# Patient Record
Sex: FEMALE | Race: WHITE | NOT HISPANIC OR LATINO | Employment: FULL TIME | URBAN - METROPOLITAN AREA
[De-identification: names, ages, dates, MRNs, and addresses within clinical notes are randomized per-mention and may not be internally consistent; named-entity substitution may affect disease eponyms.]

---

## 2017-01-01 ENCOUNTER — TELEPHONE (OUTPATIENT)
Dept: MEDICAL GROUP | Facility: MEDICAL CENTER | Age: 62
End: 2017-01-01

## 2017-01-01 ENCOUNTER — APPOINTMENT (OUTPATIENT)
Dept: VASCULAR LAB | Facility: MEDICAL CENTER | Age: 62
End: 2017-01-01
Attending: INTERNAL MEDICINE
Payer: COMMERCIAL

## 2017-01-01 ENCOUNTER — TELEPHONE (OUTPATIENT)
Dept: VASCULAR LAB | Facility: MEDICAL CENTER | Age: 62
End: 2017-01-01

## 2017-01-01 ENCOUNTER — HOSPITAL ENCOUNTER (OUTPATIENT)
Dept: RADIOLOGY | Facility: MEDICAL CENTER | Age: 62
End: 2017-12-03
Attending: INTERNAL MEDICINE
Payer: COMMERCIAL

## 2017-01-01 ENCOUNTER — ANTICOAGULATION MONITORING (OUTPATIENT)
Dept: VASCULAR LAB | Facility: MEDICAL CENTER | Age: 62
End: 2017-01-01

## 2017-01-01 ENCOUNTER — OFFICE VISIT (OUTPATIENT)
Dept: MEDICAL GROUP | Facility: MEDICAL CENTER | Age: 62
End: 2017-01-01
Payer: COMMERCIAL

## 2017-01-01 VITALS — HEART RATE: 83 BPM | DIASTOLIC BLOOD PRESSURE: 55 MMHG | SYSTOLIC BLOOD PRESSURE: 143 MMHG

## 2017-01-01 VITALS
BODY MASS INDEX: 43.94 KG/M2 | DIASTOLIC BLOOD PRESSURE: 70 MMHG | TEMPERATURE: 97.2 F | RESPIRATION RATE: 14 BRPM | HEART RATE: 86 BPM | WEIGHT: 238.8 LBS | HEIGHT: 62 IN | OXYGEN SATURATION: 98 % | SYSTOLIC BLOOD PRESSURE: 120 MMHG

## 2017-01-01 DIAGNOSIS — I82.403 ACUTE DEEP VEIN THROMBOSIS (DVT) OF BOTH LOWER EXTREMITIES, UNSPECIFIED VEIN (HCC): ICD-10-CM

## 2017-01-01 DIAGNOSIS — I82.403 DEEP VEIN THROMBOSIS (DVT) OF BOTH LOWER EXTREMITIES, UNSPECIFIED CHRONICITY, UNSPECIFIED VEIN (HCC): ICD-10-CM

## 2017-01-01 DIAGNOSIS — Z00.00 PREVENTATIVE HEALTH CARE: ICD-10-CM

## 2017-01-01 DIAGNOSIS — C78.7 ADENOCARCINOMA OF COLON METASTATIC TO LIVER (HCC): ICD-10-CM

## 2017-01-01 DIAGNOSIS — Z12.31 ENCOUNTER FOR SCREENING MAMMOGRAM FOR MALIGNANT NEOPLASM OF BREAST: ICD-10-CM

## 2017-01-01 DIAGNOSIS — Z93.3 COLOSTOMY IN PLACE (HCC): ICD-10-CM

## 2017-01-01 DIAGNOSIS — Z79.01 CHRONIC ANTICOAGULATION: ICD-10-CM

## 2017-01-01 DIAGNOSIS — C18.9 ADENOCARCINOMA OF COLON METASTATIC TO LIVER (HCC): ICD-10-CM

## 2017-01-01 DIAGNOSIS — C18.7 MALIGNANT NEOPLASM OF SIGMOID COLON (HCC): ICD-10-CM

## 2017-01-01 DIAGNOSIS — Z00.00 HEALTH CARE MAINTENANCE: ICD-10-CM

## 2017-01-01 LAB
INR BLD: 1.9 (ref 0.9–1.2)
INR BLD: 2.2 (ref 0.9–1.2)
INR BLD: 2.3 (ref 0.9–1.2)
INR BLD: 2.9 (ref 0.9–1.2)
INR BLD: 3.1 (ref 0.9–1.2)
INR BLD: 3.9 (ref 0.9–1.2)
INR PPP: 1.9 (ref 2–3.5)
INR PPP: 2.2 (ref 2–3.5)
INR PPP: 2.3 (ref 2–3.5)
INR PPP: 2.9 (ref 2–3.5)
INR PPP: 3.1 (ref 2–3.5)
INR PPP: 3.9 (ref 2–3.5)

## 2017-01-01 PROCEDURE — 99214 OFFICE O/P EST MOD 30 MIN: CPT | Performed by: INTERNAL MEDICINE

## 2017-01-01 PROCEDURE — 85610 PROTHROMBIN TIME: CPT

## 2017-01-01 PROCEDURE — 99212 OFFICE O/P EST SF 10 MIN: CPT | Performed by: NURSE PRACTITIONER

## 2017-01-01 PROCEDURE — 700117 HCHG RX CONTRAST REV CODE 255: Performed by: INTERNAL MEDICINE

## 2017-01-01 PROCEDURE — 99211 OFF/OP EST MAY X REQ PHY/QHP: CPT | Performed by: NURSE PRACTITIONER

## 2017-01-01 PROCEDURE — 71260 CT THORAX DX C+: CPT

## 2017-01-01 RX ADMIN — IOHEXOL 100 ML: 350 INJECTION, SOLUTION INTRAVENOUS at 09:46

## 2017-01-01 RX ADMIN — IOHEXOL 50 ML: 240 INJECTION, SOLUTION INTRATHECAL; INTRAVASCULAR; INTRAVENOUS; ORAL at 08:40

## 2017-01-04 ENCOUNTER — ANTICOAGULATION MONITORING (OUTPATIENT)
Dept: VASCULAR LAB | Facility: MEDICAL CENTER | Age: 62
End: 2017-01-04
Attending: NURSE PRACTITIONER
Payer: COMMERCIAL

## 2017-01-04 DIAGNOSIS — I82.403 DEEP VEIN THROMBOSIS (DVT) OF BOTH LOWER EXTREMITIES, UNSPECIFIED CHRONICITY, UNSPECIFIED VEIN (HCC): ICD-10-CM

## 2017-01-04 LAB
INR BLD: 2.3 (ref 0.9–1.2)
INR PPP: 2.3 (ref 2–3.5)

## 2017-01-04 PROCEDURE — 85610 PROTHROMBIN TIME: CPT

## 2017-01-04 PROCEDURE — 99211 OFF/OP EST MAY X REQ PHY/QHP: CPT | Performed by: NURSE PRACTITIONER

## 2017-01-04 NOTE — MR AVS SNAPSHOT
Tamara Hollinsnavin Flynn   2017 11:45 AM   Anticoagulation Monitoring   MRN: [id number]    Department:  Vascular Medicine   Dept Phone:  [phone]    Description:  Female : 1955   Provider:  [facility] EXAM 4           Allergies as of 2017     No Known Allergies      Vital Signs     Smoking Status                   Never Smoker            Basic Information     Date Of Birth Sex Race Ethnicity Preferred Language    1955 Female White Non- English      Your appointments     2017 11:45 AM   Established Patient with [facility] EXAM 4   [facility]  (--)    [address]  [place] [address]   [phone]            2017 11:00 AM   Established Patient with Maritza Esparza M.D.   [facility]    [address]  [address]  [place] [address] [phone]           You will be receiving a confirmation call a few days before your appointment from our automated call confirmation system.              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 K63.2 High  2016 - Present    Deep vein thrombosis (DVT) of both lower extremities (HCC) I82.403   2016 - Present    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   2016 - Present    Encounter for screening mammogram for malignant neoplasm of breast Z12.31   2016 - Present      Health Maintenance        Date Due Completion  Dates    IMM DTaP/Tdap/Td Vaccine (1 - Tdap) 10/23/1973 ---    PAP SMEAR 10/23/1975 ---    COLONOSCOPY 10/23/2004 ---    IMM ZOSTER VACCINE 10/23/2014 ---    MAMMOGRAM 11/1/2017 11/1/2016            Results     POCT Protime      Component    INR    2.3                        Current Immunizations     Influenza Vaccine Quad Inj (Pf) 11/12/2015    Influenza Vaccine Quad Inj (Preserved) 10/10/2016      Below and/or attached are the medications your provider expects you to take. Review all of your home medications and newly ordered medications with your provider and/or pharmacist. Follow medication instructions as directed by your provider and/or pharmacist. Please keep your medication list with you and share with your provider. Update the information when medications are discontinued, doses are changed, or new medications (including over-the-counter products) are added; and carry medication information at all times in the event of emergency situations     Allergies:  No Known Allergies          Medications  Valid as of: January 04, 2017 - 11:58 AM    Generic Name Brand Name Tablet Size Instructions for use    Aspirin-Caffeine (Tab) Aspirin-Caffeine 500-32.5 MG Take 2 Tabs by mouth 1 time daily as needed.        Cholecalciferol (Cap) Vitamin D 2000 UNITS Take 2 Caps by mouth every day.        Furosemide (Tab) LASIX 20 MG Take 20 mg by mouth 1 time daily as needed.        Multiple Vitamins-Calcium   Take 1 Tab by mouth every day.        Oxybutynin Chloride (Tab) DITROPAN 5 MG Take 5 mg by mouth 3 times a day as needed.        Oxycodone-Acetaminophen (Tab) PERCOCET 5-325 MG Take 1-2 Tabs by mouth every four hours as needed.        Warfarin Sodium (Tab) COUMADIN 6 MG Take 1 tab by mouth daily or as directed by coumadin melo        .                 Medicines prescribed today were sent to:     [facility]  [place] (), [place] - [address]    [address] [place] [address] [place] () [place] [address]    Phone: [phone]  Fax: [phone]    Open 24 Hours?: No      Medication refill instructions:       If your prescription bottle indicates you have medication refills left, it is not necessary to call your provider’s office. Please contact your pharmacy and they will refill your medication.    If your prescription bottle indicates you do not have any refills left, you may request refills at any time through one of the following ways: The online [employer] system (except Urgent Care), by calling your provider’s office, or by asking your pharmacy to contact your provider’s office with a refill request. Medication refills are processed only during regular business hours and may not be available until the next business day. Your provider may request additional information or to have a follow-up visit with you prior to refilling your medication.   *Please Note: Medication refills are assigned a new Rx number when refilled electronically. Your pharmacy may indicate that no refills were authorized even though a new prescription for the same medication is available at the pharmacy. Please request the medicine by name with the pharmacy before contacting your provider for a refill.        Warfarin Dosing Calendar   January 2017 Details    Sun Mon Tue Wed Thu Fri Sat     1               2               3               4   2.3   3 mg   See details      5      6 mg         6      3 mg         7      6 mg           8      6 mg         9      3 mg         10      6 mg         11      3 mg         12      6 mg         13      3 mg         14      6 mg           15      6 mg         16      3 mg         17      6 mg         18      3 mg         19      6 mg         20      3 mg         21      6 mg           22      6 mg         23      3 mg         24      6 mg         25      3 mg         26      6 mg         27      3 mg         28      6 mg           29      6 mg         30      3 mg         31      6 mg              Date Details   01/04 This INR  check   INR: 2.3               How to take your warfarin dose     To take:  3 mg Take 0.5 of a 6 mg tablet.    To take:  6 mg Take 1 of the 6 mg tablets.           Warfarin Dosing Calendar   February 2017 Details    Sun Mon Tue Wed Thu Fri Sat        1      3 mg         2               3               4                 5               6               7               8               9               10               11                 12               13               14               15               16               17               18                 19               20               21               22               23               24               25                 26               27               28                    Date Details   No additional details    Date of next INR:  2/1/2017         How to take your warfarin dose     To take:  3 mg Take 0.5 of a 6 mg tablet.              [employer] Access Code: Activation code not generated  Current [employer] Status: Active

## 2017-01-04 NOTE — PROGRESS NOTES
Anticoagulation Summary as of 1/4/2017     INR goal 2.0-3.0   Selected INR 2.3 (1/4/2017)   Maintenance plan 3 mg (6 mg x 0.5) on Mon, Wed, Fri; 6 mg (6 mg x 1) all other days   Weekly total 33 mg   No change documented Re Briones A.P.N.   Plan last modified ASTRID ShinP.ZACH (11/7/2016)   Next INR check 2/1/2017   Target end date Indefinite    Indications   Deep vein thrombosis (DVT) of both lower extremities (HCC) [I82.403]         Anticoagulation Episode Summary     INR check location     Preferred lab     Send INR reminders to     [facility]      Anticoagulation Care Providers     Provider Role Specialty Phone number    Yimi Martinez M.D. Referring Surgery [phone]    DIMITRI SchofieldD Responsible      [facility] Responsible  [phone]        Patient is therapeutic today. Denies any medication or diet changes. No current symptoms of bleeding or thrombosis reported. Continue current regimen. Follow up in 4 weeks.    Next Appointment: Wednesday, February 1, 2017 at 11:45 am.     Re HUITRON

## 2017-01-18 ENCOUNTER — ANTICOAGULATION MONITORING (OUTPATIENT)
Dept: VASCULAR LAB | Facility: MEDICAL CENTER | Age: 62
End: 2017-01-18

## 2017-01-18 DIAGNOSIS — I82.403 DEEP VEIN THROMBOSIS (DVT) OF BOTH LOWER EXTREMITIES, UNSPECIFIED CHRONICITY, UNSPECIFIED VEIN (HCC): ICD-10-CM

## 2017-01-19 ENCOUNTER — ANTICOAGULATION VISIT (OUTPATIENT)
Dept: VASCULAR LAB | Facility: MEDICAL CENTER | Age: 62
End: 2017-01-19
Attending: NURSE PRACTITIONER
Payer: COMMERCIAL

## 2017-01-19 DIAGNOSIS — I82.403 DEEP VEIN THROMBOSIS (DVT) OF BOTH LOWER EXTREMITIES, UNSPECIFIED CHRONICITY, UNSPECIFIED VEIN (HCC): ICD-10-CM

## 2017-01-19 LAB — INR PPP: 2.2 (ref 2–3.5)

## 2017-01-19 PROCEDURE — 85610 PROTHROMBIN TIME: CPT

## 2017-01-19 PROCEDURE — 99213 OFFICE O/P EST LOW 20 MIN: CPT

## 2017-01-19 NOTE — PATIENT INSTRUCTIONS
Pt is scheduled for breast needle biopsy 1-   Stated weight 214 lbs  ClCr >60ml/min    January 20 - last dose of warfarin   January 21 - NO WARFARIN, Lovenox 100mg twice daily sub q  January 22 - NO WARFARIN, Lovenox 100mg twice daily sub q  January 23 - NO WARFARIN, Lovenox 100mg twice daily sub q  January 24 - No warfarin, Lovenox in the morning ONLY  January 25- Procedure day - may start warfarin after procedure if ok with MD  January 26 - Warfarin 6mg  AND Lovenox 100mg twice daily   January 27-  Warfarin 6mg  AND Lovenox 100mg twice daily   January 28- Warfarin 6mg  AND Lovenox 100mg twice daily   January 29- Warfarin 6mg  AND Lovenox 100mg twice daily   January 30-test INR Lovenox 100mg in the am

## 2017-01-19 NOTE — PROGRESS NOTES
Anticoagulation Summary as of 1/19/2017     INR goal 2.0-3.0   Selected INR 2.2 (1/19/2017)   Maintenance plan 3 mg (6 mg x 0.5) on Mon, Wed, Fri; 6 mg (6 mg x 1) all other days   Weekly total 33 mg   Plan last modified DEBBI Shin (11/7/2016)   Next INR check 1/30/2017   Target end date Indefinite    Indications   Deep vein thrombosis (DVT) of both lower extremities (CMS-HCC) [I82.403]         Anticoagulation Episode Summary     INR check location     Preferred lab     Send INR reminders to     [facility]      Anticoagulation Care Providers     Provider Role Specialty Phone number    Yimi Martinez M.D. Referring Surgery [phone]    Jael Garcia PHARMD Responsible      [facility] Responsible  [phone]        Pt is therapeutic today. Pt is to continue with current warfarin dosing regimen.  Pt denies any unusual s/s of bleeding, bruising, clotting or any changes to diet or medications.  Follow up in 2 weeks.    Jael Garcia PHARMD      Pt is scheduled for breast needle biopsy 1-   Stated weight 214 lbs  ClCr >60ml/min    January 20 - last dose of warfarin   January 21 - NO WARFARIN, Lovenox 100mg twice daily sub q  January 22 - NO WARFARIN, Lovenox 100mg twice daily sub q  January 23 - NO WARFARIN, Lovenox 100mg twice daily sub q  January 24 - No warfarin, Lovenox in the morning ONLY  January 25- Procedure day - may start warfarin after procedure if ok with MD  January 26 - Warfarin 6mg  AND Lovenox 100mg twice daily   January 27-  Warfarin 6mg  AND Lovenox 100mg twice daily   January 28- Warfarin 6mg  AND Lovenox 100mg twice daily   January 29- Warfarin 6mg  AND Lovenox 100mg twice daily   January 30-test INR Lovenox 100mg in the am

## 2017-01-19 NOTE — MR AVS SNAPSHOT
Tamara Caitie lFynn   2017 11:30 AM   Anticoagulation Visit   MRN: [id number]    Department:  Vascular Medicine   Dept Phone:  [phone]    Description:  Female : 1955   Provider:  [facility] EXAM 1           Allergies as of 2017     No Known Allergies      You were diagnosed with     Deep vein thrombosis (DVT) of both lower extremities, unspecified chronicity, unspecified vein (CMS-HCC)   [4128543]         Vital Signs     Smoking Status                   Never Smoker            Basic Information     Date Of Birth Sex Race Ethnicity Preferred Language    1955 Female White Non- English      Your appointments     2017  8:00 AM   CT [id number] with [facility] CT 1, NURSE, IR IMAGING(CT/ULTRASOUND), RADIOLOGIST, CT/US INTERVENTIONAL [facility] IMAGING - CT - [facility]    [address] [address]   [phone]            2017 10:30 AM   Established Patient with [facility] EXAM 5   [facility]  (--)    [address] [address]   [phone]            2017 11:45 AM   Established Patient with [facility] EXAM 4   [facility]  (--)    [address] [address]   [phone]            2017 11:00 AM   Established Patient with Maritza Esparza M.D.   [facility]    [address]  [address]  [facility] [id number] [phone]           You will be receiving a confirmation call a few days before your appointment from our automated call confirmation system.              Problem List              ICD-10-CM Priority Class Noted - Resolved    Morbid obesity with BMI of 40.0-44.9, adult (CMS-HCC) E66.01, Z68.41   2015 - Present    Health care maintenance Z00.00   2015 - Present    Prediabetes R73.03   2015 - Present    Adenocarcinoma of sigmoid  colon (CMS-HCC) C18.7   3/22/2016 - Present    Family history of primary TB Z83.6 Low  4/20/2016 - Present    Adenocarcinoma of colon metastatic to liver (CMS-HCC) C18.9, C78.7 High  4/20/2016 - Present    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 K63.2 High  4/20/2016 - Present    Deep vein thrombosis (DVT) of both lower extremities (CMS-[facility]) I82.403   5/23/2016 - Present    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 (CMS-[facility]) C78.5   8/9/2016 - Present    Encounter for screening mammogram for malignant neoplasm of breast Z12.31   9/20/2016 - Present      Health Maintenance        Date Due Completion Dates    IMM DTaP/Tdap/Td Vaccine (1 - Tdap) 10/23/1973 ---    PAP SMEAR 10/23/1975 ---    COLONOSCOPY 10/23/2004 ---    IMM ZOSTER VACCINE 10/23/2014 ---    MAMMOGRAM 11/1/2017 11/1/2016            Results     POCT Protime      Component    INR    2.2                        Current Immunizations     Influenza Vaccine Quad Inj (Pf) 11/12/2015    Influenza Vaccine Quad Inj (Preserved) 10/10/2016      Below and/or attached are the medications your provider expects you to take. Review all of your home medications and newly ordered medications with your provider and/or pharmacist. Follow medication instructions as directed by your provider and/or pharmacist. Please keep your medication list with you and share with your provider. Update the information when medications are discontinued, doses are changed, or new medications (including over-the-counter products) are added; and carry medication information at all times in the event of emergency situations     Allergies:  No Known Allergies          Medications  Valid as of: January 19, 2017 - 11:56 AM    Generic Name Brand Name Tablet Size Instructions for use    Aspirin-Caffeine (Tab) Aspirin-Caffeine 500-32.5 MG Take 2 Tabs by mouth 1 time daily as needed.        Cholecalciferol (Cap) Vitamin D 2000 UNITS Take 2 Caps by mouth every day.        Furosemide (Tab)  LASIX 20 MG Take 20 mg by mouth 1 time daily as needed.        Multiple Vitamins-Calcium   Take 1 Tab by mouth every day.        Oxybutynin Chloride (Tab) DITROPAN 5 MG Take 5 mg by mouth 3 times a day as needed.        Oxycodone-Acetaminophen (Tab) PERCOCET 5-325 MG Take 1-2 Tabs by mouth every four hours as needed.        Warfarin Sodium (Tab) COUMADIN 6 MG Take 1 tab by mouth daily or as directed by coumadin melo        .                 Medicines prescribed today were sent to:     [facility] [address] [place] [address] (), [place] - [address]    [address] () [place] [address]    Phone: [phone] Fax: [phone]    Open 24 Hours?: No      Medication refill instructions:       If your prescription bottle indicates you have medication refills left, it is not necessary to call your provider’s office. Please contact your pharmacy and they will refill your medication.    If your prescription bottle indicates you do not have any refills left, you may request refills at any time through one of the following ways: The online [employer] system (except Urgent Care), by calling your provider’s office, or by asking your pharmacy to contact your provider’s office with a refill request. Medication refills are processed only during regular business hours and may not be available until the next business day. Your provider may request additional information or to have a follow-up visit with you prior to refilling your medication.   *Please Note: Medication refills are assigned a new Rx number when refilled electronically. Your pharmacy may indicate that no refills were authorized even though a new prescription for the same medication is available at the pharmacy. Please request the medicine by name with the pharmacy before contacting your provider for a refill.        Instructions    Pt is scheduled for breast needle biopsy 1-   Stated weight 214 lbs  ClCr >60ml/min    January 20 - last dose of warfarin      January 21 - NO WARFARIN, Lovenox 100mg twice daily sub q  January 22 - NO WARFARIN, Lovenox 100mg twice daily sub q  January 23 - NO WARFARIN, Lovenox 100mg twice daily sub q  January 24 - No warfarin, Lovenox in the morning ONLY  January 25- Procedure day - may start warfarin after procedure if ok with MD  January 26 - Warfarin 6mg  AND Lovenox 100mg twice daily   January 27-  Warfarin 6mg  AND Lovenox 100mg twice daily   January 28- Warfarin 6mg  AND Lovenox 100mg twice daily   January 29- Warfarin 6mg  AND Lovenox 100mg twice daily   January 30-test INR Lovenox 100mg in the am         Warfarin Dosing Calendar   January 2017 Details    Sun Mon Tue Wed Thu Fri Sat     1               2               3               4               5               6               7                 8               9               10               11               12               13               14                 15               16               17               18               19   2.2   6 mg   See details      20      3 mg         21      Hold           22      Hold         23      Hold         24      Hold         25      6 mg         26      6 mg         27      6 mg         28      6 mg           29      6 mg         30      3 mg         31                    Date Details   01/19 This INR check   INR: 2.2       Date of next INR:  1/30/2017         How to take your warfarin dose     To take:  3 mg Take 0.5 of a 6 mg tablet.    To take:  6 mg Take 1 of the 6 mg tablets.    Hold Do not take your warfarin dose. See the Details table to the right for additional instructions.                   [employer] Access Code: Activation code not generated  Current [employer] Status: Active

## 2017-01-20 LAB — INR BLD: 2.2 (ref 0.9–1.2)

## 2017-01-24 RX ORDER — ACETAMINOPHEN 500 MG
500-1000 TABLET ORAL PRN
Status: ON HOLD | COMMUNITY
End: 2018-01-01

## 2017-01-25 ENCOUNTER — HOSPITAL ENCOUNTER (OUTPATIENT)
Facility: MEDICAL CENTER | Age: 62
End: 2017-01-25
Attending: INTERNAL MEDICINE | Admitting: INTERNAL MEDICINE
Payer: COMMERCIAL

## 2017-01-25 ENCOUNTER — APPOINTMENT (OUTPATIENT)
Dept: RADIOLOGY | Facility: MEDICAL CENTER | Age: 62
End: 2017-01-25
Attending: INTERNAL MEDICINE
Payer: COMMERCIAL

## 2017-01-25 VITALS
HEIGHT: 61 IN | DIASTOLIC BLOOD PRESSURE: 69 MMHG | TEMPERATURE: 98 F | HEART RATE: 77 BPM | RESPIRATION RATE: 16 BRPM | BODY MASS INDEX: 40.4 KG/M2 | SYSTOLIC BLOOD PRESSURE: 146 MMHG | OXYGEN SATURATION: 99 % | WEIGHT: 214 LBS

## 2017-01-25 DIAGNOSIS — C18.7 MALIGNANT NEOPLASM OF SIGMOID COLON (HCC): ICD-10-CM

## 2017-01-25 LAB
INR PPP: 0.93 (ref 0.87–1.13)
PROTHROMBIN TIME: 12.7 SEC (ref 12–14.6)

## 2017-01-25 PROCEDURE — 700102 HCHG RX REV CODE 250 W/ 637 OVERRIDE(OP): Performed by: RADIOLOGY

## 2017-01-25 PROCEDURE — 4410213 CT-NEEDLE BX-ABD-RETROPERITONL

## 2017-01-25 PROCEDURE — 700111 HCHG RX REV CODE 636 W/ 250 OVERRIDE (IP): Performed by: RADIOLOGY

## 2017-01-25 PROCEDURE — A9270 NON-COVERED ITEM OR SERVICE: HCPCS | Performed by: RADIOLOGY

## 2017-01-25 PROCEDURE — 85610 PROTHROMBIN TIME: CPT

## 2017-01-25 PROCEDURE — 700111 HCHG RX REV CODE 636 W/ 250 OVERRIDE (IP)

## 2017-01-25 PROCEDURE — 700101 HCHG RX REV CODE 250

## 2017-01-25 PROCEDURE — 88305 TISSUE EXAM BY PATHOLOGIST: CPT

## 2017-01-25 RX ORDER — ONDANSETRON 2 MG/ML
4 INJECTION INTRAMUSCULAR; INTRAVENOUS EVERY 8 HOURS PRN
Status: DISCONTINUED | OUTPATIENT
Start: 2017-01-25 | End: 2017-01-25 | Stop reason: HOSPADM

## 2017-01-25 RX ORDER — NALOXONE HYDROCHLORIDE 0.4 MG/ML
INJECTION, SOLUTION INTRAMUSCULAR; INTRAVENOUS; SUBCUTANEOUS
Status: COMPLETED
Start: 2017-01-25 | End: 2017-01-25

## 2017-01-25 RX ORDER — ONDANSETRON 2 MG/ML
4 INJECTION INTRAMUSCULAR; INTRAVENOUS PRN
Status: DISCONTINUED | OUTPATIENT
Start: 2017-01-25 | End: 2017-01-25 | Stop reason: HOSPADM

## 2017-01-25 RX ORDER — SODIUM CHLORIDE 9 MG/ML
500 INJECTION, SOLUTION INTRAVENOUS PRN
Status: DISCONTINUED | OUTPATIENT
Start: 2017-01-25 | End: 2017-01-25 | Stop reason: HOSPADM

## 2017-01-25 RX ORDER — MIDAZOLAM HYDROCHLORIDE 1 MG/ML
.5-2 INJECTION INTRAMUSCULAR; INTRAVENOUS PRN
Status: DISCONTINUED | OUTPATIENT
Start: 2017-01-25 | End: 2017-01-25 | Stop reason: HOSPADM

## 2017-01-25 RX ORDER — MIDAZOLAM HYDROCHLORIDE 1 MG/ML
INJECTION INTRAMUSCULAR; INTRAVENOUS
Status: COMPLETED
Start: 2017-01-25 | End: 2017-01-25

## 2017-01-25 RX ORDER — FLUMAZENIL 0.1 MG/ML
INJECTION INTRAVENOUS
Status: COMPLETED
Start: 2017-01-25 | End: 2017-01-25

## 2017-01-25 RX ORDER — ACETAMINOPHEN 500 MG
1000 TABLET ORAL EVERY 6 HOURS
Status: DISCONTINUED | OUTPATIENT
Start: 2017-01-25 | End: 2017-01-25 | Stop reason: HOSPADM

## 2017-01-25 RX ADMIN — MIDAZOLAM HYDROCHLORIDE 2 MG: 1 INJECTION, SOLUTION INTRAMUSCULAR; INTRAVENOUS at 09:20

## 2017-01-25 RX ADMIN — FENTANYL CITRATE 50 MCG: 50 INJECTION, SOLUTION INTRAMUSCULAR; INTRAVENOUS at 09:30

## 2017-01-25 RX ADMIN — FENTANYL CITRATE 25 MCG: 50 INJECTION, SOLUTION INTRAMUSCULAR; INTRAVENOUS at 09:10

## 2017-01-25 RX ADMIN — MIDAZOLAM HYDROCHLORIDE 1 MG: 1 INJECTION, SOLUTION INTRAMUSCULAR; INTRAVENOUS at 09:21

## 2017-01-25 RX ADMIN — FENTANYL CITRATE 25 MCG: 50 INJECTION, SOLUTION INTRAMUSCULAR; INTRAVENOUS at 09:31

## 2017-01-25 RX ADMIN — MIDAZOLAM HYDROCHLORIDE 1 MG: 1 INJECTION, SOLUTION INTRAMUSCULAR; INTRAVENOUS at 09:10

## 2017-01-25 RX ADMIN — MIDAZOLAM 1 MG: 1 INJECTION INTRAMUSCULAR; INTRAVENOUS at 09:10

## 2017-01-25 RX ADMIN — FENTANYL CITRATE 25 MCG: 50 INJECTION, SOLUTION INTRAMUSCULAR; INTRAVENOUS at 09:26

## 2017-01-25 RX ADMIN — FENTANYL CITRATE 50 MCG: 50 INJECTION, SOLUTION INTRAMUSCULAR; INTRAVENOUS at 09:20

## 2017-01-25 RX ADMIN — FENTANYL CITRATE 25 MCG: 50 INJECTION, SOLUTION INTRAMUSCULAR; INTRAVENOUS at 09:21

## 2017-01-25 RX ADMIN — MIDAZOLAM HYDROCHLORIDE 2 MG: 1 INJECTION, SOLUTION INTRAMUSCULAR; INTRAVENOUS at 09:26

## 2017-01-25 ASSESSMENT — PAIN SCALES - GENERAL
PAINLEVEL_OUTOF10: 0

## 2017-01-25 NOTE — PROGRESS NOTES
IR RN note:    Biopsy of Peritoneal Implant by MD Mcadams assisted by CT tech Rosangela,Right mid abdomen access site;  X 4 cores in formalin, x 1 core in RPMI sent to lab  Patient tolerated procedure, hemodynamically stable; report given to PPU COLLETTE Florian;   patient transported back to PPU via RN

## 2017-01-25 NOTE — OR NURSING
0949: Pt transferred to PPU 1 at this time. Denies any pain or discomfort. Dressing to R mid upper abd, CDI.  called and notified of d/c time.     1015: R mid upper abd dressing CDI.

## 2017-01-25 NOTE — IP AVS SNAPSHOT
" After Visit Summary                                                                                                                Name:Tamara Flynn  Medical Record Number:[id number]  CSN: [id number]    YOB: 1955   Age: 61 y.o.  Sex: female  HT:1.549 m (5' 0.98\") WT: 97.07 kg (214 lb)          Admit Date: 1/25/2017     Discharge Date:   Today's Date: 1/25/2017  Attending Doctor:  Margo Carvajal M.D.                  Allergies:  Review of patient's allergies indicates no known allergies.              Follow-up Information     1. Follow up with Margo Carvajal M.D. In 1 week.    Specialty:  Oncology    Contact information    [address]   [address]  [facility] [address] [phone]          Discharge Instructions         ACTIVITY: Rest and take it easy for the first 24 hours.  A responsible adult is recommended to remain with you during that time.  It is normal to feel sleepy.  We encourage you to not do anything that requires balance, judgment or coordination.    MILD FLU-LIKE SYMPTOMS ARE NORMAL. YOU MAY EXPERIENCE GENERALIZED MUSCLE ACHES, THROAT IRRITATION, HEADACHE AND/OR SOME NAUSEA.    FOR 24 HOURS DO NOT:  Drive, operate machinery or run household appliances.  Drink beer or alcoholic beverages.   Make important decisions or sign legal documents.    SPECIAL INSTRUCTIONS:     Biopsy  A biopsy is a procedure in which small samples of tissue are removed from the body. The tissue is examined under a microscope. A biopsy may be done to determine the cause (diagnosis) of a condition or mass (tumor). A biopsy may also be done to determine the best treatment for you. In some instances, a biopsy may be performed on normal tissue to determine if cancer has spread or if a transplanted organ is being rejected. There are 2 ways to obtain samples:  · Fine needle biopsy. Samples are removed using a thin needle inserted through the skin.   · Open biopsy. Samples are removed after a cut (incision) is made " through the skin.   LET YOUR CAREGIVER KNOW ABOUT:  · Allergies to food or medicine.   · Medicines taken, including vitamins, herbs, eyedrops, over-the-counter medicines, and creams.   · Use of steroids (by mouth or creams).   · Previous problems with anesthetics or numbing medicines.   · History of bleeding problems or blood clots.   · Previous surgery.   · Other health problems, including diabetes and kidney problems.   · Possibility of pregnancy, if this applies.   RISKS AND COMPLICATIONS  · Bleeding from the biopsy site. The risk of bleeding is higher if you have a bleeding disorder or are taking any blood thinning medicines (anticoagulants).   · Infection.   · Injury to organs or structures near the biopsy site.   · Chronic pain at the biopsy site. This is defined as pain that lasts for more than 3 months.   · Very rarely, a second biopsy may be required if not enough tissue was collected during the first biopsy.   BEFORE THE PROCEDURE  Ask your caregiver what time you need to arrive for your procedure. Ask your caregiver whether you need to stop eating or drinking (fast) before your procedure. Ask your caregiver about changing or stopping your regular medicines. A blood sample may be done to determine your blood clotting time. Medicine may be given to help you relax (sedative).  PROCEDURE  During a fine needle biopsy, you will be awake during the procedure. You will be positioned to allow the best possible access to the biopsy site. Let your caregiver know if the position is not comfortable. The biopsy site will be cleaned. A needle is inserted through your skin. You may feel mild discomfort during this procedure. The needle is withdrawn once tissue samples have been removed. Pressure may be applied to the biopsy site to reduce swelling and to ensure that bleeding has stopped. The samples will be sent to be examined.  During an open biopsy, you may be given medicine that numbs the area (local anesthetic) or  medicine that makes you sleep (general anesthetic). An incision is made through the skin. A tissue sample or the entire mass is removed. The sample or mass will be sent to be examined. Sometimes, the sample or mass may be examined during the procedure. If the sample or mass contains cancer cells, further tissue or structures may be removed. The incision is then closed with stitches (sutures) or skin glue (adhesive).  AFTER THE PROCEDURE  Your recovery will be assessed and monitored. If there are no problems, you should be able to go home shortly after the procedure (outpatient). You will need to arrange for someone to drive you home if you received a sedative or pain relieving medicine during the procedure. Ask when your test results will be ready. Make sure you get your test results.  Document Released: 12/15/2001 Document Revised: 03/11/2013 Document Reviewed: 06/14/2012  [employer]® Patient Information ©2013 [employer].    DIET: To avoid nausea, slowly advance diet as tolerated, avoiding spicy or greasy foods for the first day.  Add more substantial food to your diet according to your physician's instructions.  Babies can be fed formula or breast milk as soon as they are hungry.  INCREASE FLUIDS AND FIBER TO AVOID CONSTIPATION.    SURGICAL DRESSING/BATHING: May remove bandaid in 24 hours. May shower in 24 hours. Do not submerge in water (bath/swimming) for one week.     FOLLOW-UP APPOINTMENT:  A follow-up appointment should be arranged with your doctor; call to schedule.    You should CALL YOUR PHYSICIAN if you develop:  Fever greater than 101 degrees F.  Pain not relieved by medication, or persistent nausea or vomiting.  Excessive bleeding (blood soaking through dressing) or unexpected drainage from the wound.  Extreme redness or swelling around the incision site, drainage of pus or foul smelling drainage.  Inability to urinate or empty your bladder within 8 hours.  Problems with breathing or chest pain.    You  should call 911 if you develop problems with breathing or chest pain.  If you are unable to contact your doctor or surgical center, you should go to the nearest emergency room or urgent care center.  Physician's telephone #: Dr. Carvajal [phone]      If any questions arise, call your doctor.  If your doctor is not available, please feel free to call the Surgical Center at [phone].  The Center is open Monday through Friday from 7AM to 7PM.  You can also call the HEALTH HOTLINE open 24 hours/day, 7 days/week and speak to a nurse at [phone], or toll free at [phone].    A registered nurse may call you a few days after your surgery to see how you are doing after your procedure.    MEDICATIONS: Resume taking daily medication.  Take prescribed pain medication with food.  If no medication is prescribed, you may take non-aspirin pain medication if needed.  PAIN MEDICATION CAN BE VERY CONSTIPATING.  Take a stool softener or laxative such as senokot, pericolace, or milk of magnesia if needed.        If your physician has prescribed pain medication that includes Acetaminophen (Tylenol), do not take additional Acetaminophen (Tylenol) while taking the prescribed medication.    Depression / Suicide Risk    As you are discharged from this [facility] facility, it is important to learn how to keep safe from harming yourself.    Recognize the warning signs:  · Abrupt changes in personality, positive or negative- including increase in energy   · Giving away possessions  · Change in eating patterns- significant weight changes-  positive or negative  · Change in sleeping patterns- unable to sleep or sleeping all the time   · Unwillingness or inability to communicate  · Depression  · Unusual sadness, discouragement and loneliness  · Talk of wanting to die  · Neglect of personal appearance   · Rebelliousness- reckless behavior  · Withdrawal from people/activities they love  · Confusion- inability to  concentrate     If you or a loved one observes any of these behaviors or has concerns about self-harm, here's what you can do:  · Talk about it- your feelings and reasons for harming yourself  · Remove any means that you might use to hurt yourself (examples: pills, rope, extension cords, firearm)  · Get professional help from the community (Mental Health, Substance Abuse, psychological counseling)  · Do not be alone:Call your Safe Contact- someone whom you trust who will be there for you.  · Call your local CRISIS HOTLINE 702-5448 or 980-328-4156  · Call your local Children's Mobile Crisis Response Team [place] (483) 608-8492 or www.Oxonica  · Call the toll free National Suicide Prevention Hotlines   · National Suicide Prevention Lifeline 234-229-UWLQ (9152)  · [place] Hope Line Network 800-SUICIDE (586-5010)       Medication List      CONTINUE taking these medications        Instructions    acetaminophen 500 MG Tabs   Commonly known as:  TYLENOL    Take 500-1,000 mg by mouth as needed.   Dose:  500-1000 mg       vitamin D 1000 UNIT Tabs   Commonly known as:  cholecalciferol    Take 1,000 Units by mouth every day.   Dose:  1000 Units       warfarin 6 MG Tabs   Commonly known as:  COUMADIN    Take 1 tab by mouth daily or as directed by coumadin melo               Medication Information     Above and/or attached are the medications your physician expects you to take upon discharge. Review all of your home medications and newly ordered medications with your doctor and/or pharmacist. Follow medication instructions as directed by your doctor and/or pharmacist. Please keep your medication list with you and share with your physician. Update the information when medications are discontinued, doses are changed, or new medications (including over-the-counter products) are added; and carry medication information at all times in the event of emergency situations.        Resources     Quit Smoking / Tobacco  Use:    I understand the use of any tobacco products increases my chance of suffering from future heart disease or stroke and could cause other illnesses which may shorten my life. Quitting the use of tobacco products is the single most important thing I can do to improve my health. For further information on smoking / tobacco cessation call a Toll Free Quit Line at 1-710.317.1337 (*National [employer] [place]) or 1-451.468.9985 (American Lung Association) or you can access the web based program at www.lungusa.org.    [place] Tobacco Users Help Line:  (877) 417-8882       Toll Free: 1-630.850.4401  Quit Tobacco Program [facility] (133)931-0034    Crisis Hotline:    [place] Crisis Hotline:  3-289-SHTIQGW or 1-625.647.1305    [place] Crisis Hotline:    1-255.620.8099 or 332-489-5571    Discharge Survey:   Thank you for choosing [facility]. We hope we did everything we could to make your hospital stay a pleasant one. You may be receiving a survey and we would appreciate your time and participation in answering the questions. Your input is very valuable to us in our efforts to improve our service to our patients and their families.            Signatures     My signature on this form indicates that:    1. I acknowledge receipt and understanding of these Home Care Instruction.  2. My questions regarding this information have been answered to my satisfaction.  3. I have formulated a plan with my discharge nurse to obtain my prescribed medications for home.    __________________________________      __________________________________                   Patient Signature                                 Guardian/Responsible Adult Signature      __________________________________                 __________       ________                       Nurse Signature                                               Date                 Time

## 2017-01-25 NOTE — IP AVS SNAPSHOT
1/25/2017          Tamara Flynn  [address] [place] [address]    Dear Tamara:    Critical access hospital wants to ensure your discharge home is safe and you or your loved ones have had all your questions answered regarding your care after you leave the hospital.    You may receive a telephone call within two days of your discharge.  This call is to make certain you understand your discharge instructions as well as ensure we provided you with the best care possible during your stay with us.     The call will only last approximately 3-5 minutes and will be done by a nurse.    Once again, we want to ensure your discharge home is safe and that you have a clear understanding of any next steps in your care.  If you have any questions or concerns, please do not hesitate to contact us, we are here for you.  Thank you for choosing [facility] for your healthcare needs.    Sincerely,    Reg Brower    [facility]

## 2017-01-25 NOTE — DISCHARGE INSTRUCTIONS
ACTIVITY: Rest and take it easy for the first 24 hours.  A responsible adult is recommended to remain with you during that time.  It is normal to feel sleepy.  We encourage you to not do anything that requires balance, judgment or coordination.    MILD FLU-LIKE SYMPTOMS ARE NORMAL. YOU MAY EXPERIENCE GENERALIZED MUSCLE ACHES, THROAT IRRITATION, HEADACHE AND/OR SOME NAUSEA.    FOR 24 HOURS DO NOT:  Drive, operate machinery or run household appliances.  Drink beer or alcoholic beverages.   Make important decisions or sign legal documents.    SPECIAL INSTRUCTIONS:     Biopsy  A biopsy is a procedure in which small samples of tissue are removed from the body. The tissue is examined under a microscope. A biopsy may be done to determine the cause (diagnosis) of a condition or mass (tumor). A biopsy may also be done to determine the best treatment for you. In some instances, a biopsy may be performed on normal tissue to determine if cancer has spread or if a transplanted organ is being rejected. There are 2 ways to obtain samples:  · Fine needle biopsy. Samples are removed using a thin needle inserted through the skin.   · Open biopsy. Samples are removed after a cut (incision) is made through the skin.   LET YOUR CAREGIVER KNOW ABOUT:  · Allergies to food or medicine.   · Medicines taken, including vitamins, herbs, eyedrops, over-the-counter medicines, and creams.   · Use of steroids (by mouth or creams).   · Previous problems with anesthetics or numbing medicines.   · History of bleeding problems or blood clots.   · Previous surgery.   · Other health problems, including diabetes and kidney problems.   · Possibility of pregnancy, if this applies.   RISKS AND COMPLICATIONS  · Bleeding from the biopsy site. The risk of bleeding is higher if you have a bleeding disorder or are taking any blood thinning medicines (anticoagulants).   · Infection.   · Injury to organs or structures near the biopsy site.   · Chronic pain at  the biopsy site. This is defined as pain that lasts for more than 3 months.   · Very rarely, a second biopsy may be required if not enough tissue was collected during the first biopsy.   BEFORE THE PROCEDURE  Ask your caregiver what time you need to arrive for your procedure. Ask your caregiver whether you need to stop eating or drinking (fast) before your procedure. Ask your caregiver about changing or stopping your regular medicines. A blood sample may be done to determine your blood clotting time. Medicine may be given to help you relax (sedative).  PROCEDURE  During a fine needle biopsy, you will be awake during the procedure. You will be positioned to allow the best possible access to the biopsy site. Let your caregiver know if the position is not comfortable. The biopsy site will be cleaned. A needle is inserted through your skin. You may feel mild discomfort during this procedure. The needle is withdrawn once tissue samples have been removed. Pressure may be applied to the biopsy site to reduce swelling and to ensure that bleeding has stopped. The samples will be sent to be examined.  During an open biopsy, you may be given medicine that numbs the area (local anesthetic) or medicine that makes you sleep (general anesthetic). An incision is made through the skin. A tissue sample or the entire mass is removed. The sample or mass will be sent to be examined. Sometimes, the sample or mass may be examined during the procedure. If the sample or mass contains cancer cells, further tissue or structures may be removed. The incision is then closed with stitches (sutures) or skin glue (adhesive).  AFTER THE PROCEDURE  Your recovery will be assessed and monitored. If there are no problems, you should be able to go home shortly after the procedure (outpatient). You will need to arrange for someone to drive you home if you received a sedative or pain relieving medicine during the procedure. Ask when your test results will  be ready. Make sure you get your test results.  Document Released: 12/15/2001 Document Revised: 03/11/2013 Document Reviewed: 06/14/2012  [employer]Care® Patient Information ©2013 [employer].    DIET: To avoid nausea, slowly advance diet as tolerated, avoiding spicy or greasy foods for the first day.  Add more substantial food to your diet according to your physician's instructions.  Babies can be fed formula or breast milk as soon as they are hungry.  INCREASE FLUIDS AND FIBER TO AVOID CONSTIPATION.    SURGICAL DRESSING/BATHING: May remove bandaid in 24 hours. May shower in 24 hours. Do not submerge in water (bath/swimming) for one week.     FOLLOW-UP APPOINTMENT:  A follow-up appointment should be arranged with your doctor; call to schedule.    You should CALL YOUR PHYSICIAN if you develop:  Fever greater than 101 degrees F.  Pain not relieved by medication, or persistent nausea or vomiting.  Excessive bleeding (blood soaking through dressing) or unexpected drainage from the wound.  Extreme redness or swelling around the incision site, drainage of pus or foul smelling drainage.  Inability to urinate or empty your bladder within 8 hours.  Problems with breathing or chest pain.    You should call 911 if you develop problems with breathing or chest pain.  If you are unable to contact your doctor or surgical center, you should go to the nearest emergency room or urgent care center.  Physician's telephone #: Dr. Carvajal [phone]      If any questions arise, call your doctor.  If your doctor is not available, please feel free to call the Surgical Center at [phone].  The Center is open Monday through Friday from 7AM to 7PM.  You can also call the [employer] HOTLINE open 24 hours/day, 7 days/week and speak to a nurse at [phone], or toll free at [phone].    A registered nurse may call you a few days after your surgery to see how you are doing after your procedure.    MEDICATIONS: Resume taking daily  medication.  Take prescribed pain medication with food.  If no medication is prescribed, you may take non-aspirin pain medication if needed.  PAIN MEDICATION CAN BE VERY CONSTIPATING.  Take a stool softener or laxative such as senokot, pericolace, or milk of magnesia if needed.        If your physician has prescribed pain medication that includes Acetaminophen (Tylenol), do not take additional Acetaminophen (Tylenol) while taking the prescribed medication.    Depression / Suicide Risk    As you are discharged from this [facility] access hospital facility, it is important to learn how to keep safe from harming yourself.    Recognize the warning signs:  · Abrupt changes in personality, positive or negative- including increase in energy   · Giving away possessions  · Change in eating patterns- significant weight changes-  positive or negative  · Change in sleeping patterns- unable to sleep or sleeping all the time   · Unwillingness or inability to communicate  · Depression  · Unusual sadness, discouragement and loneliness  · Talk of wanting to die  · Neglect of personal appearance   · Rebelliousness- reckless behavior  · Withdrawal from people/activities they love  · Confusion- inability to concentrate     If you or a loved one observes any of these behaviors or has concerns about self-harm, here's what you can do:  · Talk about it- your feelings and reasons for harming yourself  · Remove any means that you might use to hurt yourself (examples: pills, rope, extension cords, firearm)  · Get professional help from the community (Mental Health, Substance Abuse, psychological counseling)  · Do not be alone:Call your Safe Contact- someone whom you trust who will be there for you.  · Call your local CRISIS HOTLINE 117-4468 or 124-591-6545  · Call your local Children's Mobile Crisis Response Team [place] (146) 534-4233 or www.ClientShow  · Call the toll free National Suicide Prevention Hotlines   · National Suicide Prevention  Lifeline 468-711-CUNF (6736)  · National [place] Line Network 800-SUICIDE (570-0679)

## 2017-01-25 NOTE — OR SURGEON
Immediate Post- Operative Note    PostOp Diagnosis: ANTERIOR ABDOMINAL MASS/NODE. HX COLON CA.      Procedure(s): CT GUIDED PERITONEAL MASS BIOPSY    18G CORES X5. 4 IN FORMALIN. ONE IN RPMI.      Estimated Blood Loss: <1CC        Complications: NONE            1/25/2017     9:33 AM     Allen Mcadams

## 2017-01-30 ENCOUNTER — ANTICOAGULATION VISIT (OUTPATIENT)
Dept: VASCULAR LAB | Facility: MEDICAL CENTER | Age: 62
End: 2017-01-30
Attending: NURSE PRACTITIONER
Payer: COMMERCIAL

## 2017-01-30 DIAGNOSIS — I82.403 DEEP VEIN THROMBOSIS (DVT) OF BOTH LOWER EXTREMITIES, UNSPECIFIED CHRONICITY, UNSPECIFIED VEIN (HCC): ICD-10-CM

## 2017-01-30 LAB — INR PPP: 1.3 (ref 2–3.5)

## 2017-01-30 PROCEDURE — 99212 OFFICE O/P EST SF 10 MIN: CPT | Performed by: NURSE PRACTITIONER

## 2017-01-30 PROCEDURE — 85610 PROTHROMBIN TIME: CPT

## 2017-01-30 NOTE — MR AVS SNAPSHOT
Tamara Caitie Flynn   2017 10:30 AM   Anticoagulation Visit   MRN: [id number]    Department:  Vascular Medicine   Dept Phone:  [phone]    Description:  Female : 1955   Provider:  [facility] EXAM 5           Allergies as of 2017     No Known Allergies      You were diagnosed with     Deep vein thrombosis (DVT) of both lower extremities, unspecified chronicity, unspecified vein (CMS-HCC)   [1577568]         Vital Signs     Smoking Status                   Never Smoker            Basic Information     Date Of Birth Sex Race Ethnicity Preferred Language    1955 Female White Non- English      Your appointments     2017 11:45 AM   Established Patient with [facility] EXAM 4   [facility]  (--)    [address]  [place] [address]   [phone]            2017 11:00 AM   Established Patient with Maritza Esparza M.D.   [facility]    [address]  [address]  [place] [address] [phone]           You will be receiving a confirmation call a few days before your appointment from our automated call confirmation system.              Problem List              ICD-10-CM Priority Class Noted - Resolved    Morbid obesity with BMI of 40.0-44.9, adult (CMS-[facility]) E66.01, Z68.41   2015 - Present    Health care maintenance Z00.00   2015 - Present    Prediabetes R73.03   2015 - Present    Adenocarcinoma of sigmoid colon (CMS-HCC) C18.7   3/22/2016 - Present    Family history of primary TB Z83.6 Low  2016 - Present    Adenocarcinoma of colon metastatic to liver (CMS-HCC) C18.9, C78.7 High  2016 - Present    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 K63.2 High  2016 - Present    Deep vein thrombosis (DVT) of both lower extremities (CMS-HCC) I82.403   2016 - Present    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    8/9/2016 - Present    Encounter for screening mammogram for malignant neoplasm of breast Z12.31   9/20/2016 - Present      Health Maintenance        Date Due Completion Dates    IMM DTaP/Tdap/Td Vaccine (1 - Tdap) 10/23/1973 ---    PAP SMEAR 10/23/1975 ---    IMM ZOSTER VACCINE 10/23/2014 ---    MAMMOGRAM 11/1/2017 11/1/2016    COLONOSCOPY 3/22/2026 3/22/2016            Results     POCT Protime      Component    INR    1.3                        Current Immunizations     Influenza Vaccine Quad Inj (Pf) 11/12/2015    Influenza Vaccine Quad Inj (Preserved) 10/10/2016      Below and/or attached are the medications your provider expects you to take. Review all of your home medications and newly ordered medications with your provider and/or pharmacist. Follow medication instructions as directed by your provider and/or pharmacist. Please keep your medication list with you and share with your provider. Update the information when medications are discontinued, doses are changed, or new medications (including over-the-counter products) are added; and carry medication information at all times in the event of emergency situations     Allergies:  No Known Allergies          Medications  Valid as of: January 30, 2017 - 10:40 AM    Generic Name Brand Name Tablet Size Instructions for use    Acetaminophen (Tab) TYLENOL 500 MG Take 500-1,000 mg by mouth as needed.        Cholecalciferol (Tab) cholecalciferol 1000 UNIT Take 1,000 Units by mouth every day.        Enoxaparin Sodium (Solution) LOVENOX 100 MG/ML Inject 100 mg as instructed every 12 hours.        Warfarin Sodium (Tab) COUMADIN 6 MG Take 1 tab by mouth daily or as directed by coumadin melo        .                 Medicines prescribed today were sent to:     [facility] [address] [place] [address] (), [place] - [address]    [address] [place] [address] [place] () [place] [address]    Phone: [phone] Fax: [phone]    Open 24 Hours?: No      Medication refill instructions:       If  your prescription bottle indicates you have medication refills left, it is not necessary to call your provider’s office. Please contact your pharmacy and they will refill your medication.    If your prescription bottle indicates you do not have any refills left, you may request refills at any time through one of the following ways: The online [employer] system (except Urgent Care), by calling your provider’s office, or by asking your pharmacy to contact your provider’s office with a refill request. Medication refills are processed only during regular business hours and may not be available until the next business day. Your provider may request additional information or to have a follow-up visit with you prior to refilling your medication.   *Please Note: Medication refills are assigned a new Rx number when refilled electronically. Your pharmacy may indicate that no refills were authorized even though a new prescription for the same medication is available at the pharmacy. Please request the medicine by name with the pharmacy before contacting your provider for a refill.        Warfarin Dosing Calendar January 2017 Details    Sun Mon Tue Wed Thu Fri Sat     1               2               3               4               5               6               7                 8               9               10               11               12               13               14                 15               16               17               18               19               20               21                 22               23               24               25               26               27               28                 29               30   1.3   6 mg   See details      31      6 mg   See details           Date Details   01/30 This INR check   Take 6 mg (6 mg tablets x 1)   Lovenox 100 mg twice daily   INR: 1.3      01/31 Lovenox 100 mg twice daily               How to take your warfarin dose     To take:  6 mg  Take 1 of the 6 mg tablets.           Warfarin Dosing Calendar   February 2017 Details    Sun Mon Tue Wed Thu Fri Sat        1      6 mg   See details      2      6 mg   See details      3      3 mg         4                 5               6               7               8               9               10               11                 12               13               14               15               16               17               18                 19               20               21               22               23               24               25                 26               27               28                    Date Details   02/01 Take 6 mg (6 mg tablets x 1)   Lovenox 100 mg twice daily      02/02 Lovenox 100 mg twice daily       Date of next INR:  2/3/2017         How to take your warfarin dose     To take:  3 mg Take 0.5 of a 6 mg tablet.    To take:  6 mg Take 1 of the 6 mg tablets.              [employer] Access Code: Activation code not generated  Current [employer] Status: Active

## 2017-01-30 NOTE — PROGRESS NOTES
Anticoagulation Summary as of 1/30/2017     INR goal 2.0-3.0   Selected INR 1.3! (1/30/2017)   Maintenance plan 3 mg (6 mg x 0.5) on Mon, Wed, Fri; 6 mg (6 mg x 1) all other days   Weekly total 33 mg   Plan last modified DEBBI Shin (11/7/2016)   Next INR check 2/3/2017   Target end date Indefinite    Indications   Deep vein thrombosis (DVT) of both lower extremities (CMS-HCC) [I82.403]         Anticoagulation Episode Summary     INR check location     Preferred lab     Send INR reminders to     Comments [facility]      Anticoagulation Care Providers     Provider Role Specialty Phone number    Yimi Martinez M.D. Referring Surgery [phone]    DIMITRI SchofieldD Responsible      [facility] Responsible  [phone]        Patient issubtherapeutic today. She was off her warfarin for a breast needle biopsy. Bridging with Lovenox 100 mg twice daily. Denies any medication or diet changes. No current symptoms of bleeding or thrombosis reported. Continue current regimen. Follow up on Friday.    1/30/17 - Lovenox 100 mg twice daily AND warfarin 6 mg  1/31/17 - Lovenox 100 mg twice daily AND warfarin 6 mg  2/01/17 - Lovenox 100 mg twice daily AND warfarin 6 mg  2/02/17 - Lovenox 100 mg twice daily AND warfarin 6 mg  2/03/17 - INR check at 11:45 am.    Next Appointment: Friday , Feb 3, 2017 at 11:45 am     Re HUITRON

## 2017-01-31 LAB — INR BLD: 1.3 (ref 0.9–1.2)

## 2017-02-03 ENCOUNTER — HOSPITAL ENCOUNTER (OUTPATIENT)
Dept: RADIOLOGY | Facility: MEDICAL CENTER | Age: 62
End: 2017-02-03
Attending: SURGERY
Payer: COMMERCIAL

## 2017-02-03 ENCOUNTER — ANTICOAGULATION VISIT (OUTPATIENT)
Dept: VASCULAR LAB | Facility: MEDICAL CENTER | Age: 62
End: 2017-02-03
Attending: NURSE PRACTITIONER
Payer: COMMERCIAL

## 2017-02-03 DIAGNOSIS — C18.9 MALIGNANT NEOPLASM OF COLON, UNSPECIFIED SITE: ICD-10-CM

## 2017-02-03 DIAGNOSIS — I82.403 DEEP VEIN THROMBOSIS (DVT) OF BOTH LOWER EXTREMITIES, UNSPECIFIED CHRONICITY, UNSPECIFIED VEIN (HCC): ICD-10-CM

## 2017-02-03 DIAGNOSIS — C78.7 SECONDARY MALIGNANT NEOPLASM OF LIVER (HCC): ICD-10-CM

## 2017-02-03 LAB
INR BLD: 1.7 (ref 0.9–1.2)
INR PPP: 1.7 (ref 2–3.5)

## 2017-02-03 PROCEDURE — 85610 PROTHROMBIN TIME: CPT

## 2017-02-03 PROCEDURE — A9552 F18 FDG: HCPCS

## 2017-02-03 PROCEDURE — 99212 OFFICE O/P EST SF 10 MIN: CPT | Performed by: NURSE PRACTITIONER

## 2017-02-03 NOTE — MR AVS SNAPSHOT
Tamara Flynn   2/3/2017 11:45 AM   Anticoagulation Visit   MRN: [id number]    Department:  Vascular Medicine   Dept Phone:  [phone]    Description:  Female : 1955   Provider:  [facility] EXAM 4           Allergies as of 2/3/2017     No Known Allergies      You were diagnosed with     Deep vein thrombosis (DVT) of both lower extremities, unspecified chronicity, unspecified vein (CMS-HCC)   [[id number]]         Vital Signs     Smoking Status                   Never Smoker            Basic Information     Date Of Birth Sex Race Ethnicity Preferred Language    1955 Female White Non- English      Your appointments     2017 12:30 PM   CT PET60 with [address]   IMAGING [address] ([address])    [address]  [place] [id number] [phone]            2017  3:45 PM   Established Patient with [facility] EXAM 4   [facility]  (--)    [address] [facility]  [place] [address]   [phone]            2017 11:00 AM   Established Patient with Maritza Esparza M.D.   [facility]    [address]  [address]  [place] [id number] [phone]           You will be receiving a confirmation call a few days before your appointment from our automated call confirmation system.              Problem List              ICD-10-CM Priority Class Noted - Resolved    Morbid obesity with BMI of 40.0-44.9, adult (CMS-[facility]) E66.01, Z68.41   2015 - Present    Health care maintenance Z00.00   2015 - Present    Prediabetes R73.03   2015 - Present    Adenocarcinoma of sigmoid colon (CMS-HCC) C18.7   3/22/2016 - Present    Family history of primary TB Z83.6 Low  2016 - Present    Adenocarcinoma of colon metastatic to liver (CMS-HCC) C18.9, C78.7 High  2016 - Present    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 K63.2 High  2016 - Present    Deep vein thrombosis (DVT)  of both lower extremities (CMS-HCC) I82.403   5/23/2016 - Present    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   8/9/2016 - Present    Encounter for screening mammogram for malignant neoplasm of breast Z12.31   9/20/2016 - Present      Health Maintenance        Date Due Completion Dates    IMM DTaP/Tdap/Td Vaccine (1 - Tdap) 10/23/1973 ---    PAP SMEAR 10/23/1975 ---    IMM ZOSTER VACCINE 10/23/2014 ---    MAMMOGRAM 11/1/2017 11/1/2016    COLONOSCOPY 3/22/2026 3/22/2016            Results     POCT Protime      Component    INR    1.7                        Current Immunizations     Influenza Vaccine Quad Inj (Pf) 11/12/2015    Influenza Vaccine Quad Inj (Preserved) 10/10/2016      Below and/or attached are the medications your provider expects you to take. Review all of your home medications and newly ordered medications with your provider and/or pharmacist. Follow medication instructions as directed by your provider and/or pharmacist. Please keep your medication list with you and share with your provider. Update the information when medications are discontinued, doses are changed, or new medications (including over-the-counter products) are added; and carry medication information at all times in the event of emergency situations     Allergies:  No Known Allergies          Medications  Valid as of: February 03, 2017 - 11:49 AM    Generic Name Brand Name Tablet Size Instructions for use    Acetaminophen (Tab) TYLENOL 500 MG Take 500-1,000 mg by mouth as needed.        Cholecalciferol (Tab) cholecalciferol 1000 UNIT Take 1,000 Units by mouth every day.        Enoxaparin Sodium (Solution) LOVENOX 100 MG/ML Inject 100 mg as instructed every 12 hours.        Warfarin Sodium (Tab) COUMADIN 6 MG Take 1 tab by mouth daily or as directed by coumadin cldenzel        .                 Medicines prescribed today were sent to:     [facility] [address] - [place] - [address]     [address] [place] () [place] [address]    Phone: [phone] Fax: [phone]    Open 24 Hours?: No      Medication refill instructions:       If your prescription bottle indicates you have medication refills left, it is not necessary to call your provider’s office. Please contact your pharmacy and they will refill your medication.    If your prescription bottle indicates you do not have any refills left, you may request refills at any time through one of the following ways: The online [employer] system (except Urgent Care), by calling your provider’s office, or by asking your pharmacy to contact your provider’s office with a refill request. Medication refills are processed only during regular business hours and may not be available until the next business day. Your provider may request additional information or to have a follow-up visit with you prior to refilling your medication.   *Please Note: Medication refills are assigned a new Rx number when refilled electronically. Your pharmacy may indicate that no refills were authorized even though a new prescription for the same medication is available at the pharmacy. Please request the medicine by name with the pharmacy before contacting your provider for a refill.        Warfarin Dosing Calendar   February 2017 Details    Sun Mon Tue Wed Thu Fri Sat        1               2               3   1.7   9 mg   See details      4      9 mg   See details        5      6 mg   See details      6      3 mg         7      6 mg         8      3 mg         9      6 mg         10      3 mg         11      6 mg           12      6 mg         13      3 mg         14      6 mg         15      3 mg         16      6 mg         17      3 mg         18      6 mg           19      6 mg         20      3 mg         21      6 mg         22      3 mg         23      6 mg         24      3 mg         25      6 mg           26      6 mg         27      3 mg         28      6 mg               Date Details   02/03 This INR check   Take 9 mg (6 mg tablets x 1.5)   Lovenox 100 mg twice daily   INR: 1.7      02/04 Take 9 mg (6 mg tablets x 1.5)   Lovenox 100 mg twice daily      02/05 Lovenox 100 mg twice daily      Date of next INR: No date specified         How to take your warfarin dose     To take:  3 mg Take 0.5 of a 6 mg tablet.    To take:  6 mg Take 1 of the 6 mg tablets.    To take:  9 mg Take 1.5 of the 6 mg tablets.              [employer] Access Code: Activation code not generated  Current [employer] Status: Active

## 2017-02-03 NOTE — PROGRESS NOTES
Anticoagulation Summary as of 2/3/2017     INR goal 2.0-3.0   Selected INR 1.7! (2/3/2017)   Maintenance plan 3 mg (6 mg x 0.5) on Mon, Wed, Fri; 6 mg (6 mg x 1) all other days   Weekly total 33 mg   Plan last modified DEBBI Shin (11/7/2016)   Next INR check 2/6/2017   Target end date Indefinite    Indications   Deep vein thrombosis (DVT) of both lower extremities (CMS-HCC) [I82.403]         Anticoagulation Episode Summary     INR check location     Preferred lab     Send INR reminders to     Comments [facility]      Anticoagulation Care Providers     Provider Role Specialty Phone number    Yimi Martinez M.D. Referring Surgery [phone]    DIMITRI SchofieldD Responsible      [facility] Responsible  [phone]        Patient is subtherapeutic today. She is bridging with Lovenox. Denies any medication or diet changes. No current symptoms of bleeding or thrombosis reported. Continue Lovenox and increase warfarin. Follow up in 72 hours.    Next Appointment: Monday, Feb 6, 2017 at 10:30 am.     Re HUITRON

## 2017-02-06 ENCOUNTER — ANTICOAGULATION VISIT (OUTPATIENT)
Dept: VASCULAR LAB | Facility: MEDICAL CENTER | Age: 62
End: 2017-02-06
Attending: NURSE PRACTITIONER
Payer: COMMERCIAL

## 2017-02-06 DIAGNOSIS — I82.403 DEEP VEIN THROMBOSIS (DVT) OF BOTH LOWER EXTREMITIES, UNSPECIFIED CHRONICITY, UNSPECIFIED VEIN (HCC): ICD-10-CM

## 2017-02-06 LAB
INR BLD: 2.2 (ref 0.9–1.2)
INR PPP: 2.2 (ref 2–3.5)

## 2017-02-06 PROCEDURE — 85610 PROTHROMBIN TIME: CPT

## 2017-02-06 PROCEDURE — 99212 OFFICE O/P EST SF 10 MIN: CPT | Performed by: NURSE PRACTITIONER

## 2017-02-06 NOTE — MR AVS SNAPSHOT
Tamara Caitie Flynn   2017 3:45 PM   Anticoagulation Visit   MRN: [id number]    Department:  Vascular Medicine   Dept Phone:  [phone]    Description:  Female : 1955   Provider:  V EXAM 4           Allergies as of 2017     No Known Allergies      You were diagnosed with     Deep vein thrombosis (DVT) of both lower extremities, unspecified chronicity, unspecified vein (CMS-[facility])   [[id number]]         Vital Signs     Smoking Status                   Never Smoker            Basic Information     Date Of Birth Sex Race Ethnicity Preferred Language    1955 Female White Non- English      Your appointments     2017  3:45 PM   Established Patient with [facility] EXAM 4   [facility]  (--)    [address] [facility] [place] [address]   [phone]            2017 10:45 AM   Established Patient with [facility] EXAM 5   [facility]  (--)    [address] [facility] [place] [address]   [phone]            2017 11:00 AM   Established Patient with Maritza Esparza M.D.   [facility]    [address]  [address]  [place] [id number] [phone]           You will be receiving a confirmation call a few days before your appointment from our automated call confirmation system.              Problem List              ICD-10-CM Priority Class Noted - Resolved    Morbid obesity with BMI of 40.0-44.9, adult (CMS-[facility]) E66.01, Z68.41   2015 - Present    Health care maintenance Z00.00   2015 - Present    Prediabetes R73.03   2015 - Present    Adenocarcinoma of sigmoid colon (CMS-HCC) C18.7   3/22/2016 - Present    Family history of primary TB Z83.6 Low  2016 - Present    Adenocarcinoma of colon metastatic to liver (CMS-HCC) C18.9, C78.7 High  2016 - Present    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 K63.2 High   4/20/2016 - Present    Deep vein thrombosis (DVT) of both lower extremities (CMS-HCC) I82.403   5/23/2016 - Present    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   8/9/2016 - Present    Encounter for screening mammogram for malignant neoplasm of breast Z12.31   9/20/2016 - Present      Health Maintenance        Date Due Completion Dates    IMM DTaP/Tdap/Td Vaccine (1 - Tdap) 10/23/1973 ---    PAP SMEAR 10/23/1975 ---    IMM ZOSTER VACCINE 10/23/2014 ---    MAMMOGRAM 11/1/2017 11/1/2016    COLONOSCOPY 3/22/2026 3/22/2016            Results     POCT Protime      Component    INR    2.2                        Current Immunizations     Influenza Vaccine Quad Inj (Pf) 11/12/2015    Influenza Vaccine Quad Inj (Preserved) 10/10/2016      Below and/or attached are the medications your provider expects you to take. Review all of your home medications and newly ordered medications with your provider and/or pharmacist. Follow medication instructions as directed by your provider and/or pharmacist. Please keep your medication list with you and share with your provider. Update the information when medications are discontinued, doses are changed, or new medications (including over-the-counter products) are added; and carry medication information at all times in the event of emergency situations     Allergies:  No Known Allergies          Medications  Valid as of: February 06, 2017 - 10:42 AM    Generic Name Brand Name Tablet Size Instructions for use    Acetaminophen (Tab) TYLENOL 500 MG Take 500-1,000 mg by mouth as needed.        Cholecalciferol (Tab) cholecalciferol 1000 UNIT Take 1,000 Units by mouth every day.        Enoxaparin Sodium (Solution) LOVENOX 100 MG/ML Inject 100 mg as instructed every 12 hours.        Warfarin Sodium (Tab) COUMADIN 6 MG Take 1 tab by mouth daily or as directed by coumadin clniic        .                 Medicines prescribed today were sent to:     [facility]  [address] - [place] (), [place] - [address]    [address] [place] () [place] [address]    Phone: [phone] Fax: [phone]    Open 24 Hours?: No      Medication refill instructions:       If your prescription bottle indicates you have medication refills left, it is not necessary to call your provider’s office. Please contact your pharmacy and they will refill your medication.    If your prescription bottle indicates you do not have any refills left, you may request refills at any time through one of the following ways: The online [employer] system (except Urgent Care), by calling your provider’s office, or by asking your pharmacy to contact your provider’s office with a refill request. Medication refills are processed only during regular business hours and may not be available until the next business day. Your provider may request additional information or to have a follow-up visit with you prior to refilling your medication.   *Please Note: Medication refills are assigned a new Rx number when refilled electronically. Your pharmacy may indicate that no refills were authorized even though a new prescription for the same medication is available at the pharmacy. Please request the medicine by name with the pharmacy before contacting your provider for a refill.        Warfarin Dosing Calendar   February 2017 Details    Sun Mon Tue Wed Thu Fri Sat        1               2               3               4                 5               6   2.2   3 mg   See details      7      6 mg         8      3 mg         9      6 mg         10      3 mg         11      6 mg           12      6 mg         13      3 mg         14               15               16               17               18                 19               20               21               22               23               24               25                 26               27               28                    Date Details   02/06 This INR check   INR: 2.2        Date of next INR:  2/13/2017         How to take your warfarin dose     To take:  3 mg Take 0.5 of a 6 mg tablet.    To take:  6 mg Take 1 of the 6 mg tablets.              Incoming [employer] Access Code: Activation code not generated  Current Incoming Media Status: Active

## 2017-02-07 LAB — HBA1C MFR BLD: 6.1 % (ref ?–5.8)

## 2017-02-13 ENCOUNTER — ANTICOAGULATION VISIT (OUTPATIENT)
Dept: VASCULAR LAB | Facility: MEDICAL CENTER | Age: 62
End: 2017-02-13
Attending: NURSE PRACTITIONER
Payer: COMMERCIAL

## 2017-02-13 DIAGNOSIS — I82.403 DEEP VEIN THROMBOSIS (DVT) OF BOTH LOWER EXTREMITIES, UNSPECIFIED CHRONICITY, UNSPECIFIED VEIN (HCC): ICD-10-CM

## 2017-02-13 LAB
INR BLD: 1.2 (ref 0.9–1.2)
INR PPP: 1.2 (ref 2–3.5)

## 2017-02-13 PROCEDURE — 99212 OFFICE O/P EST SF 10 MIN: CPT | Performed by: NURSE PRACTITIONER

## 2017-02-13 PROCEDURE — 85610 PROTHROMBIN TIME: CPT

## 2017-02-13 NOTE — PROGRESS NOTES
Anticoagulation Summary as of 2/13/2017     INR goal 2.0-3.0   Selected INR 1.2! (2/13/2017)   Maintenance plan 3 mg (6 mg x 0.5) on Mon, Wed, Fri; 6 mg (6 mg x 1) all other days   Weekly total 33 mg   Plan last modified DEBBI Shin (11/7/2016)   Next INR check 2/22/2017   Target end date Indefinite    Indications   Deep vein thrombosis (DVT) of both lower extremities (CMS-HCC) [I82.403]         Anticoagulation Episode Summary     INR check location     Preferred lab     Send INR reminders to     Comments [facility]      Anticoagulation Care Providers     Provider Role Specialty Phone number    Yimi Martinez M.D. Referring Surgery [phone]    DIMITRI SchofieldD Responsible      [facility] Responsible  [phone]        Patient is subtherapeutic today. Denies any medication or diet changes and no missed doses No current symptoms of bleeding or thrombosis reported. DVT in April 2016. Will increase regimen. Follow up in 1 week.    Next Appointment: Monday, February 22, 2017 at 1:15 pm.    Re HUITRON

## 2017-02-13 NOTE — MR AVS SNAPSHOT
Tamara Caitie Flynn   2017 10:45 AM   Anticoagulation Visit   MRN: [id number]    Department:  Vascular Medicine   Dept Phone:  [phone]    Description:  Female : 1955   Provider:  [facility] EXAM 5           Allergies as of 2017     No Known Allergies      You were diagnosed with     Deep vein thrombosis (DVT) of both lower extremities, unspecified chronicity, unspecified vein (CMS-HCC)   [5693619]         Vital Signs     Smoking Status                   Never Smoker            Basic Information     Date Of Birth Sex Race Ethnicity Preferred Language    1955 Female White Non- English      Your appointments     2017 11:00 AM   Established Patient with Maritza Esparza M.D.   [facility]    [address]  [address]  [facility] [id number] [phone]           You will be receiving a confirmation call a few days before your appointment from our automated call confirmation system.            2017  1:15 PM   Established Patient with [facility] EXAM 5   [facility]  (--)    [address] [facility] [address]   [phone]              Problem List              ICD-10-CM Priority Class Noted - Resolved    Morbid obesity with BMI of 40.0-44.9, adult (CMS-HCC) E66.01, Z68.41   2015 - Present    Health care maintenance Z00.00   2015 - Present    Prediabetes R73.03   2015 - Present    Adenocarcinoma of sigmoid colon (CMS-HCC) C18.7   3/22/2016 - Present    Family history of primary TB Z83.6 Low  2016 - Present    Adenocarcinoma of colon metastatic to liver (CMS-HCC) C18.9, C78.7 High  2016 - Present    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 K63.2 High  2016 - Present    Deep vein thrombosis (DVT) of both lower extremities (CMS-HCC) I82.403   2016 - Present    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    8/9/2016 - Present    Encounter for screening mammogram for malignant neoplasm of breast Z12.31   9/20/2016 - Present      Health Maintenance        Date Due Completion Dates    IMM DTaP/Tdap/Td Vaccine (1 - Tdap) 10/23/1973 ---    PAP SMEAR 10/23/1975 ---    IMM ZOSTER VACCINE 10/23/2014 ---    MAMMOGRAM 11/1/2017 11/1/2016    COLONOSCOPY 3/22/2026 3/22/2016            Results     POCT Protime      Component    INR    1.2                        Current Immunizations     Influenza Vaccine Quad Inj (Pf) 11/12/2015    Influenza Vaccine Quad Inj (Preserved) 10/10/2016      Below and/or attached are the medications your provider expects you to take. Review all of your home medications and newly ordered medications with your provider and/or pharmacist. Follow medication instructions as directed by your provider and/or pharmacist. Please keep your medication list with you and share with your provider. Update the information when medications are discontinued, doses are changed, or new medications (including over-the-counter products) are added; and carry medication information at all times in the event of emergency situations     Allergies:  No Known Allergies          Medications  Valid as of: February 13, 2017 - 11:05 AM    Generic Name Brand Name Tablet Size Instructions for use    Acetaminophen (Tab) TYLENOL 500 MG Take 500-1,000 mg by mouth as needed.        Cholecalciferol (Tab) cholecalciferol 1000 UNIT Take 1,000 Units by mouth every day.        Enoxaparin Sodium (Solution) LOVENOX 100 MG/ML Inject 100 mg as instructed every 12 hours.        Warfarin Sodium (Tab) COUMADIN 6 MG Take 1 tab by mouth daily or as directed by coumadin melo        .                 Medicines prescribed today were sent to:     [facility] [address] [place] [address] (), [place] - [address]    [address] [place] [address] [place] () [place] [address]    Phone: [phone] Fax: [phone]    Open 24 Hours?: No      Medication refill instructions:       If  your prescription bottle indicates you have medication refills left, it is not necessary to call your provider’s office. Please contact your pharmacy and they will refill your medication.    If your prescription bottle indicates you do not have any refills left, you may request refills at any time through one of the following ways: The online [employer] system (except Urgent Care), by calling your provider’s office, or by asking your pharmacy to contact your provider’s office with a refill request. Medication refills are processed only during regular business hours and may not be available until the next business day. Your provider may request additional information or to have a follow-up visit with you prior to refilling your medication.   *Please Note: Medication refills are assigned a new Rx number when refilled electronically. Your pharmacy may indicate that no refills were authorized even though a new prescription for the same medication is available at the pharmacy. Please request the medicine by name with the pharmacy before contacting your provider for a refill.        Warfarin Dosing Calendar   February 2017 Details    Sun Mon Tue Wed Thu Fri Sat        1               2               3               4                 5               6               7               8               9               10               11                 12               13   1.2   9 mg   See details      14      6 mg         15      6 mg         16      6 mg         17      3 mg         18      6 mg           19      6 mg         20      3 mg         21      6 mg         22      3 mg         23               24               25                 26               27               28                    Date Details   02/13 This INR check   INR: 1.2       Date of next INR:  2/22/2017         How to take your warfarin dose     To take:  3 mg Take 0.5 of a 6 mg tablet.    To take:  6 mg Take 1 of the 6 mg tablets.    To take:  9 mg Take  1.5 of the 6 mg tablets.              [employer] Access Code: Activation code not generated  Current [employer] Status: Active

## 2017-02-17 ENCOUNTER — OFFICE VISIT (OUTPATIENT)
Dept: MEDICAL GROUP | Facility: MEDICAL CENTER | Age: 62
End: 2017-02-17
Payer: COMMERCIAL

## 2017-02-17 VITALS
RESPIRATION RATE: 14 BRPM | HEART RATE: 92 BPM | SYSTOLIC BLOOD PRESSURE: 132 MMHG | BODY MASS INDEX: 41.31 KG/M2 | WEIGHT: 218.8 LBS | DIASTOLIC BLOOD PRESSURE: 84 MMHG | HEIGHT: 61 IN | TEMPERATURE: 97.9 F | OXYGEN SATURATION: 94 %

## 2017-02-17 DIAGNOSIS — R73.01 IFG (IMPAIRED FASTING GLUCOSE): ICD-10-CM

## 2017-02-17 DIAGNOSIS — Z23 NEED FOR TDAP VACCINATION: ICD-10-CM

## 2017-02-17 DIAGNOSIS — C18.9 ADENOCARCINOMA OF COLON METASTATIC TO LIVER (HCC): ICD-10-CM

## 2017-02-17 DIAGNOSIS — Z79.01 CHRONIC ANTICOAGULATION: ICD-10-CM

## 2017-02-17 DIAGNOSIS — C78.7 ADENOCARCINOMA OF COLON METASTATIC TO LIVER (HCC): ICD-10-CM

## 2017-02-17 DIAGNOSIS — I82.401 DEEP VEIN THROMBOSIS (DVT) OF RIGHT LOWER EXTREMITY, UNSPECIFIED CHRONICITY, UNSPECIFIED VEIN (HCC): ICD-10-CM

## 2017-02-17 DIAGNOSIS — Z93.3 COLOSTOMY IN PLACE (HCC): ICD-10-CM

## 2017-02-17 DIAGNOSIS — Z00.00 HEALTH CARE MAINTENANCE: ICD-10-CM

## 2017-02-17 PROCEDURE — 90715 TDAP VACCINE 7 YRS/> IM: CPT | Performed by: INTERNAL MEDICINE

## 2017-02-17 PROCEDURE — 90471 IMMUNIZATION ADMIN: CPT | Performed by: INTERNAL MEDICINE

## 2017-02-17 PROCEDURE — 99214 OFFICE O/P EST MOD 30 MIN: CPT | Mod: 25 | Performed by: INTERNAL MEDICINE

## 2017-02-17 ASSESSMENT — PAIN SCALES - GENERAL: PAINLEVEL: NO PAIN

## 2017-02-17 NOTE — MR AVS SNAPSHOT
"Jesseaudi Hollinsnavin Flynn   2017 11:00 AM   Office Visit   MRN: [id number]    Department:  [place] [address]   Dept Phone:  [phone]    Description:  Female : 1955   Provider:  Maritza Esparza M.D.           Reason for Visit     Follow-Up           Allergies as of 2017     No Known Allergies      You were diagnosed with     Adenocarcinoma of colon metastatic to liver (CMS-HCC)   [269022]       Deep vein thrombosis (DVT) of right lower extremity, unspecified chronicity, unspecified vein (CMS-[facility])   [5069297]       Chronic anticoagulation   [977130]       IFG (impaired fasting glucose)   [486287]       BMI 40.0-44.9, adult (CMS-HCC)   [338099]       Need for Tdap vaccination   [321210]       Health care maintenance   [160000]       Colostomy in place (CMS-HCC)   [576474]         Vital Signs     Blood Pressure Pulse Temperature Respirations Height Weight    132/84 mmHg 92 36.6 °C (97.9 °F) 14 1.549 m (5' 0.98\") 99.247 kg (218 lb 12.8 oz)    Body Mass Index Oxygen Saturation Smoking Status             41.36 kg/m2 94% Never Smoker          Basic Information     Date Of Birth Sex Race Ethnicity Preferred Language    1955 Female White Non- English      Your appointments     2017  1:15 PM   Established Patient with [facility] EXAM 5   [facility] [place] [facility]  (--)    [address] [facility] [place] [address]   [phone]            May 19, 2017 10:40 AM   Established Patient with Maritza Esparza M.D.   [facility] ([place])    [address]  [address]  [place] [address] [phone]           You will be receiving a confirmation call a few days before your appointment from our automated call confirmation system.              Problem List              ICD-10-CM Priority Class Noted - Resolved    Morbid obesity with BMI of 40.0-44.9, adult (CMS-HCC) E66.01, Z68.41   2015 - Present    Health " care maintenance Z00.00   11/12/2015 - Present    Prediabetes R73.03   12/7/2015 - Present    Adenocarcinoma of sigmoid colon (CMS-HCC) C18.7   3/22/2016 - Present    Family history of primary TB Z83.6 Low  4/20/2016 - Present    Adenocarcinoma of colon metastatic to liver (CMS-HCC) C18.9, C78.7 High  4/20/2016 - Present    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 K63.2 High  4/20/2016 - Present    Deep vein thrombosis (DVT) of both lower extremities (CMS-HCC) I82.403   5/23/2016 - Present    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   8/9/2016 - Present    Encounter for screening mammogram for malignant neoplasm of breast Z12.31   9/20/2016 - Present    Colostomy in place (CMS-HCC) Z93.3   2/17/2017 - Present      Health Maintenance        Date Due Completion Dates    IMM DTaP/Tdap/Td Vaccine (1 - Tdap) 10/23/1973 ---    PAP SMEAR 10/23/1975 ---    IMM ZOSTER VACCINE 10/23/2014 ---    MAMMOGRAM 11/1/2017 11/1/2016    COLONOSCOPY 3/22/2026 3/22/2016            Current Immunizations     Influenza Vaccine Quad Inj (Pf) 11/12/2015    Influenza Vaccine Quad Inj (Preserved) 10/10/2016    Tdap Vaccine  Incomplete      Below and/or attached are the medications your provider expects you to take. Review all of your home medications and newly ordered medications with your provider and/or pharmacist. Follow medication instructions as directed by your provider and/or pharmacist. Please keep your medication list with you and share with your provider. Update the information when medications are discontinued, doses are changed, or new medications (including over-the-counter products) are added; and carry medication information at all times in the event of emergency situations     Allergies:  No Known Allergies          Medications  Valid as of: February 17, 2017 - 11:19 AM    Generic Name Brand Name Tablet Size Instructions for use    Acetaminophen (Tab) TYLENOL 500 MG Take 500-1,000 mg by mouth as  needed.        Cholecalciferol (Tab) cholecalciferol 1000 UNIT Take 1,000 Units by mouth every day.        Warfarin Sodium (Tab) COUMADIN 6 MG Take 1 tab by mouth daily or as directed by coumadin clniic        .                 Medicines prescribed today were sent to:     [facility] [address] [place] [address] (), [place] - [address]    [address] () [place] [address]    Phone: [phone] Fax: [phone]    Open 24 Hours?: No      Medication refill instructions:       If your prescription bottle indicates you have medication refills left, it is not necessary to call your provider’s office. Please contact your pharmacy and they will refill your medication.    If your prescription bottle indicates you do not have any refills left, you may request refills at any time through one of the following ways: The online [employer] system (except Urgent Care), by calling your provider’s office, or by asking your pharmacy to contact your provider’s office with a refill request. Medication refills are processed only during regular business hours and may not be available until the next business day. Your provider may request additional information or to have a follow-up visit with you prior to refilling your medication.   *Please Note: Medication refills are assigned a new Rx number when refilled electronically. Your pharmacy may indicate that no refills were authorized even though a new prescription for the same medication is available at the pharmacy. Please request the medicine by name with the pharmacy before contacting your provider for a refill.        Your To Do List     Future Labs/Procedures Complete By Expires    CBC WITH DIFFERENTIAL  As directed 2/18/2018    COMP METABOLIC PANEL  As directed 2/18/2018    HEMOGLOBIN A1C  As directed 2/18/2018    VITAMIN D,25 HYDROXY  As directed 2/18/2018         [employer] Access Code: Activation code not generated  Current [employer] Status: Active

## 2017-02-17 NOTE — PROGRESS NOTES
CHIEF COMPLAINT  Chief Complaint   Patient presents with   • Follow-Up   Colon cancer    HPI  Patient is a 61 y.o. female patient who presents today for the following     Adenocarcinoma of sigmoid colon with liver meta / colostomy , chronic anticoagulation/   Dg:  in 5/5016, with bladder fistula,  subsequent surgery, transient blood loss anemia (received iron).      Imaging follow-up showed (as below):   · Persistent liver metastases;  ·  Metastatic carcinoma in fibroadipose tissue anteriorly to stomach  · Left hypogastric and external iliac adenopathy  · Mild prevascular/aortopulmonary window lymphadenopathy could be reactive or malignant.    She has been f/u by surgery / oncology, had chemotherapy tolerated well.  She developed a DVT related to cancer,  on Coumadin, follow-up by Coumadin clinic.      Denies:  - Fatigue, fever, chills  - Abdominal pain  - Anorexia, weight loss.    IFG  The patient had elevated fasting blood sugar, as below.    Diet: decreased bettye  Exercise: Some  BMI: 40  No polydipsia, polyphagia, polyuria.  No abdominal pain, weight loss, fatigue.    Hypovitaminosis D  The patient had low vitamin D in the past.     She has been on 2000 units of vitamin D daily.     Obesity, BMI 41  Increased weight since the last office visit.    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     • acetaminophen (TYLENOL) 500 MG Tab Take 500-1,000 mg by mouth as needed.     • warfarin (COUMADIN) 6 MG Tab Take 1 tab by mouth daily or as directed by coumadin clniic 30 Tab 3     No current facility-administered medications for this visit.     ALLERGIES  Allergies: Review of patient's allergies indicates no known allergies.  PAST MEDICAL HISTORY  Past Medical History   Diagnosis Date   • Obesity    • Essential hypertension    • Cancer (CMS-HCC) 2016     liver, bladder, colon   • Urinary bladder disorder      1/2 bladder due to cancer surgery   • Blood clotting disorder (CMS-HCC) 4-     left leg   • Bowel habit changes      has colostomy   • Blood loss anemia 2016     SURGICAL HISTORY  She  has past surgical history that includes excision of tonsil tags; cystoscopy (3/17/2016); trans urethral resection bladder (3/17/2016); colonoscopy - endo (N/A, 3/22/2016); recovery (3/21/2016); exploratory laparotomy (4/17/2016); exploratory laparotomy (4/18/2016); cystectomy (4/18/2016); colostomy (Left, 4/19/2016); and cath placement (Left, 8/9/2016).  SOCIAL HISTORY  Social History   Substance Use Topics   • Smoking status: Never Smoker    • Smokeless tobacco: Never Used   • Alcohol Use: No     Social History     Social History Narrative    Lives with .     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     FAMILY HISTORY  Family History   Problem Relation Age of Onset   • Cancer Neg Hx    • Diabetes Mother      prediabetes   • Heart Disease Mother    • Hypertension Mother    • Hyperlipidemia Neg Hx    • Psychiatry Neg Hx    • Thyroid Neg Hx    •  "Stroke Maternal Grandmother      No family status information on file.       ROS   Constitutional: Negative for fever, chills.  HENT: Negative for congestion, sore throat.  Eyes: Negative for blurred vision.   Respiratory: Negative for cough, shortness of breath.  Cardiovascular: Negative for chest pain, palpitations.   Gastrointestinal: as above.   Genitourinary: Negative for dysuria.  Musculoskeletal: Negative for significant myalgias, back pain and joint pain.   Skin: Negative for rash and itching.   Neuro: Negative for dizziness, weakness and headaches.   Endo/Heme/Allergies: Does not bruise/bleed easily.   Psychiatric/Behavioral: Negative for depression, anxiety    PHYSICAL EXAM   Blood pressure 132/84, pulse 92, temperature 36.6 °C (97.9 °F), resp. rate 14, height 1.549 m (5' 0.98\"), weight 99.247 kg (218 lb 12.8 oz), SpO2 94 %. Body mass index is 41.36 kg/(m^2).  General:  NAD, well appearing  HEENT:   NC/AT, PERRLA, EOMI, TMs are clear. Oropharyngeal mucosa is pink,  without lesions;  no cervical / supraclavicular  lymphadenopathy, no thyromegaly.    Cardiovascular: RRR.   No m/r/g. No carotid bruits .      Lungs:   CTAB, no w/r/r, no respiratory distress.  Abdomen: Soft, NT/ND + BS; no suprapubic tenderness; could not palpate the liver or spleen due to obesity.  Colostomy in place.  No surrounding redness.  Extremities:  2+ DP and radial pulses bilaterally.  No c/c/e.   Skin:  Warm, dry.  No erythema. No rash.   Neurologic: Alert & oriented x 3.  No focal deficits.  Psychiatric:  Affect normal, mood normal, judgment normal.    LABS     Labs from 2/7/17 are reviewed and discussed with a patient, scanned in the media, significant for:    1. CMP: Alkaline phosphatase 147.  2. A1c 6.1  3. CBC: RBC 3.79, otherwise normal    IMAGING     Reviewed the following imaging, since the last office visit:    PET scan, 2/3/17  1.  Liver masses in segments 5 and 6 of the right lobe of the liver are identified measuring up " "to 4 cm in diameter with activity measuring up to 9.4 SUV. Findings are consistent with liver metastases. There is an adjacent calcified mass in the liver  with no elevated activity.  2.  Left hypogastric and external iliac adenopathy is unchanged compared to the prior CT and does demonstrate elevated activity which is likely indicative of tumor involvement of these nodes.  3.  Oval-shaped soft tissue mass anterior to the antrum of stomach is identified. Small area of elevated activity is noted posteriorly within the lesion which could indicate presence of tumor at this location.  4.  Mildly enlarged lymph nodes in the mediastinum are noted but no elevated activity is identified at this time.    CT needle bx abdomen, 1/25/17  1.  CT GUIDED BIOPSY OF AN ANTERIOR ABDOMINAL LEFT PARAMEDIAN UPPER ABDOMINAL (EPIGASTRIC) OVOID MASS SUSPECTED TO REPRESENT A PERITONEAL \"IMPLANT\".    CT abdomen, 12/20/16  1.  Interval enlargement of peritoneal implant anterior to the gastric antrum, consistent with worsening metastatic disease  2.  Enlarging left external iliac and pelvic sidewall metastatic lymphadenopathy  3.  No other worsening is seen. Perhaps improvement in hepatic metastases, stable shaun hepatis and aortic bifurcation level lymphadenopathy  4.  Mild prevascular/aortopulmonary window lymphadenopathy could be reactive or malignant. Attention in follow-up is advised  5.  Left colostomy with unchanged parastomal hernia containing small bowel    ASSESMENT AND PLAN        1. Adenocarcinoma of colon metastatic to liver (CMS-HCC)  Continue oncology/gastroenterology/surgery follow-up.   Pending new chemotherapy.    - CBC WITH DIFFERENTIAL; Future  - COMP METABOLIC PANEL; Future    2. Colostomy in place (CMS-HCC)  No complications    3. Deep vein thrombosis (DVT) of right lower extremity, unspecified chronicity, unspecified vein (CMS-HCC)  4. Chronic anticoagulation  No DVT, recurrence, continue Coumadin and Coumadin clinic " follow-up    5. IFG (impaired fasting glucose)  Advised to decrease carbohydrates, increase exercise  - HEMOGLOBIN A1C; Future    6. BMI 40.0-44.9, adult (CMS-[facility])  - OBESITY COUNSELING (No Charge): Patient identified as having weight management issue.  Appropriate orders and counseling given.    7. Need for Tdap vaccination  - Tdap =>6yo IM  Information was provided to the patient regarding the vaccine, including side effects. Vaccine was given by my medical assistant under my supervision.    8. Health care maintenance  - VITAMIN D,25 HYDROXY; Future    Counseling:   - Smoking:  Nonsmoker    Followup: in 3 months    All questions are answered.    Please note that this dictation was created using voice recognition software, and that there might be errors of butch and possibly content.

## 2017-02-22 ENCOUNTER — ANTICOAGULATION VISIT (OUTPATIENT)
Dept: VASCULAR LAB | Facility: MEDICAL CENTER | Age: 62
End: 2017-02-22
Attending: INTERNAL MEDICINE
Payer: COMMERCIAL

## 2017-02-22 DIAGNOSIS — I82.403 DEEP VEIN THROMBOSIS (DVT) OF BOTH LOWER EXTREMITIES, UNSPECIFIED CHRONICITY, UNSPECIFIED VEIN (HCC): ICD-10-CM

## 2017-02-22 LAB
INR BLD: 1.2 (ref 0.9–1.2)
INR PPP: 1.2 (ref 2–3.5)

## 2017-02-22 PROCEDURE — 99212 OFFICE O/P EST SF 10 MIN: CPT | Performed by: NURSE PRACTITIONER

## 2017-02-22 PROCEDURE — 85610 PROTHROMBIN TIME: CPT

## 2017-02-22 NOTE — MR AVS SNAPSHOT
Tamara Caitie Flynn   2017 1:15 PM   Anticoagulation Visit   MRN: [id number]    Department:  Vascular Medicine   Dept Phone:  [phone]    Description:  Female : 1955   Provider:  [facility] EXAM 5           Allergies as of 2017     No Known Allergies      You were diagnosed with     Deep vein thrombosis (DVT) of both lower extremities, unspecified chronicity, unspecified vein (CMS-HCC)   [0495520]         Vital Signs     Smoking Status                   Never Smoker            Basic Information     Date Of Birth Sex Race Ethnicity Preferred Language    1955 Female White Non- English      Your appointments     2017 10:45 AM   Established Patient with [facility] EXAM 4   [facility]  (--)    [address]  [place] [address]   [phone]            May 19, 2017 10:40 AM   Established Patient with Maritza Esparza M.D.   [facility] ([place])    [address]  [address]  [place] [address] [phone]           You will be receiving a confirmation call a few days before your appointment from our automated call confirmation system.              Problem List              ICD-10-CM Priority Class Noted - Resolved    Morbid obesity with BMI of 40.0-44.9, adult (CMS-HCC) E66.01, Z68.41   2015 - Present    Health care maintenance Z00.00   2015 - Present    Prediabetes R73.03   2015 - Present    Adenocarcinoma of sigmoid colon (CMS-HCC) C18.7   3/22/2016 - Present    Family history of primary TB Z83.6 Low  2016 - Present    Adenocarcinoma of colon metastatic to liver (CMS-HCC) C18.9, C78.7 High  2016 - Present    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 K63.2 High  2016 - Present    Deep vein thrombosis (DVT) of both lower extremities (CMS-HCC) I82.403   2016 - Present    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    8/9/2016 - Present    Encounter for screening mammogram for malignant neoplasm of breast Z12.31   9/20/2016 - Present    Colostomy in place (CMS-HCC) Z93.3   2/17/2017 - Present      Health Maintenance        Date Due Completion Dates    PAP SMEAR 10/23/1975 ---    IMM ZOSTER VACCINE 10/23/2014 ---    MAMMOGRAM 11/1/2017 11/1/2016    COLONOSCOPY 3/22/2026 3/22/2016    IMM DTaP/Tdap/Td Vaccine (2 - Td) 2/17/2027 2/17/2017            Results     POCT Protime      Component    INR    1.2                        Current Immunizations     Influenza Vaccine Quad Inj (Pf) 11/12/2015    Influenza Vaccine Quad Inj (Preserved) 10/10/2016    Tdap Vaccine 2/17/2017      Below and/or attached are the medications your provider expects you to take. Review all of your home medications and newly ordered medications with your provider and/or pharmacist. Follow medication instructions as directed by your provider and/or pharmacist. Please keep your medication list with you and share with your provider. Update the information when medications are discontinued, doses are changed, or new medications (including over-the-counter products) are added; and carry medication information at all times in the event of emergency situations     Allergies:  No Known Allergies          Medications  Valid as of: February 22, 2017 -  1:17 PM    Generic Name Brand Name Tablet Size Instructions for use    Acetaminophen (Tab) TYLENOL 500 MG Take 500-1,000 mg by mouth as needed.        Cholecalciferol (Tab) cholecalciferol 1000 UNIT Take 1,000 Units by mouth every day.        Warfarin Sodium (Tab) COUMADIN 6 MG Take 1 tab by mouth daily or as directed by coumadin melo        .                 Medicines prescribed today were sent to:     [facility] [address] [place] [address] (), [place] [address]    [address] [place] [address] () [place] [address]    Phone: [phone] Fax: [phone]    Open 24 Hours?: No      Medication refill instructions:       If your  prescription bottle indicates you have medication refills left, it is not necessary to call your provider’s office. Please contact your pharmacy and they will refill your medication.    If your prescription bottle indicates you do not have any refills left, you may request refills at any time through one of the following ways: The online [employer] system (except Urgent Care), by calling your provider’s office, or by asking your pharmacy to contact your provider’s office with a refill request. Medication refills are processed only during regular business hours and may not be available until the next business day. Your provider may request additional information or to have a follow-up visit with you prior to refilling your medication.   *Please Note: Medication refills are assigned a new Rx number when refilled electronically. Your pharmacy may indicate that no refills were authorized even though a new prescription for the same medication is available at the pharmacy. Please request the medicine by name with the pharmacy before contacting your provider for a refill.        Warfarin Dosing Calendar   February 2017 Details    Sun Mon Tue Wed Thu Fri Sat        1               2               3               4                 5               6               7               8               9               10               11                 12               13               14               15               16               17               18                 19               20               21               22   1.2   9 mg   See details      23      6 mg         24      6 mg         25      6 mg           26      6 mg         27      3 mg         28                    Date Details   02/22 This INR check   INR: 1.2       Date of next INR:  2/27/2017         How to take your warfarin dose     To take:  3 mg Take 0.5 of a 6 mg tablet.    To take:  6 mg Take 1 of the 6 mg tablets.    To take:  9 mg Take 1.5 of the 6 mg  tablets.              [employer] Access Code: Activation code not generated  Current [employer] Status: Active

## 2017-02-22 NOTE — PROGRESS NOTES
Anticoagulation Summary as of 2/22/2017     INR goal 2.0-3.0   Selected INR 1.2! (2/22/2017)   Maintenance plan 3 mg (6 mg x 0.5) on Mon; 6 mg (6 mg x 1) all other days   Weekly total 39 mg   Plan last modified DEBBI Shin (2/22/2017)   Next INR check 2/27/2017   Target end date Indefinite    Indications   Deep vein thrombosis (DVT) of both lower extremities (CMS-HCC) [I82.403]         Anticoagulation Episode Summary     INR check location     Preferred lab     Send INR reminders to     Comments [facility]      Anticoagulation Care Providers     Provider Role Specialty Phone number    Yimi Martinez M.D. Referring Surgery [phone]    DIMITRI SchofieldD Responsible      [facility] Responsible  [phone]        Patient is subtherapeutic today despite a dose increase. Denies any medication or diet changes. No current symptoms of bleeding or thrombosis reported. DVT in April 2016. Will increase regimen further. Follow up on Monday.    Next Appointment: Monday, February 27, 2017 at 10:45 am.     Re HUITRON

## 2017-02-27 ENCOUNTER — ANTICOAGULATION VISIT (OUTPATIENT)
Dept: VASCULAR LAB | Facility: MEDICAL CENTER | Age: 62
End: 2017-02-27
Attending: INTERNAL MEDICINE
Payer: COMMERCIAL

## 2017-02-27 DIAGNOSIS — I82.403 DEEP VEIN THROMBOSIS (DVT) OF BOTH LOWER EXTREMITIES, UNSPECIFIED CHRONICITY, UNSPECIFIED VEIN (HCC): ICD-10-CM

## 2017-02-27 LAB — INR PPP: 1.4 (ref 2–3.5)

## 2017-02-27 PROCEDURE — 99212 OFFICE O/P EST SF 10 MIN: CPT | Performed by: NURSE PRACTITIONER

## 2017-02-27 PROCEDURE — 85610 PROTHROMBIN TIME: CPT

## 2017-02-27 NOTE — MR AVS SNAPSHOT
Tamara Caitie Flynn   2017 10:45 AM   Anticoagulation Visit   MRN: [id number]    Department:  Vascular Medicine   Dept Phone:  [phone]    Description:  Female : 1955   Provider:  [facility] EXAM 4           Allergies as of 2017     No Known Allergies      You were diagnosed with     Deep vein thrombosis (DVT) of both lower extremities, unspecified chronicity, unspecified vein (CMS-HCC)   [[id number]]         Vital Signs     Smoking Status                   Never Smoker            Basic Information     Date Of Birth Sex Race Ethnicity Preferred Language    1955 Female White Non- English      Your appointments     Mar 06, 2017 10:45 AM   Established Patient with [facility] EXAM 4   [facility]  (--)    [address]  [place] [address] [phone]            May 19, 2017 10:40 AM   Established Patient with Maritza Esparza M.D.   [facility] ([place]    [address]  [address]  [place] [address] [phone]           You will be receiving a confirmation call a few days before your appointment from our automated call confirmation system.              Problem List              ICD-10-CM Priority Class Noted - Resolved    Morbid obesity with BMI of 40.0-44.9, adult (CMS-[facility]) E66.01, Z68.41   2015 - Present    Health care maintenance Z00.00   2015 - Present    Prediabetes R73.03   2015 - Present    Adenocarcinoma of sigmoid colon (CMS-HCC) C18.7   3/22/2016 - Present    Family history of primary TB Z83.6 Low  2016 - Present    Adenocarcinoma of colon metastatic to liver (CMS-HCC) C18.9, C78.7 High  2016 - Present    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 K63.2 High  2016 - Present    Deep vein thrombosis (DVT) of both lower extremities (CMS-HCC) I82.403   2016 - Present    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    8/9/2016 - Present    Encounter for screening mammogram for malignant neoplasm of breast Z12.31   9/20/2016 - Present    Colostomy in place (CMS-HCC) Z93.3   2/17/2017 - Present      Health Maintenance        Date Due Completion Dates    PAP SMEAR 10/23/1975 ---    IMM ZOSTER VACCINE 10/23/2014 ---    MAMMOGRAM 11/1/2017 11/1/2016    COLONOSCOPY 3/22/2026 3/22/2016    IMM DTaP/Tdap/Td Vaccine (2 - Td) 2/17/2027 2/17/2017            Results     POCT Protime      Component    INR    1.4                        Current Immunizations     Influenza Vaccine Quad Inj (Pf) 11/12/2015    Influenza Vaccine Quad Inj (Preserved) 10/10/2016    Tdap Vaccine 2/17/2017      Below and/or attached are the medications your provider expects you to take. Review all of your home medications and newly ordered medications with your provider and/or pharmacist. Follow medication instructions as directed by your provider and/or pharmacist. Please keep your medication list with you and share with your provider. Update the information when medications are discontinued, doses are changed, or new medications (including over-the-counter products) are added; and carry medication information at all times in the event of emergency situations     Allergies:  No Known Allergies          Medications  Valid as of: February 27, 2017 - 11:08 AM    Generic Name Brand Name Tablet Size Instructions for use    Acetaminophen (Tab) TYLENOL 500 MG Take 500-1,000 mg by mouth as needed.        Cholecalciferol (Tab) cholecalciferol 1000 UNIT Take 1,000 Units by mouth every day.        Warfarin Sodium (Tab) COUMADIN 6 MG Take 1 tab by mouth daily or as directed by coumadin melo        .                 Medicines prescribed today were sent to:     [facility] [address] [place] [address] (), [place] [address]    [address] [place] [address] () [place] [address]    Phone: [phone] Fax: [phone]    Open 24 Hours?: No      Medication refill instructions:       If your  prescription bottle indicates you have medication refills left, it is not necessary to call your provider’s office. Please contact your pharmacy and they will refill your medication.    If your prescription bottle indicates you do not have any refills left, you may request refills at any time through one of the following ways: The online [employer] system (except Urgent Care), by calling your provider’s office, or by asking your pharmacy to contact your provider’s office with a refill request. Medication refills are processed only during regular business hours and may not be available until the next business day. Your provider may request additional information or to have a follow-up visit with you prior to refilling your medication.   *Please Note: Medication refills are assigned a new Rx number when refilled electronically. Your pharmacy may indicate that no refills were authorized even though a new prescription for the same medication is available at the pharmacy. Please request the medicine by name with the pharmacy before contacting your provider for a refill.        Warfarin Dosing Calendar February 2017 Details    Sun Mon Tue Wed Thu Fri Sat        1               2               3               4                 5               6               7               8               9               10               11                 12               13               14               15               16               17               18                 19               20               21               22               23               24               25                 26               27   1.4   9 mg   See details      28      6 mg              Date Details   02/27 This INR check   INR: 1.4               How to take your warfarin dose     To take:  6 mg Take 1 of the 6 mg tablets.    To take:  9 mg Take 1.5 of the 6 mg tablets.           Warfarin Dosing Calendar March 2017 Details    Sun Mon Tue Wed Thu Fri Sat          1      9 mg         2      6 mg         3      9 mg         4      6 mg           5      6 mg         6      6 mg         7               8               9               10               11                 12               13               14               15               16               17               18                 19               20               21               22               23               24               25                 26               27               28               29               30               31                 Date Details   No additional details    Date of next INR:  3/6/2017         How to take your warfarin dose     To take:  6 mg Take 1 of the 6 mg tablets.    To take:  9 mg Take 1.5 of the 6 mg tablets.              [employer] Access Code: Activation code not generated  Current [employer] Status: Active

## 2017-02-27 NOTE — PROGRESS NOTES
Anticoagulation Summary as of 2/27/2017     INR goal 2.0-3.0   Selected INR 1.4! (2/27/2017)   Maintenance plan 9 mg (6 mg x 1.5) on Wed, Fri; 6 mg (6 mg x 1) all other days   Weekly total 48 mg   Plan last modified DEBBI Shin (2/27/2017)   Next INR check 3/6/2017   Target end date Indefinite    Indications   Deep vein thrombosis (DVT) of both lower extremities (CMS-HCC) [I82.403]         Anticoagulation Episode Summary     INR check location     Preferred lab     Send INR reminders to     Comments [facility]      Anticoagulation Care Providers     Provider Role Specialty Phone number    Yimi Maritnez M.D. Referring Surgery [phone]    DIMITRI SchofieldD Responsible      [facility] Responsible  [phone]        Patient is subtherapeutic today despite a dose increase. Denies any medication or diet changes. No current symptoms of bleeding or thrombosis reported. Will increase regimen further about ~23% plus a loading dose. Follow up in 1 week.    Next Appointment: Monday, March 6, 2017 at 10:45 am.    Re HUITRON

## 2017-02-28 LAB — INR BLD: 1.4 (ref 0.9–1.2)

## 2017-03-06 ENCOUNTER — ANTICOAGULATION VISIT (OUTPATIENT)
Dept: VASCULAR LAB | Facility: MEDICAL CENTER | Age: 62
End: 2017-03-06
Attending: INTERNAL MEDICINE
Payer: COMMERCIAL

## 2017-03-06 DIAGNOSIS — I82.403 DEEP VEIN THROMBOSIS (DVT) OF BOTH LOWER EXTREMITIES, UNSPECIFIED CHRONICITY, UNSPECIFIED VEIN (HCC): ICD-10-CM

## 2017-03-06 LAB
INR BLD: 1.8 (ref 0.9–1.2)
INR PPP: 1.8 (ref 2–3.5)

## 2017-03-06 PROCEDURE — 85610 PROTHROMBIN TIME: CPT

## 2017-03-06 PROCEDURE — 99212 OFFICE O/P EST SF 10 MIN: CPT | Performed by: NURSE PRACTITIONER

## 2017-03-06 NOTE — MR AVS SNAPSHOT
Tamara Caitie Flynn   3/6/2017 10:45 AM   Anticoagulation Visit   MRN: [id number]    Department:  Vascular Medicine   Dept Phone:  [phone]    Description:  Female : 1955   Provider:  [facility] EXAM 4           Allergies as of 3/6/2017     No Known Allergies      You were diagnosed with     Deep vein thrombosis (DVT) of both lower extremities, unspecified chronicity, unspecified vein (CMS-HCC)   [[id number]]         Vital Signs     Smoking Status                   Never Smoker            Basic Information     Date Of Birth Sex Race Ethnicity Preferred Language    1955 Female White Non- English      Your appointments     Mar 13, 2017  9:45 AM   Established Patient with [facility] EXAM 5   [facility]  (--)    [address]  [place] [address] [phone]            May 19, 2017 10:40 AM   Established Patient with Maritza Esparza M.D.   [facility] ([place])    [address]  [address]  [place] [address] [phone]           You will be receiving a confirmation call a few days before your appointment from our automated call confirmation system.              Problem List              ICD-10-CM Priority Class Noted - Resolved    Morbid obesity with BMI of 40.0-44.9, adult (CMS-[facility]) E66.01, Z68.41   2015 - Present    Health care maintenance Z00.00   2015 - Present    Prediabetes R73.03   2015 - Present    Adenocarcinoma of sigmoid colon (CMS-HCC) C18.7   3/22/2016 - Present    Family history of primary TB Z83.6 Low  2016 - Present    Adenocarcinoma of colon metastatic to liver (CMS-HCC) C18.9, C78.7 High  2016 - Present    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 K63.2 High  2016 - Present    Deep vein thrombosis (DVT) of both lower extremities (CMS-HCC) I82.403   2016 - Present    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    8/9/2016 - Present    Encounter for screening mammogram for malignant neoplasm of breast Z12.31   9/20/2016 - Present    Colostomy in place (CMS-HCC) Z93.3   2/17/2017 - Present      Health Maintenance        Date Due Completion Dates    PAP SMEAR 10/23/1975 ---    IMM ZOSTER VACCINE 10/23/2014 ---    MAMMOGRAM 11/1/2017 11/1/2016    COLONOSCOPY 3/22/2026 3/22/2016    IMM DTaP/Tdap/Td Vaccine (2 - Td) 2/17/2027 2/17/2017            Results     POCT Protime      Component    INR    1.8                        Current Immunizations     Influenza Vaccine Quad Inj (Pf) 11/12/2015    Influenza Vaccine Quad Inj (Preserved) 10/10/2016    Tdap Vaccine 2/17/2017      Below and/or attached are the medications your provider expects you to take. Review all of your home medications and newly ordered medications with your provider and/or pharmacist. Follow medication instructions as directed by your provider and/or pharmacist. Please keep your medication list with you and share with your provider. Update the information when medications are discontinued, doses are changed, or new medications (including over-the-counter products) are added; and carry medication information at all times in the event of emergency situations     Allergies:  No Known Allergies          Medications  Valid as of: March 06, 2017 - 10:58 AM    Generic Name Brand Name Tablet Size Instructions for use    Acetaminophen (Tab) TYLENOL 500 MG Take 500-1,000 mg by mouth as needed.        Cholecalciferol (Tab) cholecalciferol 1000 UNIT Take 1,000 Units by mouth every day.        Warfarin Sodium (Tab) COUMADIN 6 MG Take 1 tab by mouth daily or as directed by coumadin melo        .                 Medicines prescribed today were sent to:     [facility] [address] [place] [address] (), [place] - [address]    [address] [place] [address] () [place] [address]    Phone: [phone] Fax: [phone]    Open 24 Hours?: No      Medication refill instructions:       If your  prescription bottle indicates you have medication refills left, it is not necessary to call your provider’s office. Please contact your pharmacy and they will refill your medication.    If your prescription bottle indicates you do not have any refills left, you may request refills at any time through one of the following ways: The online [employer] system (except Urgent Care), by calling your provider’s office, or by asking your pharmacy to contact your provider’s office with a refill request. Medication refills are processed only during regular business hours and may not be available until the next business day. Your provider may request additional information or to have a follow-up visit with you prior to refilling your medication.   *Please Note: Medication refills are assigned a new Rx number when refilled electronically. Your pharmacy may indicate that no refills were authorized even though a new prescription for the same medication is available at the pharmacy. Please request the medicine by name with the pharmacy before contacting your provider for a refill.        Warfarin Dosing Calendar   March 2017 Details    Sun Mon Tue Wed Thu Fri Sat        1               2               3               4                 5               6   1.8   9 mg   See details      7      6 mg         8      9 mg         9      6 mg         10      9 mg         11      9 mg           12      9 mg         13      9 mg         14               15               16               17               18                 19               20               21               22               23               24               25                 26               27               28               29               30               31                 Date Details   03/06 This INR check   INR: 1.8       Date of next INR:  3/13/2017         How to take your warfarin dose     To take:  6 mg Take 1 of the 6 mg tablets.    To take:  9 mg Take 1.5 of the 6  mg tablets.              [employer] Access Code: Activation code not generated  Current [employer] Status: Active

## 2017-03-06 NOTE — PROGRESS NOTES
Anticoagulation Summary as of 3/6/2017     INR goal 2.0-3.0   Selected INR 1.8! (3/6/2017)   Maintenance plan 6 mg (6 mg x 1) on Tue, Thu; 9 mg (6 mg x 1.5) all other days   Weekly total 57 mg   Plan last modified Re Briones A.P.NClaire (3/6/2017)   Next INR check 3/13/2017   Target end date Indefinite    Indications   Deep vein thrombosis (DVT) of both lower extremities (CMS-HCC) [I82.403]         Anticoagulation Episode Summary     INR check location     Preferred lab     Send INR reminders to     Comments [facility]      Anticoagulation Care Providers     Provider Role Specialty Phone number    Yimi Martinez M.D. Referring Surgery [phone]    DIMITRI SchofieldD Responsible      [facility] Responsible  [phone]        Patient is subtherapeutic today. Denies any medication or diet changes. No current symptoms of bleeding or thrombosis reported. Will increase regimen further. Follow up in 1 week.    Next Appointment: Monday, March 13, 2017 at 9:45 am.     Re HUITRON

## 2017-03-13 ENCOUNTER — ANTICOAGULATION VISIT (OUTPATIENT)
Dept: VASCULAR LAB | Facility: MEDICAL CENTER | Age: 62
End: 2017-03-13
Attending: INTERNAL MEDICINE
Payer: COMMERCIAL

## 2017-03-13 DIAGNOSIS — I82.403 DEEP VEIN THROMBOSIS (DVT) OF BOTH LOWER EXTREMITIES, UNSPECIFIED CHRONICITY, UNSPECIFIED VEIN (HCC): ICD-10-CM

## 2017-03-13 LAB
INR BLD: 2.5 (ref 0.9–1.2)
INR PPP: 2.5 (ref 2–3.5)

## 2017-03-13 PROCEDURE — 85610 PROTHROMBIN TIME: CPT

## 2017-03-13 PROCEDURE — 99212 OFFICE O/P EST SF 10 MIN: CPT | Performed by: NURSE PRACTITIONER

## 2017-03-13 NOTE — MR AVS SNAPSHOT
Tamara Flynn   3/13/2017 9:45 AM   Anticoagulation Visit   MRN: [id number]    Department:  Vascular Medicine   Dept Phone:  [phone]    Description:  Female : 1955   Provider:  [facility] EXAM 5           Allergies as of 3/13/2017     No Known Allergies      You were diagnosed with     Deep vein thrombosis (DVT) of both lower extremities, unspecified chronicity, unspecified vein (CMS-HCC)   [0010469]         Vital Signs     Smoking Status                   Never Smoker            Basic Information     Date Of Birth Sex Race Ethnicity Preferred Language    1955 Female White Non- English      Your appointments     Mar 22, 2017 11:00 AM   Established Patient with [facility] EXAM 5   [facility]  (--)    [address]  [place] [address]   [phone]            May 19, 2017 10:40 AM   Established Patient with Maritza Esparza M.D.   [facility] ([place]    [address]  [address]  [place] [address] [phone]           You will be receiving a confirmation call a few days before your appointment from our automated call confirmation system.              Problem List              ICD-10-CM Priority Class Noted - Resolved    Morbid obesity with BMI of 40.0-44.9, adult (CMS-[facility]) E66.01, Z68.41   2015 - Present    Health care maintenance Z00.00   2015 - Present    Prediabetes R73.03   2015 - Present    Adenocarcinoma of sigmoid colon (CMS-HCC) C18.7   3/22/2016 - Present    Family history of primary TB Z83.6 Low  2016 - Present    Adenocarcinoma of colon metastatic to liver (CMS-HCC) C18.9, C78.7 High  2016 - Present    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 K63.2 High  2016 - Present    Deep vein thrombosis (DVT) of both lower extremities (CMS-HCC) I82.403   2016 - Present    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    8/9/2016 - Present    Encounter for screening mammogram for malignant neoplasm of breast Z12.31   9/20/2016 - Present    Colostomy in place (CMS-HCC) Z93.3   2/17/2017 - Present      Health Maintenance        Date Due Completion Dates    PAP SMEAR 10/23/1975 ---    IMM ZOSTER VACCINE 10/23/2014 ---    MAMMOGRAM 11/1/2017 11/1/2016    COLONOSCOPY 3/22/2026 3/22/2016    IMM DTaP/Tdap/Td Vaccine (2 - Td) 2/17/2027 2/17/2017            Results     POCT Protime      Component    INR    2.5                        Current Immunizations     Influenza Vaccine Quad Inj (Pf) 11/12/2015    Influenza Vaccine Quad Inj (Preserved) 10/10/2016    Tdap Vaccine 2/17/2017      Below and/or attached are the medications your provider expects you to take. Review all of your home medications and newly ordered medications with your provider and/or pharmacist. Follow medication instructions as directed by your provider and/or pharmacist. Please keep your medication list with you and share with your provider. Update the information when medications are discontinued, doses are changed, or new medications (including over-the-counter products) are added; and carry medication information at all times in the event of emergency situations     Allergies:  No Known Allergies          Medications  Valid as of: March 13, 2017 - 10:11 AM    Generic Name Brand Name Tablet Size Instructions for use    Acetaminophen (Tab) TYLENOL 500 MG Take 500-1,000 mg by mouth as needed.        Cholecalciferol (Tab) cholecalciferol 1000 UNIT Take 1,000 Units by mouth every day.        Warfarin Sodium (Tab) COUMADIN 6 MG Take 1 tab by mouth daily or as directed by coumadin melo        .                 Medicines prescribed today were sent to:     [facility] [address] [place] [address] (), [place] [address]    [address] [place] [address] () [place] [address]    Phone: [phone] Fax: [phone]    Open 24 Hours?: No      Medication refill instructions:       If your  prescription bottle indicates you have medication refills left, it is not necessary to call your provider’s office. Please contact your pharmacy and they will refill your medication.    If your prescription bottle indicates you do not have any refills left, you may request refills at any time through one of the following ways: The online [employer] system (except Urgent Care), by calling your provider’s office, or by asking your pharmacy to contact your provider’s office with a refill request. Medication refills are processed only during regular business hours and may not be available until the next business day. Your provider may request additional information or to have a follow-up visit with you prior to refilling your medication.   *Please Note: Medication refills are assigned a new Rx number when refilled electronically. Your pharmacy may indicate that no refills were authorized even though a new prescription for the same medication is available at the pharmacy. Please request the medicine by name with the pharmacy before contacting your provider for a refill.        Warfarin Dosing Calendar   March 2017 Details    Sun Mon Tue Wed Thu Fri Sat        1               2               3               4                 5               6               7               8               9               10               11                 12               13   2.5   9 mg   See details      14      6 mg         15      9 mg         16      6 mg         17      9 mg         18      9 mg           19      9 mg         20      9 mg         21               22               23               24               25                 26               27               28               29               30               31                 Date Details   03/13 This INR check   INR: 2.5       Date of next INR:  3/20/2017         How to take your warfarin dose     To take:  6 mg Take 1 of the 6 mg tablets.    To take:  9 mg Take 1.5 of the 6  mg tablets.              [employer] Access Code: Activation code not generated  Current [employer] Status: Active

## 2017-03-13 NOTE — PROGRESS NOTES
Anticoagulation Summary as of 3/13/2017     INR goal 2.0-3.0   Selected INR 2.5 (3/13/2017)   Maintenance plan 6 mg (6 mg x 1) on Tue, Thu; 9 mg (6 mg x 1.5) all other days   Weekly total 57 mg   Plan last modified ASTRID ShinP.ZACH (3/6/2017)   Next INR check 3/20/2017   Target end date Indefinite    Indications   Deep vein thrombosis (DVT) of both lower extremities (CMS-HCC) [I82.403]         Anticoagulation Episode Summary     INR check location     Preferred lab     Send INR reminders to     Comments [facility]      Anticoagulation Care Providers     Provider Role Specialty Phone number    Yimi Martinez M.D. Referring Surgery [phone]    DIMITRI SchofieldD Responsible      [facility] Responsible  [phone]        Patient is therapeutic today. Denies any medication or diet changes. No current symptoms of bleeding or thrombosis reported. Continue current regimen. Follow up in 1 week.    Next Appointment: Wednesday, March 22, 2017 at 11:00 am.     Re HUITRON

## 2017-03-22 ENCOUNTER — ANTICOAGULATION VISIT (OUTPATIENT)
Dept: VASCULAR LAB | Facility: MEDICAL CENTER | Age: 62
End: 2017-03-22
Attending: INTERNAL MEDICINE
Payer: COMMERCIAL

## 2017-03-22 DIAGNOSIS — I82.403 DEEP VEIN THROMBOSIS (DVT) OF BOTH LOWER EXTREMITIES, UNSPECIFIED CHRONICITY, UNSPECIFIED VEIN (HCC): ICD-10-CM

## 2017-03-22 LAB
INR BLD: 3.4 (ref 0.9–1.2)
INR PPP: 3.4 (ref 2–3.5)

## 2017-03-22 PROCEDURE — 99212 OFFICE O/P EST SF 10 MIN: CPT | Performed by: NURSE PRACTITIONER

## 2017-03-22 PROCEDURE — 85610 PROTHROMBIN TIME: CPT

## 2017-03-22 NOTE — PROGRESS NOTES
Anticoagulation Summary as of 3/22/2017     INR goal 2.0-3.0   Selected INR 3.4! (3/22/2017)   Maintenance plan 6 mg (6 mg x 1) on Tue, Thu, Sat; 9 mg (6 mg x 1.5) all other days   Weekly total 54 mg   Plan last modified Re Briones AClaireP.ZACH (3/22/2017)   Next INR check 4/5/2017   Target end date Indefinite    Indications   Deep vein thrombosis (DVT) of both lower extremities (CMS-HCC) [I82.403]         Anticoagulation Episode Summary     INR check location     Preferred lab     Send INR reminders to     Comments [facility]      Anticoagulation Care Providers     Provider Role Specialty Phone number    Yimi Martinez M.D. Referring Surgery [phone]    DIMITRI SchofieldD Responsible      [facility] Responsible  [phone]        Patient is supratherapeutic today. Denies any medication or diet changes. No current symptoms of bleeding or thrombosis reported. Will decrease regimen. Follow up in 1.5 week.    Next Appointment: Monday, April 3, 2017 at 10:45 am.     Re HUITRON

## 2017-03-22 NOTE — MR AVS SNAPSHOT
Tamara Flynn   3/22/2017 11:00 AM   Anticoagulation Visit   MRN: [id number]    Department:  Vascular Medicine   Dept Phone:  [phone]    Description:  Female : 1955   Provider:  [facility] EXAM 5           Allergies as of 3/22/2017     No Known Allergies      You were diagnosed with     Deep vein thrombosis (DVT) of both lower extremities, unspecified chronicity, unspecified vein (CMS-[facility])   [[id number]]         Vital Signs     Smoking Status                   Never Smoker            Basic Information     Date Of Birth Sex Race Ethnicity Preferred Language    1955 Female White Non- English      Your appointments     Mar 22, 2017 11:00 AM   Established Patient with [facility] EXAM 5   [facility]  (--)    [address] [address] [place] [address]   [phone]            2017 10:45 AM   Established Patient with [facility] EXAM 4   [facility]  (--)    [address] [place] [address]   [phone]            May 19, 2017 10:40 AM   Established Patient with Maritza Esparza M.D.   [facility] ([place])    [address]  [address]  [place] [address] [phone]           You will be receiving a confirmation call a few days before your appointment from our automated call confirmation system.              Problem List              ICD-10-CM Priority Class Noted - Resolved    Morbid obesity with BMI of 40.0-44.9, adult (CMS-[facility]) E66.01, Z68.41   2015 - Present    Health care maintenance Z00.00   2015 - Present    Prediabetes R73.03   2015 - Present    Adenocarcinoma of sigmoid colon (CMS-HCC) C18.7   3/22/2016 - Present    Family history of primary TB Z83.6 Low  2016 - Present    Adenocarcinoma of colon metastatic to liver (CMS-HCC) C18.9, C78.7 High  2016 - Present    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 K63.2  High  4/20/2016 - Present    Deep vein thrombosis (DVT) of both lower extremities (CMS-HCC) I82.403   5/23/2016 - Present    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   8/9/2016 - Present    Encounter for screening mammogram for malignant neoplasm of breast Z12.31   9/20/2016 - Present    Colostomy in place (CMS-HCC) Z93.3   2/17/2017 - Present      Health Maintenance        Date Due Completion Dates    PAP SMEAR 10/23/1975 ---    IMM ZOSTER VACCINE 10/23/2014 ---    MAMMOGRAM 11/1/2017 11/1/2016    COLONOSCOPY 3/22/2026 3/22/2016    IMM DTaP/Tdap/Td Vaccine (2 - Td) 2/17/2027 2/17/2017            Results     POCT Protime      Component    INR    3.4                        Current Immunizations     Influenza Vaccine Quad Inj (Pf) 11/12/2015    Influenza Vaccine Quad Inj (Preserved) 10/10/2016    Tdap Vaccine 2/17/2017      Below and/or attached are the medications your provider expects you to take. Review all of your home medications and newly ordered medications with your provider and/or pharmacist. Follow medication instructions as directed by your provider and/or pharmacist. Please keep your medication list with you and share with your provider. Update the information when medications are discontinued, doses are changed, or new medications (including over-the-counter products) are added; and carry medication information at all times in the event of emergency situations     Allergies:  No Known Allergies          Medications  Valid as of: March 22, 2017 - 10:58 AM    Generic Name Brand Name Tablet Size Instructions for use    Acetaminophen (Tab) TYLENOL 500 MG Take 500-1,000 mg by mouth as needed.        Cholecalciferol (Tab) cholecalciferol 1000 UNIT Take 1,000 Units by mouth every day.        Warfarin Sodium (Tab) COUMADIN 6 MG Take 1 tab by mouth daily or as directed by coumadin cldenzel        .                 Medicines prescribed today were sent to:     [facility] -  [place] (), [place] - [address]    [address] [place] [address] [place] () [place] [address]    Phone: [phone] Fax: [phone]    Open 24 Hours?: No      Medication refill instructions:       If your prescription bottle indicates you have medication refills left, it is not necessary to call your provider’s office. Please contact your pharmacy and they will refill your medication.    If your prescription bottle indicates you do not have any refills left, you may request refills at any time through one of the following ways: The online [employer] system (except Urgent Care), by calling your provider’s office, or by asking your pharmacy to contact your provider’s office with a refill request. Medication refills are processed only during regular business hours and may not be available until the next business day. Your provider may request additional information or to have a follow-up visit with you prior to refilling your medication.   *Please Note: Medication refills are assigned a new Rx number when refilled electronically. Your pharmacy may indicate that no refills were authorized even though a new prescription for the same medication is available at the pharmacy. Please request the medicine by name with the pharmacy before contacting your provider for a refill.        Warfarin Dosing Calendar   March 2017 Details    Sun Mon Tue Wed Thu Fri Sat        1               2               3               4                 5               6               7               8               9               10               11                 12               13               14               15               16               17               18                 19               20               21               22   3.4   6 mg   See details      23      6 mg         24      9 mg         25      6 mg           26      9 mg         27      9 mg         28      6 mg         29      9 mg         30      6 mg         31      9 mg            Date Details   03/22 This INR check   INR: 3.4               How to take your warfarin dose     To take:  6 mg Take 1 of the 6 mg tablets.    To take:  9 mg Take 1.5 of the 6 mg tablets.           Warfarin Dosing Calendar   April 2017 Details    Sun Mon Tue Wed Thu Fri Sat           1      6 mg           2      9 mg         3      9 mg         4      6 mg         5      9 mg         6               7               8                 9               10               11               12               13               14               15                 16               17               18               19               20               21               22                 23               24               25               26               27               28               29                 30                      Date Details   No additional details    Date of next INR:  4/5/2017         How to take your warfarin dose     To take:  6 mg Take 1 of the 6 mg tablets.    To take:  9 mg Take 1.5 of the 6 mg tablets.              [employer] Access Code: Activation code not generated  Current [employer] Status: Active

## 2017-04-03 ENCOUNTER — ANTICOAGULATION VISIT (OUTPATIENT)
Dept: VASCULAR LAB | Facility: MEDICAL CENTER | Age: 62
End: 2017-04-03
Attending: INTERNAL MEDICINE
Payer: COMMERCIAL

## 2017-04-03 DIAGNOSIS — I82.403 DEEP VEIN THROMBOSIS (DVT) OF BOTH LOWER EXTREMITIES, UNSPECIFIED CHRONICITY, UNSPECIFIED VEIN (HCC): ICD-10-CM

## 2017-04-03 LAB
INR BLD: 7.3 (ref 0.9–1.2)
INR PPP: 7.3 (ref 2–3.5)

## 2017-04-03 PROCEDURE — 85610 PROTHROMBIN TIME: CPT

## 2017-04-03 PROCEDURE — 99212 OFFICE O/P EST SF 10 MIN: CPT | Performed by: NURSE PRACTITIONER

## 2017-04-03 NOTE — MR AVS SNAPSHOT
Tamara Caitie Flynn   4/3/2017 10:45 AM   Anticoagulation Visit   MRN: [id number]    Department:  Vascular Medicine   Dept Phone:  [phone]    Description:  Female : 1955   Provider:  [facility] EXAM 4           Allergies as of 4/3/2017     No Known Allergies      You were diagnosed with     Deep vein thrombosis (DVT) of both lower extremities, unspecified chronicity, unspecified vein (CMS-HCC)   [[id number]]         Vital Signs     Smoking Status                   Never Smoker            Basic Information     Date Of Birth Sex Race Ethnicity Preferred Language    1955 Female White Non- English      Your appointments     Apr 10, 2017  3:15 PM   Established Patient with [facility] EXAM 4   [facility]  (--)    [address]  [place] [address]   [phone]            May 19, 2017 10:40 AM   Established Patient with Maritza Esparza M.D.   [facility] ([place])    [address]  [address]  [place] [address] [phone]           You will be receiving a confirmation call a few days before your appointment from our automated call confirmation system.              Problem List              ICD-10-CM Priority Class Noted - Resolved    Morbid obesity with BMI of 40.0-44.9, adult (CMS-[facility]) E66.01, Z68.41   2015 - Present    Health care maintenance Z00.00   2015 - Present    Prediabetes R73.03   2015 - Present    Adenocarcinoma of sigmoid colon (CMS-HCC) C18.7   3/22/2016 - Present    Family history of primary TB Z83.6 Low  2016 - Present    Adenocarcinoma of colon metastatic to liver (CMS-HCC) C18.9, C78.7 High  2016 - Present    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 K63.2 High  2016 - Present    Deep vein thrombosis (DVT) of both lower extremities (CMS-HCC) I82.403   2016 - Present    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    8/9/2016 - Present    Encounter for screening mammogram for malignant neoplasm of breast Z12.31   9/20/2016 - Present    Colostomy in place (CMS-HCC) Z93.3   2/17/2017 - Present      Health Maintenance        Date Due Completion Dates    PAP SMEAR 10/23/1975 ---    IMM ZOSTER VACCINE 10/23/2014 ---    MAMMOGRAM 11/1/2017 11/1/2016    COLONOSCOPY 3/22/2026 3/22/2016    IMM DTaP/Tdap/Td Vaccine (2 - Td) 2/17/2027 2/17/2017            Results     POCT Protime      Component    INR    7.3                        Current Immunizations     Influenza Vaccine Quad Inj (Pf) 11/12/2015    Influenza Vaccine Quad Inj (Preserved) 10/10/2016    Tdap Vaccine 2/17/2017      Below and/or attached are the medications your provider expects you to take. Review all of your home medications and newly ordered medications with your provider and/or pharmacist. Follow medication instructions as directed by your provider and/or pharmacist. Please keep your medication list with you and share with your provider. Update the information when medications are discontinued, doses are changed, or new medications (including over-the-counter products) are added; and carry medication information at all times in the event of emergency situations     Allergies:  No Known Allergies          Medications  Valid as of: April 03, 2017 - 11:00 AM    Generic Name Brand Name Tablet Size Instructions for use    Acetaminophen (Tab) TYLENOL 500 MG Take 500-1,000 mg by mouth as needed.        Cholecalciferol (Tab) cholecalciferol 1000 UNIT Take 1,000 Units by mouth every day.        Warfarin Sodium (Tab) COUMADIN 6 MG Take 1 tab by mouth daily or as directed by coumadin melo        .                 Medicines prescribed today were sent to:     [facility] [address] [place] [address] (), [place] - [address]    [address] [place] [address] () [place] [address]    Phone: [phone] Fax: [phone]    Open 24 Hours?: No      Medication refill instructions:       If your  prescription bottle indicates you have medication refills left, it is not necessary to call your provider’s office. Please contact your pharmacy and they will refill your medication.    If your prescription bottle indicates you do not have any refills left, you may request refills at any time through one of the following ways: The online [employer] system (except Urgent Care), by calling your provider’s office, or by asking your pharmacy to contact your provider’s office with a refill request. Medication refills are processed only during regular business hours and may not be available until the next business day. Your provider may request additional information or to have a follow-up visit with you prior to refilling your medication.   *Please Note: Medication refills are assigned a new Rx number when refilled electronically. Your pharmacy may indicate that no refills were authorized even though a new prescription for the same medication is available at the pharmacy. Please request the medicine by name with the pharmacy before contacting your provider for a refill.        Warfarin Dosing Calendar   April 2017 Details    Sun Mon Tue Wed Thu Fri Sat           1                 2               3   7.3   Hold   See details      4      Hold         5      3 mg         6      6 mg         7      6 mg         8      6 mg           9      9 mg         10      6 mg         11               12               13               14               15                 16               17               18               19               20               21               22                 23               24               25               26               27               28               29                 30                      Date Details   04/03 This INR check   INR: 7.3       Date of next INR:  4/10/2017         How to take your warfarin dose     To take:  3 mg Take 0.5 of a 6 mg tablet.    To take:  6 mg Take 1 of the 6 mg tablets.     To take:  9 mg Take 1.5 of the 6 mg tablets.    Hold Do not take your warfarin dose. See the Details table to the right for additional instructions.                   [employer] Access Code: Activation code not generated  Current [employer] Status: Active

## 2017-04-03 NOTE — PROGRESS NOTES
Anticoagulation Summary as of 4/3/2017     INR goal 2.0-3.0   Selected INR 7.3! (4/3/2017)   Maintenance plan 9 mg (6 mg x 1.5) on Sun; 6 mg (6 mg x 1) all other days   Weekly total 45 mg   Plan last modified DEBBI Shin (4/3/2017)   Next INR check 4/10/2017   Target end date Indefinite    Indications   Deep vein thrombosis (DVT) of both lower extremities (CMS-HCC) [I82.403]         Anticoagulation Episode Summary     INR check location     Preferred lab     Send INR reminders to     Comments [facility]      Anticoagulation Care Providers     Provider Role Specialty Phone number    Yimi Martinez M.D. Referring Surgery [phone]    DIMITRI SchofieldD Responsible      [facility] Responsible  [phone]        Patient is supratherapeutic today. She received chemo last week. She's eating less greens. Denies any medication changes. No current symptoms of bleeding or thrombosis reported. Will decrease regimen. Follow up in 1 week.    Next Appointment: Monday, April 11, 2017 at 3:15 pm.     Re HUITRON

## 2017-04-10 ENCOUNTER — ANTICOAGULATION VISIT (OUTPATIENT)
Dept: VASCULAR LAB | Facility: MEDICAL CENTER | Age: 62
End: 2017-04-10
Attending: INTERNAL MEDICINE
Payer: COMMERCIAL

## 2017-04-10 DIAGNOSIS — I82.403 DEEP VEIN THROMBOSIS (DVT) OF BOTH LOWER EXTREMITIES, UNSPECIFIED CHRONICITY, UNSPECIFIED VEIN (HCC): ICD-10-CM

## 2017-04-10 LAB
INR BLD: 2.3 (ref 0.9–1.2)
INR PPP: 2.3 (ref 2–3.5)

## 2017-04-10 PROCEDURE — 99211 OFF/OP EST MAY X REQ PHY/QHP: CPT | Performed by: NURSE PRACTITIONER

## 2017-04-10 PROCEDURE — 85610 PROTHROMBIN TIME: CPT

## 2017-04-10 RX ORDER — WARFARIN SODIUM 6 MG/1
TABLET ORAL
Qty: 30 TAB | Refills: 3 | Status: SHIPPED | OUTPATIENT
Start: 2017-04-10 | End: 2017-04-11

## 2017-04-10 NOTE — PROGRESS NOTES
Anticoagulation Summary as of 4/10/2017     INR goal 2.0-3.0   Selected INR 2.3 (4/10/2017)   Maintenance plan 9 mg (6 mg x 1.5) on Sun; 6 mg (6 mg x 1) all other days   Weekly total 45 mg   Plan last modified DEBBI Shin (4/3/2017)   Next INR check 4/26/2017   Target end date Indefinite    Indications   Deep vein thrombosis (DVT) of both lower extremities (CMS-HCC) [I82.403]         Anticoagulation Episode Summary     INR check location     Preferred lab     Send INR reminders to     Comments [facility]      Anticoagulation Care Providers     Provider Role Specialty Phone number    Yimi Martinez M.D. Referring Surgery [phone]    DIMITRI SchofieldD Responsible      [facility] Responsible  [phone]        Patient is therapeutic today. Denies any medication or diet changes. No current symptoms of bleeding or thrombosis reported. Continue current regimen. Follow up in 1 week.    Next Appointment: Wednesday, April 19, 2017 at 11:15 am.     Re HUITRON

## 2017-04-10 NOTE — MR AVS SNAPSHOT
Tamara Caitie Flynn   4/10/2017 3:15 PM   Anticoagulation Visit   MRN: [id number]    Department:  Vascular Medicine   Dept Phone:  [phone]    Description:  Female : 1955   Provider:  [facility] EXAM 4           Allergies as of 4/10/2017     No Known Allergies      You were diagnosed with     Deep vein thrombosis (DVT) of both lower extremities, unspecified chronicity, unspecified vein (CMS-HCC)   [[id number]]         Vital Signs     Smoking Status                   Never Smoker            Basic Information     Date Of Birth Sex Race Ethnicity Preferred Language    1955 Female White Non- English      Your appointments     2017 11:15 AM   Established Patient with [facility] EXAM 1   [facility]  (--)    [address]  [place] [address]   [phone]            May 19, 2017 10:40 AM   Established Patient with Maritza Esparza M.D.   [facility]    [address]  [address]  [place] [address] [phone]           You will be receiving a confirmation call a few days before your appointment from our automated call confirmation system.              Problem List              ICD-10-CM Priority Class Noted - Resolved    Morbid obesity with BMI of 40.0-44.9, adult (CMS-[facility]) E66.01, Z68.41   2015 - Present    Health care maintenance Z00.00   2015 - Present    Prediabetes R73.03   2015 - Present    Adenocarcinoma of sigmoid colon (CMS-HCC) C18.7   3/22/2016 - Present    Family history of primary TB Z83.6 Low  2016 - Present    Adenocarcinoma of colon metastatic to liver (CMS-HCC) C18.9, C78.7 High  2016 - Present    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 K63.2 High  2016 - Present    Deep vein thrombosis (DVT) of both lower extremities (CMS-HCC) I82.403   2016 - Present    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    8/9/2016 - Present    Encounter for screening mammogram for malignant neoplasm of breast Z12.31   9/20/2016 - Present    Colostomy in place (CMS-HCC) Z93.3   2/17/2017 - Present      Health Maintenance        Date Due Completion Dates    PAP SMEAR 10/23/1975 ---    IMM ZOSTER VACCINE 10/23/2014 ---    MAMMOGRAM 11/1/2017 11/1/2016    COLONOSCOPY 3/22/2026 3/22/2016    IMM DTaP/Tdap/Td Vaccine (2 - Td) 2/17/2027 2/17/2017            Results     POCT Protime      Component    INR    2.3                        Current Immunizations     Influenza Vaccine Quad Inj (Pf) 11/12/2015    Influenza Vaccine Quad Inj (Preserved) 10/10/2016    Tdap Vaccine 2/17/2017      Below and/or attached are the medications your provider expects you to take. Review all of your home medications and newly ordered medications with your provider and/or pharmacist. Follow medication instructions as directed by your provider and/or pharmacist. Please keep your medication list with you and share with your provider. Update the information when medications are discontinued, doses are changed, or new medications (including over-the-counter products) are added; and carry medication information at all times in the event of emergency situations     Allergies:  No Known Allergies          Medications  Valid as of: April 10, 2017 -  3:20 PM    Generic Name Brand Name Tablet Size Instructions for use    Acetaminophen (Tab) TYLENOL 500 MG Take 500-1,000 mg by mouth as needed.        Cholecalciferol (Tab) cholecalciferol 1000 UNIT Take 1,000 Units by mouth every day.        Warfarin Sodium (Tab) COUMADIN 6 MG Take 1 tab by mouth daily or as directed by coumadin melo        .                 Medicines prescribed today were sent to:     [facility] [address] [place] [address] (), [place] [address]    [address] [place] [address] () [place] [address]    Phone: [phone] Fax: [phone]    Open 24 Hours?: No      Medication refill instructions:       If your  prescription bottle indicates you have medication refills left, it is not necessary to call your provider’s office. Please contact your pharmacy and they will refill your medication.    If your prescription bottle indicates you do not have any refills left, you may request refills at any time through one of the following ways: The online [employer] system (except Urgent Care), by calling your provider’s office, or by asking your pharmacy to contact your provider’s office with a refill request. Medication refills are processed only during regular business hours and may not be available until the next business day. Your provider may request additional information or to have a follow-up visit with you prior to refilling your medication.   *Please Note: Medication refills are assigned a new Rx number when refilled electronically. Your pharmacy may indicate that no refills were authorized even though a new prescription for the same medication is available at the pharmacy. Please request the medicine by name with the pharmacy before contacting your provider for a refill.        Warfarin Dosing Calendar   April 2017 Details    Sun Mon Tue Wed Thu Fri Sat           1                 2               3               4               5               6               7               8                 9               10   2.3   6 mg   See details      11      6 mg         12      6 mg         13      6 mg         14      6 mg         15      6 mg           16      9 mg         17      6 mg         18      6 mg         19      6 mg         20      6 mg         21      6 mg         22      6 mg           23      9 mg         24      6 mg         25      6 mg         26      6 mg         27               28               29                 30                      Date Details   04/10 This INR check   INR: 2.3       Date of next INR:  4/26/2017         How to take your warfarin dose     To take:  6 mg Take 1 of the 6 mg tablets.    To  take:  9 mg Take 1.5 of the 6 mg tablets.              [employer] Access Code: Activation code not generated  Current [employer] Status: Active

## 2017-04-11 DIAGNOSIS — Z86.718 HISTORY OF DVT (DEEP VEIN THROMBOSIS): ICD-10-CM

## 2017-04-11 RX ORDER — WARFARIN SODIUM 6 MG/1
TABLET ORAL
Qty: 135 TAB | Refills: 1 | Status: SHIPPED | OUTPATIENT
Start: 2017-04-11 | End: 2017-07-24 | Stop reason: SDUPTHER

## 2017-04-19 ENCOUNTER — ANTICOAGULATION VISIT (OUTPATIENT)
Dept: VASCULAR LAB | Facility: MEDICAL CENTER | Age: 62
End: 2017-04-19
Attending: INTERNAL MEDICINE
Payer: COMMERCIAL

## 2017-04-19 DIAGNOSIS — I82.403 DEEP VEIN THROMBOSIS (DVT) OF BOTH LOWER EXTREMITIES, UNSPECIFIED CHRONICITY, UNSPECIFIED VEIN (HCC): ICD-10-CM

## 2017-04-19 LAB
INR BLD: 2.8 (ref 0.9–1.2)
INR PPP: 2.8 (ref 2–3.5)

## 2017-04-19 PROCEDURE — 85610 PROTHROMBIN TIME: CPT

## 2017-04-19 PROCEDURE — 99211 OFF/OP EST MAY X REQ PHY/QHP: CPT | Performed by: NURSE PRACTITIONER

## 2017-04-19 NOTE — PROGRESS NOTES
Anticoagulation Summary as of 4/19/2017     INR goal 2.0-3.0   Selected INR 2.8 (4/19/2017)   Maintenance plan 9 mg (6 mg x 1.5) on Sun; 6 mg (6 mg x 1) all other days   Weekly total 45 mg   Plan last modified DEBBI Shin (4/3/2017)   Next INR check 5/3/2017   Target end date Indefinite    Indications   Deep vein thrombosis (DVT) of both lower extremities (CMS-HCC) [I82.403]         Anticoagulation Episode Summary     INR check location     Preferred lab     Send INR reminders to     Comments [facility]      Anticoagulation Care Providers     Provider Role Specialty Phone number    Yimi Martinez M.D. Referring Surgery [phone]    DIMITRI SchofieldD Responsible      [facility] Responsible  [phone]        Patient is therapeutic today. Denies any medication or diet changes. No current symptoms of bleeding or thrombosis reported. Continue current regimen. Follow up in 2 weeks.    Next Appointment: Wednesday, May 3, 2017 at 11:15 am.     Re HUITRON

## 2017-04-19 NOTE — MR AVS SNAPSHOT
Tamara Caitie Flynn   2017 11:15 AM   Anticoagulation Visit   MRN: [id number]    Department:  Vascular Medicine   Dept Phone:  [phone]    Description:  Female : 1955   Provider:  [facility] EXAM 1           Allergies as of 2017     No Known Allergies      You were diagnosed with     Deep vein thrombosis (DVT) of both lower extremities, unspecified chronicity, unspecified vein (CMS-HCC)   [[id number]]         Vital Signs     Smoking Status                   Never Smoker            Basic Information     Date Of Birth Sex Race Ethnicity Preferred Language    1955 Female White Non- English      Your appointments     May 03, 2017 11:15 AM   Established Patient with [facility] EXAM 1   [facility]  (--)    [address]  [place] [address]   [phone]            May 19, 2017 10:40 AM   Established Patient with Maritza Esparza M.D.   [facility] ([place]    [address]  [address]  [place] [address] [phone]           You will be receiving a confirmation call a few days before your appointment from our automated call confirmation system.              Problem List              ICD-10-CM Priority Class Noted - Resolved    Morbid obesity with BMI of 40.0-44.9, adult (CMS-[facility]) E66.01, Z68.41   2015 - Present    Health care maintenance Z00.00   2015 - Present    Prediabetes R73.03   2015 - Present    Adenocarcinoma of sigmoid colon (CMS-HCC) C18.7   3/22/2016 - Present    Family history of primary TB Z83.6 Low  2016 - Present    Adenocarcinoma of colon metastatic to liver (CMS-HCC) C18.9, C78.7 High  2016 - Present    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 K63.2 High  2016 - Present    Deep vein thrombosis (DVT) of both lower extremities (CMS-HCC) I82.403   2016 - Present    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    8/9/2016 - Present    Encounter for screening mammogram for malignant neoplasm of breast Z12.31   9/20/2016 - Present    Colostomy in place (CMS-HCC) Z93.3   2/17/2017 - Present      Health Maintenance        Date Due Completion Dates    PAP SMEAR 10/23/1975 ---    IMM ZOSTER VACCINE 10/23/2014 ---    MAMMOGRAM 11/1/2017 11/1/2016    COLONOSCOPY 3/22/2026 3/22/2016    IMM DTaP/Tdap/Td Vaccine (2 - Td) 2/17/2027 2/17/2017            Results     POCT Protime      Component    INR    2.8                        Current Immunizations     Influenza Vaccine Quad Inj (Pf) 11/12/2015    Influenza Vaccine Quad Inj (Preserved) 10/10/2016    Tdap Vaccine 2/17/2017      Below and/or attached are the medications your provider expects you to take. Review all of your home medications and newly ordered medications with your provider and/or pharmacist. Follow medication instructions as directed by your provider and/or pharmacist. Please keep your medication list with you and share with your provider. Update the information when medications are discontinued, doses are changed, or new medications (including over-the-counter products) are added; and carry medication information at all times in the event of emergency situations     Allergies:  No Known Allergies          Medications  Valid as of: April 19, 2017 - 11:28 AM    Generic Name Brand Name Tablet Size Instructions for use    Acetaminophen (Tab) TYLENOL 500 MG Take 500-1,000 mg by mouth as needed.        Cholecalciferol (Tab) cholecalciferol 1000 UNIT Take 1,000 Units by mouth every day.        Warfarin Sodium (Tab) COUMADIN 6 MG Take one to one and one-half (1-1.5) tablets daily as directed by [facility]        .                 Medicines prescribed today were sent to:     [facility]  [place] (), [place] - [address]    [address] [place] [address] [place] () [place] [address]    Phone: [phone] Fax: [phone]    Open 24 Hours?: No      Medication  refill instructions:       If your prescription bottle indicates you have medication refills left, it is not necessary to call your provider’s office. Please contact your pharmacy and they will refill your medication.    If your prescription bottle indicates you do not have any refills left, you may request refills at any time through one of the following ways: The online [employer] system (except Urgent Care), by calling your provider’s office, or by asking your pharmacy to contact your provider’s office with a refill request. Medication refills are processed only during regular business hours and may not be available until the next business day. Your provider may request additional information or to have a follow-up visit with you prior to refilling your medication.   *Please Note: Medication refills are assigned a new Rx number when refilled electronically. Your pharmacy may indicate that no refills were authorized even though a new prescription for the same medication is available at the pharmacy. Please request the medicine by name with the pharmacy before contacting your provider for a refill.        Warfarin Dosing Calendar April 2017 Details    Sun Mon Tue Wed Thu Fri Sat           1                 2               3               4               5               6               7               8                 9               10               11               12               13               14               15                 16               17               18               19   2.8   6 mg   See details      20      6 mg         21      6 mg         22      6 mg           23      9 mg         24      6 mg         25      6 mg         26      6 mg         27      6 mg         28      6 mg         29      6 mg           30      9 mg                Date Details   04/19 This INR check   INR: 2.8               How to take your warfarin dose     To take:  6 mg Take 1 of the 6 mg tablets.    To take:  9 mg Take  1.5 of the 6 mg tablets.           Warfarin Dosing Calendar   May 2017 Details    Sun Mon Tue Wed Thu Fri Sat      1      6 mg         2      6 mg         3      6 mg         4               5               6                 7               8               9               10               11               12               13                 14               15               16               17               18               19               20                 21               22               23               24               25               26               27                 28               29               30               31                   Date Details   No additional details    Date of next INR:  5/3/2017         How to take your warfarin dose     To take:  6 mg Take 1 of the 6 mg tablets.              [employer] Access Code: Activation code not generated  Current [employer] Status: Active

## 2017-05-01 ENCOUNTER — APPOINTMENT (OUTPATIENT)
Dept: VASCULAR LAB | Facility: MEDICAL CENTER | Age: 62
End: 2017-05-01
Attending: INTERNAL MEDICINE
Payer: COMMERCIAL

## 2017-05-05 ENCOUNTER — ANTICOAGULATION VISIT (OUTPATIENT)
Dept: VASCULAR LAB | Facility: MEDICAL CENTER | Age: 62
End: 2017-05-05
Attending: INTERNAL MEDICINE
Payer: COMMERCIAL

## 2017-05-05 DIAGNOSIS — I82.403 DEEP VEIN THROMBOSIS (DVT) OF BOTH LOWER EXTREMITIES, UNSPECIFIED CHRONICITY, UNSPECIFIED VEIN (HCC): ICD-10-CM

## 2017-05-05 LAB — INR PPP: 1.6 (ref 2–3.5)

## 2017-05-05 PROCEDURE — 99212 OFFICE O/P EST SF 10 MIN: CPT | Performed by: NURSE PRACTITIONER

## 2017-05-05 PROCEDURE — 85610 PROTHROMBIN TIME: CPT

## 2017-05-05 NOTE — MR AVS SNAPSHOT
Tamara Flynn   2017 10:45 AM   Anticoagulation Visit   MRN: [id number]    Department:  Vascular Medicine   Dept Phone:  [phone]    Description:  Female : 1955   Provider:  [facility] EXAM 5           Allergies as of 2017     No Known Allergies      You were diagnosed with     Deep vein thrombosis (DVT) of both lower extremities, unspecified chronicity, unspecified vein (CMS-HCC)   [[id number]]         Vital Signs     Smoking Status                   Never Smoker            Basic Information     Date Of Birth Sex Race Ethnicity Preferred Language    1955 Female White Non- English      Your appointments     May 15, 2017 10:30 AM   Established Patient with [facility] EXAM 4   [facility]  (--)    [address]  [place] [address] [phone]            May 19, 2017 10:40 AM   Established Patient with Maritza Esparza M.D.   [facility] ([place]    [address]  [address]  [place] [address] [phone]           You will be receiving a confirmation call a few days before your appointment from our automated call confirmation system.              Problem List              ICD-10-CM Priority Class Noted - Resolved    Morbid obesity with BMI of 40.0-44.9, adult (CMS-[facility]) E66.01, Z68.41   2015 - Present    Health care maintenance Z00.00   2015 - Present    Prediabetes R73.03   2015 - Present    Adenocarcinoma of sigmoid colon (CMS-HCC) C18.7   3/22/2016 - Present    Family history of primary TB Z83.6 Low  2016 - Present    Adenocarcinoma of colon metastatic to liver (CMS-HCC) C18.9, C78.7 High  2016 - Present    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 K63.2 High  2016 - Present    Deep vein thrombosis (DVT) of both lower extremities (CMS-HCC) I82.403   2016 - Present    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    8/9/2016 - Present    Encounter for screening mammogram for malignant neoplasm of breast Z12.31   9/20/2016 - Present    Colostomy in place (CMS-HCC) Z93.3   2/17/2017 - Present      Health Maintenance        Date Due Completion Dates    PAP SMEAR 10/23/1975 ---    IMM ZOSTER VACCINE 10/23/2014 ---    MAMMOGRAM 11/1/2017 11/1/2016    COLONOSCOPY 3/22/2026 3/22/2016    IMM DTaP/Tdap/Td Vaccine (2 - Td) 2/17/2027 2/17/2017            Results     POCT Protime      Component    INR    1.6                        Current Immunizations     Influenza Vaccine Quad Inj (Pf) 11/12/2015    Influenza Vaccine Quad Inj (Preserved) 10/10/2016    Tdap Vaccine 2/17/2017      Below and/or attached are the medications your provider expects you to take. Review all of your home medications and newly ordered medications with your provider and/or pharmacist. Follow medication instructions as directed by your provider and/or pharmacist. Please keep your medication list with you and share with your provider. Update the information when medications are discontinued, doses are changed, or new medications (including over-the-counter products) are added; and carry medication information at all times in the event of emergency situations     Allergies:  No Known Allergies          Medications  Valid as of: May 05, 2017 - 10:51 AM    Generic Name Brand Name Tablet Size Instructions for use    Acetaminophen (Tab) TYLENOL 500 MG Take 500-1,000 mg by mouth as needed.        Cholecalciferol (Tab) cholecalciferol 1000 UNIT Take 1,000 Units by mouth every day.        Warfarin Sodium (Tab) COUMADIN 6 MG Take one to one and one-half (1-1.5) tablets daily as directed by [facility]        .                 Medicines prescribed today were sent to:     [facility]  [place] (), [place] - [address]    [address] [place] [address] [place] () [place] [address]    Phone: [phone] Fax: [phone]    Open 24 Hours?: No      Medication refill  instructions:       If your prescription bottle indicates you have medication refills left, it is not necessary to call your provider’s office. Please contact your pharmacy and they will refill your medication.    If your prescription bottle indicates you do not have any refills left, you may request refills at any time through one of the following ways: The online [employer] system (except Urgent Care), by calling your provider’s office, or by asking your pharmacy to contact your provider’s office with a refill request. Medication refills are processed only during regular business hours and may not be available until the next business day. Your provider may request additional information or to have a follow-up visit with you prior to refilling your medication.   *Please Note: Medication refills are assigned a new Rx number when refilled electronically. Your pharmacy may indicate that no refills were authorized even though a new prescription for the same medication is available at the pharmacy. Please request the medicine by name with the pharmacy before contacting your provider for a refill.        Warfarin Dosing Calendar   May 2017 Details    Sun Mon Tue Wed Thu Fri Sat      1               2               3               4               5   1.6   9 mg   See details      6      6 mg           7      9 mg         8      6 mg         9      6 mg         10      6 mg         11      6 mg         12      6 mg         13      6 mg           14      9 mg         15      6 mg         16               17               18               19               20                 21               22               23               24               25               26               27                 28               29               30               31                   Date Details   05/05 This INR check   INR: 1.6       Date of next INR:  5/15/2017         How to take your warfarin dose     To take:  6 mg Take 1 of the 6 mg tablets.     To take:  9 mg Take 1.5 of the 6 mg tablets.              [employer] Access Code: Activation code not generated  Current [employer] Status: Active

## 2017-05-05 NOTE — PROGRESS NOTES
Anticoagulation Summary as of 5/5/2017     INR goal 2.0-3.0   Selected INR 1.6! (5/5/2017)   Maintenance plan 9 mg (6 mg x 1.5) on Sun, Wed; 6 mg (6 mg x 1) all other days   Weekly total 48 mg   Plan last modified DEBBI Shin (5/5/2017)   Next INR check 5/15/2017   Target end date Indefinite    Indications   Deep vein thrombosis (DVT) of both lower extremities (CMS-HCC) [I82.403]         Anticoagulation Episode Summary     INR check location     Preferred lab     Send INR reminders to     Comments [facility]      Anticoagulation Care Providers     Provider Role Specialty Phone number    Yimi Martinez M.D. Referring Surgery [phone]    DIMITRI SchofieldD Responsible      [facility] Responsible  [phone]        Patient is subtherapeutic today. Denies any medication or diet changes and no missed doses. No current symptoms of bleeding or thrombosis reported. Will increase tonight then continue current regimen. Follow up in 1.5 weeks.    Next Appointment: Monday, May 15, 2017 at 10:30 am.    Re HUITRON

## 2017-05-12 LAB — INR BLD: 1.6 (ref 0.9–1.2)

## 2017-05-15 ENCOUNTER — ANTICOAGULATION VISIT (OUTPATIENT)
Dept: VASCULAR LAB | Facility: MEDICAL CENTER | Age: 62
End: 2017-05-15
Attending: INTERNAL MEDICINE
Payer: COMMERCIAL

## 2017-05-15 DIAGNOSIS — I82.403 DEEP VEIN THROMBOSIS (DVT) OF BOTH LOWER EXTREMITIES, UNSPECIFIED CHRONICITY, UNSPECIFIED VEIN (HCC): ICD-10-CM

## 2017-05-15 LAB
INR BLD: 1.6 (ref 0.9–1.2)
INR PPP: 1.6 (ref 2–3.5)

## 2017-05-15 PROCEDURE — 85610 PROTHROMBIN TIME: CPT

## 2017-05-15 PROCEDURE — 99212 OFFICE O/P EST SF 10 MIN: CPT | Performed by: NURSE PRACTITIONER

## 2017-05-15 NOTE — MR AVS SNAPSHOT
Tamara Caitie Flynn   5/15/2017 10:30 AM   Anticoagulation Visit   MRN: [id number]    Department:  Vascular Medicine   Dept Phone:  [phone]    Description:  Female : 1955   Provider:  [facility] EXAM 4           Allergies as of 5/15/2017     No Known Allergies      You were diagnosed with     Deep vein thrombosis (DVT) of both lower extremities, unspecified chronicity, unspecified vein (CMS-HCC)   [6391481]         Vital Signs     Smoking Status                   Never Smoker            Basic Information     Date Of Birth Sex Race Ethnicity Preferred Language    1955 Female White Non- English      Your appointments     May 19, 2017 10:40 AM   Established Patient with Maritza Esparza M.D.   [facility]    [address]  [address]  [place] [id number] [phone]           You will be receiving a confirmation call a few days before your appointment from our automated call confirmation system.            May 22, 2017 11:15 AM   Established Patient with [facility] EXAM 5   [facility]  (--)    [address] [facility] [place] [address]   [phone]              Problem List              ICD-10-CM Priority Class Noted - Resolved    Morbid obesity with BMI of 40.0-44.9, adult (CMS-HCC) E66.01, Z68.41   2015 - Present    Health care maintenance Z00.00   2015 - Present    Prediabetes R73.03   2015 - Present    Adenocarcinoma of sigmoid colon (CMS-HCC) C18.7   3/22/2016 - Present    Family history of primary TB Z83.6 Low  2016 - Present    Adenocarcinoma of colon metastatic to liver (CMS-HCC) C18.9, C78.7 High  2016 - Present    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 K63.2 High  2016 - Present    Deep vein thrombosis (DVT) of both lower extremities (CMS-HCC) I82.403   2016 - Present    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    8/9/2016 - Present    Encounter for screening mammogram for malignant neoplasm of breast Z12.31   9/20/2016 - Present    Colostomy in place (CMS-HCC) Z93.3   2/17/2017 - Present      Health Maintenance        Date Due Completion Dates    PAP SMEAR 10/23/1975 ---    IMM ZOSTER VACCINE 10/23/2014 ---    MAMMOGRAM 11/1/2017 11/1/2016    COLONOSCOPY 3/22/2026 3/22/2016    IMM DTaP/Tdap/Td Vaccine (2 - Td) 2/17/2027 2/17/2017            Results     POCT Protime      Component    INR    1.6                        Current Immunizations     Influenza Vaccine Quad Inj (Pf) 11/12/2015    Influenza Vaccine Quad Inj (Preserved) 10/10/2016    Tdap Vaccine 2/17/2017      Below and/or attached are the medications your provider expects you to take. Review all of your home medications and newly ordered medications with your provider and/or pharmacist. Follow medication instructions as directed by your provider and/or pharmacist. Please keep your medication list with you and share with your provider. Update the information when medications are discontinued, doses are changed, or new medications (including over-the-counter products) are added; and carry medication information at all times in the event of emergency situations     Allergies:  No Known Allergies          Medications  Valid as of: May 15, 2017 - 10:53 AM    Generic Name Brand Name Tablet Size Instructions for use    Acetaminophen (Tab) TYLENOL 500 MG Take 500-1,000 mg by mouth as needed.        Cholecalciferol (Tab) cholecalciferol 1000 UNIT Take 1,000 Units by mouth every day.        Warfarin Sodium (Tab) COUMADIN 6 MG Take one to one and one-half (1-1.5) tablets daily as directed by [facility]        .                 Medicines prescribed today were sent to:     [facility]  [place] (), [place] - [address]    [address] [place] [address] [place] () [place] [address]    Phone: [phone] Fax: [phone]    Open 24 Hours?: No      Medication refill  instructions:       If your prescription bottle indicates you have medication refills left, it is not necessary to call your provider’s office. Please contact your pharmacy and they will refill your medication.    If your prescription bottle indicates you do not have any refills left, you may request refills at any time through one of the following ways: The online [employer] system (except Urgent Care), by calling your provider’s office, or by asking your pharmacy to contact your provider’s office with a refill request. Medication refills are processed only during regular business hours and may not be available until the next business day. Your provider may request additional information or to have a follow-up visit with you prior to refilling your medication.   *Please Note: Medication refills are assigned a new Rx number when refilled electronically. Your pharmacy may indicate that no refills were authorized even though a new prescription for the same medication is available at the pharmacy. Please request the medicine by name with the pharmacy before contacting your provider for a refill.        Warfarin Dosing Calendar   May 2017 Details    Sun Mon Tue Wed Thu Fri Sat      1               2               3               4               5               6                 7               8               9               10               11               12               13                 14               15   1.6   9 mg   See details      16      6 mg         17      6 mg         18      9 mg         19      6 mg         20      6 mg           21      9 mg         22      6 mg         23               24               25               26               27                 28               29               30               31                   Date Details   05/15 This INR check   INR: 1.6       Date of next INR:  5/22/2017         How to take your warfarin dose     To take:  6 mg Take 1 of the 6 mg tablets.    To take:   9 mg Take 1.5 of the 6 mg tablets.              [employer] Access Code: Activation code not generated  Current [employer] Status: Active

## 2017-05-18 LAB — HBA1C MFR BLD: 6 % (ref ?–5.8)

## 2017-05-22 ENCOUNTER — ANTICOAGULATION VISIT (OUTPATIENT)
Dept: VASCULAR LAB | Facility: MEDICAL CENTER | Age: 62
End: 2017-05-22
Attending: INTERNAL MEDICINE
Payer: COMMERCIAL

## 2017-05-22 DIAGNOSIS — I82.403 DEEP VEIN THROMBOSIS (DVT) OF BOTH LOWER EXTREMITIES, UNSPECIFIED CHRONICITY, UNSPECIFIED VEIN (HCC): ICD-10-CM

## 2017-05-22 LAB
INR BLD: 1.9 (ref 0.9–1.2)
INR PPP: 1.9 (ref 2–3.5)

## 2017-05-22 PROCEDURE — 85610 PROTHROMBIN TIME: CPT

## 2017-05-22 PROCEDURE — 99212 OFFICE O/P EST SF 10 MIN: CPT | Performed by: NURSE PRACTITIONER

## 2017-05-22 NOTE — PROGRESS NOTES
Anticoagulation Summary as of 5/22/2017     INR goal 2.0-3.0   Selected INR 1.9! (5/22/2017)   Maintenance plan 9 mg (6 mg x 1.5) on Sun, Tue, Thu; 6 mg (6 mg x 1) all other days   Weekly total 51 mg   Plan last modified Re Briones A.P.NClaire (5/22/2017)   Next INR check 5/31/2017   Target end date Indefinite    Indications   Deep vein thrombosis (DVT) of both lower extremities (CMS-HCC) [I82.403]         Anticoagulation Episode Summary     INR check location     Preferred lab     Send INR reminders to     Comments [facility]      Anticoagulation Care Providers     Provider Role Specialty Phone number    Yimi Martinez M.D. Referring Surgery [phone]    DIMITRI SchofieldD Responsible      [facility] Responsible  [phone]        Patient is subtherapeutic today. Denies any medication or diet changes. No current symptoms of bleeding or thrombosis reported. Will increase regimen. Follow up in 1 week.    Next Appointment: Wednesday, May 31, 2017 at 8:30 am.     Re HUITRON

## 2017-05-24 ENCOUNTER — HOSPITAL ENCOUNTER (OUTPATIENT)
Dept: RADIOLOGY | Facility: MEDICAL CENTER | Age: 62
End: 2017-05-24
Attending: INTERNAL MEDICINE
Payer: COMMERCIAL

## 2017-05-24 DIAGNOSIS — C18.7 MALIGNANT NEOPLASM OF SIGMOID COLON (HCC): ICD-10-CM

## 2017-05-24 PROCEDURE — 71260 CT THORAX DX C+: CPT

## 2017-05-24 PROCEDURE — 700117 HCHG RX CONTRAST REV CODE 255: Performed by: INTERNAL MEDICINE

## 2017-05-24 RX ADMIN — IOHEXOL 100 ML: 350 INJECTION, SOLUTION INTRAVENOUS at 10:40

## 2017-05-26 ENCOUNTER — OFFICE VISIT (OUTPATIENT)
Dept: MEDICAL GROUP | Facility: MEDICAL CENTER | Age: 62
End: 2017-05-26
Payer: COMMERCIAL

## 2017-05-26 VITALS
HEART RATE: 97 BPM | TEMPERATURE: 97.1 F | HEIGHT: 61 IN | WEIGHT: 218 LBS | OXYGEN SATURATION: 95 % | SYSTOLIC BLOOD PRESSURE: 124 MMHG | DIASTOLIC BLOOD PRESSURE: 72 MMHG | BODY MASS INDEX: 41.16 KG/M2

## 2017-05-26 DIAGNOSIS — C78.7 ADENOCARCINOMA OF COLON METASTATIC TO LIVER (HCC): ICD-10-CM

## 2017-05-26 DIAGNOSIS — I82.402 DEEP VEIN THROMBOSIS (DVT) OF LEFT LOWER EXTREMITY, UNSPECIFIED CHRONICITY, UNSPECIFIED VEIN (HCC): ICD-10-CM

## 2017-05-26 DIAGNOSIS — R73.01 IFG (IMPAIRED FASTING GLUCOSE): ICD-10-CM

## 2017-05-26 DIAGNOSIS — C18.9 ADENOCARCINOMA OF COLON METASTATIC TO LIVER (HCC): ICD-10-CM

## 2017-05-26 DIAGNOSIS — Z00.00 HEALTH CARE MAINTENANCE: ICD-10-CM

## 2017-05-26 DIAGNOSIS — E55.9 HYPOVITAMINOSIS D: ICD-10-CM

## 2017-05-26 DIAGNOSIS — Z93.3 COLOSTOMY IN PLACE (HCC): ICD-10-CM

## 2017-05-26 DIAGNOSIS — Z79.01 CHRONIC ANTICOAGULATION: ICD-10-CM

## 2017-05-26 PROCEDURE — 99214 OFFICE O/P EST MOD 30 MIN: CPT | Performed by: INTERNAL MEDICINE

## 2017-05-26 RX ORDER — DOXYCYCLINE HYCLATE 100 MG
100 TABLET ORAL 2 TIMES DAILY
COMMUNITY
End: 2017-05-26

## 2017-05-26 NOTE — MR AVS SNAPSHOT
"Tamara Flynn   2017 11:40 AM   Office Visit   MRN: [id number]    Department:  [place] [address]   Dept Phone:  [phone]    Description:  Female : 1955   Provider:  Maritza Esparza M.D.           Reason for Visit     Follow-Up           Allergies as of 2017     No Known Allergies      You were diagnosed with     Adenocarcinoma of colon metastatic to liver (CMS-HCC)   [185956]       Colostomy in place (CMS-HCC)   [354592]       Deep vein thrombosis (DVT) of left lower extremity, unspecified chronicity, unspecified vein (CMS-HCC)   [2713845]       Chronic anticoagulation   [700202]       IFG (impaired fasting glucose)   [910740]       Hypovitaminosis D   [240800]       BMI 40.0-44.9, adult (CMS-HCC)   [077038]       Health care maintenance   [446405]         Vital Signs     Blood Pressure Pulse Temperature Height Weight Body Mass Index    124/72 mmHg 97 36.2 °C (97.1 °F) 1.549 m (5' 1\") 98.884 kg (218 lb) 41.21 kg/m2    Oxygen Saturation Smoking Status                95% Never Smoker           Basic Information     Date Of Birth Sex Race Ethnicity Preferred Language    1955 Female White Non- English      Your appointments     May 31, 2017  8:30 AM   Established Patient with [facility] EXAM 4   [facility]  (--)    [address] [facility]  [place] [address]   [phone]            Aug 25, 2017 11:00 AM   Established Patient with Maritza Esparza M.D.   [facility]    [address]  [address]  [place] [id number] [phone]           You will be receiving a confirmation call a few days before your appointment from our automated call confirmation system.              Problem List              ICD-10-CM Priority Class Noted - Resolved    Morbid obesity with BMI of 40.0-44.9, adult (CMS-HCC) E66.01, Z68.41   2015 - Present    Health care maintenance Z00.00   " 11/12/2015 - Present    Prediabetes R73.03   12/7/2015 - Present    Adenocarcinoma of sigmoid colon (CMS-HCC) C18.7   3/22/2016 - Present    Family history of primary TB Z83.6 Low  4/20/2016 - Present    Adenocarcinoma of colon metastatic to liver (CMS-HCC) C18.9, C78.7 High  4/20/2016 - Present    Deep vein thrombosis (DVT) of both lower extremities (CMS-HCC) I82.403   5/23/2016 - Present    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   8/9/2016 - Present    Encounter for screening mammogram for malignant neoplasm of breast Z12.31   9/20/2016 - Present    Colostomy in place (CMS-HCC) Z93.3   2/17/2017 - Present      Health Maintenance        Date Due Completion Dates    PAP SMEAR 10/23/1975 ---    IMM ZOSTER VACCINE 10/23/2014 ---    MAMMOGRAM 11/1/2017 11/1/2016    COLONOSCOPY 3/22/2026 3/22/2016    IMM DTaP/Tdap/Td Vaccine (2 - Td) 2/17/2027 2/17/2017            Current Immunizations     Influenza Vaccine Quad Inj (Pf) 11/12/2015    Influenza Vaccine Quad Inj (Preserved) 10/10/2016    Tdap Vaccine 2/17/2017      Below and/or attached are the medications your provider expects you to take. Review all of your home medications and newly ordered medications with your provider and/or pharmacist. Follow medication instructions as directed by your provider and/or pharmacist. Please keep your medication list with you and share with your provider. Update the information when medications are discontinued, doses are changed, or new medications (including over-the-counter products) are added; and carry medication information at all times in the event of emergency situations     Allergies:  No Known Allergies          Medications  Valid as of: May 26, 2017 - 11:40 AM    Generic Name Brand Name Tablet Size Instructions for use    Acetaminophen (Tab) TYLENOL 500 MG Take 500-1,000 mg by mouth as needed.        Cholecalciferol (Tab) cholecalciferol 1000 UNIT Take 1,000 Units by mouth every day.           Warfarin Sodium (Tab) COUMADIN 6 MG Take one to one and one-half (1-1.5) tablets daily as directed by [facility]        .                 Medicines prescribed today were sent to:     [facility] [address] [place] [address] (), [place] - [address]    [address] () [place] [address]    Phone: [phone] Fax: [phone]    Open 24 Hours?: No      Medication refill instructions:       If your prescription bottle indicates you have medication refills left, it is not necessary to call your provider’s office. Please contact your pharmacy and they will refill your medication.    If your prescription bottle indicates you do not have any refills left, you may request refills at any time through one of the following ways: The online [employer] system (except Urgent Care), by calling your provider’s office, or by asking your pharmacy to contact your provider’s office with a refill request. Medication refills are processed only during regular business hours and may not be available until the next business day. Your provider may request additional information or to have a follow-up visit with you prior to refilling your medication.   *Please Note: Medication refills are assigned a new Rx number when refilled electronically. Your pharmacy may indicate that no refills were authorized even though a new prescription for the same medication is available at the pharmacy. Please request the medicine by name with the pharmacy before contacting your provider for a refill.        Your To Do List     Future Labs/Procedures Complete By Expires    COMP METABOLIC PANEL  As directed 5/27/2018    HEMOGLOBIN A1C  As directed 5/27/2018    VITAMIN D,25 HYDROXY  As directed 5/27/2018         [employer] Access Code: Activation code not generated  Current [employer] Status: Active

## 2017-05-26 NOTE — PROGRESS NOTES
CHIEF COMPLAINT  Chief Complaint   Patient presents with   • Follow-Up     HPI  Patient is a 61 y.o. female patient who presents today for the following     Adenocarcinoma of sigmoid colon with liver meta / colostomy , chronic anticoagulation/   Interval course:    - she has had experimental chemotherapy and steroids IV  - had recent CT, showed some improvement, as bellow.  - denies abdominal pain, blood in a stool, anorexia, WT loss.     Dg:  in 5/5016, with bladder fistula,  subsequent surgery, transient blood loss anemia (received iron).      Imaging follow-up showed (as below):    · Persistent liver metastases;  · Metastatic carcinoma in fibroadipose tissue anteriorly to stomach  · Left hypogastric and external iliac adenopathy  · Mild prevascular/aortopulmonary window lymphadenopathy could be reactive or malignant.    She developed a DVT related to cancer,  on Coumadin, follow-up by Coumadin clinic.    IFG  The patient had elevated A1c and FBS, as below.    Diet: increased food intake after started steroids  Exercise: Some  BMI: 40  No polydipsia, polyphagia, polyuria.  No abdominal pain, weight loss, fatigue.    Hypovitaminosis D  The patient had low vitamin D at 23 on 5/18/17..     She has been on 2000 units of vitamin D, but not consistent.    Obesity, BMI 41  Increased weight since the last office visit.    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     • acetaminophen (TYLENOL) 500 MG Tab Take 500-1,000 mg by mouth as needed.       No current facility-administered medications for this visit.     ALLERGIES  Allergies: Review of patient's allergies indicates no known allergies.  PAST MEDICAL HISTORY  Past Medical History   Diagnosis Date   • Obesity    • Essential hypertension    • Cancer (CMS-HCC) 2016     liver, bladder, colon   • Urinary bladder disorder      1/2 bladder due to cancer surgery   • Blood clotting disorder (CMS-HCC) 4-     left leg   • Bowel habit changes      has colostomy   • Blood loss anemia 2016     SURGICAL HISTORY  She  has past surgical history that includes excision of tonsil tags; cystoscopy (3/17/2016); trans urethral resection bladder (3/17/2016); colonoscopy - endo (N/A, 3/22/2016); recovery (3/21/2016); exploratory laparotomy (4/17/2016); exploratory laparotomy (4/18/2016); cystectomy (4/18/2016); colostomy (Left, 4/19/2016); and cath placement (Left, 8/9/2016).  SOCIAL HISTORY  Social History   Substance Use Topics   • Smoking status: Never Smoker    • Smokeless tobacco: Never Used   • Alcohol Use: No     Social History     Social History Narrative    Lives with .     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     FAMILY HISTORY  Family History   Problem Relation Age of Onset   • Cancer Neg Hx    • Diabetes Mother      prediabetes   • Heart Disease Mother    • Hypertension  "Mother    • Hyperlipidemia Neg Hx    • Psychiatry Neg Hx    • Thyroid Neg Hx    • Stroke Maternal Grandmother      No family status information on file.     ROS   Constitutional: Negative for fever, chills.  HENT: Negative for congestion, sore throat.  Eyes: Negative for blurred vision.   Respiratory: Negative for cough, shortness of breath.  Cardiovascular: Negative for chest pain, palpitations.   Gastrointestinal: Negative for heartburn, nausea, abdominal pain.   Genitourinary: Negative for dysuria.  Musculoskeletal: Negative for significant myalgias, back pain and joint pain.   Skin: Negative for rash and itching.   Neuro: Negative for dizziness, weakness and headaches.   Endo/Heme/Allergies: Does not bruise/bleed easily.   Onc: as above.  Psychiatric/Behavioral: Negative for depression, anxiety    PHYSICAL EXAM   Blood pressure 124/72, pulse 97, temperature 36.2 °C (97.1 °F), height 1.549 m (5' 1\"), weight 98.884 kg (218 lb), SpO2 95 %. Body mass index is 41.21 kg/(m^2).  General:  NAD, well appearing  HEENT:   NC/AT, PERRLA, EOMI, TMs are clear. Oropharyngeal mucosa is pink,  without lesions;  no cervical / supraclavicular  lymphadenopathy, no thyromegaly.    Cardiovascular: RRR.   No m/r/g. No carotid bruits .      Lungs:   CTAB, no w/r/r, no respiratory distress.  Abdomen: Soft, NT/ND + BS; no suprapubic tenderness; obese.   Colostomy in place, no irritation.  Extremities:  2+ DP and radial pulses bilaterally.  No c/c/e.   Skin:  Warm, dry.  No erythema. No rash.   Neurologic: Alert & oriented x 3.   No focal deficits.  Psychiatric:  Affect normal, mood normal, judgment normal.    LABS     Labs from 5/18/17 are reviewed and discussed with a patient,   1. CBC: Unremarkable  2. CMP: within normal limits   3. A1c was 6.0  4. Vitamin D level: 23    IMAGING     Reviewed the last CT chest/abdomen from 5/24/17  1.  Mass containing calcifications in segment 5 of the liver is again identified with no significant " change since the prior exam.  2.  Additional 2 masses are demonstrated abnormal elevated activity on the prior PET/CT are not appreciated on the current contrast enhanced CT scan. Resolution of these lesions is a possibility. PET/CT or MRI of the abdomen could be obtained for further   evaluation if there is suspicion of persistent or residual liver metastasis.  3.  Marked decrease in size of mass anterior to the stomach in the upper abdomen since the prior exam. Resolving neoplasm at this location is possible.  4.  Marked decrease in left pelvic adenopathy since the prior examination. Resolving neoplasm within these nodes is a possibility.  5.  Small wedge-shaped parenchymal opacification posteriorly in the right lung could be due to inflammation or scarring. Appearance is not typical of pulmonary metastasis.  6.  Left lower quadrant colostomy is again noted.     ASSESMENT AND PLAN        1. Adenocarcinoma of colon metastatic to liver (CMS-HCC)  Stable at this point.  Continue current treatment and oncology follow-up.    2. Colostomy in place (CMS-HCC)  No irritation    3. Deep vein thrombosis (DVT) of left lower extremity, unspecified chronicity, unspecified vein (CMS-HCC)  4. Chronic anticoagulation  Continue Coumadin and Coumadin clinic follow-up    5. IFG (impaired fasting glucose)  Advised to decrease carbs, increase exercise  - COMP METABOLIC PANEL; Future  - HEMOGLOBIN A1C; Future    6. Hypovitaminosis D  Advised to be consistent with vitamin D supplement  - VITAMIN D,25 HYDROXY; Future    7. BMI 40.0-44.9, adult (CMS-HCC)  Advised to decrease calorie count and increase exercise    8. Health care maintenance  Reviewed.   At her stage of disease, may not need PAP.   She is on chemotherapy re shingles shot.     Counseling:   - Smoking:  Nonsmoker    Followup: Return in about 3 months (around 8/26/2017) for Short.    All questions are answered.    Please note that this dictation was created using voice  recognition software, and that there might be errors of butch and possibly content.

## 2017-05-31 ENCOUNTER — ANTICOAGULATION VISIT (OUTPATIENT)
Dept: VASCULAR LAB | Facility: MEDICAL CENTER | Age: 62
End: 2017-05-31
Attending: INTERNAL MEDICINE
Payer: COMMERCIAL

## 2017-05-31 DIAGNOSIS — I82.403 DEEP VEIN THROMBOSIS (DVT) OF BOTH LOWER EXTREMITIES, UNSPECIFIED CHRONICITY, UNSPECIFIED VEIN (HCC): ICD-10-CM

## 2017-05-31 LAB
INR BLD: 2.5 (ref 0.9–1.2)
INR PPP: 2.5 (ref 2–3.5)

## 2017-05-31 PROCEDURE — 85610 PROTHROMBIN TIME: CPT

## 2017-05-31 PROCEDURE — 99211 OFF/OP EST MAY X REQ PHY/QHP: CPT | Performed by: NURSE PRACTITIONER

## 2017-05-31 NOTE — PROGRESS NOTES
Anticoagulation Summary as of 5/31/2017     INR goal 2.0-3.0   Selected INR 2.5 (5/31/2017)   Maintenance plan 9 mg (6 mg x 1.5) on Sun, Tue, Thu; 6 mg (6 mg x 1) all other days   Weekly total 51 mg   Plan last modified ASTRID ShinPINES (5/22/2017)   Next INR check 6/7/2017   Target end date Indefinite    Indications   Deep vein thrombosis (DVT) of both lower extremities (CMS-HCC) [I82.403]         Anticoagulation Episode Summary     INR check location     Preferred lab     Send INR reminders to     Comments [facility]      Anticoagulation Care Providers     Provider Role Specialty Phone number    Yimi Martinez M.D. Referring Surgery [phone]    DIMITRI SchofieldD Responsible      [facility] Responsible  [phone]        Patient is therapeutic today. Denies any medication or diet changes. No current symptoms of bleeding or thrombosis reported. Continue current regimen. Follow up in 1 week.    Next Appointment: Friday , June 9, 2017 at 10:30 am.     Re HUITRON

## 2017-05-31 NOTE — MR AVS SNAPSHOT
Tamara Hollinsnavin Flynn   2017 8:30 AM   Anticoagulation Visit   MRN: [id number]    Department:  Vascular Medicine   Dept Phone:  [phone]    Description:  Female : 1955   Provider:  [facility]           Allergies as of 2017     No Known Allergies      You were diagnosed with     Deep vein thrombosis (DVT) of both lower extremities, unspecified chronicity, unspecified vein (CMS-formerly [facility])   [5632873]         Vital Signs     Smoking Status                   Never Smoker            Basic Information     Date Of Birth Sex Race Ethnicity Preferred Language    1955 Female White Non- English      Your appointments     2017 10:30 AM   Established Patient with [facility]   [facility] for [facility]  (--)    [address]  [place] [address]   [phone]            Aug 25, 2017 11:00 AM   Established Patient with Maritza Esparza M.D.   [facility] ([place]    [address]  [address]  [place] [id number] [phone]           You will be receiving a confirmation call a few days before your appointment from our automated call confirmation system.              Problem List              ICD-10-CM Priority Class Noted - Resolved    Morbid obesity with BMI of 40.0-44.9, adult (CMS-formerly [facility]) E66.01, Z68.41   2015 - Present    Health care maintenance Z00.00   2015 - Present    Prediabetes R73.03   2015 - Present    Adenocarcinoma of sigmoid colon (CMS-HCC) C18.7   3/22/2016 - Present    Family history of primary TB Z83.6 Low  2016 - Present    Adenocarcinoma of colon metastatic to liver (CMS-HCC) C18.9, C78.7 High  2016 - Present    Deep vein thrombosis (DVT) of both lower extremities (CMS-formerly [facility]) I82.403   2016 - Present    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   2016 - Present    Encounter for screening mammogram for malignant  neoplasm of breast Z12.31   9/20/2016 - Present    Colostomy in place (CMS-[facility]) Z93.3   2/17/2017 - Present      Health Maintenance        Date Due Completion Dates    PAP SMEAR 10/23/1975 ---    IMM ZOSTER VACCINE 10/23/2014 ---    MAMMOGRAM 11/1/2017 11/1/2016    COLONOSCOPY 3/22/2026 3/22/2016    IMM DTaP/Tdap/Td Vaccine (2 - Td) 2/17/2027 2/17/2017            Results     POCT Protime      Component    INR    2.5                        Current Immunizations     Influenza Vaccine Quad Inj (Pf) 11/12/2015    Influenza Vaccine Quad Inj (Preserved) 10/10/2016    Tdap Vaccine 2/17/2017      Below and/or attached are the medications your provider expects you to take. Review all of your home medications and newly ordered medications with your provider and/or pharmacist. Follow medication instructions as directed by your provider and/or pharmacist. Please keep your medication list with you and share with your provider. Update the information when medications are discontinued, doses are changed, or new medications (including over-the-counter products) are added; and carry medication information at all times in the event of emergency situations     Allergies:  No Known Allergies          Medications  Valid as of: May 31, 2017 - 10:52 AM    Generic Name Brand Name Tablet Size Instructions for use    Acetaminophen (Tab) TYLENOL 500 MG Take 500-1,000 mg by mouth as needed.        Cholecalciferol (Tab) cholecalciferol 1000 UNIT Take 1,000 Units by mouth every day.        Warfarin Sodium (Tab) COUMADIN 6 MG Take one to one and one-half (1-1.5) tablets daily as directed by [facility]        .                 Medicines prescribed today were sent to:     [facility] [address] [place] [address] (), [place] [address]    [address] [place] [address] [place] () [place] [address]    Phone: [phone] Fax: [phone]    Open 24 Hours?: No      Medication refill instructions:       If your prescription bottle indicates you have  medication refills left, it is not necessary to call your provider’s office. Please contact your pharmacy and they will refill your medication.    If your prescription bottle indicates you do not have any refills left, you may request refills at any time through one of the following ways: The online [employer] system (except Urgent Care), by calling your provider’s office, or by asking your pharmacy to contact your provider’s office with a refill request. Medication refills are processed only during regular business hours and may not be available until the next business day. Your provider may request additional information or to have a follow-up visit with you prior to refilling your medication.   *Please Note: Medication refills are assigned a new Rx number when refilled electronically. Your pharmacy may indicate that no refills were authorized even though a new prescription for the same medication is available at the pharmacy. Please request the medicine by name with the pharmacy before contacting your provider for a refill.        Warfarin Dosing Calendar   May 2017 Details    Sun Mon Tue Wed Thu Fri Sat      1               2               3               4               5               6                 7               8               9               10               11               12               13                 14               15               16               17               18               19               20                 21               22               23               24               25               26               27                 28               29               30               31   2.5   6 mg   See details          Date Details   05/31 This INR check   INR: 2.5               How to take your warfarin dose     To take:  6 mg Take 1 of the 6 mg tablets.           Warfarin Dosing Calendar   June 2017 Details    Sun Mon Tue Wed Thu Fri Sat         1      9 mg         2      6 mg         3       6 mg           4      9 mg         5      6 mg         6      9 mg         7      6 mg         8      9 mg         9      6 mg         10                 11               12               13               14               15               16               17                 18               19               20               21               22               23               24                 25               26               27               28               29               30                 Date Details   No additional details    Date of next INR:  6/9/2017         How to take your warfarin dose     To take:  6 mg Take 1 of the 6 mg tablets.    To take:  9 mg Take 1.5 of the 6 mg tablets.              [employer] Access Code: Activation code not generated  Current [employer] Status: Active

## 2017-06-14 ENCOUNTER — ANTICOAGULATION VISIT (OUTPATIENT)
Dept: VASCULAR LAB | Facility: MEDICAL CENTER | Age: 62
End: 2017-06-14
Attending: INTERNAL MEDICINE
Payer: COMMERCIAL

## 2017-06-14 DIAGNOSIS — I82.403 DEEP VEIN THROMBOSIS (DVT) OF BOTH LOWER EXTREMITIES, UNSPECIFIED CHRONICITY, UNSPECIFIED VEIN (HCC): ICD-10-CM

## 2017-06-14 LAB — INR PPP: 3.5 (ref 2–3.5)

## 2017-06-14 PROCEDURE — 85610 PROTHROMBIN TIME: CPT

## 2017-06-14 PROCEDURE — 99212 OFFICE O/P EST SF 10 MIN: CPT | Performed by: NURSE PRACTITIONER

## 2017-06-14 NOTE — PROGRESS NOTES
Anticoagulation Summary as of 6/14/2017     INR goal 2.0-3.0   Selected INR 3.5! (6/14/2017)   Maintenance plan 9 mg (6 mg x 1.5) on Sun, Tue; 6 mg (6 mg x 1) all other days   Weekly total 48 mg   Plan last modified ASTRID ShinP.ZACH (6/14/2017)   Next INR check 6/26/2017   Target end date Indefinite    Indications   Deep vein thrombosis (DVT) of both lower extremities (CMS-HCC) [I82.403]         Anticoagulation Episode Summary     INR check location     Preferred lab     Send INR reminders to     Comments [facility]      Anticoagulation Care Providers     Provider Role Specialty Phone number    Yimi Martinez M.D. Referring Surgery [phone]    DIMITRI SchofieldD Responsible      [facility] Responsible  [phone]        Patient is supratherapeutic today. Denies any medication or diet changes. No current symptoms of bleeding or thrombosis reported. Will decrease regimen. Follow up in 2 weeks.    Next Appointment: Monday, June 26, 2017 at 10:30 am.     Re HUITRON

## 2017-06-14 NOTE — MR AVS SNAPSHOT
Tamara Caitie Flynn   2017 7:45 AM   Anticoagulation Visit   MRN: [id number]    Department:  Vascular Medicine   Dept Phone:  [phone]    Description:  Female : 1955   Provider:  [facility] EXAM 4           Allergies as of 2017     No Known Allergies      You were diagnosed with     Deep vein thrombosis (DVT) of both lower extremities, unspecified chronicity, unspecified vein (CMS-[facility])   [[id number]]         Vital Signs     Smoking Status                   Never Smoker            Basic Information     Date Of Birth Sex Race Ethnicity Preferred Language    1955 Female White Non- English      Your appointments     2017 10:30 AM   Established Patient with [facility] 4   [facility] for Heart and Vascular Health  (--)    [address]  [place] [address]   [phone]            Aug 25, 2017 11:00 AM   Established Patient with Maritza Esparza M.D.   [facility]    [address]  [address]  [place] [id number] [phone]           You will be receiving a confirmation call a few days before your appointment from our automated call confirmation system.              Problem List              ICD-10-CM Priority Class Noted - Resolved    Morbid obesity with BMI of 40.0-44.9, adult (CMS-[facility]) E66.01, Z68.41   2015 - Present    Health care maintenance Z00.00   2015 - Present    Prediabetes R73.03   2015 - Present    Adenocarcinoma of sigmoid colon (CMS-HCC) C18.7   3/22/2016 - Present    Family history of primary TB Z83.6 Low  2016 - Present    Adenocarcinoma of colon metastatic to liver (CMS-HCC) C18.9, C78.7 High  2016 - Present    Deep vein thrombosis (DVT) of both lower extremities ([facility]) I82.403   2016 - Present    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   2016 - Present    Encounter for screening mammogram for malignant  neoplasm of breast Z12.31   9/20/2016 - Present    Colostomy in place (CMS-[facility]) Z93.3   2/17/2017 - Present      Health Maintenance        Date Due Completion Dates    PAP SMEAR 10/23/1975 ---    IMM ZOSTER VACCINE 10/23/2014 ---    MAMMOGRAM 11/1/2017 11/1/2016    COLONOSCOPY 3/22/2026 3/22/2016    IMM DTaP/Tdap/Td Vaccine (2 - Td) 2/17/2027 2/17/2017            Results     POCT Protime      Component    INR    3.5                        Current Immunizations     Influenza Vaccine Quad Inj (Pf) 11/12/2015    Influenza Vaccine Quad Inj (Preserved) 10/10/2016    Tdap Vaccine 2/17/2017      Below and/or attached are the medications your provider expects you to take. Review all of your home medications and newly ordered medications with your provider and/or pharmacist. Follow medication instructions as directed by your provider and/or pharmacist. Please keep your medication list with you and share with your provider. Update the information when medications are discontinued, doses are changed, or new medications (including over-the-counter products) are added; and carry medication information at all times in the event of emergency situations     Allergies:  No Known Allergies          Medications  Valid as of: June 14, 2017 -  1:01 PM    Generic Name Brand Name Tablet Size Instructions for use    Acetaminophen (Tab) TYLENOL 500 MG Take 500-1,000 mg by mouth as needed.        Cholecalciferol (Tab) cholecalciferol 1000 UNIT Take 1,000 Units by mouth every day.        Warfarin Sodium (Tab) COUMADIN 6 MG Take one to one and one-half (1-1.5) tablets daily as directed by [facility]        .                 Medicines prescribed today were sent to:     [facility] [address] [place] [address] (), [place] - [address]    [address] () [place] [address]    Phone: [phone] Fax: [phone]    Open 24 Hours?: No      Medication refill instructions:       If your prescription bottle indicates you have  medication refills left, it is not necessary to call your provider’s office. Please contact your pharmacy and they will refill your medication.    If your prescription bottle indicates you do not have any refills left, you may request refills at any time through one of the following ways: The online [employer] system (except Urgent Care), by calling your provider’s office, or by asking your pharmacy to contact your provider’s office with a refill request. Medication refills are processed only during regular business hours and may not be available until the next business day. Your provider may request additional information or to have a follow-up visit with you prior to refilling your medication.   *Please Note: Medication refills are assigned a new Rx number when refilled electronically. Your pharmacy may indicate that no refills were authorized even though a new prescription for the same medication is available at the pharmacy. Please request the medicine by name with the pharmacy before contacting your provider for a refill.        Warfarin Dosing Calendar   June 2017 Details    Sun Mon Tue Wed Thu Fri Sat         1               2               3                 4               5               6               7               8               9               10                 11               12               13               14   3.5   6 mg   See details      15      6 mg         16      6 mg         17      6 mg           18      9 mg         19      6 mg         20      9 mg         21      6 mg         22      6 mg         23      6 mg         24      6 mg           25      9 mg         26      6 mg         27               28               29               30                 Date Details   06/14 This INR check   INR: 3.5       Date of next INR:  6/26/2017         How to take your warfarin dose     To take:  6 mg Take 1 of the 6 mg tablets.    To take:  9 mg Take 1.5 of the 6 mg tablets.              [employer]  Access Code: Activation code not generated  Current MyChart Status: Active

## 2017-06-26 ENCOUNTER — ANTICOAGULATION VISIT (OUTPATIENT)
Dept: VASCULAR LAB | Facility: MEDICAL CENTER | Age: 62
End: 2017-06-26
Attending: INTERNAL MEDICINE
Payer: COMMERCIAL

## 2017-06-26 DIAGNOSIS — I82.403 ACUTE DEEP VEIN THROMBOSIS (DVT) OF BOTH LOWER EXTREMITIES, UNSPECIFIED VEIN (HCC): ICD-10-CM

## 2017-06-26 LAB
INR BLD: 2.4 (ref 0.9–1.2)
INR PPP: 2.4 (ref 2–3.5)

## 2017-06-26 PROCEDURE — 99211 OFF/OP EST MAY X REQ PHY/QHP: CPT | Performed by: NURSE PRACTITIONER

## 2017-06-26 PROCEDURE — 85610 PROTHROMBIN TIME: CPT

## 2017-06-26 NOTE — PROGRESS NOTES
Anticoagulation Summary as of 6/26/2017     INR goal 2.0-3.0   Selected INR 2.4 (6/26/2017)   Maintenance plan 9 mg (6 mg x 1.5) on Sun, Tue; 6 mg (6 mg x 1) all other days   Weekly total 48 mg   No change documented ASTRID ShinP.NClaire   Plan last modified ASTRID ShinPINES (6/14/2017)   Next INR check 7/10/2017   Target end date Indefinite    Indications   Deep vein thrombosis (DVT) of both lower extremities (CMS-HCC) [I82.403]         Anticoagulation Episode Summary     INR check location     Preferred lab     Send INR reminders to     Comments [facility]      Anticoagulation Care Providers     Provider Role Specialty Phone number    Yimi Martinez M.D. Referring Surgery [phone]    DIMITRI SchofieldD Responsible      [facility] Responsible  [phone]        Patient is therapeutic today. Denies any medication or diet changes. No current symptoms of bleeding or thrombosis reported. Continue current regimen. Follow up in 2 weeks.    Next Appointment: Monday, July 10, 2017 at 10:30 am.    Re HUITRON

## 2017-06-26 NOTE — MR AVS SNAPSHOT
Tamara Caitie Flynn   2017 10:30 AM   Anticoagulation Visit   MRN: [id number]    Department:  Vascular Medicine   Dept Phone:  [phone]    Description:  Female : 1955   Provider:  [facility] EXAM 4           Allergies as of 2017     No Known Allergies      You were diagnosed with     Acute deep vein thrombosis (DVT) of both lower extremities, unspecified vein (CMS-HCC)   [[id number]]         Vital Signs     Smoking Status                   Never Smoker            Basic Information     Date Of Birth Sex Race Ethnicity Preferred Language    1955 Female White Non- English      Your appointments     Jul 10, 2017 10:30 AM   Established Patient with [facility] EXAM 4   [facility] for [facility] and [facility] Health  (--)    [address]  [place] [address]   [phone]            Aug 25, 2017 11:00 AM   Established Patient with Maritza Esparza M.D.   [facility]    [address]  [address]  [place] [id number] [phone]           You will be receiving a confirmation call a few days before your appointment from our automated call confirmation system.              Problem List              ICD-10-CM Priority Class Noted - Resolved    Morbid obesity with BMI of 40.0-44.9, adult (CMS-[facility]) E66.01, Z68.41   2015 - Present    Health care maintenance Z00.00   2015 - Present    Prediabetes R73.03   2015 - Present    Adenocarcinoma of sigmoid colon (CMS-HCC) C18.7   3/22/2016 - Present    Family history of primary TB Z83.6 Low  2016 - Present    Adenocarcinoma of colon metastatic to liver (CMS-HCC) C18.9, C78.7 High  2016 - Present    Deep vein thrombosis (DVT) of both lower extremities (CMS-[facility]) I82.403   2016 - Present    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   2016 - Present    Encounter for screening mammogram for malignant neoplasm of breast  Z12.31   9/20/2016 - Present    Colostomy in place (CMS-[facility]) Z93.3   2/17/2017 - Present      Health Maintenance        Date Due Completion Dates    PAP SMEAR 10/23/1975 ---    IMM ZOSTER VACCINE 10/23/2014 ---    MAMMOGRAM 11/1/2017 11/1/2016    COLONOSCOPY 3/22/2026 3/22/2016    IMM DTaP/Tdap/Td Vaccine (2 - Td) 2/17/2027 2/17/2017            Results     POCT Protime      Component    INR    2.4                        Current Immunizations     Influenza Vaccine Quad Inj (Pf) 11/12/2015    Influenza Vaccine Quad Inj (Preserved) 10/10/2016    Tdap Vaccine 2/17/2017      Below and/or attached are the medications your provider expects you to take. Review all of your home medications and newly ordered medications with your provider and/or pharmacist. Follow medication instructions as directed by your provider and/or pharmacist. Please keep your medication list with you and share with your provider. Update the information when medications are discontinued, doses are changed, or new medications (including over-the-counter products) are added; and carry medication information at all times in the event of emergency situations     Allergies:  No Known Allergies          Medications  Valid as of: June 26, 2017 - 10:49 AM    Generic Name Brand Name Tablet Size Instructions for use    Acetaminophen (Tab) TYLENOL 500 MG Take 500-1,000 mg by mouth as needed.        Cholecalciferol (Tab) cholecalciferol 1000 UNIT Take 1,000 Units by mouth every day.        Warfarin Sodium (Tab) COUMADIN 6 MG Take one to one and one-half (1-1.5) tablets daily as directed by [facility]        .                 Medicines prescribed today were sent to:     [facility]  [place] (), [place] - [address]    [address] [place] [address] [place] () [place] [address]    Phone: [phone] Fax: [phone]    Open 24 Hours?: No      Medication refill instructions:       If your prescription bottle indicates you have medication refills  left, it is not necessary to call your provider’s office. Please contact your pharmacy and they will refill your medication.    If your prescription bottle indicates you do not have any refills left, you may request refills at any time through one of the following ways: The online [employer] system (except Urgent Care), by calling your provider’s office, or by asking your pharmacy to contact your provider’s office with a refill request. Medication refills are processed only during regular business hours and may not be available until the next business day. Your provider may request additional information or to have a follow-up visit with you prior to refilling your medication.   *Please Note: Medication refills are assigned a new Rx number when refilled electronically. Your pharmacy may indicate that no refills were authorized even though a new prescription for the same medication is available at the pharmacy. Please request the medicine by name with the pharmacy before contacting your provider for a refill.        Warfarin Dosing Calendar   June 2017 Details    Sun Mon Tue Wed Thu Fri Sat         1               2               3                 4               5               6               7               8               9               10                 11               12               13               14               15               16               17                 18               19               20               21               22               23               24                 25               26   2.4   6 mg   See details      27      9 mg         28      6 mg         29      6 mg         30      6 mg           Date Details   06/26 This INR check   INR: 2.4               How to take your warfarin dose     To take:  6 mg Take 1 of the 6 mg tablets.    To take:  9 mg Take 1.5 of the 6 mg tablets.           Warfarin Dosing Calendar   July 2017 Details    Sun Mon Tue Wed Thu Fri Sat           1      6  mg           2      9 mg         3      6 mg         4      9 mg         5      6 mg         6      6 mg         7      6 mg         8      6 mg           9      9 mg         10      6 mg         11               12               13               14               15                 16               17               18               19               20               21               22                 23               24               25               26               27               28               29                 30               31                     Date Details   No additional details    Date of next INR:  7/10/2017         How to take your warfarin dose     To take:  6 mg Take 1 of the 6 mg tablets.    To take:  9 mg Take 1.5 of the 6 mg tablets.              [employer] Access Code: Activation code not generated  Current [employer] Status: Active

## 2017-07-10 ENCOUNTER — HOSPITAL ENCOUNTER (OUTPATIENT)
Dept: RADIOLOGY | Facility: MEDICAL CENTER | Age: 62
End: 2017-07-10
Attending: INTERNAL MEDICINE
Payer: COMMERCIAL

## 2017-07-10 ENCOUNTER — ANTICOAGULATION VISIT (OUTPATIENT)
Dept: VASCULAR LAB | Facility: MEDICAL CENTER | Age: 62
End: 2017-07-10
Attending: INTERNAL MEDICINE
Payer: COMMERCIAL

## 2017-07-10 ENCOUNTER — OFFICE VISIT (OUTPATIENT)
Dept: MEDICAL GROUP | Facility: MEDICAL CENTER | Age: 62
End: 2017-07-10
Payer: COMMERCIAL

## 2017-07-10 VITALS
HEART RATE: 93 BPM | HEIGHT: 61 IN | TEMPERATURE: 97.5 F | DIASTOLIC BLOOD PRESSURE: 72 MMHG | OXYGEN SATURATION: 96 % | WEIGHT: 233.69 LBS | SYSTOLIC BLOOD PRESSURE: 126 MMHG | BODY MASS INDEX: 44.12 KG/M2

## 2017-07-10 DIAGNOSIS — Z00.00 HEALTH CARE MAINTENANCE: ICD-10-CM

## 2017-07-10 DIAGNOSIS — C18.9 ADENOCARCINOMA OF COLON METASTATIC TO LIVER (HCC): ICD-10-CM

## 2017-07-10 DIAGNOSIS — M62.838 MUSCLE SPASM: ICD-10-CM

## 2017-07-10 DIAGNOSIS — I82.403 ACUTE DEEP VEIN THROMBOSIS (DVT) OF BOTH LOWER EXTREMITIES, UNSPECIFIED VEIN (HCC): ICD-10-CM

## 2017-07-10 DIAGNOSIS — M54.50 ACUTE RIGHT-SIDED LOW BACK PAIN WITHOUT SCIATICA: ICD-10-CM

## 2017-07-10 DIAGNOSIS — C78.7 ADENOCARCINOMA OF COLON METASTATIC TO LIVER (HCC): ICD-10-CM

## 2017-07-10 LAB
INR BLD: 3.8 (ref 0.9–1.2)
INR PPP: 3.8 (ref 2–3.5)

## 2017-07-10 PROCEDURE — 99212 OFFICE O/P EST SF 10 MIN: CPT | Performed by: NURSE PRACTITIONER

## 2017-07-10 PROCEDURE — 85610 PROTHROMBIN TIME: CPT

## 2017-07-10 PROCEDURE — 72170 X-RAY EXAM OF PELVIS: CPT

## 2017-07-10 PROCEDURE — 99214 OFFICE O/P EST MOD 30 MIN: CPT | Performed by: INTERNAL MEDICINE

## 2017-07-10 RX ORDER — PREDNISONE 10 MG/1
TABLET ORAL
Qty: 15 TAB | Refills: 0 | Status: SHIPPED | OUTPATIENT
Start: 2017-07-10 | End: 2017-07-16

## 2017-07-10 RX ORDER — CYCLOBENZAPRINE HCL 5 MG
5-10 TABLET ORAL 2 TIMES DAILY PRN
Qty: 60 TAB | Refills: 0 | Status: SHIPPED | OUTPATIENT
Start: 2017-07-10 | End: 2018-01-01

## 2017-07-10 RX ORDER — OXYCODONE HYDROCHLORIDE 5 MG/1
5 TABLET ORAL 3 TIMES DAILY PRN
Qty: 30 TAB | Refills: 0 | Status: SHIPPED | OUTPATIENT
Start: 2017-07-10 | End: 2018-01-01

## 2017-07-10 RX ORDER — MELOXICAM 15 MG/1
15 TABLET ORAL DAILY
Qty: 60 TAB | Refills: 0 | Status: SHIPPED | OUTPATIENT
Start: 2017-07-10 | End: 2018-01-01

## 2017-07-10 RX ORDER — MELOXICAM 15 MG/1
15 TABLET ORAL DAILY
Qty: 60 TAB | Refills: 1 | Status: SHIPPED | OUTPATIENT
Start: 2017-07-10 | End: 2017-08-25

## 2017-07-10 ASSESSMENT — PATIENT HEALTH QUESTIONNAIRE - PHQ9: CLINICAL INTERPRETATION OF PHQ2 SCORE: 0

## 2017-07-10 NOTE — MR AVS SNAPSHOT
Tamara Caitie Flynn   7/10/2017 10:30 AM   Anticoagulation Visit   MRN: [id number]    Department:  Vascular Medicine   Dept Phone:  [phone]    Description:  Female : 1955   Provider:  [facility] EXAM 4           Allergies as of 7/10/2017     No Known Allergies      You were diagnosed with     Acute deep vein thrombosis (DVT) of both lower extremities, unspecified vein (CMS-HCC)   [[id number]]         Vital Signs     Smoking Status                   Never Smoker            Basic Information     Date Of Birth Sex Race Ethnicity Preferred Language    1955 Female White Non- English      Your appointments     Jul 10, 2017  2:20 PM   Established Patient with Maritza Esparza M.D.   [facility]    [address]  [address]  [place] [id number] [phone]           You will be receiving a confirmation call a few days before your appointment from our automated call confirmation system.            2017 10:45 AM   Established Patient with [facility] EXAM 4   [facility]  (--)    [address]  [place] [address]   [phone]            Aug 25, 2017 11:00 AM   Established Patient with Maritza Esparza M.D.   [facility] ([place]    [address]  [address]  [place] [id number] [phone]           You will be receiving a confirmation call a few days before your appointment from our automated call confirmation system.              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   8/9/2016 - Present    Encounter for screening mammogram for malignant neoplasm of breast Z12.31   9/20/2016 - Present    Colostomy in place (CMS-HCC) Z93.3   2/17/2017 - Present      Health Maintenance        Date Due Completion Dates    PAP SMEAR 10/23/1975 ---    IMM ZOSTER VACCINE 10/23/2014 ---    IMM INFLUENZA (1) 9/1/2017 10/10/2016, 11/12/2015    MAMMOGRAM 11/1/2017 11/1/2016    COLONOSCOPY 3/22/2026 3/22/2016    IMM DTaP/Tdap/Td Vaccine (2 - Td) 2/17/2027 2/17/2017            Results     POCT Protime      Component    INR    3.8                        Current Immunizations     Influenza Vaccine Quad Inj (Pf) 11/12/2015    Influenza Vaccine Quad Inj (Preserved) 10/10/2016    Tdap Vaccine 2/17/2017      Below and/or attached are the medications your provider expects you to take. Review all of your home medications and newly ordered medications with your provider and/or pharmacist. Follow medication instructions as directed by your provider and/or pharmacist. Please keep your medication list with you and share with your provider. Update the information when medications are discontinued, doses are changed, or new medications (including over-the-counter products) are added; and carry medication information at all times in the event of emergency situations     Allergies:  No Known Allergies          Medications  Valid as of: July 10, 2017 - 10:58 AM    Generic Name Brand Name Tablet Size Instructions for use    Acetaminophen (Tab) TYLENOL 500 MG Take 500-1,000 mg by mouth as needed.        Cholecalciferol (Tab) cholecalciferol 1000 UNIT Take 1,000 Units by mouth every day.        Warfarin Sodium (Tab) COUMADIN 6 MG Take one to one and one-half (1-1.5) tablets daily  as directed by [facility]        .                 Medicines prescribed today were sent to:     [facility] - [place] (), [place] - [address]    [address] [place] [address] () [place] [address]    Phone: [phone] Fax: [phone]    Open 24 Hours?: No      Medication refill instructions:       If your prescription bottle indicates you have medication refills left, it is not necessary to call your provider’s office. Please contact your pharmacy and they will refill your medication.    If your prescription bottle indicates you do not have any refills left, you may request refills at any time through one of the following ways: The online [employer] system (except Urgent Care), by calling your provider’s office, or by asking your pharmacy to contact your provider’s office with a refill request. Medication refills are processed only during regular business hours and may not be available until the next business day. Your provider may request additional information or to have a follow-up visit with you prior to refilling your medication.   *Please Note: Medication refills are assigned a new Rx number when refilled electronically. Your pharmacy may indicate that no refills were authorized even though a new prescription for the same medication is available at the pharmacy. Please request the medicine by name with the pharmacy before contacting your provider for a refill.        Warfarin Dosing Calendar   July 2017 Details    Sun Mon Tue Wed Thu Fri Sat           1                 2               3               4               5               6               7               8                 9               10   3.8   Hold   See details      11      9 mg         12      6 mg         13      6 mg         14      6 mg         15      6 mg           16      9 mg         17      6 mg         18      9 mg         19      6 mg         20      6 mg         21      6 mg         22      6 mg           23       9 mg         24      6 mg         25               26               27               28               29                 30               31                     Date Details   07/10 This INR check   INR: 3.8       Date of next INR:  7/24/2017         How to take your warfarin dose     To take:  6 mg Take 1 of the 6 mg tablets.    To take:  9 mg Take 1.5 of the 6 mg tablets.    Hold Do not take your warfarin dose. See the Details table to the right for additional instructions.                   [employer] Access Code: Activation code not generated  Current [employer] Status: Active

## 2017-07-10 NOTE — PROGRESS NOTES
Anticoagulation Summary as of 7/10/2017     INR goal 2.0-3.0   Selected INR 3.8! (7/10/2017)   Maintenance plan 9 mg (6 mg x 1.5) on Sun, Tue; 6 mg (6 mg x 1) all other days   Weekly total 48 mg   Plan last modified DEBBI Shin (6/14/2017)   Next INR check 7/24/2017   Target end date Indefinite    Indications   Deep vein thrombosis (DVT) of both lower extremities (CMS-HCC) [I82.403]         Anticoagulation Episode Summary     INR check location     Preferred lab     Send INR reminders to     Comments [facility]      Anticoagulation Care Providers     Provider Role Specialty Phone number    Yimi Martinez M.D. Referring Surgery [phone]    DIMITRI SchofieldD Responsible      [facility] Responsible  [phone]        Patient is supratherapeutic today. Denies any medication or diet changes. No current symptoms of bleeding or thrombosis reported. HOLD tonight then continue current regimen. Follow up in 2 weeks.    Next Appointment: Monday, July 24, 2017 at 10:45 am.     Re HUITRON

## 2017-07-10 NOTE — PROGRESS NOTES
CHIEF COMPLAINT  Chief Complaint   Patient presents with   • Back Pain     lower back pain, hip pain     HPI  Patient is a 61 y.o. female patient who presents today for the following     Right sided lower back pain, muscle spasm  Stage IV of colon adenocarcinoma  Colon cancer, adenocarcinoma, stage IV:   - Dg In 3/2016   - st post colectomy, on chemotherapy    LBP pain/spasm  - Onset:  10 days ago  - Triger: after bending / lifting  - located in: right lower back  - intensity:  Up to 6/10   - may awake overnight when tylenol wears off  - quality:  dull and sharp (intermittently and with activity)  - radiation:  horizontally, towards other side of the spine  - alleviating factors are:  rest, heat, tylenol (helped)  -  exacerbating factors are:  activity  - accompanied: no numbness, weakness, tingling, fever, chills  - course: worsening  - therapy: as above    No reported history of:  - chronic immune suppression, alcoholism, IV drug abuse, indwelling catheter, diabetes.    - No history of recent spinal surgery or injection.    Denies:  - numbness/saddle anesthesia.  - bladder changes, fever.   - nausea/vomiting  - chest pain, shortness of breath, abdominal pain.      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 30 Tab 0   • cyclobenzaprine (FLEXERIL) 5 MG tablet Take 1-2 Tabs by mouth 2 times a day as needed. 60 Tab 0   • meloxicam (MOBIC) 15 MG tablet Take 1 Tab by mouth every day. 60 Tab 0   • warfarin (COUMADIN) 6 MG Tab Take one to one and one-half (1-1.5) tablets daily as directed by [facility] 135 Tab 1   • vitamin D (CHOLECALCIFEROL) 1000 UNIT Tab Take 1,000 Units by mouth every day.     • acetaminophen (TYLENOL) 500 MG Tab Take 500-1,000 mg by mouth as needed.       No current facility-administered medications for this visit.     ALLERGIES  Allergies: Review of patient's allergies indicates no known allergies.    PAST MEDICAL HISTORY  Past Medical History   Diagnosis Date   • Obesity    • Essential hypertension    • Cancer (CMS-HCC) 2016     liver, bladder, colon   • Urinary bladder disorder      1/2 bladder due to cancer surgery   • Blood clotting disorder (CMS-HCC) 4-     left leg   • Bowel habit changes      has colostomy   • Blood loss anemia 2016     SURGICAL HISTORY  She  has past surgical history that includes excision of tonsil tags; cystoscopy (3/17/2016); trans urethral resection bladder (3/17/2016); colonoscopy - endo (N/A, 3/22/2016); recovery (3/21/2016); exploratory laparotomy (4/17/2016); exploratory laparotomy (4/18/2016); cystectomy (4/18/2016); colostomy (Left, 4/19/2016); and cath placement (Left, 8/9/2016).    SOCIAL HISTORY  Social History   Substance Use Topics   • Smoking status: Never Smoker    • Smokeless tobacco: Never Used   • Alcohol Use: No     Social History     Social History Narrative    Lives with .     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     FAMILY HISTORY  Family History   Problem Relation Age of Onset   • Cancer Neg Hx    • Diabetes  "Mother      prediabetes   • Heart Disease Mother    • Hypertension Mother    • Hyperlipidemia Neg Hx    • Psychiatry Neg Hx    • Thyroid Neg Hx    • Stroke Maternal Grandmother      No family status information on file.     ROS   Constitutional: Negative for fever, chills.  HENT: Negative for congestion, sore throat.  Eyes: Negative for blurred vision.    Respiratory: Negative for cough, shortness of breath.  Cardiovascular: Negative for chest pain, palpitations.   Gastrointestinal: Negative for heartburn, nausea, abdominal pain.   Genitourinary: Negative for dysuria.  Musculoskeletal: As above.  Skin: Negative for rash and itching.   Neuro: Negative for dizziness, weakness and headaches.   Endo/Heme/Allergies: Does not bruise/bleed easily.   Psychiatric/Behavioral: Negative for depression, anxiety    PHYSICAL EXAM   Blood pressure 126/72, pulse 93, temperature 36.4 °C (97.5 °F), height 1.549 m (5' 0.98\"), weight 106 kg (233 lb 11 oz), SpO2 96 %. Body mass index is 44.18 kg/(m^2).  General:  NAD, well appearing.   Obese.  HEENT:   NC/AT, PERRLA, EOMI, TMs are clear. Oropharyngeal mucosa is pink,  without lesions;  no cervical / supraclavicular  lymphadenopathy, no thyromegaly.    Cardiovascular: RRR.   No m/r/g. No carotid bruits .      Lungs:   CTAB, no w/r/r, no respiratory distress.  Abdomen: Soft, NT/ND + BS; no suprapubic tenderness; no masses or hepatosplenomegaly.  Extremities:  2+ DP and radial pulses bilaterally.  No c/c/e.   Skin:  Warm, dry.  No erythema. No rash.   Neurologic: Alert & oriented x 3. Knee DTR are 2/4, symmetric. Strength and sensation grossly intact.  No focal deficits.  Psychiatric:  Affect normal, mood normal, judgment normal.    LABS     The last labs from 5/18/17 are reviewed and discussed with a patient, scanned in the media.      1. CBC: Unremarkable  2. CMP: within normal limits    3. A1c was 6.0  4. Vitamin D level: 23  5.   IMAGING     Reviewed the last CT of abdomen and pelvis " from 5/24/17:  1.  Mass containing calcifications in segment 5 of the liver is again identified with no significant change since the prior exam.  2.  Additional 2 masses are demonstrated abnormal elevated activity on the prior PET/CT are not appreciated on the current contrast enhanced CT scan. Resolution of these lesions is a possibility. PET/CT or MRI of the abdomen could be obtained for further   evaluation if there is suspicion of persistent or residual liver metastasis.  3.  Marked decrease in size of mass anterior to the stomach in the upper abdomen since the prior exam. Resolving neoplasm at this location is possible.  4.  Marked decrease in left pelvic adenopathy since the prior examination. Resolving neoplasm within these nodes is a possibility.  5.  Small wedge-shaped parenchymal opacification posteriorly in the right lung could be due to inflammation or scarring. Appearance is not typical of pulmonary metastasis.  6.  Left lower quadrant colostomy is again noted.    ASSESMENT AND PLAN        1. Acute right-sided low back pain without sciatica  Continue activity as tolerated  Ice packs may help.      Advised to discuss the following treatment with oncology tomorrow, (has scheduled chemotherapy).    - DX-PELVIS-1 OR 2 VIEWS; Future  - meloxicam (MOBIC) 15 MG tablet; Take 1 Tab by mouth every day.  Dispense: 60 Tab; Refill: 0  - oxycodone immediate-release (ROXICODONE) 5 MG Tab; Take 1 Tab by mouth 3 times a day as needed for Severe Pain.  Dispense: 30 Tab; Refill: 0  - meloxicam (MOBIC) 15 MG tablet; Take 1 Tab by mouth every day.  Dispense: 60 Tab; Refill: 1  - predniSONE (DELTASONE) 10 MG Tab; 5 po today then 4 po in am then 3 po in am then 2 po in am then 1 po in am then stop.  Dispense: 15 Tab; Refill: 0    2. Muscle spasm  - cyclobenzaprine (FLEXERIL) 5 MG tablet; Take 1-2 Tabs by mouth 2 times a day as needed.  Dispense: 60 Tab; Refill: 0    3. Adenocarcinoma of colon metastatic to liver  (CMS-HCC)  Continue current treatment and oncology follow-up    4. Health care maintenance  Advised shingles shot.    Counseling:   - Smoking:  Nonsmoker    Followup: Return if symptoms worsen or fail to improve.    All questions are answered.    Please note that this dictation was created using voice recognition software, and that there might be errors of butch and possibly content.

## 2017-07-24 ENCOUNTER — ANTICOAGULATION VISIT (OUTPATIENT)
Dept: VASCULAR LAB | Facility: MEDICAL CENTER | Age: 62
End: 2017-07-24
Attending: INTERNAL MEDICINE
Payer: COMMERCIAL

## 2017-07-24 VITALS — DIASTOLIC BLOOD PRESSURE: 57 MMHG | HEART RATE: 93 BPM | SYSTOLIC BLOOD PRESSURE: 115 MMHG

## 2017-07-24 DIAGNOSIS — Z86.718 HISTORY OF DVT (DEEP VEIN THROMBOSIS): ICD-10-CM

## 2017-07-24 DIAGNOSIS — I82.403 ACUTE DEEP VEIN THROMBOSIS (DVT) OF BOTH LOWER EXTREMITIES, UNSPECIFIED VEIN (HCC): ICD-10-CM

## 2017-07-24 LAB — INR PPP: 4.9 (ref 2–3.5)

## 2017-07-24 PROCEDURE — 99212 OFFICE O/P EST SF 10 MIN: CPT | Performed by: NURSE PRACTITIONER

## 2017-07-24 PROCEDURE — 85610 PROTHROMBIN TIME: CPT

## 2017-07-24 RX ORDER — WARFARIN SODIUM 6 MG/1
TABLET ORAL
Qty: 135 TAB | Refills: 1 | Status: ON HOLD | OUTPATIENT
Start: 2017-07-24 | End: 2018-01-01

## 2017-07-24 NOTE — MR AVS SNAPSHOT
Tamara Caitie Flynn   2017 10:45 AM   Anticoagulation Visit   MRN: [id number]    Department:  Vascular Medicine   Dept Phone:  [phone]    Description:  Female : 1955   Provider:  [facility] EXAM 4           Allergies as of 2017     No Known Allergies      You were diagnosed with     Acute deep vein thrombosis (DVT) of both lower extremities, unspecified vein (CMS-HCC)   [[id number]]         Vital Signs     Blood Pressure Pulse Smoking Status             115/57 mmHg 93 Never Smoker          Basic Information     Date Of Birth Sex Race Ethnicity Preferred Language    1955 Female White Non- English      Your appointments     2017 10:30 AM   Established Patient with [facility] EXAM 5   [facility]  (--)    [address]  [place] [address]   [phone]            Aug 25, 2017 11:00 AM   Established Patient with Maritza Esparza M.D.   [facility]    [address]  [address]  [place] [id number] [phone]           You will be receiving a confirmation call a few days before your appointment from our automated call confirmation system.              Problem List              ICD-10-CM Priority Class Noted - Resolved    Morbid obesity with BMI of 40.0-44.9, adult (CMS-HCC) E66.01, Z68.41   2015 - Present    Health care maintenance Z00.00   2015 - Present    Prediabetes R73.03   2015 - Present    Adenocarcinoma of sigmoid colon (CMS-HCC) C18.7   3/22/2016 - Present    Family history of primary TB Z83.6 Low  2016 - Present    Adenocarcinoma of colon metastatic to liver (CMS-[facility]) C18.9, C78.7 High  2016 - Present    Deep vein thrombosis (DVT) of both lower extremities (CMS-[facility]) I82.403   2016 - Present    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   2016 - Present    Encounter for screening mammogram for  malignant neoplasm of breast Z12.31   9/20/2016 - Present    Colostomy in place (CMS-HCC) Z93.3   2/17/2017 - Present      Health Maintenance        Date Due Completion Dates    IMM ZOSTER VACCINE 10/23/2014 ---    IMM INFLUENZA (1) 9/1/2017 10/10/2016, 11/12/2015    MAMMOGRAM 11/1/2017 11/1/2016    COLONOSCOPY 3/22/2026 3/22/2016    IMM DTaP/Tdap/Td Vaccine (2 - Td) 2/17/2027 2/17/2017            Results     POCT Protime      Component    INR    4.9                        Current Immunizations     Influenza Vaccine Quad Inj (Pf) 11/12/2015    Influenza Vaccine Quad Inj (Preserved) 10/10/2016    Tdap Vaccine 2/17/2017      Below and/or attached are the medications your provider expects you to take. Review all of your home medications and newly ordered medications with your provider and/or pharmacist. Follow medication instructions as directed by your provider and/or pharmacist. Please keep your medication list with you and share with your provider. Update the information when medications are discontinued, doses are changed, or new medications (including over-the-counter products) are added; and carry medication information at all times in the event of emergency situations     Allergies:  No Known Allergies          Medications  Valid as of: July 24, 2017 - 11:17 AM    Generic Name Brand Name Tablet Size Instructions for use    Acetaminophen (Tab) TYLENOL 500 MG Take 500-1,000 mg by mouth as needed.        Cholecalciferol (Tab) cholecalciferol 1000 UNIT Take 1,000 Units by mouth every day.        Cyclobenzaprine HCl (Tab) FLEXERIL 5 MG Take 1-2 Tabs by mouth 2 times a day as needed.        Meloxicam (Tab) MOBIC 15 MG Take 1 Tab by mouth every day.        Meloxicam (Tab) MOBIC 15 MG Take 1 Tab by mouth every day.        OxyCODONE HCl (Tab) ROXICODONE 5 MG Take 1 Tab by mouth 3 times a day as needed for Severe Pain.        Warfarin Sodium (Tab) COUMADIN 6 MG Take one to one and one-half (1-1.5) tablets daily as  directed by [facility]        .                 Medicines prescribed today were sent to:     [facility] - [place] (), [place] - [address]    [address] [place] [address] [place] () [place] [address]    Phone: [phone] Fax: [phone]    Open 24 Hours?: No      Medication refill instructions:       If your prescription bottle indicates you have medication refills left, it is not necessary to call your provider’s office. Please contact your pharmacy and they will refill your medication.    If your prescription bottle indicates you do not have any refills left, you may request refills at any time through one of the following ways: The online "[employer]" system (except Urgent Care), by calling your provider’s office, or by asking your pharmacy to contact your provider’s office with a refill request. Medication refills are processed only during regular business hours and may not be available until the next business day. Your provider may request additional information or to have a follow-up visit with you prior to refilling your medication.   *Please Note: Medication refills are assigned a new Rx number when refilled electronically. Your pharmacy may indicate that no refills were authorized even though a new prescription for the same medication is available at the pharmacy. Please request the medicine by name with the pharmacy before contacting your provider for a refill.        Warfarin Dosing Calendar   July 2017 Details    Sun Mon Tue Wed Thu Fri Sat           1                 2               3               4               5               6               7               8                 9               10               11               12               13               14               15                 16               17               18               19               20               21               22                 23               24   4.9   Hold   See details      25      Hold          26      6 mg         27      6 mg         28      6 mg         29      6 mg           30      9 mg         31      6 mg               Date Details   07/24 This INR check   INR: 4.9       Date of next INR:  7/31/2017         How to take your warfarin dose     To take:  6 mg Take 1 of the 6 mg tablets.    To take:  9 mg Take 1.5 of the 6 mg tablets.    Hold Do not take your warfarin dose. See the Details table to the right for additional instructions.                   [employer] Access Code: Activation code not generated  Current [employer] Status: Active

## 2017-07-24 NOTE — PROGRESS NOTES
Anticoagulation Summary as of 7/24/2017     INR goal 2.0-3.0   Selected INR    Maintenance plan 9 mg (6 mg x 1.5) on Sun, Tue; 6 mg (6 mg x 1) all other days   Weekly total 48 mg   Plan last modified DEBBI Shin (6/14/2017)   Next INR check 7/31/2017   Target end date Indefinite    Indications   Deep vein thrombosis (DVT) of both lower extremities (CMS-HCC) [I82.403]         Anticoagulation Episode Summary     INR check location     Preferred lab     Send INR reminders to     Comments [facility]      Anticoagulation Care Providers     Provider Role Specialty Phone number    Yimi Martinez M.D. Referring Surgery [phone]    DIMITRI SchofieldD Responsible      [facility] Responsible  [phone]        Anticoagulation Patient Findings      Current Outpatient Prescriptions on File Prior to Visit   Medication Sig Dispense Refill   • cyclobenzaprine (FLEXERIL) 5 MG tablet Take 1-2 Tabs by mouth 2 times a day as needed. 60 Tab 0   • meloxicam (MOBIC) 15 MG tablet Take 1 Tab by mouth every day. 60 Tab 0   • oxycodone immediate-release (ROXICODONE) 5 MG Tab Take 1 Tab by mouth 3 times a day as needed for Severe Pain. 30 Tab 0   • meloxicam (MOBIC) 15 MG tablet Take 1 Tab by mouth every day. 60 Tab 1   • warfarin (COUMADIN) 6 MG Tab Take one to one and one-half (1-1.5) tablets daily as directed by [facility] 135 Tab 1   • vitamin D (CHOLECALCIFEROL) 1000 UNIT Tab Take 1,000 Units by mouth every day.     • acetaminophen (TYLENOL) 500 MG Tab Take 500-1,000 mg by mouth as needed.       No current facility-administered medications on file prior to visit.       Lab Results   Component Value Date/Time    SODIUM 137 08/08/2016 02:27 PM    POTASSIUM 3.8 08/08/2016 02:27 PM    CHLORIDE 103 08/08/2016 02:27 PM    CO2 25 08/08/2016 02:27 PM    GLUCOSE 123* 08/08/2016 02:27 PM    BUN 25* 08/08/2016 02:27 PM    CREATININE 0.82 08/08/2016 02:27 PM          Tamara Flynn seen  in clinic today  INR  SUPRA-therapeutic.    Denies signs/symptoms of bleeding and/or thrombosis.    Denies changes to diet, just finished a round of steroid and is Meloxicam and Flexeril. Patient is aware to monitor closley for bleeding.   Follow up appointment in 1 week(s).      HOLD DOSE FOR TWO DAYS then continue current medication regimen.        LEX Ospina.   ELANA Lewis

## 2017-07-26 LAB — INR BLD: 4.9 (ref 0.9–1.2)

## 2017-07-31 ENCOUNTER — ANTICOAGULATION VISIT (OUTPATIENT)
Dept: VASCULAR LAB | Facility: MEDICAL CENTER | Age: 62
End: 2017-07-31
Attending: INTERNAL MEDICINE
Payer: COMMERCIAL

## 2017-07-31 DIAGNOSIS — I82.403 ACUTE DEEP VEIN THROMBOSIS (DVT) OF BOTH LOWER EXTREMITIES, UNSPECIFIED VEIN (HCC): ICD-10-CM

## 2017-07-31 LAB — INR PPP: 3.1 (ref 2–3.5)

## 2017-07-31 PROCEDURE — 99212 OFFICE O/P EST SF 10 MIN: CPT | Performed by: NURSE PRACTITIONER

## 2017-07-31 PROCEDURE — 85610 PROTHROMBIN TIME: CPT

## 2017-07-31 NOTE — PROGRESS NOTES
Anticoagulation Summary as of 7/31/2017     INR goal 2.0-3.0   Selected INR 3.1! (7/31/2017)   Maintenance plan 6 mg (6 mg x 1) every day   Weekly total 42 mg   Plan last modified DEBBI Shin (7/31/2017)   Next INR check 8/7/2017   Target end date Indefinite    Indications   Deep vein thrombosis (DVT) of both lower extremities (CMS-HCC) [I82.403]         Anticoagulation Episode Summary     INR check location     Preferred lab     Send INR reminders to     Comments [facility]      Anticoagulation Care Providers     Provider Role Specialty Phone number    Yimi Martinez M.D. Referring Surgery [phone]    DIMITRI SchofieldD Responsible      [facility] Responsible  [phone]        Patient is supratherapeutic today. She is still taking Mobic for back pain. Denies any medication or diet changes. No current symptoms of bleeding or thrombosis reported. Will decrease regimen. Follow up in 1 week.    Next Appointment: Monday, August 7, 2017 at 10:30 am.     Re HUITRON

## 2017-07-31 NOTE — MR AVS SNAPSHOT
Tamara Caitie Flynn   2017 10:30 AM   Anticoagulation Visit   MRN: [id number]    Department:  Vascular Medicine   Dept Phone:  [phone]    Description:  Female : 1955   Provider:  [facility] EXAM 5           Allergies as of 2017     No Known Allergies      You were diagnosed with     Acute deep vein thrombosis (DVT) of both lower extremities, unspecified vein (CMS-HCC)   [[id number]]         Vital Signs     Smoking Status                   Never Smoker            Basic Information     Date Of Birth Sex Race Ethnicity Preferred Language    1955 Female White Non- English      Your appointments     Aug 07, 2017  7:30 AM   Established Patient with [facility] EXAM 4   [facility]  (--)    [address]  [place] [address]   [phone]            Aug 25, 2017 11:00 AM   Established Patient with Maritza Esparza M.D.   [facility]    [address]  [address]  [place] [id number] [phone]           You will be receiving a confirmation call a few days before your appointment from our automated call confirmation system.              Problem List              ICD-10-CM Priority Class Noted - Resolved    Morbid obesity with BMI of 40.0-44.9, adult (CMS-[facility]) E66.01, Z68.41   2015 - Present    Health care maintenance Z00.00   2015 - Present    Prediabetes R73.03   2015 - Present    Adenocarcinoma of sigmoid colon (CMS-HCC) C18.7   3/22/2016 - Present    Family history of primary TB Z83.6 Low  2016 - Present    Adenocarcinoma of colon metastatic to liver (CMS-HCC) C18.9, C78.7 High  2016 - Present    Deep vein thrombosis (DVT) of both lower extremities (CMS-[facility]) I82.403   2016 - Present    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   2016 - Present    Encounter for screening mammogram for malignant neoplasm of breast  Z12.31   9/20/2016 - Present    Colostomy in place (CMS-[facility]) Z93.3   2/17/2017 - Present      Health Maintenance        Date Due Completion Dates    IMM ZOSTER VACCINE 10/23/2014 ---    IMM INFLUENZA (1) 9/1/2017 10/10/2016, 11/12/2015    MAMMOGRAM 11/1/2017 11/1/2016    COLONOSCOPY 3/22/2026 3/22/2016    IMM DTaP/Tdap/Td Vaccine (2 - Td) 2/17/2027 2/17/2017            Results     POCT Protime      Component    INR    3.1                        Current Immunizations     Influenza Vaccine Quad Inj (Pf) 11/12/2015    Influenza Vaccine Quad Inj (Preserved) 10/10/2016    Tdap Vaccine 2/17/2017      Below and/or attached are the medications your provider expects you to take. Review all of your home medications and newly ordered medications with your provider and/or pharmacist. Follow medication instructions as directed by your provider and/or pharmacist. Please keep your medication list with you and share with your provider. Update the information when medications are discontinued, doses are changed, or new medications (including over-the-counter products) are added; and carry medication information at all times in the event of emergency situations     Allergies:  No Known Allergies          Medications  Valid as of: July 31, 2017 - 11:07 AM    Generic Name Brand Name Tablet Size Instructions for use    Acetaminophen (Tab) TYLENOL 500 MG Take 500-1,000 mg by mouth as needed.        Cholecalciferol (Tab) cholecalciferol 1000 UNIT Take 1,000 Units by mouth every day.        Cyclobenzaprine HCl (Tab) FLEXERIL 5 MG Take 1-2 Tabs by mouth 2 times a day as needed.        Meloxicam (Tab) MOBIC 15 MG Take 1 Tab by mouth every day.        Meloxicam (Tab) MOBIC 15 MG Take 1 Tab by mouth every day.        OxyCODONE HCl (Tab) ROXICODONE 5 MG Take 1 Tab by mouth 3 times a day as needed for Severe Pain.        Warfarin Sodium (Tab) COUMADIN 6 MG Take one to one and one-half (1-1.5) tablets daily as directed by [facility]        .                 Medicines prescribed today were sent to:     [facility] - [place] (), [place] - [address]    [address] [place] [address] () [place] [address]    Phone: [phone] Fax: [phone]    Open 24 Hours?: No      Medication refill instructions:       If your prescription bottle indicates you have medication refills left, it is not necessary to call your provider’s office. Please contact your pharmacy and they will refill your medication.    If your prescription bottle indicates you do not have any refills left, you may request refills at any time through one of the following ways: The online [employer] system (except Urgent Care), by calling your provider’s office, or by asking your pharmacy to contact your provider’s office with a refill request. Medication refills are processed only during regular business hours and may not be available until the next business day. Your provider may request additional information or to have a follow-up visit with you prior to refilling your medication.   *Please Note: Medication refills are assigned a new Rx number when refilled electronically. Your pharmacy may indicate that no refills were authorized even though a new prescription for the same medication is available at the pharmacy. Please request the medicine by name with the pharmacy before contacting your provider for a refill.        Warfarin Dosing Calendar   July 2017 Details    Sun Mon Tue Wed Thu Fri Sat           1                 2               3               4               5               6               7               8                 9               10               11               12               13               14               15                 16               17               18               19               20               21               22                 23               24               25               26               27               28               29                    30               31   3.1   6 mg   See details            Date Details   07/31 This INR check   INR: 3.1               How to take your warfarin dose     To take:  6 mg Take 1 of the 6 mg tablets.           Warfarin Dosing Calendar   August 2017 Details    Sun Mon Tue Wed Thu Fri Sat       1      6 mg         2      6 mg         3      6 mg         4      6 mg         5      6 mg           6      6 mg         7      6 mg         8               9               10               11               12                 13               14               15               16               17               18               19                 20               21               22               23               24               25               26                 27               28               29               30               31                  Date Details   No additional details    Date of next INR:  8/7/2017         How to take your warfarin dose     To take:  6 mg Take 1 of the 6 mg tablets.              [employer] Access Code: Activation code not generated  Current [employer] Status: Active

## 2017-08-07 ENCOUNTER — ANTICOAGULATION VISIT (OUTPATIENT)
Dept: VASCULAR LAB | Facility: MEDICAL CENTER | Age: 62
End: 2017-08-07
Attending: INTERNAL MEDICINE
Payer: COMMERCIAL

## 2017-08-07 VITALS — DIASTOLIC BLOOD PRESSURE: 84 MMHG | SYSTOLIC BLOOD PRESSURE: 156 MMHG | HEART RATE: 73 BPM

## 2017-08-07 DIAGNOSIS — I82.403 ACUTE DEEP VEIN THROMBOSIS (DVT) OF BOTH LOWER EXTREMITIES, UNSPECIFIED VEIN (HCC): ICD-10-CM

## 2017-08-07 LAB
INR BLD: 2.7 (ref 0.9–1.2)
INR PPP: 2.7 (ref 2–3.5)

## 2017-08-07 PROCEDURE — 85610 PROTHROMBIN TIME: CPT

## 2017-08-07 PROCEDURE — 99211 OFF/OP EST MAY X REQ PHY/QHP: CPT | Performed by: PHARMACIST

## 2017-08-07 NOTE — MR AVS SNAPSHOT
Tamara Caitie Flynn   2017 7:30 AM   Anticoagulation Visit   MRN: [id number]    Department:  Vascular Medicine   Dept Phone:  [phone]    Description:  Female : 1955   Provider:  [facility] EXAM 4           Allergies as of 2017     No Known Allergies      You were diagnosed with     Acute deep vein thrombosis (DVT) of both lower extremities, unspecified vein (CMS-HCC)   [[id number]]         Vital Signs     Blood Pressure Pulse Smoking Status             156/84 mmHg 73 Never Smoker          Basic Information     Date Of Birth Sex Race Ethnicity Preferred Language    1955 Female White Non- English      Your appointments     Aug 21, 2017 10:45 AM   Established Patient with [facility] EXAM 5   [facility]  (--)    [address]  [place] [address]   [phone]            Aug 25, 2017 11:00 AM   Established Patient with Maritza Esparza M.D.   [facility]    [address]  [address]  [place] [address] [phone]           You will be receiving a confirmation call a few days before your appointment from our automated call confirmation system.              Problem List              ICD-10-CM Priority Class Noted - Resolved    Morbid obesity with BMI of 40.0-44.9, adult (CMS-HCC) E66.01, Z68.41   2015 - Present    Health care maintenance Z00.00   2015 - Present    Prediabetes R73.03   2015 - Present    Adenocarcinoma of sigmoid colon (CMS-HCC) C18.7   3/22/2016 - Present    Family history of primary TB Z83.6 Low  2016 - Present    Adenocarcinoma of colon metastatic to liver (CMS-[facility]) C18.9, C78.7 High  2016 - Present    Deep vein thrombosis (DVT) of both lower extremities (CMS-[facility]) I82.403   2016 - Present    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   2016 - Present    Encounter for screening mammogram for  malignant neoplasm of breast Z12.31   9/20/2016 - Present    Colostomy in place (CMS-HCC) Z93.3   2/17/2017 - Present      Health Maintenance        Date Due Completion Dates    IMM ZOSTER VACCINE 10/23/2014 ---    IMM INFLUENZA (1) 9/1/2017 10/10/2016, 11/12/2015    MAMMOGRAM 11/1/2017 11/1/2016    COLONOSCOPY 3/22/2026 3/22/2016    IMM DTaP/Tdap/Td Vaccine (2 - Td) 2/17/2027 2/17/2017            Results     POCT Protime      Component    INR    2.7                        Current Immunizations     Influenza Vaccine Quad Inj (Pf) 11/12/2015    Influenza Vaccine Quad Inj (Preserved) 10/10/2016    Tdap Vaccine 2/17/2017      Below and/or attached are the medications your provider expects you to take. Review all of your home medications and newly ordered medications with your provider and/or pharmacist. Follow medication instructions as directed by your provider and/or pharmacist. Please keep your medication list with you and share with your provider. Update the information when medications are discontinued, doses are changed, or new medications (including over-the-counter products) are added; and carry medication information at all times in the event of emergency situations     Allergies:  No Known Allergies          Medications  Valid as of: August 07, 2017 - 12:20 PM    Generic Name Brand Name Tablet Size Instructions for use    Acetaminophen (Tab) TYLENOL 500 MG Take 500-1,000 mg by mouth as needed.        Cholecalciferol (Tab) cholecalciferol 1000 UNIT Take 1,000 Units by mouth every day.        Cyclobenzaprine HCl (Tab) FLEXERIL 5 MG Take 1-2 Tabs by mouth 2 times a day as needed.        Meloxicam (Tab) MOBIC 15 MG Take 1 Tab by mouth every day.        Meloxicam (Tab) MOBIC 15 MG Take 1 Tab by mouth every day.        OxyCODONE HCl (Tab) ROXICODONE 5 MG Take 1 Tab by mouth 3 times a day as needed for Severe Pain.        Warfarin Sodium (Tab) COUMADIN 6 MG Take one to one and one-half (1-1.5) tablets daily as  directed by [facility]        .                 Medicines prescribed today were sent to:     [facility] - [place] (), [place] - [address]    [address] [place] [address] () [place] [address]    Phone: [phone] Fax: [phone]    Open 24 Hours?: No      Medication refill instructions:       If your prescription bottle indicates you have medication refills left, it is not necessary to call your provider’s office. Please contact your pharmacy and they will refill your medication.    If your prescription bottle indicates you do not have any refills left, you may request refills at any time through one of the following ways: The online [employer] system (except Urgent Care), by calling your provider’s office, or by asking your pharmacy to contact your provider’s office with a refill request. Medication refills are processed only during regular business hours and may not be available until the next business day. Your provider may request additional information or to have a follow-up visit with you prior to refilling your medication.   *Please Note: Medication refills are assigned a new Rx number when refilled electronically. Your pharmacy may indicate that no refills were authorized even though a new prescription for the same medication is available at the pharmacy. Please request the medicine by name with the pharmacy before contacting your provider for a refill.        Warfarin Dosing Calendar   August 2017 Details    Sun Mon Tue Wed Thu Fri Sat       1               2               3               4               5                 6               7   2.7   6 mg   See details      8      6 mg         9      6 mg         10      6 mg         11      6 mg         12      6 mg           13      6 mg         14      6 mg         15      6 mg         16      6 mg         17      6 mg         18      6 mg         19      6 mg           20      6 mg         21      6 mg         22                23               24               25               26                 27               28               29               30               31                  Date Details   08/07 This INR check   INR: 2.7       Date of next INR:  8/21/2017         How to take your warfarin dose     To take:  6 mg Take 1 of the 6 mg tablets.              [employer] Access Code: Activation code not generated  Current [employer] Status: Active

## 2017-08-08 LAB — INR BLD: 3.1 (ref 0.9–1.2)

## 2017-08-14 ENCOUNTER — TELEPHONE (OUTPATIENT)
Dept: MEDICAL GROUP | Facility: MEDICAL CENTER | Age: 62
End: 2017-08-14

## 2017-08-18 ENCOUNTER — HOSPITAL ENCOUNTER (OUTPATIENT)
Dept: RADIOLOGY | Facility: MEDICAL CENTER | Age: 62
End: 2017-08-18
Attending: INTERNAL MEDICINE
Payer: COMMERCIAL

## 2017-08-18 DIAGNOSIS — C18.7 MALIGNANT NEOPLASM OF SIGMOID COLON (HCC): ICD-10-CM

## 2017-08-18 PROCEDURE — 700117 HCHG RX CONTRAST REV CODE 255: Performed by: INTERNAL MEDICINE

## 2017-08-18 PROCEDURE — 71260 CT THORAX DX C+: CPT

## 2017-08-18 RX ADMIN — IOHEXOL 100 ML: 350 INJECTION, SOLUTION INTRAVENOUS at 13:40

## 2017-08-21 ENCOUNTER — ANTICOAGULATION VISIT (OUTPATIENT)
Dept: VASCULAR LAB | Facility: MEDICAL CENTER | Age: 62
End: 2017-08-21
Attending: INTERNAL MEDICINE
Payer: COMMERCIAL

## 2017-08-21 DIAGNOSIS — I82.403 ACUTE DEEP VEIN THROMBOSIS (DVT) OF BOTH LOWER EXTREMITIES, UNSPECIFIED VEIN (HCC): ICD-10-CM

## 2017-08-21 LAB — INR PPP: 2.1 (ref 2–3.5)

## 2017-08-21 PROCEDURE — 99211 OFF/OP EST MAY X REQ PHY/QHP: CPT | Performed by: NURSE PRACTITIONER

## 2017-08-21 PROCEDURE — 85610 PROTHROMBIN TIME: CPT

## 2017-08-21 NOTE — PROGRESS NOTES
Anticoagulation Summary as of 8/21/2017     INR goal 2.0-3.0   Selected INR 2.1 (8/21/2017)   Maintenance plan 6 mg (6 mg x 1) every day   Weekly total 42 mg   Plan last modified DEBBI Shin (7/31/2017)   Next INR check 9/6/2017   Target end date Indefinite    Indications   Deep vein thrombosis (DVT) of both lower extremities (CMS-HCC) [I82.403]         Anticoagulation Episode Summary     INR check location     Preferred lab     Send INR reminders to     Comments [facility]      Anticoagulation Care Providers     Provider Role Specialty Phone number    Yiim Martinez M.D. Referring Surgery [phone]    DIMITRI SchofieldD Responsible      [facility] Responsible  [phone]        Patient is therapeutic today. Chemo is on hold for now. Denies any medication or diet changes. No current symptoms of bleeding or thrombosis reported. Continue current regimen. Follow up in 2 weeks.    Next Appointment: Wednesday, September 6, 2017 at 10:30 am.     Re HUITRON

## 2017-08-21 NOTE — MR AVS SNAPSHOT
Tamara Caitie Flynn   2017 10:45 AM   Anticoagulation Visit   MRN: [id number]    Department:  Vascular Medicine   Dept Phone:  [phone]    Description:  Female : 1955   Provider:  [facility] EXAM 5           Allergies as of 2017     No Known Allergies      You were diagnosed with     Acute deep vein thrombosis (DVT) of both lower extremities, unspecified vein (CMS-[facility])   [[id number]]         Vital Signs     Smoking Status                   Never Smoker            Basic Information     Date Of Birth Sex Race Ethnicity Preferred Language    1955 Female White Non- English      Your appointments     Aug 24, 2017 11:20 AM   Established Patient with Maritza Esparza M.D.   [facility]    [address]  [address]  [place] [id number] [phone]           You will be receiving a confirmation call a few days before your appointment from our automated call confirmation system.            Sep 06, 2017  9:45 AM   Established Patient with [facility] EXAM 5   [facility]  (--)    [address] [place] [address]   [phone]              Problem List              ICD-10-CM Priority Class Noted - Resolved    Morbid obesity with BMI of 40.0-44.9, adult (CMS-[facility]) E66.01, Z68.41   2015 - Present    Health care maintenance Z00.00   2015 - Present    Prediabetes R73.03   2015 - Present    Adenocarcinoma of sigmoid colon (CMS-HCC) C18.7   3/22/2016 - Present    Family history of primary TB Z83.6 Low  2016 - Present    Adenocarcinoma of colon metastatic to liver (CMS-HCC) C18.9, C78.7 High  2016 - Present    Deep vein thrombosis (DVT) of both lower extremities (CMS-[facility]) I82.403   2016 - Present    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   2016 - Present    Encounter for screening mammogram for malignant neoplasm of breast  Z12.31   9/20/2016 - Present    Colostomy in place (CMS-[facility]) Z93.3   2/17/2017 - Present      Health Maintenance        Date Due Completion Dates    IMM ZOSTER VACCINE 10/23/2014 ---    IMM INFLUENZA (1) 9/1/2017 10/10/2016, 11/12/2015    MAMMOGRAM 11/1/2017 11/1/2016    COLONOSCOPY 3/22/2026 3/22/2016    IMM DTaP/Tdap/Td Vaccine (2 - Td) 2/17/2027 2/17/2017            Results     POCT Protime      Component    INR    2.1                        Current Immunizations     Influenza Vaccine Quad Inj (Pf) 11/12/2015    Influenza Vaccine Quad Inj (Preserved) 10/10/2016    Tdap Vaccine 2/17/2017      Below and/or attached are the medications your provider expects you to take. Review all of your home medications and newly ordered medications with your provider and/or pharmacist. Follow medication instructions as directed by your provider and/or pharmacist. Please keep your medication list with you and share with your provider. Update the information when medications are discontinued, doses are changed, or new medications (including over-the-counter products) are added; and carry medication information at all times in the event of emergency situations     Allergies:  No Known Allergies          Medications  Valid as of: August 21, 2017 - 12:03 PM    Generic Name Brand Name Tablet Size Instructions for use    Acetaminophen (Tab) TYLENOL 500 MG Take 500-1,000 mg by mouth as needed.        Cholecalciferol (Tab) cholecalciferol 1000 UNIT Take 1,000 Units by mouth every day.        Cyclobenzaprine HCl (Tab) FLEXERIL 5 MG Take 1-2 Tabs by mouth 2 times a day as needed.        Meloxicam (Tab) MOBIC 15 MG Take 1 Tab by mouth every day.        Meloxicam (Tab) MOBIC 15 MG Take 1 Tab by mouth every day.        OxyCODONE HCl (Tab) ROXICODONE 5 MG Take 1 Tab by mouth 3 times a day as needed for Severe Pain.        Warfarin Sodium (Tab) COUMADIN 6 MG Take one to one and one-half (1-1.5) tablets daily as directed by [facility]        .                 Medicines prescribed today were sent to:     [facility] (), [place] - [address]    [address] [place] [address] () [place] [address]    Phone: [phone] Fax: [phone]    Open 24 Hours?: No      Medication refill instructions:       If your prescription bottle indicates you have medication refills left, it is not necessary to call your provider’s office. Please contact your pharmacy and they will refill your medication.    If your prescription bottle indicates you do not have any refills left, you may request refills at any time through one of the following ways: The online [employer]ison[employer] system (except Urgent Care), by calling your provider’s office, or by asking your pharmacy to contact your provider’s office with a refill request. Medication refills are processed only during regular business hours and may not be available until the next business day. Your provider may request additional information or to have a follow-up visit with you prior to refilling your medication.   *Please Note: Medication refills are assigned a new Rx number when refilled electronically. Your pharmacy may indicate that no refills were authorized even though a new prescription for the same medication is available at the pharmacy. Please request the medicine by name with the pharmacy before contacting your provider for a refill.        Warfarin Dosing Calendar   August 2017 Details    Sun Mon Tue Wed Thu Fri Sat       1               2               3               4               5                 6               7               8               9               10               11               12                 13               14               15               16               17               18               19                 20               21   2.1   6 mg   See details      22      6 mg         23      6 mg         24      6 mg         25      6 mg         26      6 mg           27       6 mg         28      6 mg         29      6 mg         30      6 mg         31      6 mg            Date Details   08/21 This INR check   INR: 2.1               How to take your warfarin dose     To take:  6 mg Take 1 of the 6 mg tablets.           Warfarin Dosing Calendar   September 2017 Details    Sun Mon Tue Wed Thu Fri Sat          1      6 mg         2      6 mg           3      6 mg         4      6 mg         5      6 mg         6      6 mg         7               8               9                 10               11               12               13               14               15               16                 17               18               19               20               21               22               23                 24               25               26               27               28               29               30                Date Details   No additional details    Date of next INR:  9/6/2017         How to take your warfarin dose     To take:  6 mg Take 1 of the 6 mg tablets.              [employer] Access Code: Activation code not generated  Current Cloudnex[employer] Status: Active

## 2017-08-24 ENCOUNTER — OFFICE VISIT (OUTPATIENT)
Dept: MEDICAL GROUP | Facility: MEDICAL CENTER | Age: 62
End: 2017-08-24
Payer: COMMERCIAL

## 2017-08-24 VITALS
OXYGEN SATURATION: 97 % | RESPIRATION RATE: 16 BRPM | BODY MASS INDEX: 46.78 KG/M2 | SYSTOLIC BLOOD PRESSURE: 128 MMHG | TEMPERATURE: 98.2 F | HEART RATE: 95 BPM | DIASTOLIC BLOOD PRESSURE: 66 MMHG | WEIGHT: 238.3 LBS | HEIGHT: 60 IN

## 2017-08-24 DIAGNOSIS — R53.83 FATIGUE, UNSPECIFIED TYPE: ICD-10-CM

## 2017-08-24 DIAGNOSIS — Z00.00 HEALTH CARE MAINTENANCE: ICD-10-CM

## 2017-08-24 DIAGNOSIS — Z93.3 COLOSTOMY IN PLACE (HCC): ICD-10-CM

## 2017-08-24 DIAGNOSIS — C18.9 ADENOCARCINOMA OF COLON METASTATIC TO LIVER (HCC): ICD-10-CM

## 2017-08-24 DIAGNOSIS — C78.7 ADENOCARCINOMA OF COLON METASTATIC TO LIVER (HCC): ICD-10-CM

## 2017-08-24 DIAGNOSIS — R73.01 IFG (IMPAIRED FASTING GLUCOSE): ICD-10-CM

## 2017-08-24 DIAGNOSIS — E66.01 MORBID OBESITY WITH BMI OF 40.0-44.9, ADULT (HCC): ICD-10-CM

## 2017-08-24 DIAGNOSIS — M79.89 LEG SWELLING: ICD-10-CM

## 2017-08-24 DIAGNOSIS — E83.42 HYPOMAGNESEMIA: ICD-10-CM

## 2017-08-24 LAB
HBA1C MFR BLD: 6.1 % (ref ?–5.8)
INR BLD: 2.1 (ref 0.9–1.2)
INT CON NEG: NORMAL
INT CON POS: NORMAL

## 2017-08-24 PROCEDURE — 83036 HEMOGLOBIN GLYCOSYLATED A1C: CPT | Performed by: INTERNAL MEDICINE

## 2017-08-24 PROCEDURE — 99214 OFFICE O/P EST MOD 30 MIN: CPT | Performed by: INTERNAL MEDICINE

## 2017-08-24 RX ORDER — FUROSEMIDE 20 MG/1
10 TABLET ORAL
Qty: 45 TAB | Refills: 0 | Status: ON HOLD | OUTPATIENT
Start: 2017-08-24 | End: 2018-01-01

## 2017-08-24 NOTE — MR AVS SNAPSHOT
Tamara Flynn   2017 11:20 AM   Office Visit   MRN: [id number]    Department:  [place] [address]   Dept Phone:  [phone]    Description:  Female : 1955   Provider:  Maritza Esparza M.D.           Reason for Visit     Follow-Up labs       Allergies as of 2017     No Known Allergies      You were diagnosed with     Adenocarcinoma of colon metastatic to liver (CMS-HCC)   [898235]       Colostomy in place (CMS-[facility])   [625525]       Morbid obesity with BMI of 40.0-44.9, adult (CMS-[facility])   [636983]       Fatigue, unspecified type   [3084757]       Health care maintenance   [954155]       Hypomagnesemia   [734153]       Leg swelling   [727174]       IFG (impaired fasting glucose)   [690424]         Vital Signs     Blood Pressure Pulse Temperature Respirations Height Weight    128/66 mmHg 95 36.8 °C (98.2 °F) 16 1.524 m (5') 108.092 kg (238 lb 4.8 oz)    Body Mass Index Oxygen Saturation Smoking Status             46.54 kg/m2 97% Never Smoker          Basic Information     Date Of Birth Sex Race Ethnicity Preferred Language    1955 Female White Non- English      Your appointments     Sep 06, 2017  9:45 AM   [facility]   [facility]  (--)    [address] [place] [address]   [phone]              Problem List              ICD-10-CM Priority Class Noted - Resolved    Morbid obesity with BMI of 40.0-44.9, adult (CMS-HCC) E66.01, Z68.41   2015 - Present    Health care maintenance Z00.00   2015 - Present    Prediabetes R73.03   2015 - Present    Adenocarcinoma of sigmoid colon (CMS-HCC) C18.7   3/22/2016 - Present    Family history of primary TB Z83.6 Low  2016 - Present    Adenocarcinoma of colon metastatic to liver (CMS-HCC) C18.9, C78.7 High  2016 - Present    Deep vein thrombosis (DVT) of both lower extremities (CMS-[facility]) I82.403   2016 - Present    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   8/9/2016 - Present    Colostomy in place (CMS-HCC) Z93.3   2/17/2017 - Present      Health Maintenance        Date Due Completion Dates    IMM ZOSTER VACCINE 10/23/2014 ---    IMM INFLUENZA (1) 9/1/2017 10/10/2016, 11/12/2015    MAMMOGRAM 11/1/2017 11/1/2016    COLONOSCOPY 3/22/2026 3/22/2016    IMM DTaP/Tdap/Td Vaccine (2 - Td) 2/17/2027 2/17/2017            Results     POCT  A1C      Component    Glycohemoglobin    6.1    Internal Control Negative    Internal Control Positive                        Current Immunizations     Influenza Vaccine Quad Inj (Pf) 11/12/2015    Influenza Vaccine Quad Inj (Preserved) 10/10/2016    Tdap Vaccine 2/17/2017      Below and/or attached are the medications your provider expects you to take. Review all of your home medications and newly ordered medications with your provider and/or pharmacist. Follow medication instructions as directed by your provider and/or pharmacist. Please keep your medication list with you and share with your provider. Update the information when medications are discontinued, doses are changed, or new medications (including over-the-counter products) are added; and carry medication information at all times in the event of emergency situations     Allergies:  No Known Allergies          Medications  Valid as of: August 24, 2017 - 12:07 PM    Generic Name Brand Name Tablet Size Instructions for use    Acetaminophen (Tab) TYLENOL 500 MG Take 500-1,000 mg by mouth as needed.        Cholecalciferol (Tab) cholecalciferol 1000 UNIT Take 1,000 Units by mouth every day.        Cyclobenzaprine HCl (Tab) FLEXERIL 5 MG Take 1-2 Tabs by mouth 2 times a day as needed.        Furosemide (Tab) LASIX 20 MG Take 0.5 Tabs by mouth 1 time daily as needed.        Meloxicam (Tab) MOBIC 15 MG Take 1 Tab by mouth every day.        Meloxicam (Tab) MOBIC 15 MG Take 1 Tab by mouth every day.        OxyCODONE  HCl (Tab) ROXICODONE 5 MG Take 1 Tab by mouth 3 times a day as needed for Severe Pain.        Warfarin Sodium (Tab) COUMADIN 6 MG Take one to one and one-half (1-1.5) tablets daily as directed by [facility]        .                 Medicines prescribed today were sent to:     [facility] [address] [place] [address] (), [place] - [address]    [address] () [place] [address]    Phone: [phone] Fax: [phone]    Open 24 Hours?: No      Medication refill instructions:       If your prescription bottle indicates you have medication refills left, it is not necessary to call your provider’s office. Please contact your pharmacy and they will refill your medication.    If your prescription bottle indicates you do not have any refills left, you may request refills at any time through one of the following ways: The online [employer] system (except Urgent Care), by calling your provider’s office, or by asking your pharmacy to contact your provider’s office with a refill request. Medication refills are processed only during regular business hours and may not be available until the next business day. Your provider may request additional information or to have a follow-up visit with you prior to refilling your medication.   *Please Note: Medication refills are assigned a new Rx number when refilled electronically. Your pharmacy may indicate that no refills were authorized even though a new prescription for the same medication is available at the pharmacy. Please request the medicine by name with the pharmacy before contacting your provider for a refill.        Your To Do List     Future Labs/Procedures Complete By Expires    CBC WITH DIFFERENTIAL  As directed 8/25/2018    COMP METABOLIC PANEL  As directed 8/25/2018    TSH  As directed 8/25/2018         [employer] Access Code: Activation code not generated  Current [employer] Status: Active

## 2017-08-25 NOTE — PROGRESS NOTES
CHIEF COMPLAINT  Chief Complaint   Patient presents with   • Follow-Up     labs    IFG    HPI  Patient is a 61 y.o. female patient who presents today for the following     Adenocarcinoma of sigmoid colon with liver meta / colostomy , chronic anticoagulation  LE swelling   Interval course:    - she has had experimental chemotherapy (12 + 12 cycles, the last was 2 weeks ago)   - steroids IV, with subsequent IFG  - denies abdominal pain, blood in a stool, anorexia, WT loss.     Dg:  in 5/5016, with bladder fistula,  subsequent surgery, transient blood loss anemia (received iron).      · Reviewed the last CT of abdomen and pelvis    Complications:   - DVT related to cancer,  on Coumadin, follow-up by Coumadin clinic  - Current fatigue, hypomagnesemia due to chemotherapy, improved with IV and by mouth supplement  - IFG  - LE swelling.    IFG  The patient had elevated A1c at 6.1 and FBS.    Diet: increased food intake after started steroids  Exercise: Some  BMI: 46  No polydipsia, polyphagia, polyuria.  No abdominal pain, weight loss, fatigue.    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 45 Tab 0   • warfarin (COUMADIN) 6 MG  Tab Take one to one and one-half (1-1.5) tablets daily as directed by [facility]tico[facility] 135 Tab 1   • meloxicam (MOBIC) 15 MG tablet Take 1 Tab by mouth every day. 60 Tab 0   • oxycodone immediate-release (ROXICODONE) 5 MG Tab Take 1 Tab by mouth 3 times a day as needed for Severe Pain. 30 Tab 0   • vitamin D (CHOLECALCIFEROL) 1000 UNIT Tab Take 1,000 Units by mouth every day.     • acetaminophen (TYLENOL) 500 MG Tab Take 500-1,000 mg by mouth as needed.     • cyclobenzaprine (FLEXERIL) 5 MG tablet Take 1-2 Tabs by mouth 2 times a day as needed. 60 Tab 0   • meloxicam (MOBIC) 15 MG tablet Take 1 Tab by mouth every day. 60 Tab 1     No current facility-administered medications for this visit.     ALLERGIES  Allergies: Review of patient's allergies indicates no known allergies.    PAST MEDICAL HISTORY  Past Medical History   Diagnosis Date   • Obesity    • Essential hypertension    • Cancer (CMS-HCC) 2016     liver, bladder, colon   • Urinary bladder disorder      1/2 bladder due to cancer surgery   • Blood clotting disorder (CMS-HCC) 4-     left leg   • Bowel habit changes      has colostomy   • Blood loss anemia 2016     SURGICAL HISTORY  She  has past surgical history that includes excision of tonsil tags; cystoscopy (3/17/2016); trans urethral resection bladder (3/17/2016); colonoscopy - endo (N/A, 3/22/2016); recovery (3/21/2016); exploratory laparotomy (4/17/2016); exploratory laparotomy (4/18/2016); cystectomy (4/18/2016); colostomy (Left, 4/19/2016); and cath placement (Left, 8/9/2016).    SOCIAL HISTORY  Social History   Substance Use Topics   • Smoking status: Never Smoker    • Smokeless tobacco: Never Used   • Alcohol Use: No     Social History     Social History Narrative    Lives with .     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     FAMILY HISTORY  Family History   Problem Relation Age of Onset   • Cancer Neg Hx    • Diabetes Mother      prediabetes   • Heart  Disease Mother    • Hypertension Mother    • Hyperlipidemia Neg Hx    • Psychiatry Neg Hx    • Thyroid Neg Hx    • Stroke Maternal Grandmother      No family status information on file.     ROS   Constitutional: Negative for fever, chills.  HENT: Negative for congestion, sore throat.  Eyes: Negative for blurred vision.   Respiratory: Negative for cough, shortness of breath.  Cardiovascular: Negative for chest pain, palpitations.   Gastrointestinal: Negative for heartburn, nausea, abdominal pain.   Genitourinary: Negative for dysuria.  Musculoskeletal: Negative for significant myalgias, back pain and joint pain.   Skin: Negative for rash and itching.   Neuro: Negative for dizziness, weakness and headaches.   Endo/Heme/Allergies: Does not bruise/bleed easily. And per HPI.  Onc: as abobe.  Psychiatric/Behavioral: Negative for depression, anxiety    PHYSICAL EXAM   Blood pressure 128/66, pulse 95, temperature 36.8 °C (98.2 °F), resp. rate 16, height 1.524 m (5'), weight 108.092 kg (238 lb 4.8 oz), SpO2 97 %. Body mass index is 46.54 kg/(m^2).  General:  NAD, well appearing, obese.  HEENT:   NC/AT, PERRLA, EOMI, TMs are clear. Oropharyngeal mucosa is pink,  without lesions;  no cervical / supraclavicular  lymphadenopathy, no thyromegaly.    Cardiovascular: RRR.   No m/r/g. No carotid bruits .      Lungs:   CTAB, no w/r/r, no respiratory distress.  Abdomen: Soft, NT/ND + BS; could not palpate liver or spleen due to obesity.  Colostomy in place.  Extremities:  2+ DP and radial pulses bilaterally.  No c/c/e.   Skin:  Warm, dry.  No erythema. No rash.   Neurologic: Alert & oriented x 3.   No focal deficits.  Psychiatric:  Affect normal, mood normal, judgment normal.    LABS     Labs are reviewed and discussed with a patient    Lab Results   Component Value Date/Time    TRIGLYCERIDES 146 04/20/2016 04:15 AM       Lab Results   Component Value Date/Time    SODIUM 137 08/08/2016 02:27 PM    POTASSIUM 3.8 08/08/2016 02:27 PM     CHLORIDE 103 08/08/2016 02:27 PM    CO2 25 08/08/2016 02:27 PM    GLUCOSE 123* 08/08/2016 02:27 PM    BUN 25* 08/08/2016 02:27 PM    CREATININE 0.82 08/08/2016 02:27 PM     Lab Results   Component Value Date/Time    ALKALINE PHOSPHATASE 98 04/25/2016 05:30 AM    AST(SGOT) 31 04/25/2016 05:30 AM    ALT(SGPT) 23 04/25/2016 05:30 AM    TOTAL BILIRUBIN 0.4 04/25/2016 05:30 AM      Lab Results   Component Value Date/Time    GLYCOHEMOGLOBIN 6.1 08/24/2017 12:01 PM    GLYCOHEMOGLOBIN 6.0 05/18/2017    GLYCOHEMOGLOBIN 6.1 02/07/2017     No results found for: TSH  No results found for: FREET4    Lab Results   Component Value Date/Time    WBC 5.0 08/08/2016 02:27 PM    RBC 4.50 08/08/2016 02:27 PM    HEMOGLOBIN 12.3 08/08/2016 02:27 PM    HEMATOCRIT 38.8 08/08/2016 02:27 PM    MCV 86.2 08/08/2016 02:27 PM    MCH 27.3 08/08/2016 02:27 PM    MCHC 31.7* 08/08/2016 02:27 PM    MPV 10.0 08/08/2016 02:27 PM    NEUTROPHILS-POLYS 62.80 05/02/2016 01:09 PM    LYMPHOCYTES 21.50* 05/02/2016 01:09 PM    MONOCYTES 9.70 05/02/2016 01:09 PM    EOSINOPHILS 3.20 05/02/2016 01:09 PM    BASOPHILS 0.50 05/02/2016 01:09 PM    HYPOCHROMIA 1+ 04/27/2016 03:14 AM    ANISOCYTOSIS 1+ 04/27/2016 03:14 AM      IMAGING     Reviewed the last CT chest/abdomen/pelvis from 8/18/17:  1. Interval enlargement of the mass in the right hepatic lobe, concerning for worsening metastasis.  2. The small lesion anterior to the stomach and the left external iliac lymph node are unchanged.  3. Interval enlargement of the calcified mass in the urinary bladder, concerning for primary bladder cancer.. Correlate with cystoscopy.  4. Mild prevascular/superior mediastinum lymphadenopathy could be reactive or malignant. Attention in follow-up is advised.    ASSESMENT AND PLAN        1. Adenocarcinoma of colon metastatic to liver (CMS-HCC)   She received plan chemotherapy, continue oncology follow-up  - CBC WITH DIFFERENTIAL; Future    2. Colostomy in place (CMS-HCC)  Clean.   Colostomy area    3. Fatigue, unspecified type  Pending labs  - CBC WITH DIFFERENTIAL; Future  - COMP METABOLIC PANEL; Future  - TSH; Future    4. Hypomagnesemia  Resolved    5. Leg swelling  Refill:  - furosemide (LASIX) 20 MG Tab; Take 0.5 Tabs by mouth 1 time daily as needed.  Dispense: 45 Tab; Refill: 0    6. IFG (impaired fasting glucose)  May improve after she finished steroid IV treatment  - POCT  A1C    7. Morbid obesity with BMI of 40.0-44.9, adult (CMS-[facility])  - Patient identified as having weight management issue.  Appropriate orders and counseling given.    8. Health care maintenance  Remaining shingles shot.    Counseling:   - Smoking:  Nonsmoker    Followup: Return in about 3 months (around 11/24/2017) for Short.    All questions are answered.    Please note that this dictation was created using voice recognition software, and that there might be errors of butch and possibly content.

## 2017-09-06 ENCOUNTER — ANTICOAGULATION VISIT (OUTPATIENT)
Dept: VASCULAR LAB | Facility: MEDICAL CENTER | Age: 62
End: 2017-09-06
Attending: INTERNAL MEDICINE
Payer: COMMERCIAL

## 2017-09-06 DIAGNOSIS — I82.403 ACUTE DEEP VEIN THROMBOSIS (DVT) OF BOTH LOWER EXTREMITIES, UNSPECIFIED VEIN (HCC): ICD-10-CM

## 2017-09-06 LAB — INR PPP: 1.6 (ref 2–3.5)

## 2017-09-06 PROCEDURE — 99212 OFFICE O/P EST SF 10 MIN: CPT | Performed by: NURSE PRACTITIONER

## 2017-09-06 PROCEDURE — 85610 PROTHROMBIN TIME: CPT

## 2017-09-06 NOTE — PROGRESS NOTES
Anticoagulation Summary  As of 9/6/2017    INR goal:   2.0-3.0   TTR:   43.8 % (1.3 y)   Today's INR:   1.6!   Maintenance plan:   9 mg (6 mg x 1.5) on Wed; 6 mg (6 mg x 1) all other days   Weekly total:   45 mg   Plan last modified:   DEBBI Shin (9/6/2017)   Next INR check:   9/13/2017   Target end date:   Indefinite    Indications    Deep vein thrombosis (DVT) of both lower extremities (CMS-HCC) [I82.403]             Anticoagulation Episode Summary     INR check location:       Preferred lab:       Send INR reminders to:       Comments:   [facility]      Anticoagulation Care Providers     Provider Role Specialty Phone number    Yimi Martinez M.D. Referring Surgery [phone]    Cesar SchofieldD Responsible      [facility] Responsible  [phone]        Patient is subtherapeutic today. Denies any medication or diet changes. No current symptoms of bleeding or thrombosis reported. INR trending down. Will increase regimen. Follow up in 1 week.    Next Appointment: Wednesday, September 13, 2017 at 11:00 am.     Re HUITRON

## 2017-09-07 LAB — INR BLD: 1.6 (ref 0.9–1.2)

## 2017-09-13 ENCOUNTER — ANTICOAGULATION VISIT (OUTPATIENT)
Dept: VASCULAR LAB | Facility: MEDICAL CENTER | Age: 62
End: 2017-09-13
Attending: INTERNAL MEDICINE
Payer: COMMERCIAL

## 2017-09-13 VITALS — HEART RATE: 75 BPM | DIASTOLIC BLOOD PRESSURE: 81 MMHG | SYSTOLIC BLOOD PRESSURE: 138 MMHG

## 2017-09-13 DIAGNOSIS — I82.403 ACUTE DEEP VEIN THROMBOSIS (DVT) OF BOTH LOWER EXTREMITIES, UNSPECIFIED VEIN (HCC): ICD-10-CM

## 2017-09-13 LAB
INR BLD: 1.8 (ref 0.9–1.2)
INR PPP: 1.8 (ref 2–3.5)

## 2017-09-13 PROCEDURE — 85610 PROTHROMBIN TIME: CPT

## 2017-09-13 PROCEDURE — 99212 OFFICE O/P EST SF 10 MIN: CPT | Performed by: NURSE PRACTITIONER

## 2017-09-13 NOTE — PROGRESS NOTES
.  Anticoagulation Summary  As of 9/13/2017    INR goal:   2.0-3.0   TTR:   43.1 % (1.3 y)   Today's INR:   1.8!   Maintenance plan:   9 mg (6 mg x 1.5) on Wed, Sat; 6 mg (6 mg x 1) all other days   Weekly total:   48 mg   Plan last modified:   ASTRID ShinPINES (9/13/2017)   Next INR check:   9/20/2017   Target end date:   Indefinite    Indications    Deep vein thrombosis (DVT) of both lower extremities (CMS-[facility]) [I82.403]             Anticoagulation Episode Summary     INR check location:       Preferred lab:       Send INR reminders to:       Comments:   [facility]      Anticoagulation Care Providers     Provider Role Specialty Phone number    Yimi Martinez M.D. Referring Surgery [phone]    Cesar SchofieldD Responsible      [facility] Responsible  [phone]        Anticoagulation Patient Findings      HPI:  Tamara Flynn seen in clinic today for follow up on anticoagulation therapy in the presence of DVT hx. Denies any changes to current medical/health status since last appointment. Denies any medication or diet changes. No current symptoms of bleeding or thrombosis reported    A/P:   INR subtherapeutic despite a dose decrease. No recent VTEs. Will increase regimen. BP recorded in vitals.    Follow up appointment in 1 week(s).    Next Appointment: Wednesday, September 20, 2017 at 10:45 am.     Re HUITRON

## 2017-09-20 ENCOUNTER — ANTICOAGULATION VISIT (OUTPATIENT)
Dept: VASCULAR LAB | Facility: MEDICAL CENTER | Age: 62
End: 2017-09-20
Attending: INTERNAL MEDICINE
Payer: COMMERCIAL

## 2017-09-20 VITALS — HEART RATE: 77 BPM | SYSTOLIC BLOOD PRESSURE: 129 MMHG | DIASTOLIC BLOOD PRESSURE: 72 MMHG

## 2017-09-20 DIAGNOSIS — I82.403 ACUTE DEEP VEIN THROMBOSIS (DVT) OF BOTH LOWER EXTREMITIES, UNSPECIFIED VEIN (HCC): ICD-10-CM

## 2017-09-20 LAB
INR BLD: 2.3 (ref 0.9–1.2)
INR PPP: 2.3 (ref 2–3.5)

## 2017-09-20 PROCEDURE — 90686 IIV4 VACC NO PRSV 0.5 ML IM: CPT

## 2017-09-20 PROCEDURE — 90471 IMMUNIZATION ADMIN: CPT | Performed by: NURSE PRACTITIONER

## 2017-09-20 PROCEDURE — 90471 IMMUNIZATION ADMIN: CPT

## 2017-09-20 PROCEDURE — 99212 OFFICE O/P EST SF 10 MIN: CPT | Performed by: NURSE PRACTITIONER

## 2017-09-20 PROCEDURE — 85610 PROTHROMBIN TIME: CPT

## 2017-09-20 NOTE — PROGRESS NOTES
Anticoagulation Summary  As of 9/20/2017    INR goal:   2.0-3.0   TTR:   43.4 % (1.3 y)   Today's INR:   2.3   Maintenance plan:   9 mg (6 mg x 1.5) on Wed, Sat; 6 mg (6 mg x 1) all other days   Weekly total:   48 mg   Plan last modified:   ASTRID ShinPINES (9/13/2017)   Next INR check:   9/29/2017   Target end date:   Indefinite    Indications    Deep vein thrombosis (DVT) of both lower extremities (CMS-HCC) [I82.403]             Anticoagulation Episode Summary     INR check location:       Preferred lab:       Send INR reminders to:       Comments:   [facility]      Anticoagulation Care Providers     Provider Role Specialty Phone number    Yimi Martinez M.D. Referring Surgery [phone]    Cesar SchofieldD Responsible      [facility] Responsible  [phone]        Anticoagulation Patient Findings      HPI:  Tamara Flynn seen in clinic today for follow up on anticoagulation therapy in the presence of DVT hx. Denies any changes to current medical/health status since last appointment. Denies any medication or diet changes. No current symptoms of bleeding or thrombosis reported.    A/P:   INR therapeutic. Continue current regimen. BP recorded in vitals.    Pt consented to and received the influenza vaccine.    Follow up appointment in 1 week(s).    Next Appointment: Friday , September 29, 2017 at 10:45 am.     Re HUITRON

## 2017-09-29 ENCOUNTER — ANTICOAGULATION VISIT (OUTPATIENT)
Dept: VASCULAR LAB | Facility: MEDICAL CENTER | Age: 62
End: 2017-09-29
Attending: INTERNAL MEDICINE
Payer: COMMERCIAL

## 2017-09-29 DIAGNOSIS — I82.403 ACUTE DEEP VEIN THROMBOSIS (DVT) OF BOTH LOWER EXTREMITIES, UNSPECIFIED VEIN (HCC): ICD-10-CM

## 2017-09-29 LAB
INR BLD: 1.9 (ref 0.9–1.2)
INR PPP: 1.9 (ref 2–3.5)

## 2017-09-29 PROCEDURE — 85610 PROTHROMBIN TIME: CPT

## 2017-09-29 PROCEDURE — 99212 OFFICE O/P EST SF 10 MIN: CPT | Performed by: NURSE PRACTITIONER

## 2017-09-29 NOTE — PROGRESS NOTES
Anticoagulation Summary  As of 9/29/2017    INR goal:   2.0-3.0   TTR:   44.0 % (1.3 y)   Today's INR:   1.9!   Maintenance plan:   9 mg (6 mg x 1.5) on Mon, Wed, Sat; 6 mg (6 mg x 1) all other days   Weekly total:   51 mg   Plan last modified:   ASTRID ShinPINES (9/29/2017)   Next INR check:   10/9/2017   Target end date:   Indefinite    Indications    Deep vein thrombosis (DVT) of both lower extremities (CMS-[facility]) [I82.403]             Anticoagulation Episode Summary     INR check location:       Preferred lab:       Send INR reminders to:       Comments:   [facility]      Anticoagulation Care Providers     Provider Role Specialty Phone number    Yimi Martinez M.D. Referring Surgery [phone]    Cesar SchofieldD Responsible      [facility] Responsible  [phone]        Anticoagulation Patient Findings      HPI:  Tamara Flynn seen in clinic today for follow up on anticoagulation therapy in the presence of DVT hx. Denies any changes to current medical/health status since last appointment. Denies any medication or diet changes. No current symptoms of bleeding or thrombosis reported.    A/P:   INR subtherapeutic. No recent VTEs. Will increase regimen.     Follow up appointment in 2 week(s).    Next Appointment: Monday, October 9, 2017 at 1:00 pm.    Re HUITRON

## 2017-10-09 NOTE — PROGRESS NOTES
Anticoagulation Summary  As of 10/9/2017    INR goal:   2.0-3.0   TTR:   44.6 % (1.4 y)   Today's INR:   2.3   Maintenance plan:   6 mg (6 mg x 1) on Sun, Tue, Thu; 9 mg (6 mg x 1.5) all other days   Weekly total:   54 mg   Plan last modified:   DEBBI Shin (9/29/2017)   Next INR check:   10/19/2017   Target end date:   Indefinite    Indications    Deep vein thrombosis (DVT) of both lower extremities (CMS-[facility]) [I82.403]             Anticoagulation Episode Summary     INR check location:       Preferred lab:       Send INR reminders to:       Comments:   [facility]      Anticoagulation Care Providers     Provider Role Specialty Phone number    Yimi Martinez M.D. Referring Surgery [phone]    Cesar SchofieldD Responsible      [facility] Responsible  [phone]        Anticoagulation Patient Findings      HPI:  Tamara Flynn seen in clinic today for follow up on anticoagulation therapy in the presence of DVT hx. Denies any medication or diet changes. No current symptoms of bleeding or thrombosis reported.    She is scheduled for a urology procedure next Monday. She would like me to contact Dr. Carvajal about possibly not bridging with Lovenox. Hx of 1st time malignancy associated DVT in May.     A/P:   INR therapeutic. Continue current regimen. Stop warfarin on Wednesday and restart warfarin the night of procedure if okay with MD. I will contact patient regarding bridging once I hear from Dr. Carvajal.     Follow up appointment in 1.5 weeks.    Next Appointment: Thursday, October 19, 2017 at 10:30 am.     Re Briones APRN      Addendum: Spoke with patient by phone. She would rather use Lovenox for her upcoming procedure. Prefers to stop warfarin then restart after urology procedure. She understands to seek medical attention for any s/sx of return VTE. Will f/u with pt next week.

## 2017-10-19 NOTE — PROGRESS NOTES
Spoke with patient by phone. She was instructed by her urologist to restart warfarin tonight instead of the night of the procedure which was Tuesday. She is on Cipro which she will finish today. Having small amount hematuria which her MD is aware of. Pt will restart warfarin tonight and f/u in anticoag clinic next Thursday.    Re HUITRON

## 2017-10-26 NOTE — PROGRESS NOTES
Anticoagulation Summary  As of 10/26/2017    INR goal:   2.0-3.0   TTR:   45.6 % (1.4 y)   Today's INR:   1.9!   Maintenance plan:   6 mg (6 mg x 1) on Sun, Tue, Thu; 9 mg (6 mg x 1.5) all other days   Weekly total:   54 mg   Plan last modified:   ASTRID ShinPINES (9/29/2017)   Next INR check:   11/2/2017   Target end date:   Indefinite    Indications    Deep vein thrombosis (DVT) of both lower extremities (CMS-HCC) [I82.403]             Anticoagulation Episode Summary     INR check location:       Preferred lab:       Send INR reminders to:       Comments:   [facility]      Anticoagulation Care Providers     Provider Role Specialty Phone number    Yimi Martinez M.D. Referring Surgery [phone]    Cesar SchofieldD Responsible      [facility] Responsible  [phone]        Anticoagulation Patient Findings      HPI:  Tamara Flynn seen in clinic today for follow up on anticoagulation therapy in the presence of DVT. She had a urology procedure last week. Denies any medication or diet changes.Mind hematuria which her urologist is aware of. No current other of bleeding or thrombosis reported.    A/P:   INR subtherapeutic. Will increase one dose then continue current regimen. BP recorded in vitals.    Follow up appointment in 1 week(s).    Next Appointment: Thursday, November 2, 2017 at 8:45 am.     Re HUITRON

## 2017-11-02 NOTE — PROGRESS NOTES
Anticoagulation Summary  As of 11/2/2017    INR goal:   2.0-3.0   TTR:   46.1 % (1.4 y)   Today's INR:   3.1!   Maintenance plan:   9 mg (6 mg x 1.5) on Mon, Wed, Fri; 6 mg (6 mg x 1) all other days   Weekly total:   51 mg   Plan last modified:   ASTRID ShinPINES (11/2/2017)   Next INR check:   11/16/2017   Target end date:   Indefinite    Indications    Deep vein thrombosis (DVT) of both lower extremities (CMS-[facility]) [I82.403]             Anticoagulation Episode Summary     INR check location:       Preferred lab:       Send INR reminders to:       Comments:   [facility]      Anticoagulation Care Providers     Provider Role Specialty Phone number    Yimi Martinez M.D. Referring Surgery [phone]    Cesar SchofieldD Responsible      [facility] Responsible  [phone]        Anticoagulation Patient Findings      HPI:  Tamara Flynn seen in clinic today for follow up on anticoagulation therapy in the presence of DVT hx. Denies any changes to current medical/health status since last appointment. Denies any medication or diet changes. No current symptoms of bleeding or thrombosis reported.    A/P:   INR supratherapeutic. Will decrease regimen by ~5%.     Follow up appointment in 2 week(s).    Next Appointment: Thursday, November 16, 2017 at 11:00 am.    Re HUITRON

## 2017-11-20 NOTE — TELEPHONE ENCOUNTER
Pt needs lab orders to be put in for   - CBC WITH DIFFERENTIAL; Future  - COMP METABOLIC PANEL; Future  - TSH; Future    Thank you

## 2017-11-20 NOTE — PROGRESS NOTES
Anticoagulation Summary  As of 11/20/2017    INR goal:   2.0-3.0   TTR:   46.2 % (1.5 y)   Today's INR:   2.9   Maintenance plan:   9 mg (6 mg x 1.5) on Mon, Wed, Fri; 6 mg (6 mg x 1) all other days   Weekly total:   51 mg   Plan last modified:   ASTRID ShinPINES (11/2/2017)   Next INR check:   12/4/2017   Target end date:   Indefinite    Indications    Deep vein thrombosis (DVT) of both lower extremities (CMS-[facility]) [I82.403]             Anticoagulation Episode Summary     INR check location:       Preferred lab:       Send INR reminders to:       Comments:   [facility]      Anticoagulation Care Providers     Provider Role Specialty Phone number    Yimi Martinez M.D. Referring Surgery [phone]    Cesar SchofieldD Responsible      [facility]  [phone]        Anticoagulation Patient Findings      HPI:  Tamara Flynn seen in clinic today for follow up on anticoagulation therapy in the presence of DVT ;hx. Denies any changes to current medical/health status since last appointment. Denies any medication or diet changes. No current symptoms of bleeding or thrombosis reported.    A/P:   INR therapeutic. Continue current regimen.    Follow up appointment in 2 week(s).    Next Appointment: Monday, December 4, 2017 at 10:45 am.    Re HUITRON

## 2017-12-06 NOTE — PROGRESS NOTES
Anticoagulation Summary  As of 12/6/2017    INR goal:   2.0-3.0   TTR:   47.8 % (1.5 y)   Today's INR:   2.2   Maintenance plan:   9 mg (6 mg x 1.5) on Mon, Wed, Fri; 6 mg (6 mg x 1) all other days   Weekly total:   51 mg   Plan last modified:   ASTRID ShinPINES (11/2/2017)   Next INR check:   12/27/2017   Target end date:   Indefinite    Indications    Deep vein thrombosis (DVT) of both lower extremities (CMS-[facility]) [I82.403]             Anticoagulation Episode Summary     INR check location:       Preferred lab:       Send INR reminders to:       Comments:   [facility]      Anticoagulation Care Providers     Provider Role Specialty Phone number    Yimi Martinez M.D. Referring Surgery [phone]    Cesar SchofieldD Responsible      [facility]  [phone]        Anticoagulation Patient Findings      HPI:  Tamara Flynn seen in clinic today for follow up on anticoagulation therapy in the presence of DVT gx. Denies any changes to current medical/health status since last appointment. Denies any medication or diet changes. No current symptoms of bleeding or thrombosis reported.    A/P:   INR therapeutic. Continue current regimen.     Follow up appointment in 3 week(s).    Next Appointment: Wednesday, December 27, 2017 at 11:30 am.    Re HUITRON

## 2017-12-08 NOTE — PROGRESS NOTES
CHIEF COMPLAINT  Chief Complaint   Patient presents with   • Follow-Up     HPI  Patient is a 62 y.o. female patient who presents today for the following     Adenocarcinoma of sigmoid colon with liver meta / colostomy   Interval course:    - received 3 cycles of chemotherapy  - recent CT abdomen showed uncontrolled/spreading disease  - she will have another experimental therapy in      Dg:  in 5/5016, with bladder fistula,  subsequent surgery, transient blood loss anemia (received iron).       Imaging follow-up showed (as below):    · Persistent liver metastases;  · Metastatic carcinoma in fibroadipose tissue anteriorly to stomach  · Left hypogastric and external iliac adenopathy  · Mild prevascular/aortopulmonary window lymphadenopathy could be reactive or malignant.     She developed a DVT related to cancer,  on Coumadin, follow-up by Coumadin clinic.     DVT due to cancer, Chronic anticoagulation  DVT in 4/2016  On coumadin, coumadin clinic f/u.     Obesity, BMI 41  Increased weight since the last office visit.     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 45 Tab 0   • warfarin (COUMADIN) 6 MG Tab Take one to one and one-half (1-1.5) tablets daily as directed by [facility] 135 Tab 1   • cyclobenzaprine (FLEXERIL) 5 MG tablet Take 1-2 Tabs by mouth 2 times a day as needed. 60 Tab 0   • meloxicam (MOBIC) 15 MG tablet Take 1 Tab by mouth every day. 60 Tab 0   • oxycodone immediate-release (ROXICODONE) 5 MG Tab Take 1 Tab by mouth 3 times a day as needed for Severe Pain. 30 Tab 0   • vitamin D (CHOLECALCIFEROL) 1000 UNIT Tab Take 1,000 Units by mouth every day.     • acetaminophen (TYLENOL) 500 MG Tab Take 500-1,000 mg by mouth as needed.       No current facility-administered medications for this visit.      ALLERGIES  Allergies: Patient has no known allergies.  PAST MEDICAL HISTORY  Past Medical History:   Diagnosis Date   • Blood clotting disorder (CMS-[facility]) 4-    left leg   • Cancer (CMS-[facility]) 2016    liver, bladder, colon   • Blood loss anemia 2016   • Bowel habit changes     has colostomy   • Essential hypertension    • Obesity    • Urinary bladder disorder     1/2 bladder due to cancer surgery     SURGICAL HISTORY  She  has a past surgical history that includes pr excision of tonsil tags; cystoscopy (3/17/2016); trans urethral resection bladder (3/17/2016); colonoscopy - endo (N/A, 3/22/2016); recovery (3/21/2016); exploratory laparotomy (4/17/2016); exploratory laparotomy (4/18/2016); cystectomy (4/18/2016); colostomy (Left, 4/19/2016); and cath placement (Left, 8/9/2016).  SOCIAL HISTORY  Social History   Substance Use Topics   • Smoking status: Never Smoker   • Smokeless tobacco: Never Used   • Alcohol use No     Social History     Social History Narrative    Lives with .     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     FAMILY HISTORY  Family History   Problem Relation Age of Onset   • Cancer Neg Hx    • Diabetes Mother      prediabetes   • Heart Disease Mother    • Hypertension Mother    • Hyperlipidemia  "Neg Hx    • Psychiatry Neg Hx    • Thyroid Neg Hx    • Stroke Maternal Grandmother      No family status information on file.     ROS   Constitutional: Negative for fever, chills.  HENT: Negative for congestion, sore throat.  Eyes: Negative for blurred vision.   Respiratory: Negative for cough, shortness of breath.  Cardiovascular: Negative for chest pain, palpitations.   Gastrointestinal: Negative for heartburn, nausea, abdominal pain.   Genitourinary: Negative for dysuria.  Musculoskeletal: Negative for significant myalgias, back pain and joint pain.   Skin: Negative for rash and itching.   Neuro: Negative for dizziness, weakness and headaches.   Endo/Heme/Allergies: Does not bruise/bleed easily. And per HPI.  Onc: as above.  Psychiatric/Behavioral: Negative for depression, anxiety    PHYSICAL EXAM   Blood pressure 120/70, pulse 86, temperature 36.2 °C (97.2 °F), resp. rate 14, height 1.562 m (5' 1.5\"), weight 108.3 kg (238 lb 12.8 oz), SpO2 98 %, not currently breastfeeding. Body mass index is 44.39 kg/m².  General:  NAD, well appearing  HEENT:   NC/AT, PERRLA, EOMI, TMs are clear. Oropharyngeal mucosa is pink,  without lesions;  no cervical / supraclavicular  lymphadenopathy, no thyromegaly.    Cardiovascular: RRR.   No m/r/g. No carotid bruits .      Lungs:   CTAB, no w/r/r, no respiratory distress.  Abdomen: Soft, NT/ND + BS; unable to palpate liver/spleen due to obesity.Extremities:  2+ DP and radial pulses bilaterally.  No c/c/e.   Skin:  Warm, dry.  No erythema. No rash.   Neurologic: Alert & oriented x 3. No focal deficits.  Psychiatric:  Affect normal, mood normal, judgment normal.    LABS     Labs are reviewed and discussed with a patient    Lab Results   Component Value Date/Time    TRIGLYCERIDE 146 04/20/2016 04:15 AM       Lab Results   Component Value Date/Time    SODIUM 137 08/08/2016 02:27 PM    POTASSIUM 3.8 08/08/2016 02:27 PM    CHLORIDE 103 08/08/2016 02:27 PM    CO2 25 08/08/2016 02:27 PM    " GLUCOSE 123 (H) 08/08/2016 02:27 PM    BUN 25 (H) 08/08/2016 02:27 PM    CREATININE 0.82 08/08/2016 02:27 PM     Lab Results   Component Value Date/Time    ALKPHOSPHAT 98 04/25/2016 05:30 AM    ASTSGOT 31 04/25/2016 05:30 AM    ALTSGPT 23 04/25/2016 05:30 AM    TBILIRUBIN 0.4 04/25/2016 05:30 AM      Lab Results   Component Value Date/Time    HBA1C 6.1 08/24/2017 12:01 PM    HBA1C 6.0 05/18/2017    HBA1C 6.1 02/07/2017     No results found for: TSH  No results found for: FREET4  Lab Results   Component Value Date/Time    WBC 5.0 08/08/2016 02:27 PM    RBC 4.50 08/08/2016 02:27 PM    HEMOGLOBIN 12.3 08/08/2016 02:27 PM    HEMATOCRIT 38.8 08/08/2016 02:27 PM    MCV 86.2 08/08/2016 02:27 PM    MCH 27.3 08/08/2016 02:27 PM    MCHC 31.7 (L) 08/08/2016 02:27 PM    MPV 10.0 08/08/2016 02:27 PM    NEUTSPOLYS 62.80 05/02/2016 01:09 PM    LYMPHOCYTES 21.50 (L) 05/02/2016 01:09 PM    MONOCYTES 9.70 05/02/2016 01:09 PM    EOSINOPHILS 3.20 05/02/2016 01:09 PM    BASOPHILS 0.50 05/02/2016 01:09 PM    HYPOCHROMIA 1+ 04/27/2016 03:14 AM    ANISOCYTOSIS 1+ 04/27/2016 03:14 AM        IMAGING     Reviewed CT abdomen/pelvis, 12/3/17  1.  Significant increase in size of solid mass inferiorly in right lobe of liver compared to prior exam. This finding is worrisome for progression of neoplastic liver lesion which is likely metastatic.  2.  Increase in size of peritoneal tumor anterior to the stomach since the prior exam.  3.  Significant increase in left external iliac adenopathy since prior exam. This is consistent with progression of neoplasm within these nodes.  4.  Possible new metastatic lesion anteriorly in segment 4 of the liver. MRI could be obtained for further evaluation if clinically indicated.  5.  Enhancing mass along the anterior and left side of the urinary bladder is noted which is suspicious for neoplasm.  6.  Mild adenopathy in the anterior mediastinum is unchanged.    ASSESMENT AND PLAN        1. Adenocarcinoma of colon  metastatic to liver (CMS-HCC)  Advised to continue recommended treatment options.  - CBC WITH DIFFERENTIAL; Future  - COMP METABOLIC PANEL; Future    2. Colostomy in place (CMS-HCC)  No complications.    3. Deep vein thrombosis (DVT) of both lower extremities, unspecified chronicity, unspecified vein (CMS-HCC)  4. Chronic anticoagulation  Continue coumadin, coumadin clinic f/u    5. Health care maintenance  Addressed    6. Encounter for screening mammogram for malignant neoplasm of breast  - MA-MAMMO SCREENING BILAT W/LINDA W/CAD; Future    Counseling:   - Smoking:  Nonsmoker    Followup: Return in about 3 months (around 3/8/2018) for Short.    All questions are answered.    Please note that this dictation was created using voice recognition software, and that there might be errors of butch and possibly content.

## 2017-12-28 NOTE — PROGRESS NOTES
Anticoagulation Summary  As of 12/28/2017    INR goal:   2.0-3.0   TTR:   47.8 % (1.6 y)   Today's INR:   3.9!   Maintenance plan:   9 mg (6 mg x 1.5) on Mon; 6 mg (6 mg x 1) all other days   Weekly total:   45 mg   Plan last modified:   ASTRID ShinPINES (12/28/2017)   Next INR check:      Target end date:   Indefinite    Indications    Deep vein thrombosis (DVT) of both lower extremities (CMS-HCC) [I82.403]             Anticoagulation Episode Summary     INR check location:       Preferred lab:       Send INR reminders to:       Comments:   [facility]      Anticoagulation Care Providers     Provider Role Specialty Phone number    Yimi Martinez M.D. Referring Surgery [phone]    Cesar SchofieldD Responsible      [facility] Responsible  [phone]        Anticoagulation Patient Findings      HPI:  Tamara Hollinsnavin Flynn seen in clinic today for follow up on anticoagulation therapy in the presence of DVT. Denies any changes to current medical/health status since last appointment. Currently taking Stivarga which can increase INR. She is on it daily for 3 weeks then off one week. Denies any diet changes. No current symptoms of bleeding or thrombosis reported.    A/P:   INR supratherapeutic. Will decrease regimen.     Follow up appointment in 1 week(s).    Next Appointment: Monday, January 8, 2018 at 11:15 am.     Re HUITRON

## 2018-01-01 ENCOUNTER — APPOINTMENT (OUTPATIENT)
Dept: RADIOLOGY | Facility: MEDICAL CENTER | Age: 63
DRG: 871 | End: 2018-01-01
Attending: HOSPITALIST
Payer: COMMERCIAL

## 2018-01-01 ENCOUNTER — HOME CARE VISIT (OUTPATIENT)
Dept: HOSPICE | Facility: HOSPICE | Age: 63
End: 2018-01-01
Payer: COMMERCIAL

## 2018-01-01 ENCOUNTER — HOSPITAL ENCOUNTER (OUTPATIENT)
Dept: LAB | Facility: MEDICAL CENTER | Age: 63
End: 2018-07-13
Attending: INTERNAL MEDICINE
Payer: COMMERCIAL

## 2018-01-01 ENCOUNTER — ANTICOAGULATION VISIT (OUTPATIENT)
Dept: VASCULAR LAB | Facility: MEDICAL CENTER | Age: 63
End: 2018-01-01
Attending: INTERNAL MEDICINE
Payer: COMMERCIAL

## 2018-01-01 ENCOUNTER — HOSPITAL ENCOUNTER (OUTPATIENT)
Facility: MEDICAL CENTER | Age: 63
End: 2018-08-29
Payer: COMMERCIAL

## 2018-01-01 ENCOUNTER — APPOINTMENT (OUTPATIENT)
Dept: WOUND CARE | Facility: MEDICAL CENTER | Age: 63
End: 2018-01-01
Attending: SURGERY
Payer: COMMERCIAL

## 2018-01-01 ENCOUNTER — APPOINTMENT (OUTPATIENT)
Dept: HEMATOLOGY ONCOLOGY | Facility: MEDICAL CENTER | Age: 63
End: 2018-01-01
Payer: COMMERCIAL

## 2018-01-01 ENCOUNTER — HOSPITAL ENCOUNTER (OUTPATIENT)
Dept: RADIOLOGY | Facility: MEDICAL CENTER | Age: 63
End: 2018-06-21
Attending: INTERNAL MEDICINE
Payer: COMMERCIAL

## 2018-01-01 ENCOUNTER — HOSPITAL ENCOUNTER (OUTPATIENT)
Dept: LAB | Facility: MEDICAL CENTER | Age: 63
End: 2018-03-23
Attending: INTERNAL MEDICINE
Payer: COMMERCIAL

## 2018-01-01 ENCOUNTER — HOSPICE ADMISSION (OUTPATIENT)
Dept: HOSPICE | Facility: HOSPICE | Age: 63
End: 2018-01-01
Payer: COMMERCIAL

## 2018-01-01 ENCOUNTER — APPOINTMENT (OUTPATIENT)
Dept: VASCULAR LAB | Facility: MEDICAL CENTER | Age: 63
End: 2018-01-01
Payer: COMMERCIAL

## 2018-01-01 ENCOUNTER — OUTPATIENT INFUSION SERVICES (OUTPATIENT)
Dept: ONCOLOGY | Facility: MEDICAL CENTER | Age: 63
End: 2018-01-01
Attending: INTERNAL MEDICINE
Payer: COMMERCIAL

## 2018-01-01 ENCOUNTER — APPOINTMENT (OUTPATIENT)
Dept: HOSPICE | Facility: HOSPICE | Age: 63
End: 2018-01-01
Payer: COMMERCIAL

## 2018-01-01 ENCOUNTER — HOSPITAL ENCOUNTER (INPATIENT)
Facility: MEDICAL CENTER | Age: 63
LOS: 2 days | DRG: 689 | End: 2018-06-04
Attending: EMERGENCY MEDICINE | Admitting: HOSPITALIST
Payer: COMMERCIAL

## 2018-01-01 ENCOUNTER — APPOINTMENT (OUTPATIENT)
Dept: VASCULAR LAB | Facility: MEDICAL CENTER | Age: 63
End: 2018-01-01
Attending: NURSE PRACTITIONER
Payer: COMMERCIAL

## 2018-01-01 ENCOUNTER — PATIENT OUTREACH (OUTPATIENT)
Dept: HEALTH INFORMATION MANAGEMENT | Facility: OTHER | Age: 63
End: 2018-01-01

## 2018-01-01 ENCOUNTER — PATIENT OUTREACH (OUTPATIENT)
Dept: OTHER | Facility: MEDICAL CENTER | Age: 63
End: 2018-01-01

## 2018-01-01 ENCOUNTER — HOSPITAL ENCOUNTER (OUTPATIENT)
Dept: RADIOLOGY | Facility: MEDICAL CENTER | Age: 63
End: 2018-07-12
Attending: INTERNAL MEDICINE
Payer: COMMERCIAL

## 2018-01-01 ENCOUNTER — TELEPHONE (OUTPATIENT)
Dept: HEMATOLOGY ONCOLOGY | Facility: MEDICAL CENTER | Age: 63
End: 2018-01-01

## 2018-01-01 ENCOUNTER — OFFICE VISIT (OUTPATIENT)
Dept: MEDICAL GROUP | Facility: MEDICAL CENTER | Age: 63
End: 2018-01-01
Payer: COMMERCIAL

## 2018-01-01 ENCOUNTER — TELEPHONE (OUTPATIENT)
Dept: MEDICAL GROUP | Facility: MEDICAL CENTER | Age: 63
End: 2018-01-01

## 2018-01-01 ENCOUNTER — HOSPITAL ENCOUNTER (OUTPATIENT)
Dept: LAB | Facility: MEDICAL CENTER | Age: 63
End: 2018-08-01
Attending: UROLOGY
Payer: COMMERCIAL

## 2018-01-01 ENCOUNTER — HOSPITAL ENCOUNTER (OUTPATIENT)
Facility: MEDICAL CENTER | Age: 63
End: 2018-03-15
Attending: NURSE PRACTITIONER
Payer: COMMERCIAL

## 2018-01-01 ENCOUNTER — HOSPITAL ENCOUNTER (OUTPATIENT)
Dept: LAB | Facility: MEDICAL CENTER | Age: 63
End: 2018-08-27
Attending: INTERNAL MEDICINE
Payer: COMMERCIAL

## 2018-01-01 ENCOUNTER — HOSPITAL ENCOUNTER (OUTPATIENT)
Dept: LAB | Facility: MEDICAL CENTER | Age: 63
End: 2018-08-28
Attending: UROLOGY
Payer: COMMERCIAL

## 2018-01-01 ENCOUNTER — OFFICE VISIT (OUTPATIENT)
Dept: HEMATOLOGY ONCOLOGY | Facility: MEDICAL CENTER | Age: 63
End: 2018-01-01
Payer: COMMERCIAL

## 2018-01-01 ENCOUNTER — APPOINTMENT (OUTPATIENT)
Dept: RADIOLOGY | Facility: MEDICAL CENTER | Age: 63
DRG: 871 | End: 2018-01-01
Attending: INTERNAL MEDICINE
Payer: COMMERCIAL

## 2018-01-01 ENCOUNTER — OFFICE VISIT (OUTPATIENT)
Dept: INFECTIOUS DISEASES | Facility: MEDICAL CENTER | Age: 63
End: 2018-01-01
Payer: COMMERCIAL

## 2018-01-01 ENCOUNTER — TELEPHONE (OUTPATIENT)
Dept: VASCULAR LAB | Facility: MEDICAL CENTER | Age: 63
End: 2018-01-01

## 2018-01-01 ENCOUNTER — OFFICE VISIT (OUTPATIENT)
Dept: URGENT CARE | Facility: CLINIC | Age: 63
End: 2018-01-01
Payer: COMMERCIAL

## 2018-01-01 ENCOUNTER — HOSPITAL ENCOUNTER (OUTPATIENT)
Dept: LAB | Facility: MEDICAL CENTER | Age: 63
End: 2018-08-17
Attending: INTERNAL MEDICINE
Payer: COMMERCIAL

## 2018-01-01 ENCOUNTER — HOSPITAL ENCOUNTER (OUTPATIENT)
Dept: LAB | Facility: MEDICAL CENTER | Age: 63
End: 2018-05-29
Attending: INTERNAL MEDICINE
Payer: COMMERCIAL

## 2018-01-01 ENCOUNTER — HOSPITAL ENCOUNTER (OUTPATIENT)
Dept: LAB | Facility: MEDICAL CENTER | Age: 63
End: 2018-07-16
Attending: INTERNAL MEDICINE
Payer: COMMERCIAL

## 2018-01-01 ENCOUNTER — APPOINTMENT (OUTPATIENT)
Dept: RADIOLOGY | Facility: MEDICAL CENTER | Age: 63
End: 2018-01-01
Attending: INTERNAL MEDICINE
Payer: COMMERCIAL

## 2018-01-01 ENCOUNTER — ANTICOAGULATION VISIT (OUTPATIENT)
Dept: MEDICAL GROUP | Facility: MEDICAL CENTER | Age: 63
End: 2018-01-01
Payer: COMMERCIAL

## 2018-01-01 ENCOUNTER — HOSPITAL ENCOUNTER (OUTPATIENT)
Facility: MEDICAL CENTER | Age: 63
End: 2018-08-09
Attending: UROLOGY | Admitting: UROLOGY
Payer: COMMERCIAL

## 2018-01-01 ENCOUNTER — HOSPITAL ENCOUNTER (OUTPATIENT)
Dept: LAB | Facility: MEDICAL CENTER | Age: 63
End: 2018-09-07
Attending: INTERNAL MEDICINE
Payer: COMMERCIAL

## 2018-01-01 ENCOUNTER — HOSPITAL ENCOUNTER (OUTPATIENT)
Dept: RADIOLOGY | Facility: MEDICAL CENTER | Age: 63
End: 2018-04-09
Attending: INTERNAL MEDICINE
Payer: COMMERCIAL

## 2018-01-01 ENCOUNTER — HOSPITAL ENCOUNTER (INPATIENT)
Facility: MEDICAL CENTER | Age: 63
LOS: 8 days | DRG: 871 | End: 2018-09-20
Attending: EMERGENCY MEDICINE | Admitting: HOSPITALIST
Payer: COMMERCIAL

## 2018-01-01 ENCOUNTER — HOSPITAL ENCOUNTER (OUTPATIENT)
Dept: LAB | Facility: MEDICAL CENTER | Age: 63
End: 2018-02-26
Attending: INTERNAL MEDICINE
Payer: COMMERCIAL

## 2018-01-01 ENCOUNTER — ANTICOAGULATION VISIT (OUTPATIENT)
Dept: VASCULAR LAB | Facility: MEDICAL CENTER | Age: 63
End: 2018-01-01
Payer: COMMERCIAL

## 2018-01-01 ENCOUNTER — HOSPITAL ENCOUNTER (OUTPATIENT)
Facility: MEDICAL CENTER | Age: 63
End: 2018-08-30
Attending: HOSPITALIST | Admitting: HOSPITALIST
Payer: COMMERCIAL

## 2018-01-01 ENCOUNTER — HOSPITAL ENCOUNTER (OUTPATIENT)
Dept: LAB | Facility: MEDICAL CENTER | Age: 63
End: 2018-06-26
Attending: UROLOGY
Payer: COMMERCIAL

## 2018-01-01 ENCOUNTER — HOSPITAL ENCOUNTER (OUTPATIENT)
Dept: LAB | Facility: MEDICAL CENTER | Age: 63
End: 2018-06-01
Attending: INTERNAL MEDICINE
Payer: COMMERCIAL

## 2018-01-01 ENCOUNTER — APPOINTMENT (OUTPATIENT)
Dept: WOUND CARE | Facility: MEDICAL CENTER | Age: 63
End: 2018-01-01
Payer: COMMERCIAL

## 2018-01-01 ENCOUNTER — HOSPITAL ENCOUNTER (OUTPATIENT)
Dept: LAB | Facility: MEDICAL CENTER | Age: 63
End: 2018-02-23
Attending: INTERNAL MEDICINE
Payer: COMMERCIAL

## 2018-01-01 ENCOUNTER — HOSPITAL ENCOUNTER (OUTPATIENT)
Dept: LAB | Facility: MEDICAL CENTER | Age: 63
End: 2018-06-15
Attending: INTERNAL MEDICINE
Payer: COMMERCIAL

## 2018-01-01 ENCOUNTER — HOSPITAL ENCOUNTER (OUTPATIENT)
Dept: LAB | Facility: MEDICAL CENTER | Age: 63
End: 2018-05-22
Attending: INTERNAL MEDICINE
Payer: COMMERCIAL

## 2018-01-01 ENCOUNTER — APPOINTMENT (OUTPATIENT)
Dept: MEDICAL GROUP | Facility: MEDICAL CENTER | Age: 63
End: 2018-01-01
Payer: COMMERCIAL

## 2018-01-01 ENCOUNTER — HOSPITAL ENCOUNTER (OUTPATIENT)
Dept: LAB | Facility: MEDICAL CENTER | Age: 63
End: 2018-06-12
Attending: UROLOGY
Payer: COMMERCIAL

## 2018-01-01 ENCOUNTER — HOSPITAL ENCOUNTER (OUTPATIENT)
Dept: LAB | Facility: MEDICAL CENTER | Age: 63
End: 2018-05-25
Attending: UROLOGY
Payer: COMMERCIAL

## 2018-01-01 ENCOUNTER — ANTICOAGULATION VISIT (OUTPATIENT)
Dept: MEDICAL GROUP | Facility: PHYSICIAN GROUP | Age: 63
End: 2018-01-01
Payer: COMMERCIAL

## 2018-01-01 ENCOUNTER — APPOINTMENT (OUTPATIENT)
Dept: MEDICAL GROUP | Facility: PHYSICIAN GROUP | Age: 63
End: 2018-01-01
Payer: COMMERCIAL

## 2018-01-01 VITALS
HEART RATE: 120 BPM | WEIGHT: 203 LBS | BODY MASS INDEX: 40.92 KG/M2 | SYSTOLIC BLOOD PRESSURE: 112 MMHG | TEMPERATURE: 97.4 F | DIASTOLIC BLOOD PRESSURE: 70 MMHG | HEIGHT: 59 IN | OXYGEN SATURATION: 94 %

## 2018-01-01 VITALS — RESPIRATION RATE: 18 BRPM | HEART RATE: 84 BPM | DIASTOLIC BLOOD PRESSURE: 72 MMHG | SYSTOLIC BLOOD PRESSURE: 108 MMHG

## 2018-01-01 VITALS
TEMPERATURE: 98.4 F | DIASTOLIC BLOOD PRESSURE: 77 MMHG | RESPIRATION RATE: 18 BRPM | OXYGEN SATURATION: 95 % | WEIGHT: 231.48 LBS | HEIGHT: 61 IN | SYSTOLIC BLOOD PRESSURE: 153 MMHG | BODY MASS INDEX: 43.7 KG/M2 | HEART RATE: 114 BPM

## 2018-01-01 VITALS
HEIGHT: 59 IN | WEIGHT: 207.34 LBS | HEART RATE: 128 BPM | BODY MASS INDEX: 41.8 KG/M2 | TEMPERATURE: 97 F | SYSTOLIC BLOOD PRESSURE: 133 MMHG | DIASTOLIC BLOOD PRESSURE: 85 MMHG | OXYGEN SATURATION: 97 %

## 2018-01-01 VITALS
BODY MASS INDEX: 44.99 KG/M2 | HEIGHT: 61 IN | DIASTOLIC BLOOD PRESSURE: 72 MMHG | OXYGEN SATURATION: 95 % | TEMPERATURE: 98.7 F | HEART RATE: 100 BPM | SYSTOLIC BLOOD PRESSURE: 122 MMHG | WEIGHT: 238.32 LBS

## 2018-01-01 VITALS
HEIGHT: 60 IN | DIASTOLIC BLOOD PRESSURE: 67 MMHG | TEMPERATURE: 98.8 F | OXYGEN SATURATION: 97 % | RESPIRATION RATE: 20 BRPM | WEIGHT: 216.05 LBS | BODY MASS INDEX: 42.42 KG/M2 | HEART RATE: 94 BPM | SYSTOLIC BLOOD PRESSURE: 125 MMHG

## 2018-01-01 VITALS
BODY MASS INDEX: 42.03 KG/M2 | SYSTOLIC BLOOD PRESSURE: 131 MMHG | TEMPERATURE: 97.8 F | HEART RATE: 107 BPM | OXYGEN SATURATION: 96 % | DIASTOLIC BLOOD PRESSURE: 82 MMHG | WEIGHT: 214.07 LBS | HEIGHT: 60 IN | RESPIRATION RATE: 18 BRPM

## 2018-01-01 VITALS
BODY MASS INDEX: 44.56 KG/M2 | HEIGHT: 61 IN | DIASTOLIC BLOOD PRESSURE: 94 MMHG | TEMPERATURE: 97.8 F | HEART RATE: 82 BPM | SYSTOLIC BLOOD PRESSURE: 162 MMHG | OXYGEN SATURATION: 97 % | WEIGHT: 236 LBS

## 2018-01-01 VITALS
WEIGHT: 214.29 LBS | HEART RATE: 125 BPM | RESPIRATION RATE: 18 BRPM | HEIGHT: 60 IN | TEMPERATURE: 98.1 F | OXYGEN SATURATION: 93 % | BODY MASS INDEX: 42.07 KG/M2 | DIASTOLIC BLOOD PRESSURE: 71 MMHG | SYSTOLIC BLOOD PRESSURE: 121 MMHG

## 2018-01-01 VITALS
TEMPERATURE: 97.6 F | OXYGEN SATURATION: 98 % | HEIGHT: 59 IN | WEIGHT: 209.22 LBS | BODY MASS INDEX: 42.18 KG/M2 | DIASTOLIC BLOOD PRESSURE: 74 MMHG | SYSTOLIC BLOOD PRESSURE: 92 MMHG | RESPIRATION RATE: 18 BRPM | HEART RATE: 106 BPM

## 2018-01-01 VITALS
SYSTOLIC BLOOD PRESSURE: 121 MMHG | DIASTOLIC BLOOD PRESSURE: 65 MMHG | RESPIRATION RATE: 18 BRPM | BODY MASS INDEX: 41.85 KG/M2 | OXYGEN SATURATION: 96 % | HEIGHT: 60 IN | OXYGEN SATURATION: 96 % | HEART RATE: 107 BPM | TEMPERATURE: 97.2 F | DIASTOLIC BLOOD PRESSURE: 74 MMHG | HEART RATE: 124 BPM | RESPIRATION RATE: 20 BRPM | BODY MASS INDEX: 43.07 KG/M2 | HEIGHT: 60 IN | WEIGHT: 213.19 LBS | SYSTOLIC BLOOD PRESSURE: 138 MMHG | WEIGHT: 219.36 LBS | TEMPERATURE: 98.5 F

## 2018-01-01 VITALS
RESPIRATION RATE: 18 BRPM | DIASTOLIC BLOOD PRESSURE: 44 MMHG | WEIGHT: 218.92 LBS | HEART RATE: 116 BPM | BODY MASS INDEX: 44.13 KG/M2 | SYSTOLIC BLOOD PRESSURE: 92 MMHG | HEIGHT: 59 IN | OXYGEN SATURATION: 95 % | TEMPERATURE: 97.9 F

## 2018-01-01 VITALS
DIASTOLIC BLOOD PRESSURE: 69 MMHG | WEIGHT: 207.67 LBS | HEIGHT: 60 IN | TEMPERATURE: 97.5 F | HEART RATE: 108 BPM | BODY MASS INDEX: 40.77 KG/M2 | OXYGEN SATURATION: 96 % | RESPIRATION RATE: 18 BRPM | SYSTOLIC BLOOD PRESSURE: 110 MMHG

## 2018-01-01 VITALS
TEMPERATURE: 97 F | HEIGHT: 61 IN | HEART RATE: 119 BPM | DIASTOLIC BLOOD PRESSURE: 86 MMHG | OXYGEN SATURATION: 97 % | SYSTOLIC BLOOD PRESSURE: 150 MMHG | WEIGHT: 235.8 LBS | BODY MASS INDEX: 44.52 KG/M2 | RESPIRATION RATE: 20 BRPM

## 2018-01-01 VITALS
WEIGHT: 212.3 LBS | TEMPERATURE: 96.2 F | OXYGEN SATURATION: 97 % | DIASTOLIC BLOOD PRESSURE: 68 MMHG | HEART RATE: 118 BPM | BODY MASS INDEX: 41.68 KG/M2 | HEIGHT: 60 IN | SYSTOLIC BLOOD PRESSURE: 112 MMHG

## 2018-01-01 VITALS
SYSTOLIC BLOOD PRESSURE: 113 MMHG | DIASTOLIC BLOOD PRESSURE: 63 MMHG | HEART RATE: 87 BPM | DIASTOLIC BLOOD PRESSURE: 79 MMHG | BODY MASS INDEX: 42.49 KG/M2 | HEART RATE: 110 BPM | SYSTOLIC BLOOD PRESSURE: 159 MMHG | WEIGHT: 215 LBS

## 2018-01-01 VITALS
SYSTOLIC BLOOD PRESSURE: 122 MMHG | DIASTOLIC BLOOD PRESSURE: 84 MMHG | WEIGHT: 224.65 LBS | HEART RATE: 116 BPM | OXYGEN SATURATION: 95 % | TEMPERATURE: 98.9 F | BODY MASS INDEX: 42.41 KG/M2 | HEIGHT: 61 IN

## 2018-01-01 VITALS
BODY MASS INDEX: 42.81 KG/M2 | HEIGHT: 60 IN | SYSTOLIC BLOOD PRESSURE: 116 MMHG | HEART RATE: 110 BPM | OXYGEN SATURATION: 93 % | WEIGHT: 218.03 LBS | DIASTOLIC BLOOD PRESSURE: 71 MMHG | TEMPERATURE: 98 F | RESPIRATION RATE: 18 BRPM

## 2018-01-01 VITALS — RESPIRATION RATE: 20 BRPM | SYSTOLIC BLOOD PRESSURE: 118 MMHG | HEART RATE: 122 BPM | DIASTOLIC BLOOD PRESSURE: 78 MMHG

## 2018-01-01 VITALS — DIASTOLIC BLOOD PRESSURE: 67 MMHG | HEART RATE: 104 BPM | SYSTOLIC BLOOD PRESSURE: 128 MMHG

## 2018-01-01 VITALS
SYSTOLIC BLOOD PRESSURE: 108 MMHG | OXYGEN SATURATION: 96 % | HEIGHT: 61 IN | TEMPERATURE: 98.9 F | BODY MASS INDEX: 41.54 KG/M2 | WEIGHT: 220.02 LBS | HEART RATE: 110 BPM | RESPIRATION RATE: 16 BRPM | DIASTOLIC BLOOD PRESSURE: 64 MMHG

## 2018-01-01 VITALS — HEART RATE: 75 BPM | DIASTOLIC BLOOD PRESSURE: 78 MMHG | SYSTOLIC BLOOD PRESSURE: 156 MMHG

## 2018-01-01 VITALS
HEIGHT: 60 IN | SYSTOLIC BLOOD PRESSURE: 122 MMHG | BODY MASS INDEX: 41.03 KG/M2 | OXYGEN SATURATION: 97 % | DIASTOLIC BLOOD PRESSURE: 67 MMHG | HEART RATE: 102 BPM | TEMPERATURE: 97 F | RESPIRATION RATE: 16 BRPM | WEIGHT: 209 LBS

## 2018-01-01 VITALS
WEIGHT: 218.48 LBS | TEMPERATURE: 97.2 F | SYSTOLIC BLOOD PRESSURE: 128 MMHG | OXYGEN SATURATION: 98 % | HEART RATE: 122 BPM | BODY MASS INDEX: 42.89 KG/M2 | DIASTOLIC BLOOD PRESSURE: 71 MMHG | RESPIRATION RATE: 18 BRPM | HEIGHT: 60 IN

## 2018-01-01 VITALS
BODY MASS INDEX: 43.87 KG/M2 | HEART RATE: 119 BPM | OXYGEN SATURATION: 99 % | SYSTOLIC BLOOD PRESSURE: 96 MMHG | WEIGHT: 217.59 LBS | TEMPERATURE: 98.9 F | DIASTOLIC BLOOD PRESSURE: 62 MMHG | HEIGHT: 59 IN

## 2018-01-01 VITALS
OXYGEN SATURATION: 97 % | BODY MASS INDEX: 43.29 KG/M2 | SYSTOLIC BLOOD PRESSURE: 112 MMHG | DIASTOLIC BLOOD PRESSURE: 86 MMHG | TEMPERATURE: 98.6 F | RESPIRATION RATE: 14 BRPM | HEIGHT: 59 IN | HEART RATE: 124 BPM | WEIGHT: 214.73 LBS

## 2018-01-01 VITALS — DIASTOLIC BLOOD PRESSURE: 68 MMHG | HEART RATE: 96 BPM | RESPIRATION RATE: 20 BRPM | SYSTOLIC BLOOD PRESSURE: 106 MMHG

## 2018-01-01 VITALS — SYSTOLIC BLOOD PRESSURE: 124 MMHG | HEART RATE: 123 BPM | RESPIRATION RATE: 16 BRPM | DIASTOLIC BLOOD PRESSURE: 75 MMHG

## 2018-01-01 VITALS — SYSTOLIC BLOOD PRESSURE: 149 MMHG | HEART RATE: 82 BPM | DIASTOLIC BLOOD PRESSURE: 75 MMHG

## 2018-01-01 VITALS
DIASTOLIC BLOOD PRESSURE: 67 MMHG | BODY MASS INDEX: 42.16 KG/M2 | SYSTOLIC BLOOD PRESSURE: 131 MMHG | TEMPERATURE: 97.5 F | HEIGHT: 60 IN | WEIGHT: 214.73 LBS | OXYGEN SATURATION: 97 % | RESPIRATION RATE: 18 BRPM | HEART RATE: 100 BPM

## 2018-01-01 VITALS
BODY MASS INDEX: 45.29 KG/M2 | WEIGHT: 239.86 LBS | OXYGEN SATURATION: 97 % | HEART RATE: 108 BPM | DIASTOLIC BLOOD PRESSURE: 68 MMHG | TEMPERATURE: 97.8 F | SYSTOLIC BLOOD PRESSURE: 122 MMHG | HEIGHT: 61 IN

## 2018-01-01 VITALS
BODY MASS INDEX: 45.71 KG/M2 | OXYGEN SATURATION: 91 % | HEART RATE: 106 BPM | RESPIRATION RATE: 15 BRPM | DIASTOLIC BLOOD PRESSURE: 70 MMHG | SYSTOLIC BLOOD PRESSURE: 110 MMHG | HEIGHT: 60 IN | WEIGHT: 232.81 LBS | TEMPERATURE: 97.4 F

## 2018-01-01 VITALS — HEART RATE: 72 BPM | DIASTOLIC BLOOD PRESSURE: 100 MMHG | SYSTOLIC BLOOD PRESSURE: 159 MMHG

## 2018-01-01 DIAGNOSIS — C78.7 ADENOCARCINOMA OF COLON METASTATIC TO LIVER (HCC): ICD-10-CM

## 2018-01-01 DIAGNOSIS — C18.9 ADENOCARCINOMA OF COLON METASTATIC TO LIVER (HCC): ICD-10-CM

## 2018-01-01 DIAGNOSIS — I82.5Z3 CHRONIC DEEP VEIN THROMBOSIS (DVT) OF DISTAL VEIN OF BOTH LOWER EXTREMITIES (HCC): ICD-10-CM

## 2018-01-01 DIAGNOSIS — Z79.01 CHRONIC ANTICOAGULATION: ICD-10-CM

## 2018-01-01 DIAGNOSIS — D64.9 ANEMIA, UNSPECIFIED TYPE: ICD-10-CM

## 2018-01-01 DIAGNOSIS — N32.1 CVF (COLOVESICAL FISTULA): ICD-10-CM

## 2018-01-01 DIAGNOSIS — E66.01 MORBID OBESITY WITH BMI OF 40.0-44.9, ADULT (HCC): ICD-10-CM

## 2018-01-01 DIAGNOSIS — A41.9 SEPSIS, DUE TO UNSPECIFIED ORGANISM: ICD-10-CM

## 2018-01-01 DIAGNOSIS — I82.4Z3 DEEP VEIN THROMBOSIS (DVT) OF DISTAL VEIN OF BOTH LOWER EXTREMITIES, UNSPECIFIED CHRONICITY (HCC): ICD-10-CM

## 2018-01-01 DIAGNOSIS — C18.7 ADENOCARCINOMA OF SIGMOID COLON (HCC): ICD-10-CM

## 2018-01-01 DIAGNOSIS — Z16.12 ESBL (EXTENDED SPECTRUM BETA-LACTAMASE) PRODUCING BACTERIA INFECTION: ICD-10-CM

## 2018-01-01 DIAGNOSIS — R10.30 LOWER ABDOMINAL PAIN: ICD-10-CM

## 2018-01-01 DIAGNOSIS — Z79.01 CHRONIC ANTICOAGULATION: Primary | ICD-10-CM

## 2018-01-01 DIAGNOSIS — R53.83 FATIGUE, UNSPECIFIED TYPE: ICD-10-CM

## 2018-01-01 DIAGNOSIS — R06.02 SOB (SHORTNESS OF BREATH): ICD-10-CM

## 2018-01-01 DIAGNOSIS — I82.403 DEEP VEIN THROMBOSIS (DVT) OF BOTH LOWER EXTREMITIES, UNSPECIFIED CHRONICITY, UNSPECIFIED VEIN (HCC): ICD-10-CM

## 2018-01-01 DIAGNOSIS — I82.4Y3 DEEP VEIN THROMBOSIS (DVT) OF PROXIMAL VEIN OF BOTH LOWER EXTREMITIES, UNSPECIFIED CHRONICITY (HCC): ICD-10-CM

## 2018-01-01 DIAGNOSIS — S31.109D OPEN WOUND OF ABDOMEN, SUBSEQUENT ENCOUNTER: ICD-10-CM

## 2018-01-01 DIAGNOSIS — I82.423 DEEP VEIN THROMBOSIS (DVT) OF ILIAC VEIN OF BOTH LOWER EXTREMITIES, UNSPECIFIED CHRONICITY (HCC): ICD-10-CM

## 2018-01-01 DIAGNOSIS — R73.01 IFG (IMPAIRED FASTING GLUCOSE): ICD-10-CM

## 2018-01-01 DIAGNOSIS — N39.0 RECURRENT UTI: ICD-10-CM

## 2018-01-01 DIAGNOSIS — D50.8 OTHER IRON DEFICIENCY ANEMIA: ICD-10-CM

## 2018-01-01 DIAGNOSIS — Z93.3 COLOSTOMY IN PLACE (HCC): ICD-10-CM

## 2018-01-01 DIAGNOSIS — I10 ESSENTIAL HYPERTENSION: ICD-10-CM

## 2018-01-01 DIAGNOSIS — R11.0 NAUSEA: ICD-10-CM

## 2018-01-01 DIAGNOSIS — C78.5 SECONDARY MALIGNANT NEOPLASM OF LARGE INTESTINE AND RECTUM (HCC): ICD-10-CM

## 2018-01-01 DIAGNOSIS — L08.9 WOUND INFECTION: Primary | ICD-10-CM

## 2018-01-01 DIAGNOSIS — D72.829 LEUKOCYTOSIS, UNSPECIFIED TYPE: ICD-10-CM

## 2018-01-01 DIAGNOSIS — A49.9 ESBL (EXTENDED SPECTRUM BETA-LACTAMASE) PRODUCING BACTERIA INFECTION: ICD-10-CM

## 2018-01-01 DIAGNOSIS — R73.03 PREDIABETES: ICD-10-CM

## 2018-01-01 DIAGNOSIS — N39.0 URINARY TRACT INFECTION WITH HEMATURIA, SITE UNSPECIFIED: ICD-10-CM

## 2018-01-01 DIAGNOSIS — N12 PYELONEPHRITIS: ICD-10-CM

## 2018-01-01 DIAGNOSIS — Z09 HOSPITAL DISCHARGE FOLLOW-UP: ICD-10-CM

## 2018-01-01 DIAGNOSIS — C18.7 MALIGNANT NEOPLASM OF SIGMOID COLON (HCC): ICD-10-CM

## 2018-01-01 DIAGNOSIS — R30.0 DYSURIA: ICD-10-CM

## 2018-01-01 DIAGNOSIS — R63.0 ANOREXIA: ICD-10-CM

## 2018-01-01 DIAGNOSIS — G47.9 SLEEP DISORDER: ICD-10-CM

## 2018-01-01 DIAGNOSIS — I82.443 DEEP VEIN THROMBOSIS (DVT) OF TIBIAL VEIN OF BOTH LOWER EXTREMITIES, UNSPECIFIED CHRONICITY (HCC): ICD-10-CM

## 2018-01-01 DIAGNOSIS — R35.0 URINARY FREQUENCY: ICD-10-CM

## 2018-01-01 DIAGNOSIS — Z00.00 HEALTH CARE MAINTENANCE: ICD-10-CM

## 2018-01-01 DIAGNOSIS — F41.9 ANXIETY: ICD-10-CM

## 2018-01-01 DIAGNOSIS — C18.9 MALIGNANT NEOPLASM OF COLON, UNSPECIFIED PART OF COLON (HCC): Primary | ICD-10-CM

## 2018-01-01 DIAGNOSIS — E66.01 OBESITY, CLASS III, BMI 40-49.9 (MORBID OBESITY) (HCC): ICD-10-CM

## 2018-01-01 DIAGNOSIS — D72.819 LEUKOPENIA, UNSPECIFIED TYPE: ICD-10-CM

## 2018-01-01 DIAGNOSIS — R31.9 URINARY TRACT INFECTION WITH HEMATURIA, SITE UNSPECIFIED: ICD-10-CM

## 2018-01-01 DIAGNOSIS — N30.90 CYSTITIS: ICD-10-CM

## 2018-01-01 DIAGNOSIS — T14.8XXA WOUND INFECTION: Primary | ICD-10-CM

## 2018-01-01 DIAGNOSIS — M54.50 LUMBOSACRAL PAIN: ICD-10-CM

## 2018-01-01 DIAGNOSIS — R74.8 ELEVATED LIVER ENZYMES: ICD-10-CM

## 2018-01-01 DIAGNOSIS — N30.01 ACUTE CYSTITIS WITH HEMATURIA: ICD-10-CM

## 2018-01-01 DIAGNOSIS — S31.109D OPEN WOUND OF ABDOMEN, SUBSEQUENT ENCOUNTER: Primary | ICD-10-CM

## 2018-01-01 LAB
25(OH)D3 SERPL-MCNC: 27 NG/ML (ref 30–100)
ABO GROUP BLD: NORMAL
ALBUMIN SERPL BCP-MCNC: 1.9 G/DL (ref 3.2–4.9)
ALBUMIN SERPL BCP-MCNC: 2.1 G/DL (ref 3.2–4.9)
ALBUMIN SERPL BCP-MCNC: 2.2 G/DL (ref 3.2–4.9)
ALBUMIN SERPL BCP-MCNC: 2.3 G/DL (ref 3.2–4.9)
ALBUMIN SERPL BCP-MCNC: 2.4 G/DL (ref 3.2–4.9)
ALBUMIN SERPL BCP-MCNC: 2.6 G/DL (ref 3.2–4.9)
ALBUMIN SERPL BCP-MCNC: 2.7 G/DL (ref 3.2–4.9)
ALBUMIN SERPL BCP-MCNC: 2.7 G/DL (ref 3.2–4.9)
ALBUMIN SERPL BCP-MCNC: 2.8 G/DL (ref 3.2–4.9)
ALBUMIN SERPL BCP-MCNC: 2.8 G/DL (ref 3.2–4.9)
ALBUMIN SERPL BCP-MCNC: 3 G/DL (ref 3.2–4.9)
ALBUMIN SERPL BCP-MCNC: 3.2 G/DL (ref 3.2–4.9)
ALBUMIN SERPL BCP-MCNC: 3.7 G/DL (ref 3.2–4.9)
ALBUMIN SERPL BCP-MCNC: 3.9 G/DL (ref 3.2–4.9)
ALBUMIN SERPL BCP-MCNC: 4 G/DL (ref 3.2–4.9)
ALBUMIN SERPL BCP-MCNC: 4.2 G/DL (ref 3.2–4.9)
ALBUMIN/GLOB SERPL: 0.5 G/DL
ALBUMIN/GLOB SERPL: 0.6 G/DL
ALBUMIN/GLOB SERPL: 0.7 G/DL
ALBUMIN/GLOB SERPL: 0.8 G/DL
ALBUMIN/GLOB SERPL: 0.8 G/DL
ALBUMIN/GLOB SERPL: 0.9 G/DL
ALBUMIN/GLOB SERPL: 1 G/DL
ALBUMIN/GLOB SERPL: 1 G/DL
ALBUMIN/GLOB SERPL: 1.2 G/DL
ALBUMIN/GLOB SERPL: 1.3 G/DL
ALBUMIN/GLOB SERPL: 1.3 G/DL
ALBUMIN/GLOB SERPL: 1.5 G/DL
ALP SERPL-CCNC: 1062 U/L (ref 30–99)
ALP SERPL-CCNC: 112 U/L (ref 30–99)
ALP SERPL-CCNC: 112 U/L (ref 30–99)
ALP SERPL-CCNC: 114 U/L (ref 30–99)
ALP SERPL-CCNC: 1211 U/L (ref 30–99)
ALP SERPL-CCNC: 146 U/L (ref 30–99)
ALP SERPL-CCNC: 176 U/L (ref 30–99)
ALP SERPL-CCNC: 214 U/L (ref 30–99)
ALP SERPL-CCNC: 265 U/L (ref 30–99)
ALP SERPL-CCNC: 265 U/L (ref 30–99)
ALP SERPL-CCNC: 291 U/L (ref 30–99)
ALP SERPL-CCNC: 338 U/L (ref 30–99)
ALP SERPL-CCNC: 339 U/L (ref 30–99)
ALP SERPL-CCNC: 444 U/L (ref 30–99)
ALP SERPL-CCNC: 905 U/L (ref 30–99)
ALP SERPL-CCNC: 917 U/L (ref 30–99)
ALT SERPL-CCNC: 12 U/L (ref 2–50)
ALT SERPL-CCNC: 13 U/L (ref 2–50)
ALT SERPL-CCNC: 18 U/L (ref 2–50)
ALT SERPL-CCNC: 19 U/L (ref 2–50)
ALT SERPL-CCNC: 20 U/L (ref 2–50)
ALT SERPL-CCNC: 22 U/L (ref 2–50)
ALT SERPL-CCNC: 22 U/L (ref 2–50)
ALT SERPL-CCNC: 23 U/L (ref 2–50)
ALT SERPL-CCNC: 26 U/L (ref 2–50)
ALT SERPL-CCNC: 28 U/L (ref 2–50)
ALT SERPL-CCNC: 28 U/L (ref 2–50)
ALT SERPL-CCNC: 34 U/L (ref 2–50)
ALT SERPL-CCNC: 34 U/L (ref 2–50)
ALT SERPL-CCNC: 36 U/L (ref 2–50)
ALT SERPL-CCNC: 39 U/L (ref 2–50)
ALT SERPL-CCNC: 50 U/L (ref 2–50)
AMBIGUOUS DTTM AMBI4: NORMAL
AMORPH CRY #/AREA URNS HPF: PRESENT /HPF
AMORPH CRY #/AREA URNS HPF: PRESENT /HPF
ANION GAP SERPL CALC-SCNC: 10 MMOL/L (ref 0–11.9)
ANION GAP SERPL CALC-SCNC: 10 MMOL/L (ref 0–11.9)
ANION GAP SERPL CALC-SCNC: 11 MMOL/L (ref 0–11.9)
ANION GAP SERPL CALC-SCNC: 11 MMOL/L (ref 0–11.9)
ANION GAP SERPL CALC-SCNC: 12 MMOL/L (ref 0–11.9)
ANION GAP SERPL CALC-SCNC: 12 MMOL/L (ref 0–11.9)
ANION GAP SERPL CALC-SCNC: 13 MMOL/L (ref 0–11.9)
ANION GAP SERPL CALC-SCNC: 13 MMOL/L (ref 0–11.9)
ANION GAP SERPL CALC-SCNC: 14 MMOL/L (ref 0–11.9)
ANION GAP SERPL CALC-SCNC: 19 MMOL/L (ref 0–11.9)
ANION GAP SERPL CALC-SCNC: 6 MMOL/L (ref 0–11.9)
ANION GAP SERPL CALC-SCNC: 7 MMOL/L (ref 0–11.9)
ANION GAP SERPL CALC-SCNC: 8 MMOL/L (ref 0–11.9)
ANION GAP SERPL CALC-SCNC: 9 MMOL/L (ref 0–11.9)
ANION GAP SERPL CALC-SCNC: 9 MMOL/L (ref 0–11.9)
ANISOCYTOSIS BLD QL SMEAR: ABNORMAL
APPEARANCE UR: ABNORMAL
APTT PPP: 33 SEC (ref 24.7–36)
APTT PPP: 52.3 SEC (ref 24.7–36)
AST SERPL-CCNC: 108 U/L (ref 12–45)
AST SERPL-CCNC: 121 U/L (ref 12–45)
AST SERPL-CCNC: 129 U/L (ref 12–45)
AST SERPL-CCNC: 143 U/L (ref 12–45)
AST SERPL-CCNC: 16 U/L (ref 12–45)
AST SERPL-CCNC: 20 U/L (ref 12–45)
AST SERPL-CCNC: 24 U/L (ref 12–45)
AST SERPL-CCNC: 25 U/L (ref 12–45)
AST SERPL-CCNC: 25 U/L (ref 12–45)
AST SERPL-CCNC: 31 U/L (ref 12–45)
AST SERPL-CCNC: 36 U/L (ref 12–45)
AST SERPL-CCNC: 37 U/L (ref 12–45)
AST SERPL-CCNC: 38 U/L (ref 12–45)
AST SERPL-CCNC: 46 U/L (ref 12–45)
AST SERPL-CCNC: 71 U/L (ref 12–45)
AST SERPL-CCNC: 91 U/L (ref 12–45)
BACTERIA #/AREA URNS HPF: ABNORMAL /HPF
BACTERIA #/AREA URNS HPF: ABNORMAL /HPF
BACTERIA #/AREA URNS HPF: NEGATIVE /HPF
BACTERIA BLD CULT: NORMAL
BACTERIA TISS AEROBE CULT: ABNORMAL
BACTERIA TISS AEROBE CULT: ABNORMAL
BACTERIA UR CULT: ABNORMAL
BACTERIA UR CULT: NORMAL
BACTERIA UR CULT: NORMAL
BARCODED ABORH UBTYP: 6200
BARCODED PRD CODE UBPRD: NORMAL
BARCODED UNIT NUM UBUNT: NORMAL
BASOPHILS # BLD AUTO: 0 % (ref 0–1.8)
BASOPHILS # BLD AUTO: 0.2 % (ref 0–1.8)
BASOPHILS # BLD AUTO: 0.3 % (ref 0–1.8)
BASOPHILS # BLD AUTO: 0.3 % (ref 0–1.8)
BASOPHILS # BLD AUTO: 0.4 % (ref 0–1.8)
BASOPHILS # BLD AUTO: 0.5 % (ref 0–1.8)
BASOPHILS # BLD AUTO: 0.6 % (ref 0–1.8)
BASOPHILS # BLD AUTO: 0.7 % (ref 0–1.8)
BASOPHILS # BLD: 0 K/UL (ref 0–0.12)
BASOPHILS # BLD: 0.01 K/UL (ref 0–0.12)
BASOPHILS # BLD: 0.02 K/UL (ref 0–0.12)
BASOPHILS # BLD: 0.03 K/UL (ref 0–0.12)
BASOPHILS # BLD: 0.04 K/UL (ref 0–0.12)
BASOPHILS # BLD: 0.05 K/UL (ref 0–0.12)
BILIRUB SERPL-MCNC: 0.4 MG/DL (ref 0.1–1.5)
BILIRUB SERPL-MCNC: 0.5 MG/DL (ref 0.1–1.5)
BILIRUB SERPL-MCNC: 0.6 MG/DL (ref 0.1–1.5)
BILIRUB SERPL-MCNC: 0.7 MG/DL (ref 0.1–1.5)
BILIRUB SERPL-MCNC: 0.8 MG/DL (ref 0.1–1.5)
BILIRUB SERPL-MCNC: 1.1 MG/DL (ref 0.1–1.5)
BILIRUB SERPL-MCNC: 1.4 MG/DL (ref 0.1–1.5)
BILIRUB SERPL-MCNC: 1.4 MG/DL (ref 0.1–1.5)
BILIRUB SERPL-MCNC: 2.1 MG/DL (ref 0.1–1.5)
BILIRUB UR QL STRIP.AUTO: ABNORMAL
BLD GP AB SCN SERPL QL: NORMAL
BUN SERPL-MCNC: 13 MG/DL (ref 8–22)
BUN SERPL-MCNC: 14 MG/DL (ref 8–22)
BUN SERPL-MCNC: 14 MG/DL (ref 8–22)
BUN SERPL-MCNC: 15 MG/DL (ref 8–22)
BUN SERPL-MCNC: 17 MG/DL (ref 8–22)
BUN SERPL-MCNC: 18 MG/DL (ref 8–22)
BUN SERPL-MCNC: 18 MG/DL (ref 8–22)
BUN SERPL-MCNC: 19 MG/DL (ref 8–22)
BUN SERPL-MCNC: 20 MG/DL (ref 8–22)
BUN SERPL-MCNC: 20 MG/DL (ref 8–22)
BUN SERPL-MCNC: 21 MG/DL (ref 8–22)
BUN SERPL-MCNC: 21 MG/DL (ref 8–22)
BUN SERPL-MCNC: 22 MG/DL (ref 8–22)
BUN SERPL-MCNC: 23 MG/DL (ref 8–22)
BUN SERPL-MCNC: 23 MG/DL (ref 8–22)
BUN SERPL-MCNC: 8 MG/DL (ref 8–22)
BURR CELLS BLD QL SMEAR: NORMAL
CALCIUM SERPL-MCNC: 7.2 MG/DL (ref 8.5–10.5)
CALCIUM SERPL-MCNC: 7.7 MG/DL (ref 8.5–10.5)
CALCIUM SERPL-MCNC: 7.8 MG/DL (ref 8.5–10.5)
CALCIUM SERPL-MCNC: 8.1 MG/DL (ref 8.4–10.2)
CALCIUM SERPL-MCNC: 8.1 MG/DL (ref 8.4–10.2)
CALCIUM SERPL-MCNC: 8.1 MG/DL (ref 8.5–10.5)
CALCIUM SERPL-MCNC: 8.3 MG/DL (ref 8.5–10.5)
CALCIUM SERPL-MCNC: 8.3 MG/DL (ref 8.5–10.5)
CALCIUM SERPL-MCNC: 8.4 MG/DL (ref 8.4–10.2)
CALCIUM SERPL-MCNC: 8.5 MG/DL (ref 8.5–10.5)
CALCIUM SERPL-MCNC: 8.5 MG/DL (ref 8.5–10.5)
CALCIUM SERPL-MCNC: 8.6 MG/DL (ref 8.5–10.5)
CALCIUM SERPL-MCNC: 8.7 MG/DL (ref 8.5–10.5)
CALCIUM SERPL-MCNC: 8.8 MG/DL (ref 8.5–10.5)
CALCIUM SERPL-MCNC: 8.9 MG/DL (ref 8.4–10.2)
CALCIUM SERPL-MCNC: 9 MG/DL (ref 8.5–10.5)
CEA SERPL-MCNC: 2361.3 NG/ML (ref 0–3)
CEA SERPL-MCNC: 262.7 NG/ML (ref 0–3)
CEA SERPL-MCNC: 403.8 NG/ML (ref 0–3)
CHLORIDE SERPL-SCNC: 100 MMOL/L (ref 96–112)
CHLORIDE SERPL-SCNC: 101 MMOL/L (ref 96–112)
CHLORIDE SERPL-SCNC: 102 MMOL/L (ref 96–112)
CHLORIDE SERPL-SCNC: 104 MMOL/L (ref 96–112)
CHLORIDE SERPL-SCNC: 105 MMOL/L (ref 96–112)
CHLORIDE SERPL-SCNC: 107 MMOL/L (ref 96–112)
CHLORIDE SERPL-SCNC: 108 MMOL/L (ref 96–112)
CHLORIDE SERPL-SCNC: 97 MMOL/L (ref 96–112)
CHLORIDE SERPL-SCNC: 97 MMOL/L (ref 96–112)
CHLORIDE SERPL-SCNC: 99 MMOL/L (ref 96–112)
CHOLEST SERPL-MCNC: 146 MG/DL (ref 100–199)
CHOLEST SERPL-MCNC: 210 MG/DL (ref 100–199)
CO2 SERPL-SCNC: 13 MMOL/L (ref 20–33)
CO2 SERPL-SCNC: 13 MMOL/L (ref 20–33)
CO2 SERPL-SCNC: 16 MMOL/L (ref 20–33)
CO2 SERPL-SCNC: 19 MMOL/L (ref 20–33)
CO2 SERPL-SCNC: 20 MMOL/L (ref 20–33)
CO2 SERPL-SCNC: 20 MMOL/L (ref 20–33)
CO2 SERPL-SCNC: 21 MMOL/L (ref 20–33)
CO2 SERPL-SCNC: 21 MMOL/L (ref 20–33)
CO2 SERPL-SCNC: 22 MMOL/L (ref 20–33)
CO2 SERPL-SCNC: 24 MMOL/L (ref 20–33)
CO2 SERPL-SCNC: 25 MMOL/L (ref 20–33)
CO2 SERPL-SCNC: 27 MMOL/L (ref 20–33)
CO2 SERPL-SCNC: 28 MMOL/L (ref 20–33)
COLOR UR: ABNORMAL
COMMENT 1642: NORMAL
COMPONENT R 8504R: NORMAL
CREAT SERPL-MCNC: 0.52 MG/DL (ref 0.5–1.4)
CREAT SERPL-MCNC: 0.55 MG/DL (ref 0.5–1.4)
CREAT SERPL-MCNC: 0.58 MG/DL (ref 0.5–1.4)
CREAT SERPL-MCNC: 0.59 MG/DL (ref 0.5–1.4)
CREAT SERPL-MCNC: 0.61 MG/DL (ref 0.5–1.4)
CREAT SERPL-MCNC: 0.63 MG/DL (ref 0.5–1.4)
CREAT SERPL-MCNC: 0.66 MG/DL (ref 0.5–1.4)
CREAT SERPL-MCNC: 0.66 MG/DL (ref 0.5–1.4)
CREAT SERPL-MCNC: 0.68 MG/DL (ref 0.5–1.4)
CREAT SERPL-MCNC: 0.7 MG/DL (ref 0.5–1.4)
CREAT SERPL-MCNC: 0.73 MG/DL (ref 0.5–1.4)
CREAT SERPL-MCNC: 0.75 MG/DL (ref 0.5–1.4)
CREAT SERPL-MCNC: 0.77 MG/DL (ref 0.5–1.4)
CREAT SERPL-MCNC: 0.77 MG/DL (ref 0.5–1.4)
CREAT SERPL-MCNC: 0.94 MG/DL (ref 0.5–1.4)
CREAT SERPL-MCNC: 1.06 MG/DL (ref 0.5–1.4)
CRP SERPL HS-MCNC: 6.47 MG/DL (ref 0–0.75)
CRP SERPL HS-MCNC: 7.6 MG/DL (ref 0–0.75)
EKG IMPRESSION: NORMAL
EKG IMPRESSION: NORMAL
EOSINOPHIL # BLD AUTO: 0 K/UL (ref 0–0.51)
EOSINOPHIL # BLD AUTO: 0.01 K/UL (ref 0–0.51)
EOSINOPHIL # BLD AUTO: 0.02 K/UL (ref 0–0.51)
EOSINOPHIL # BLD AUTO: 0.04 K/UL (ref 0–0.51)
EOSINOPHIL # BLD AUTO: 0.05 K/UL (ref 0–0.51)
EOSINOPHIL # BLD AUTO: 0.07 K/UL (ref 0–0.51)
EOSINOPHIL # BLD AUTO: 0.07 K/UL (ref 0–0.51)
EOSINOPHIL # BLD AUTO: 0.13 K/UL (ref 0–0.51)
EOSINOPHIL # BLD AUTO: 0.14 K/UL (ref 0–0.51)
EOSINOPHIL NFR BLD: 0 % (ref 0–6.9)
EOSINOPHIL NFR BLD: 0.1 % (ref 0–6.9)
EOSINOPHIL NFR BLD: 0.2 % (ref 0–6.9)
EOSINOPHIL NFR BLD: 0.3 % (ref 0–6.9)
EOSINOPHIL NFR BLD: 0.4 % (ref 0–6.9)
EOSINOPHIL NFR BLD: 0.5 % (ref 0–6.9)
EOSINOPHIL NFR BLD: 0.6 % (ref 0–6.9)
EOSINOPHIL NFR BLD: 0.9 % (ref 0–6.9)
EOSINOPHIL NFR BLD: 1 % (ref 0–6.9)
EOSINOPHIL NFR BLD: 1.4 % (ref 0–6.9)
EOSINOPHIL NFR BLD: 1.7 % (ref 0–6.9)
EPI CELLS #/AREA URNS HPF: ABNORMAL /HPF
ERYTHROCYTE [DISTWIDTH] IN BLOOD BY AUTOMATED COUNT: 103.8 FL (ref 35.9–50)
ERYTHROCYTE [DISTWIDTH] IN BLOOD BY AUTOMATED COUNT: 104.7 FL (ref 35.9–50)
ERYTHROCYTE [DISTWIDTH] IN BLOOD BY AUTOMATED COUNT: 110.9 FL (ref 35.9–50)
ERYTHROCYTE [DISTWIDTH] IN BLOOD BY AUTOMATED COUNT: 112.2 FL (ref 35.9–50)
ERYTHROCYTE [DISTWIDTH] IN BLOOD BY AUTOMATED COUNT: 112.9 FL (ref 35.9–50)
ERYTHROCYTE [DISTWIDTH] IN BLOOD BY AUTOMATED COUNT: 114 FL (ref 35.9–50)
ERYTHROCYTE [DISTWIDTH] IN BLOOD BY AUTOMATED COUNT: 114.2 FL (ref 35.9–50)
ERYTHROCYTE [DISTWIDTH] IN BLOOD BY AUTOMATED COUNT: 116.1 FL (ref 35.9–50)
ERYTHROCYTE [DISTWIDTH] IN BLOOD BY AUTOMATED COUNT: 118.5 FL (ref 35.9–50)
ERYTHROCYTE [DISTWIDTH] IN BLOOD BY AUTOMATED COUNT: 123 FL (ref 35.9–50)
ERYTHROCYTE [DISTWIDTH] IN BLOOD BY AUTOMATED COUNT: 49.6 FL (ref 35.9–50)
ERYTHROCYTE [DISTWIDTH] IN BLOOD BY AUTOMATED COUNT: 53.5 FL (ref 35.9–50)
ERYTHROCYTE [DISTWIDTH] IN BLOOD BY AUTOMATED COUNT: 53.8 FL (ref 35.9–50)
ERYTHROCYTE [DISTWIDTH] IN BLOOD BY AUTOMATED COUNT: 54 FL (ref 35.9–50)
ERYTHROCYTE [DISTWIDTH] IN BLOOD BY AUTOMATED COUNT: 54.4 FL (ref 35.9–50)
ERYTHROCYTE [DISTWIDTH] IN BLOOD BY AUTOMATED COUNT: 55 FL (ref 35.9–50)
ERYTHROCYTE [DISTWIDTH] IN BLOOD BY AUTOMATED COUNT: 56.1 FL (ref 35.9–50)
ERYTHROCYTE [DISTWIDTH] IN BLOOD BY AUTOMATED COUNT: 57.3 FL (ref 35.9–50)
ERYTHROCYTE [DISTWIDTH] IN BLOOD BY AUTOMATED COUNT: 60.5 FL (ref 35.9–50)
ERYTHROCYTE [DISTWIDTH] IN BLOOD BY AUTOMATED COUNT: 66.4 FL (ref 35.9–50)
ERYTHROCYTE [DISTWIDTH] IN BLOOD BY AUTOMATED COUNT: 69.1 FL (ref 35.9–50)
ERYTHROCYTE [DISTWIDTH] IN BLOOD BY AUTOMATED COUNT: 69.1 FL (ref 35.9–50)
ERYTHROCYTE [DISTWIDTH] IN BLOOD BY AUTOMATED COUNT: 86.8 FL (ref 35.9–50)
ERYTHROCYTE [DISTWIDTH] IN BLOOD BY AUTOMATED COUNT: 91.4 FL (ref 35.9–50)
ERYTHROCYTE [DISTWIDTH] IN BLOOD BY AUTOMATED COUNT: 94 FL (ref 35.9–50)
ERYTHROCYTE [SEDIMENTATION RATE] IN BLOOD BY WESTERGREN METHOD: 48 MM/HOUR (ref 0–30)
EST. AVERAGE GLUCOSE BLD GHB EST-MCNC: 126 MG/DL
FERRITIN SERPL-MCNC: 112.3 NG/ML (ref 10–291)
GIANT PLATELETS BLD QL SMEAR: NORMAL
GLOBULIN SER CALC-MCNC: 2.3 G/DL (ref 1.9–3.5)
GLOBULIN SER CALC-MCNC: 2.6 G/DL (ref 1.9–3.5)
GLOBULIN SER CALC-MCNC: 2.7 G/DL (ref 1.9–3.5)
GLOBULIN SER CALC-MCNC: 2.8 G/DL (ref 1.9–3.5)
GLOBULIN SER CALC-MCNC: 2.9 G/DL (ref 1.9–3.5)
GLOBULIN SER CALC-MCNC: 3 G/DL (ref 1.9–3.5)
GLOBULIN SER CALC-MCNC: 3.2 G/DL (ref 1.9–3.5)
GLOBULIN SER CALC-MCNC: 3.3 G/DL (ref 1.9–3.5)
GLOBULIN SER CALC-MCNC: 3.3 G/DL (ref 1.9–3.5)
GLOBULIN SER CALC-MCNC: 3.7 G/DL (ref 1.9–3.5)
GLOBULIN SER CALC-MCNC: 3.8 G/DL (ref 1.9–3.5)
GLOBULIN SER CALC-MCNC: 3.9 G/DL (ref 1.9–3.5)
GLOBULIN SER CALC-MCNC: 4.1 G/DL (ref 1.9–3.5)
GLOBULIN SER CALC-MCNC: 4.2 G/DL (ref 1.9–3.5)
GLOBULIN SER CALC-MCNC: 4.3 G/DL (ref 1.9–3.5)
GLOBULIN SER CALC-MCNC: 5.2 G/DL (ref 1.9–3.5)
GLUCOSE BLD-MCNC: 109 MG/DL (ref 65–99)
GLUCOSE BLD-MCNC: 122 MG/DL (ref 65–99)
GLUCOSE BLD-MCNC: 126 MG/DL (ref 65–99)
GLUCOSE BLD-MCNC: 143 MG/DL (ref 65–99)
GLUCOSE SERPL-MCNC: 104 MG/DL (ref 65–99)
GLUCOSE SERPL-MCNC: 108 MG/DL (ref 65–99)
GLUCOSE SERPL-MCNC: 114 MG/DL (ref 65–99)
GLUCOSE SERPL-MCNC: 114 MG/DL (ref 65–99)
GLUCOSE SERPL-MCNC: 117 MG/DL (ref 65–99)
GLUCOSE SERPL-MCNC: 117 MG/DL (ref 65–99)
GLUCOSE SERPL-MCNC: 128 MG/DL (ref 65–99)
GLUCOSE SERPL-MCNC: 133 MG/DL (ref 65–99)
GLUCOSE SERPL-MCNC: 140 MG/DL (ref 65–99)
GLUCOSE SERPL-MCNC: 141 MG/DL (ref 65–99)
GLUCOSE SERPL-MCNC: 152 MG/DL (ref 65–99)
GLUCOSE SERPL-MCNC: 162 MG/DL (ref 65–99)
GLUCOSE SERPL-MCNC: 163 MG/DL (ref 65–99)
GLUCOSE SERPL-MCNC: 195 MG/DL (ref 65–99)
GLUCOSE SERPL-MCNC: 198 MG/DL (ref 65–99)
GLUCOSE SERPL-MCNC: 90 MG/DL (ref 65–99)
GLUCOSE SERPL-MCNC: 90 MG/DL (ref 65–99)
GLUCOSE SERPL-MCNC: 92 MG/DL (ref 65–99)
GLUCOSE SERPL-MCNC: 96 MG/DL (ref 65–99)
GLUCOSE SERPL-MCNC: 99 MG/DL (ref 65–99)
GLUCOSE UR STRIP.AUTO-MCNC: 100 MG/DL
GLUCOSE UR STRIP.AUTO-MCNC: NEGATIVE MG/DL
GRAM STN SPEC: ABNORMAL
GRAM STN SPEC: NORMAL
HBA1C MFR BLD: 6 % (ref 0–5.6)
HCT VFR BLD AUTO: 20.7 % (ref 37–47)
HCT VFR BLD AUTO: 23.3 % (ref 37–47)
HCT VFR BLD AUTO: 23.4 % (ref 37–47)
HCT VFR BLD AUTO: 23.6 % (ref 37–47)
HCT VFR BLD AUTO: 23.6 % (ref 37–47)
HCT VFR BLD AUTO: 23.9 % (ref 37–47)
HCT VFR BLD AUTO: 24.2 % (ref 37–47)
HCT VFR BLD AUTO: 24.7 % (ref 37–47)
HCT VFR BLD AUTO: 25 % (ref 37–47)
HCT VFR BLD AUTO: 25.4 % (ref 37–47)
HCT VFR BLD AUTO: 25.4 % (ref 37–47)
HCT VFR BLD AUTO: 25.5 % (ref 37–47)
HCT VFR BLD AUTO: 26.2 % (ref 37–47)
HCT VFR BLD AUTO: 26.5 % (ref 37–47)
HCT VFR BLD AUTO: 26.5 % (ref 37–47)
HCT VFR BLD AUTO: 26.6 % (ref 37–47)
HCT VFR BLD AUTO: 27.2 % (ref 37–47)
HCT VFR BLD AUTO: 27.8 % (ref 37–47)
HCT VFR BLD AUTO: 27.9 % (ref 37–47)
HCT VFR BLD AUTO: 28.4 % (ref 37–47)
HCT VFR BLD AUTO: 29 % (ref 37–47)
HCT VFR BLD AUTO: 30.4 % (ref 37–47)
HCT VFR BLD AUTO: 30.8 % (ref 37–47)
HCT VFR BLD AUTO: 31.1 % (ref 37–47)
HCT VFR BLD AUTO: 31.4 % (ref 37–47)
HCT VFR BLD AUTO: 31.4 % (ref 37–47)
HCT VFR BLD AUTO: 40.6 % (ref 37–47)
HDLC SERPL-MCNC: 11 MG/DL
HDLC SERPL-MCNC: 22 MG/DL
HGB BLD-MCNC: 12.8 G/DL (ref 12–16)
HGB BLD-MCNC: 6.5 G/DL (ref 12–16)
HGB BLD-MCNC: 7 G/DL (ref 12–16)
HGB BLD-MCNC: 7.3 G/DL (ref 12–16)
HGB BLD-MCNC: 7.3 G/DL (ref 12–16)
HGB BLD-MCNC: 7.5 G/DL (ref 12–16)
HGB BLD-MCNC: 7.6 G/DL (ref 12–16)
HGB BLD-MCNC: 7.7 G/DL (ref 12–16)
HGB BLD-MCNC: 7.8 G/DL (ref 12–16)
HGB BLD-MCNC: 7.8 G/DL (ref 12–16)
HGB BLD-MCNC: 7.9 G/DL (ref 12–16)
HGB BLD-MCNC: 7.9 G/DL (ref 12–16)
HGB BLD-MCNC: 8 G/DL (ref 12–16)
HGB BLD-MCNC: 8.6 G/DL (ref 12–16)
HGB BLD-MCNC: 8.7 G/DL (ref 12–16)
HGB BLD-MCNC: 8.7 G/DL (ref 12–16)
HGB BLD-MCNC: 8.8 G/DL (ref 12–16)
HGB BLD-MCNC: 9 G/DL (ref 12–16)
HGB BLD-MCNC: 9 G/DL (ref 12–16)
HGB BLD-MCNC: 9.4 G/DL (ref 12–16)
HGB BLD-MCNC: 9.4 G/DL (ref 12–16)
HGB BLD-MCNC: 9.7 G/DL (ref 12–16)
HGB RETIC QN AUTO: 24.7 PG/CELL (ref 29–35)
HYPOCHROMIA BLD QL SMEAR: ABNORMAL
IMM GRANULOCYTES # BLD AUTO: 0.04 K/UL (ref 0–0.11)
IMM GRANULOCYTES # BLD AUTO: 0.05 K/UL (ref 0–0.11)
IMM GRANULOCYTES # BLD AUTO: 0.11 K/UL (ref 0–0.11)
IMM GRANULOCYTES # BLD AUTO: 0.12 K/UL (ref 0–0.11)
IMM GRANULOCYTES # BLD AUTO: 0.13 K/UL (ref 0–0.11)
IMM GRANULOCYTES # BLD AUTO: 0.13 K/UL (ref 0–0.11)
IMM GRANULOCYTES # BLD AUTO: 0.16 K/UL (ref 0–0.11)
IMM GRANULOCYTES # BLD AUTO: 0.17 K/UL (ref 0–0.11)
IMM GRANULOCYTES # BLD AUTO: 0.18 K/UL (ref 0–0.11)
IMM GRANULOCYTES # BLD AUTO: 0.21 K/UL (ref 0–0.11)
IMM GRANULOCYTES # BLD AUTO: 0.25 K/UL (ref 0–0.11)
IMM GRANULOCYTES # BLD AUTO: 0.26 K/UL (ref 0–0.11)
IMM GRANULOCYTES # BLD AUTO: 0.3 K/UL (ref 0–0.11)
IMM GRANULOCYTES # BLD AUTO: 0.32 K/UL (ref 0–0.11)
IMM GRANULOCYTES # BLD AUTO: 0.34 K/UL (ref 0–0.11)
IMM GRANULOCYTES # BLD AUTO: 0.37 K/UL (ref 0–0.11)
IMM GRANULOCYTES # BLD AUTO: 0.6 K/UL (ref 0–0.11)
IMM GRANULOCYTES # BLD AUTO: 0.93 K/UL (ref 0–0.11)
IMM GRANULOCYTES NFR BLD AUTO: 0.6 % (ref 0–0.9)
IMM GRANULOCYTES NFR BLD AUTO: 0.9 % (ref 0–0.9)
IMM GRANULOCYTES NFR BLD AUTO: 1.1 % (ref 0–0.9)
IMM GRANULOCYTES NFR BLD AUTO: 1.1 % (ref 0–0.9)
IMM GRANULOCYTES NFR BLD AUTO: 1.2 % (ref 0–0.9)
IMM GRANULOCYTES NFR BLD AUTO: 1.3 % (ref 0–0.9)
IMM GRANULOCYTES NFR BLD AUTO: 1.4 % (ref 0–0.9)
IMM GRANULOCYTES NFR BLD AUTO: 1.9 % (ref 0–0.9)
IMM GRANULOCYTES NFR BLD AUTO: 2 % (ref 0–0.9)
IMM GRANULOCYTES NFR BLD AUTO: 2.4 % (ref 0–0.9)
IMM GRANULOCYTES NFR BLD AUTO: 2.5 % (ref 0–0.9)
IMM GRANULOCYTES NFR BLD AUTO: 3.7 % (ref 0–0.9)
IMM GRANULOCYTES NFR BLD AUTO: 3.8 % (ref 0–0.9)
IMM GRANULOCYTES NFR BLD AUTO: 3.9 % (ref 0–0.9)
IMM GRANULOCYTES NFR BLD AUTO: 4.1 % (ref 0–0.9)
IMM GRANULOCYTES NFR BLD AUTO: 4.6 % (ref 0–0.9)
IMM GRANULOCYTES NFR BLD AUTO: 6.2 % (ref 0–0.9)
IMM GRANULOCYTES NFR BLD AUTO: 6.7 % (ref 0–0.9)
IMM RETICS NFR: 35.4 % (ref 9.3–17.4)
INR BLD: 1.3 (ref 0.9–1.2)
INR BLD: 1.5 (ref 0.9–1.2)
INR BLD: 1.6 (ref 0.9–1.2)
INR BLD: 1.7 (ref 0.9–1.2)
INR BLD: 1.7 (ref 0.9–1.2)
INR BLD: 1.9 (ref 0.9–1.2)
INR BLD: 2 (ref 0.9–1.2)
INR BLD: 2.2 (ref 0.9–1.2)
INR BLD: 2.4 (ref 0.9–1.2)
INR BLD: 2.7 (ref 0.9–1.2)
INR BLD: 2.7 (ref 0.9–1.2)
INR BLD: 2.9 (ref 0.9–1.2)
INR BLD: 3 (ref 0.9–1.2)
INR BLD: 3 (ref 0.9–1.2)
INR BLD: 3.1 (ref 0.9–1.2)
INR BLD: 3.2 (ref 0.9–1.2)
INR BLD: 3.2 (ref 0.9–1.2)
INR BLD: 3.3 (ref 0.9–1.2)
INR BLD: 3.9 (ref 0.9–1.2)
INR BLD: 4.2 (ref 0.9–1.2)
INR BLD: 5 (ref 0.9–1.2)
INR BLD: 5.3 (ref 0.9–1.2)
INR PPP: 1.27 (ref 0.87–1.13)
INR PPP: 1.29 (ref 0.87–1.13)
INR PPP: 1.3 (ref 2–3.5)
INR PPP: 1.3 (ref 2–3.5)
INR PPP: 1.43 (ref 0.87–1.13)
INR PPP: 1.44 (ref 0.87–1.13)
INR PPP: 1.44 (ref 0.87–1.13)
INR PPP: 1.5 (ref 2–3.5)
INR PPP: 1.6 (ref 0.87–1.13)
INR PPP: 1.7 (ref 2–3.5)
INR PPP: 1.7 (ref 2–3.5)
INR PPP: 1.8 (ref 2–3.5)
INR PPP: 1.9 (ref 2–3.5)
INR PPP: 2 (ref 2–3.5)
INR PPP: 2.2 (ref 2–3.5)
INR PPP: 2.2 (ref 2–3.5)
INR PPP: 2.4 (ref 2–3.5)
INR PPP: 2.47 (ref 0.87–1.13)
INR PPP: 2.51 (ref 0.87–1.13)
INR PPP: 2.7 (ref 2–3.5)
INR PPP: 2.7 (ref 2–3.5)
INR PPP: 2.82 (ref 0.87–1.13)
INR PPP: 2.9 (ref 2–3.5)
INR PPP: 3 (ref 0.87–1.13)
INR PPP: 3 (ref 2–3.5)
INR PPP: 3 (ref 2–3.5)
INR PPP: 3.1 (ref 2–3.5)
INR PPP: 3.2 (ref 2–3.5)
INR PPP: 3.2 (ref 2–3.5)
INR PPP: 3.3 (ref 2–3.5)
INR PPP: 3.9 (ref 2–3.5)
INR PPP: 4.2 (ref 2–3.5)
INR PPP: 5 (ref 2–3.5)
INR PPP: 5.3 (ref 2–3.5)
INR PPP: >8 (ref 2–3.5)
INR PPP: >8 (ref 2–3.5)
IRON SATN MFR SERPL: 5 % (ref 15–55)
IRON SATN MFR SERPL: ABNORMAL % (ref 15–55)
IRON SATN MFR SERPL: ABNORMAL % (ref 15–55)
IRON SERPL-MCNC: 13 UG/DL (ref 40–170)
IRON SERPL-MCNC: <10 UG/DL (ref 40–170)
IRON SERPL-MCNC: <10 UG/DL (ref 40–170)
KETONES UR STRIP.AUTO-MCNC: ABNORMAL MG/DL
KETONES UR STRIP.AUTO-MCNC: NEGATIVE MG/DL
LACTATE BLD-SCNC: 1.45 MMOL/L (ref 0.5–2)
LACTATE BLD-SCNC: 1.63 MMOL/L (ref 0.5–2)
LACTATE BLD-SCNC: 2.5 MMOL/L (ref 0.5–2)
LACTATE BLD-SCNC: 3.2 MMOL/L (ref 0.5–2)
LACTATE BLD-SCNC: 3.4 MMOL/L (ref 0.5–2)
LACTATE BLD-SCNC: 3.9 MMOL/L (ref 0.5–2)
LACTATE BLD-SCNC: 4.3 MMOL/L (ref 0.5–2)
LACTATE BLD-SCNC: 4.6 MMOL/L (ref 0.5–2)
LACTATE BLD-SCNC: 4.8 MMOL/L (ref 0.5–2)
LACTATE BLD-SCNC: 4.8 MMOL/L (ref 0.5–2)
LACTATE BLD-SCNC: 5.2 MMOL/L (ref 0.5–2)
LACTATE BLD-SCNC: 7.3 MMOL/L (ref 0.5–2)
LACTATE BLD-SCNC: 7.6 MMOL/L (ref 0.5–2)
LDH SERPL L TO P-CCNC: 535 U/L (ref 107–266)
LDLC SERPL CALC-MCNC: 155 MG/DL
LDLC SERPL CALC-MCNC: 81 MG/DL
LEUKOCYTE ESTERASE UR QL STRIP.AUTO: ABNORMAL
LG PLATELETS BLD QL SMEAR: NORMAL
LIPASE SERPL-CCNC: 25 U/L (ref 11–82)
LIPASE SERPL-CCNC: 27 U/L (ref 7–58)
LYMPHOCYTES # BLD AUTO: 0.17 K/UL (ref 1–4.8)
LYMPHOCYTES # BLD AUTO: 0.55 K/UL (ref 1–4.8)
LYMPHOCYTES # BLD AUTO: 0.79 K/UL (ref 1–4.8)
LYMPHOCYTES # BLD AUTO: 1.02 K/UL (ref 1–4.8)
LYMPHOCYTES # BLD AUTO: 1.09 K/UL (ref 1–4.8)
LYMPHOCYTES # BLD AUTO: 1.12 K/UL (ref 1–4.8)
LYMPHOCYTES # BLD AUTO: 1.14 K/UL (ref 1–4.8)
LYMPHOCYTES # BLD AUTO: 1.14 K/UL (ref 1–4.8)
LYMPHOCYTES # BLD AUTO: 1.25 K/UL (ref 1–4.8)
LYMPHOCYTES # BLD AUTO: 1.26 K/UL (ref 1–4.8)
LYMPHOCYTES # BLD AUTO: 1.27 K/UL (ref 1–4.8)
LYMPHOCYTES # BLD AUTO: 1.52 K/UL (ref 1–4.8)
LYMPHOCYTES # BLD AUTO: 1.58 K/UL (ref 1–4.8)
LYMPHOCYTES # BLD AUTO: 1.63 K/UL (ref 1–4.8)
LYMPHOCYTES # BLD AUTO: 1.64 K/UL (ref 1–4.8)
LYMPHOCYTES # BLD AUTO: 1.69 K/UL (ref 1–4.8)
LYMPHOCYTES # BLD AUTO: 1.91 K/UL (ref 1–4.8)
LYMPHOCYTES # BLD AUTO: 2.15 K/UL (ref 1–4.8)
LYMPHOCYTES # BLD AUTO: 2.57 K/UL (ref 1–4.8)
LYMPHOCYTES # BLD AUTO: 2.63 K/UL (ref 1–4.8)
LYMPHOCYTES # BLD AUTO: 2.77 K/UL (ref 1–4.8)
LYMPHOCYTES # BLD AUTO: 2.94 K/UL (ref 1–4.8)
LYMPHOCYTES NFR BLD: 10.6 % (ref 22–41)
LYMPHOCYTES NFR BLD: 11.8 % (ref 22–41)
LYMPHOCYTES NFR BLD: 11.9 % (ref 22–41)
LYMPHOCYTES NFR BLD: 12.1 % (ref 22–41)
LYMPHOCYTES NFR BLD: 12.6 % (ref 22–41)
LYMPHOCYTES NFR BLD: 13.8 % (ref 22–41)
LYMPHOCYTES NFR BLD: 14 % (ref 22–41)
LYMPHOCYTES NFR BLD: 15 % (ref 22–41)
LYMPHOCYTES NFR BLD: 18.1 % (ref 22–41)
LYMPHOCYTES NFR BLD: 19.3 % (ref 22–41)
LYMPHOCYTES NFR BLD: 20.1 % (ref 22–41)
LYMPHOCYTES NFR BLD: 21.2 % (ref 22–41)
LYMPHOCYTES NFR BLD: 22.2 % (ref 22–41)
LYMPHOCYTES NFR BLD: 22.4 % (ref 22–41)
LYMPHOCYTES NFR BLD: 23.5 % (ref 22–41)
LYMPHOCYTES NFR BLD: 3.6 % (ref 22–41)
LYMPHOCYTES NFR BLD: 38.7 % (ref 22–41)
LYMPHOCYTES NFR BLD: 40 % (ref 22–41)
LYMPHOCYTES NFR BLD: 41.5 % (ref 22–41)
LYMPHOCYTES NFR BLD: 6.1 % (ref 22–41)
LYMPHOCYTES NFR BLD: 62.5 % (ref 22–41)
LYMPHOCYTES NFR BLD: 7.7 % (ref 22–41)
MACROCYTES BLD QL SMEAR: ABNORMAL
MAGNESIUM SERPL-MCNC: 1.4 MG/DL (ref 1.5–2.5)
MAGNESIUM SERPL-MCNC: 1.6 MG/DL (ref 1.5–2.5)
MAGNESIUM SERPL-MCNC: 2 MG/DL (ref 1.5–2.5)
MAGNESIUM SERPL-MCNC: 2.2 MG/DL (ref 1.5–2.5)
MANUAL DIFF BLD: NORMAL
MCH RBC QN AUTO: 25 PG (ref 27–33)
MCH RBC QN AUTO: 25.1 PG (ref 27–33)
MCH RBC QN AUTO: 25.2 PG (ref 27–33)
MCH RBC QN AUTO: 25.2 PG (ref 27–33)
MCH RBC QN AUTO: 25.4 PG (ref 27–33)
MCH RBC QN AUTO: 25.5 PG (ref 27–33)
MCH RBC QN AUTO: 25.7 PG (ref 27–33)
MCH RBC QN AUTO: 25.8 PG (ref 27–33)
MCH RBC QN AUTO: 25.8 PG (ref 27–33)
MCH RBC QN AUTO: 25.9 PG (ref 27–33)
MCH RBC QN AUTO: 27.2 PG (ref 27–33)
MCH RBC QN AUTO: 27.6 PG (ref 27–33)
MCH RBC QN AUTO: 29.5 PG (ref 27–33)
MCH RBC QN AUTO: 30.6 PG (ref 27–33)
MCH RBC QN AUTO: 31.2 PG (ref 27–33)
MCH RBC QN AUTO: 31.4 PG (ref 27–33)
MCH RBC QN AUTO: 32.4 PG (ref 27–33)
MCH RBC QN AUTO: 33 PG (ref 27–33)
MCH RBC QN AUTO: 33.1 PG (ref 27–33)
MCH RBC QN AUTO: 33.3 PG (ref 27–33)
MCH RBC QN AUTO: 33.8 PG (ref 27–33)
MCH RBC QN AUTO: 33.8 PG (ref 27–33)
MCH RBC QN AUTO: 33.9 PG (ref 27–33)
MCH RBC QN AUTO: 34 PG (ref 27–33)
MCH RBC QN AUTO: 34 PG (ref 27–33)
MCHC RBC AUTO-ENTMCNC: 28.7 G/DL (ref 33.6–35)
MCHC RBC AUTO-ENTMCNC: 28.7 G/DL (ref 33.6–35)
MCHC RBC AUTO-ENTMCNC: 28.9 G/DL (ref 33.6–35)
MCHC RBC AUTO-ENTMCNC: 29.7 G/DL (ref 33.6–35)
MCHC RBC AUTO-ENTMCNC: 29.8 G/DL (ref 33.6–35)
MCHC RBC AUTO-ENTMCNC: 29.9 G/DL (ref 33.6–35)
MCHC RBC AUTO-ENTMCNC: 29.9 G/DL (ref 33.6–35)
MCHC RBC AUTO-ENTMCNC: 30 G/DL (ref 33.6–35)
MCHC RBC AUTO-ENTMCNC: 30.2 G/DL (ref 33.6–35)
MCHC RBC AUTO-ENTMCNC: 30.2 G/DL (ref 33.6–35)
MCHC RBC AUTO-ENTMCNC: 30.4 G/DL (ref 33.6–35)
MCHC RBC AUTO-ENTMCNC: 30.6 G/DL (ref 33.6–35)
MCHC RBC AUTO-ENTMCNC: 30.8 G/DL (ref 33.6–35)
MCHC RBC AUTO-ENTMCNC: 30.9 G/DL (ref 33.6–35)
MCHC RBC AUTO-ENTMCNC: 31.4 G/DL (ref 33.6–35)
MCHC RBC AUTO-ENTMCNC: 31.5 G/DL (ref 33.6–35)
MCHC RBC AUTO-ENTMCNC: 31.7 G/DL (ref 33.6–35)
MCHC RBC AUTO-ENTMCNC: 32.2 G/DL (ref 33.6–35)
MCHC RBC AUTO-ENTMCNC: 32.3 G/DL (ref 33.6–35)
MCHC RBC AUTO-ENTMCNC: 32.5 G/DL (ref 33.6–35)
MCHC RBC AUTO-ENTMCNC: 32.5 G/DL (ref 33.6–35)
MCHC RBC AUTO-ENTMCNC: 33 G/DL (ref 33.6–35)
MCHC RBC AUTO-ENTMCNC: 33.1 G/DL (ref 33.6–35)
MCV RBC AUTO: 102.1 FL (ref 81.4–97.8)
MCV RBC AUTO: 102.8 FL (ref 81.4–97.8)
MCV RBC AUTO: 103.3 FL (ref 81.4–97.8)
MCV RBC AUTO: 104.7 FL (ref 81.4–97.8)
MCV RBC AUTO: 104.9 FL (ref 81.4–97.8)
MCV RBC AUTO: 106.2 FL (ref 81.4–97.8)
MCV RBC AUTO: 106.9 FL (ref 81.4–97.8)
MCV RBC AUTO: 108 FL (ref 81.4–97.8)
MCV RBC AUTO: 83.4 FL (ref 81.4–97.8)
MCV RBC AUTO: 83.5 FL (ref 81.4–97.8)
MCV RBC AUTO: 83.8 FL (ref 81.4–97.8)
MCV RBC AUTO: 83.9 FL (ref 81.4–97.8)
MCV RBC AUTO: 85.8 FL (ref 81.4–97.8)
MCV RBC AUTO: 86.1 FL (ref 81.4–97.8)
MCV RBC AUTO: 86.5 FL (ref 81.4–97.8)
MCV RBC AUTO: 87.5 FL (ref 81.4–97.8)
MCV RBC AUTO: 87.7 FL (ref 81.4–97.8)
MCV RBC AUTO: 88.5 FL (ref 81.4–97.8)
MCV RBC AUTO: 88.9 FL (ref 81.4–97.8)
MCV RBC AUTO: 90.3 FL (ref 81.4–97.8)
MCV RBC AUTO: 93.6 FL (ref 81.4–97.8)
MCV RBC AUTO: 94.3 FL (ref 81.4–97.8)
MCV RBC AUTO: 96.5 FL (ref 81.4–97.8)
METAMYELOCYTES NFR BLD MANUAL: 1.7 %
MICRO URNS: ABNORMAL
MICROCYTES BLD QL SMEAR: ABNORMAL
MONOCYTES # BLD AUTO: 0.08 K/UL (ref 0–0.85)
MONOCYTES # BLD AUTO: 0.19 K/UL (ref 0–0.85)
MONOCYTES # BLD AUTO: 0.26 K/UL (ref 0–0.85)
MONOCYTES # BLD AUTO: 0.32 K/UL (ref 0–0.85)
MONOCYTES # BLD AUTO: 0.5 K/UL (ref 0–0.85)
MONOCYTES # BLD AUTO: 0.66 K/UL (ref 0–0.85)
MONOCYTES # BLD AUTO: 0.68 K/UL (ref 0–0.85)
MONOCYTES # BLD AUTO: 0.69 K/UL (ref 0–0.85)
MONOCYTES # BLD AUTO: 0.69 K/UL (ref 0–0.85)
MONOCYTES # BLD AUTO: 0.7 K/UL (ref 0–0.85)
MONOCYTES # BLD AUTO: 0.75 K/UL (ref 0–0.85)
MONOCYTES # BLD AUTO: 0.76 K/UL (ref 0–0.85)
MONOCYTES # BLD AUTO: 0.89 K/UL (ref 0–0.85)
MONOCYTES # BLD AUTO: 0.9 K/UL (ref 0–0.85)
MONOCYTES # BLD AUTO: 0.92 K/UL (ref 0–0.85)
MONOCYTES # BLD AUTO: 1.09 K/UL (ref 0–0.85)
MONOCYTES # BLD AUTO: 1.11 K/UL (ref 0–0.85)
MONOCYTES # BLD AUTO: 1.25 K/UL (ref 0–0.85)
MONOCYTES # BLD AUTO: 1.29 K/UL (ref 0–0.85)
MONOCYTES # BLD AUTO: 1.44 K/UL (ref 0–0.85)
MONOCYTES # BLD AUTO: 1.68 K/UL (ref 0–0.85)
MONOCYTES # BLD AUTO: 2.57 K/UL (ref 0–0.85)
MONOCYTES NFR BLD AUTO: 1.8 % (ref 0–13.4)
MONOCYTES NFR BLD AUTO: 11 % (ref 0–13.4)
MONOCYTES NFR BLD AUTO: 12.2 % (ref 0–13.4)
MONOCYTES NFR BLD AUTO: 13.6 % (ref 0–13.4)
MONOCYTES NFR BLD AUTO: 13.7 % (ref 0–13.4)
MONOCYTES NFR BLD AUTO: 14.5 % (ref 0–13.4)
MONOCYTES NFR BLD AUTO: 15.7 % (ref 0–13.4)
MONOCYTES NFR BLD AUTO: 17.1 % (ref 0–13.4)
MONOCYTES NFR BLD AUTO: 19.3 % (ref 0–13.4)
MONOCYTES NFR BLD AUTO: 4.6 % (ref 0–13.4)
MONOCYTES NFR BLD AUTO: 5.3 % (ref 0–13.4)
MONOCYTES NFR BLD AUTO: 5.7 % (ref 0–13.4)
MONOCYTES NFR BLD AUTO: 7.3 % (ref 0–13.4)
MONOCYTES NFR BLD AUTO: 7.5 % (ref 0–13.4)
MONOCYTES NFR BLD AUTO: 7.7 % (ref 0–13.4)
MONOCYTES NFR BLD AUTO: 7.8 % (ref 0–13.4)
MONOCYTES NFR BLD AUTO: 8.3 % (ref 0–13.4)
MONOCYTES NFR BLD AUTO: 8.5 % (ref 0–13.4)
MONOCYTES NFR BLD AUTO: 8.6 % (ref 0–13.4)
MONOCYTES NFR BLD AUTO: 8.7 % (ref 0–13.4)
MONOCYTES NFR BLD AUTO: 8.9 % (ref 0–13.4)
MONOCYTES NFR BLD AUTO: 9.8 % (ref 0–13.4)
MORPHOLOGY BLD-IMP: NORMAL
MYELOCYTES NFR BLD MANUAL: 0.9 %
MYELOCYTES NFR BLD MANUAL: 1.1 %
MYELOCYTES NFR BLD MANUAL: 5.3 %
NEUTROPHILS # BLD AUTO: 0.74 K/UL (ref 2–7.15)
NEUTROPHILS # BLD AUTO: 1.29 K/UL (ref 2–7.15)
NEUTROPHILS # BLD AUTO: 1.63 K/UL (ref 2–7.15)
NEUTROPHILS # BLD AUTO: 10.37 K/UL (ref 2–7.15)
NEUTROPHILS # BLD AUTO: 12.41 K/UL (ref 2–7.15)
NEUTROPHILS # BLD AUTO: 14.15 K/UL (ref 2–7.15)
NEUTROPHILS # BLD AUTO: 2.03 K/UL (ref 2–7.15)
NEUTROPHILS # BLD AUTO: 3.65 K/UL (ref 2–7.15)
NEUTROPHILS # BLD AUTO: 4.35 K/UL (ref 2–7.15)
NEUTROPHILS # BLD AUTO: 4.68 K/UL (ref 2–7.15)
NEUTROPHILS # BLD AUTO: 4.85 K/UL (ref 2–7.15)
NEUTROPHILS # BLD AUTO: 5.39 K/UL (ref 2–7.15)
NEUTROPHILS # BLD AUTO: 5.53 K/UL (ref 2–7.15)
NEUTROPHILS # BLD AUTO: 5.78 K/UL (ref 2–7.15)
NEUTROPHILS # BLD AUTO: 5.88 K/UL (ref 2–7.15)
NEUTROPHILS # BLD AUTO: 5.99 K/UL (ref 2–7.15)
NEUTROPHILS # BLD AUTO: 7.24 K/UL (ref 2–7.15)
NEUTROPHILS # BLD AUTO: 7.75 K/UL (ref 2–7.15)
NEUTROPHILS # BLD AUTO: 8.11 K/UL (ref 2–7.15)
NEUTROPHILS # BLD AUTO: 8.27 K/UL (ref 2–7.15)
NEUTROPHILS # BLD AUTO: 8.34 K/UL (ref 2–7.15)
NEUTROPHILS # BLD AUTO: 9.42 K/UL (ref 2–7.15)
NEUTROPHILS NFR BLD: 29.6 % (ref 44–72)
NEUTROPHILS NFR BLD: 37.8 % (ref 44–72)
NEUTROPHILS NFR BLD: 38.7 % (ref 44–72)
NEUTROPHILS NFR BLD: 42.6 % (ref 44–72)
NEUTROPHILS NFR BLD: 54.4 % (ref 44–72)
NEUTROPHILS NFR BLD: 58.9 % (ref 44–72)
NEUTROPHILS NFR BLD: 60.5 % (ref 44–72)
NEUTROPHILS NFR BLD: 60.8 % (ref 44–72)
NEUTROPHILS NFR BLD: 61.6 % (ref 44–72)
NEUTROPHILS NFR BLD: 66 % (ref 44–72)
NEUTROPHILS NFR BLD: 68.2 % (ref 44–72)
NEUTROPHILS NFR BLD: 71.3 % (ref 44–72)
NEUTROPHILS NFR BLD: 72.7 % (ref 44–72)
NEUTROPHILS NFR BLD: 74.4 % (ref 44–72)
NEUTROPHILS NFR BLD: 77.3 % (ref 44–72)
NEUTROPHILS NFR BLD: 77.6 % (ref 44–72)
NEUTROPHILS NFR BLD: 78 % (ref 44–72)
NEUTROPHILS NFR BLD: 79.6 % (ref 44–72)
NEUTROPHILS NFR BLD: 80.5 % (ref 44–72)
NEUTROPHILS NFR BLD: 84.6 % (ref 44–72)
NEUTROPHILS NFR BLD: 85.8 % (ref 44–72)
NEUTROPHILS NFR BLD: 94.6 % (ref 44–72)
NEUTS BAND NFR BLD MANUAL: 0.9 % (ref 0–10)
NEUTS BAND NFR BLD MANUAL: 1.1 % (ref 0–10)
NEUTS HYPERSEG BLD QL SMEAR: NORMAL
NEUTS HYPERSEG BLD QL SMEAR: NORMAL
NITRITE UR QL STRIP.AUTO: POSITIVE
NRBC # BLD AUTO: 0 K/UL
NRBC # BLD AUTO: 0.02 K/UL
NRBC # BLD AUTO: 0.02 K/UL
NRBC # BLD AUTO: 0.03 K/UL
NRBC # BLD AUTO: 0.03 K/UL
NRBC # BLD AUTO: 0.04 K/UL
NRBC # BLD AUTO: 0.05 K/UL
NRBC # BLD AUTO: 0.06 K/UL
NRBC # BLD AUTO: 0.07 K/UL
NRBC # BLD AUTO: 0.1 K/UL
NRBC # BLD AUTO: 0.1 K/UL
NRBC BLD-RTO: 0 /100 WBC
NRBC BLD-RTO: 0.2 /100 WBC
NRBC BLD-RTO: 0.4 /100 WBC
NRBC BLD-RTO: 0.5 /100 WBC
NRBC BLD-RTO: 0.6 /100 WBC
NRBC BLD-RTO: 0.7 /100 WBC
NRBC BLD-RTO: 0.8 /100 WBC
NRBC BLD-RTO: 1.1 /100 WBC
NRBC BLD-RTO: 1.4 /100 WBC
NRBC BLD-RTO: 1.5 /100 WBC
OVALOCYTES BLD QL SMEAR: NORMAL
PH UR STRIP.AUTO: 5.5 [PH]
PH UR STRIP.AUTO: 5.5 [PH]
PH UR STRIP.AUTO: 6 [PH]
PH UR STRIP.AUTO: 6.5 [PH]
PHOSPHATE SERPL-MCNC: 2.3 MG/DL (ref 2.5–4.5)
PLATELET # BLD AUTO: 137 K/UL (ref 164–446)
PLATELET # BLD AUTO: 151 K/UL (ref 164–446)
PLATELET # BLD AUTO: 184 K/UL (ref 164–446)
PLATELET # BLD AUTO: 187 K/UL (ref 164–446)
PLATELET # BLD AUTO: 187 K/UL (ref 164–446)
PLATELET # BLD AUTO: 232 K/UL (ref 164–446)
PLATELET # BLD AUTO: 236 K/UL (ref 164–446)
PLATELET # BLD AUTO: 240 K/UL (ref 164–446)
PLATELET # BLD AUTO: 241 K/UL (ref 164–446)
PLATELET # BLD AUTO: 255 K/UL (ref 164–446)
PLATELET # BLD AUTO: 258 K/UL (ref 164–446)
PLATELET # BLD AUTO: 286 K/UL (ref 164–446)
PLATELET # BLD AUTO: 366 K/UL (ref 164–446)
PLATELET # BLD AUTO: 378 K/UL (ref 164–446)
PLATELET # BLD AUTO: 482 K/UL (ref 164–446)
PLATELET # BLD AUTO: 543 K/UL (ref 164–446)
PLATELET # BLD AUTO: 605 K/UL (ref 164–446)
PLATELET # BLD AUTO: 606 K/UL (ref 164–446)
PLATELET # BLD AUTO: 607 K/UL (ref 164–446)
PLATELET # BLD AUTO: 653 K/UL (ref 164–446)
PLATELET # BLD AUTO: 691 K/UL (ref 164–446)
PLATELET # BLD AUTO: 698 K/UL (ref 164–446)
PLATELET # BLD AUTO: 737 K/UL (ref 164–446)
PLATELET # BLD AUTO: 759 K/UL (ref 164–446)
PLATELET # BLD AUTO: 97 K/UL (ref 164–446)
PLATELET BLD QL SMEAR: NORMAL
PMV BLD AUTO: 10.3 FL (ref 9–12.9)
PMV BLD AUTO: 10.6 FL (ref 9–12.9)
PMV BLD AUTO: 10.6 FL (ref 9–12.9)
PMV BLD AUTO: 10.7 FL (ref 9–12.9)
PMV BLD AUTO: 10.8 FL (ref 9–12.9)
PMV BLD AUTO: 10.8 FL (ref 9–12.9)
PMV BLD AUTO: 10.9 FL (ref 9–12.9)
PMV BLD AUTO: 11.1 FL (ref 9–12.9)
PMV BLD AUTO: 11.6 FL (ref 9–12.9)
PMV BLD AUTO: 11.6 FL (ref 9–12.9)
PMV BLD AUTO: 8.2 FL (ref 9–12.9)
PMV BLD AUTO: 8.5 FL (ref 9–12.9)
PMV BLD AUTO: 8.5 FL (ref 9–12.9)
PMV BLD AUTO: 8.7 FL (ref 9–12.9)
PMV BLD AUTO: 8.7 FL (ref 9–12.9)
PMV BLD AUTO: 8.8 FL (ref 9–12.9)
PMV BLD AUTO: 8.8 FL (ref 9–12.9)
PMV BLD AUTO: 8.9 FL (ref 9–12.9)
PMV BLD AUTO: 8.9 FL (ref 9–12.9)
PMV BLD AUTO: 9.1 FL (ref 9–12.9)
PMV BLD AUTO: 9.2 FL (ref 9–12.9)
PMV BLD AUTO: 9.3 FL (ref 9–12.9)
PMV BLD AUTO: 9.5 FL (ref 9–12.9)
PMV BLD AUTO: 9.7 FL (ref 9–12.9)
PMV BLD AUTO: 9.9 FL (ref 9–12.9)
POIKILOCYTOSIS BLD QL SMEAR: NORMAL
POLYCHROMASIA BLD QL SMEAR: NORMAL
POTASSIUM SERPL-SCNC: 3.6 MMOL/L (ref 3.6–5.5)
POTASSIUM SERPL-SCNC: 3.6 MMOL/L (ref 3.6–5.5)
POTASSIUM SERPL-SCNC: 3.7 MMOL/L (ref 3.6–5.5)
POTASSIUM SERPL-SCNC: 3.7 MMOL/L (ref 3.6–5.5)
POTASSIUM SERPL-SCNC: 3.8 MMOL/L (ref 3.6–5.5)
POTASSIUM SERPL-SCNC: 3.8 MMOL/L (ref 3.6–5.5)
POTASSIUM SERPL-SCNC: 3.9 MMOL/L (ref 3.6–5.5)
POTASSIUM SERPL-SCNC: 4 MMOL/L (ref 3.6–5.5)
POTASSIUM SERPL-SCNC: 4.1 MMOL/L (ref 3.6–5.5)
POTASSIUM SERPL-SCNC: 4.2 MMOL/L (ref 3.6–5.5)
POTASSIUM SERPL-SCNC: 4.2 MMOL/L (ref 3.6–5.5)
POTASSIUM SERPL-SCNC: 4.3 MMOL/L (ref 3.6–5.5)
POTASSIUM SERPL-SCNC: 4.3 MMOL/L (ref 3.6–5.5)
POTASSIUM SERPL-SCNC: 4.4 MMOL/L (ref 3.6–5.5)
POTASSIUM SERPL-SCNC: 4.5 MMOL/L (ref 3.6–5.5)
POTASSIUM SERPL-SCNC: 4.5 MMOL/L (ref 3.6–5.5)
POTASSIUM SERPL-SCNC: 4.6 MMOL/L (ref 3.6–5.5)
POTASSIUM SERPL-SCNC: 4.8 MMOL/L (ref 3.6–5.5)
PREALB SERPL-MCNC: 6 MG/DL (ref 18–38)
PREALB SERPL-MCNC: 7 MG/DL (ref 18–38)
PRODUCT TYPE UPROD: NORMAL
PROMYELOCYTES NFR BLD MANUAL: 1.1 %
PROT SERPL-MCNC: 4.7 G/DL (ref 6–8.2)
PROT SERPL-MCNC: 5 G/DL (ref 6–8.2)
PROT SERPL-MCNC: 5.3 G/DL (ref 6–8.2)
PROT SERPL-MCNC: 5.7 G/DL (ref 6–8.2)
PROT SERPL-MCNC: 5.9 G/DL (ref 6–8.2)
PROT SERPL-MCNC: 5.9 G/DL (ref 6–8.2)
PROT SERPL-MCNC: 6.1 G/DL (ref 6–8.2)
PROT SERPL-MCNC: 6.3 G/DL (ref 6–8.2)
PROT SERPL-MCNC: 6.5 G/DL (ref 6–8.2)
PROT SERPL-MCNC: 6.5 G/DL (ref 6–8.2)
PROT SERPL-MCNC: 6.6 G/DL (ref 6–8.2)
PROT SERPL-MCNC: 6.9 G/DL (ref 6–8.2)
PROT SERPL-MCNC: 7.4 G/DL (ref 6–8.2)
PROT SERPL-MCNC: 7.5 G/DL (ref 6–8.2)
PROT SERPL-MCNC: 7.6 G/DL (ref 6–8.2)
PROT SERPL-MCNC: 7.9 G/DL (ref 6–8.2)
PROT UR QL STRIP: 300 MG/DL
PROT UR QL STRIP: >=1000 MG/DL
PROT UR QL STRIP: >=1000 MG/DL
PROT UR QL STRIP: >=300 MG/DL
PROTHROMBIN TIME: 15.6 SEC (ref 12–14.6)
PROTHROMBIN TIME: 15.8 SEC (ref 12–14.6)
PROTHROMBIN TIME: 17.1 SEC (ref 12–14.6)
PROTHROMBIN TIME: 17.2 SEC (ref 12–14.6)
PROTHROMBIN TIME: 17.2 SEC (ref 12–14.6)
PROTHROMBIN TIME: 18.7 SEC (ref 12–14.6)
PROTHROMBIN TIME: 26.4 SEC (ref 12–14.6)
PROTHROMBIN TIME: 26.7 SEC (ref 12–14.6)
PROTHROMBIN TIME: 29.3 SEC (ref 12–14.6)
PROTHROMBIN TIME: 30.7 SEC (ref 12–14.6)
RBC # BLD AUTO: 1.95 M/UL (ref 4.2–5.4)
RBC # BLD AUTO: 2.25 M/UL (ref 4.2–5.4)
RBC # BLD AUTO: 2.34 M/UL (ref 4.2–5.4)
RBC # BLD AUTO: 2.39 M/UL (ref 4.2–5.4)
RBC # BLD AUTO: 2.47 M/UL (ref 4.2–5.4)
RBC # BLD AUTO: 2.47 M/UL (ref 4.2–5.4)
RBC # BLD AUTO: 2.53 M/UL (ref 4.2–5.4)
RBC # BLD AUTO: 2.53 M/UL (ref 4.2–5.4)
RBC # BLD AUTO: 2.54 M/UL (ref 4.2–5.4)
RBC # BLD AUTO: 2.61 M/UL (ref 4.2–5.4)
RBC # BLD AUTO: 2.63 M/UL (ref 4.2–5.4)
RBC # BLD AUTO: 2.75 M/UL (ref 4.2–5.4)
RBC # BLD AUTO: 2.88 M/UL (ref 4.2–5.4)
RBC # BLD AUTO: 2.9 M/UL (ref 4.2–5.4)
RBC # BLD AUTO: 2.9 M/UL (ref 4.2–5.4)
RBC # BLD AUTO: 3.03 M/UL (ref 4.2–5.4)
RBC # BLD AUTO: 3.04 M/UL (ref 4.2–5.4)
RBC # BLD AUTO: 3.1 M/UL (ref 4.2–5.4)
RBC # BLD AUTO: 3.29 M/UL (ref 4.2–5.4)
RBC # BLD AUTO: 3.37 M/UL (ref 4.2–5.4)
RBC # BLD AUTO: 3.5 M/UL (ref 4.2–5.4)
RBC # BLD AUTO: 3.55 M/UL (ref 4.2–5.4)
RBC # BLD AUTO: 3.63 M/UL (ref 4.2–5.4)
RBC # BLD AUTO: 3.64 M/UL (ref 4.2–5.4)
RBC # BLD AUTO: 4.64 M/UL (ref 4.2–5.4)
RBC # URNS HPF: >150 /HPF
RBC # URNS HPF: >150 /HPF
RBC # URNS HPF: ABNORMAL /HPF
RBC # URNS HPF: ABNORMAL /HPF
RBC BLD AUTO: PRESENT
RBC UR QL AUTO: ABNORMAL
RETICS # AUTO: 0.14 M/UL (ref 0.04–0.06)
RETICS/RBC NFR: 4.5 % (ref 0.8–2.1)
RH BLD: NORMAL
SCHISTOCYTES BLD QL SMEAR: NORMAL
SIGNIFICANT IND 70042: ABNORMAL
SIGNIFICANT IND 70042: NORMAL
SITE SITE: ABNORMAL
SITE SITE: NORMAL
SMUDGE CELLS BLD QL SMEAR: NORMAL
SODIUM SERPL-SCNC: 131 MMOL/L (ref 135–145)
SODIUM SERPL-SCNC: 132 MMOL/L (ref 135–145)
SODIUM SERPL-SCNC: 133 MMOL/L (ref 135–145)
SODIUM SERPL-SCNC: 134 MMOL/L (ref 135–145)
SODIUM SERPL-SCNC: 134 MMOL/L (ref 135–145)
SODIUM SERPL-SCNC: 135 MMOL/L (ref 135–145)
SODIUM SERPL-SCNC: 136 MMOL/L (ref 135–145)
SODIUM SERPL-SCNC: 136 MMOL/L (ref 135–145)
SODIUM SERPL-SCNC: 137 MMOL/L (ref 135–145)
SODIUM SERPL-SCNC: 137 MMOL/L (ref 135–145)
SODIUM SERPL-SCNC: 138 MMOL/L (ref 135–145)
SODIUM SERPL-SCNC: 140 MMOL/L (ref 135–145)
SOURCE SOURCE: ABNORMAL
SOURCE SOURCE: NORMAL
SP GR UR STRIP.AUTO: 1.02
SP GR UR STRIP.AUTO: 1.03
TARGETS BLD QL SMEAR: NORMAL
TIBC SERPL-MCNC: 263 UG/DL (ref 250–450)
TIBC SERPL-MCNC: 276 UG/DL (ref 250–450)
TIBC SERPL-MCNC: 298 UG/DL (ref 250–450)
TRANS CELLS #/AREA URNS HPF: ABNORMAL /HPF
TRIGL SERPL-MCNC: 214 MG/DL (ref 0–149)
TRIGL SERPL-MCNC: 219 MG/DL (ref 0–149)
TROPONIN I SERPL-MCNC: <0.01 NG/ML (ref 0–0.04)
TSH SERPL DL<=0.005 MIU/L-ACNC: 2.16 UIU/ML (ref 0.38–5.33)
TSH SERPL DL<=0.005 MIU/L-ACNC: 4.5 UIU/ML (ref 0.38–5.33)
UNIT STATUS USTAT: NORMAL
UROBILINOGEN UR STRIP.AUTO-MCNC: 0.2 MG/DL
UROBILINOGEN UR STRIP.AUTO-MCNC: 1 MG/DL
UROBILINOGEN UR STRIP.AUTO-MCNC: 1 MG/DL
VIT B12 SERPL-MCNC: 1359 PG/ML (ref 211–911)
WBC # BLD AUTO: 1.9 K/UL (ref 4.8–10.8)
WBC # BLD AUTO: 10.5 K/UL (ref 4.8–10.8)
WBC # BLD AUTO: 10.7 K/UL (ref 4.8–10.8)
WBC # BLD AUTO: 11.8 K/UL (ref 4.8–10.8)
WBC # BLD AUTO: 13.1 K/UL (ref 4.8–10.8)
WBC # BLD AUTO: 13.3 K/UL (ref 4.8–10.8)
WBC # BLD AUTO: 13.4 K/UL (ref 4.8–10.8)
WBC # BLD AUTO: 13.8 K/UL (ref 4.8–10.8)
WBC # BLD AUTO: 14.5 K/UL (ref 4.8–10.8)
WBC # BLD AUTO: 16.7 K/UL (ref 4.8–10.8)
WBC # BLD AUTO: 3.1 K/UL (ref 4.8–10.8)
WBC # BLD AUTO: 4 K/UL (ref 4.8–10.8)
WBC # BLD AUTO: 4.2 K/UL (ref 4.8–10.8)
WBC # BLD AUTO: 4.2 K/UL (ref 4.8–10.8)
WBC # BLD AUTO: 4.5 K/UL (ref 4.8–10.8)
WBC # BLD AUTO: 4.6 K/UL (ref 4.8–10.8)
WBC # BLD AUTO: 4.8 K/UL (ref 4.8–10.8)
WBC # BLD AUTO: 6.9 K/UL (ref 4.8–10.8)
WBC # BLD AUTO: 7.6 K/UL (ref 4.8–10.8)
WBC # BLD AUTO: 7.9 K/UL (ref 4.8–10.8)
WBC # BLD AUTO: 8 K/UL (ref 4.8–10.8)
WBC # BLD AUTO: 8.1 K/UL (ref 4.8–10.8)
WBC # BLD AUTO: 9.1 K/UL (ref 4.8–10.8)
WBC # BLD AUTO: 9.2 K/UL (ref 4.8–10.8)
WBC # BLD AUTO: 9.9 K/UL (ref 4.8–10.8)
WBC #/AREA URNS HPF: ABNORMAL /HPF

## 2018-01-01 PROCEDURE — 700102 HCHG RX REV CODE 250 W/ 637 OVERRIDE(OP): Performed by: HOSPITALIST

## 2018-01-01 PROCEDURE — 99213 OFFICE O/P EST LOW 20 MIN: CPT

## 2018-01-01 PROCEDURE — 87086 URINE CULTURE/COLONY COUNT: CPT

## 2018-01-01 PROCEDURE — 85610 PROTHROMBIN TIME: CPT

## 2018-01-01 PROCEDURE — A9270 NON-COVERED ITEM OR SERVICE: HCPCS | Performed by: INTERNAL MEDICINE

## 2018-01-01 PROCEDURE — 80048 BASIC METABOLIC PNL TOTAL CA: CPT

## 2018-01-01 PROCEDURE — 80053 COMPREHEN METABOLIC PANEL: CPT

## 2018-01-01 PROCEDURE — A9270 NON-COVERED ITEM OR SERVICE: HCPCS | Performed by: HOSPITALIST

## 2018-01-01 PROCEDURE — G0299 HHS/HOSPICE OF RN EA 15 MIN: HCPCS

## 2018-01-01 PROCEDURE — 700111 HCHG RX REV CODE 636 W/ 250 OVERRIDE (IP): Performed by: NURSE PRACTITIONER

## 2018-01-01 PROCEDURE — 83550 IRON BINDING TEST: CPT

## 2018-01-01 PROCEDURE — 85025 COMPLETE CBC W/AUTO DIFF WBC: CPT

## 2018-01-01 PROCEDURE — 700111 HCHG RX REV CODE 636 W/ 250 OVERRIDE (IP): Performed by: INTERNAL MEDICINE

## 2018-01-01 PROCEDURE — 99212 OFFICE O/P EST SF 10 MIN: CPT | Performed by: NURSE PRACTITIONER

## 2018-01-01 PROCEDURE — 85027 COMPLETE CBC AUTOMATED: CPT

## 2018-01-01 PROCEDURE — 86923 COMPATIBILITY TEST ELECTRIC: CPT

## 2018-01-01 PROCEDURE — A4212 NON CORING NEEDLE OR STYLET: HCPCS

## 2018-01-01 PROCEDURE — 36415 COLL VENOUS BLD VENIPUNCTURE: CPT

## 2018-01-01 PROCEDURE — 87205 SMEAR GRAM STAIN: CPT

## 2018-01-01 PROCEDURE — 700111 HCHG RX REV CODE 636 W/ 250 OVERRIDE (IP): Performed by: HOSPITALIST

## 2018-01-01 PROCEDURE — 71260 CT THORAX DX C+: CPT

## 2018-01-01 PROCEDURE — 99233 SBSQ HOSP IP/OBS HIGH 50: CPT | Performed by: HOSPITALIST

## 2018-01-01 PROCEDURE — 83615 LACTATE (LD) (LDH) ENZYME: CPT

## 2018-01-01 PROCEDURE — 700102 HCHG RX REV CODE 250 W/ 637 OVERRIDE(OP): Performed by: INTERNAL MEDICINE

## 2018-01-01 PROCEDURE — 160048 HCHG OR STATISTICAL LEVEL 1-5: Performed by: UROLOGY

## 2018-01-01 PROCEDURE — 84484 ASSAY OF TROPONIN QUANT: CPT

## 2018-01-01 PROCEDURE — 87077 CULTURE AEROBIC IDENTIFY: CPT

## 2018-01-01 PROCEDURE — G0378 HOSPITAL OBSERVATION PER HR: HCPCS

## 2018-01-01 PROCEDURE — S9126 HOSPICE CARE, IN THE HOME, P: HCPCS

## 2018-01-01 PROCEDURE — 82607 VITAMIN B-12: CPT

## 2018-01-01 PROCEDURE — 700105 HCHG RX REV CODE 258: Performed by: INTERNAL MEDICINE

## 2018-01-01 PROCEDURE — G8989 SELF CARE D/C STATUS: HCPCS | Mod: CI

## 2018-01-01 PROCEDURE — 85046 RETICYTE/HGB CONCENTRATE: CPT

## 2018-01-01 PROCEDURE — 86901 BLOOD TYPING SEROLOGIC RH(D): CPT

## 2018-01-01 PROCEDURE — 86850 RBC ANTIBODY SCREEN: CPT

## 2018-01-01 PROCEDURE — 80061 LIPID PANEL: CPT

## 2018-01-01 PROCEDURE — 700111 HCHG RX REV CODE 636 W/ 250 OVERRIDE (IP)

## 2018-01-01 PROCEDURE — 85730 THROMBOPLASTIN TIME PARTIAL: CPT

## 2018-01-01 PROCEDURE — 770004 HCHG ROOM/CARE - ONCOLOGY PRIVATE *

## 2018-01-01 PROCEDURE — G8978 MOBILITY CURRENT STATUS: HCPCS | Mod: CI

## 2018-01-01 PROCEDURE — 99233 SBSQ HOSP IP/OBS HIGH 50: CPT | Performed by: INTERNAL MEDICINE

## 2018-01-01 PROCEDURE — 501329 HCHG SET, CYSTO IRRIG Y TUR: Performed by: UROLOGY

## 2018-01-01 PROCEDURE — 96366 THER/PROPH/DIAG IV INF ADDON: CPT

## 2018-01-01 PROCEDURE — 700117 HCHG RX CONTRAST REV CODE 255: Performed by: HOSPITALIST

## 2018-01-01 PROCEDURE — 96374 THER/PROPH/DIAG INJ IV PUSH: CPT

## 2018-01-01 PROCEDURE — 99291 CRITICAL CARE FIRST HOUR: CPT

## 2018-01-01 PROCEDURE — 74177 CT ABD & PELVIS W/CONTRAST: CPT

## 2018-01-01 PROCEDURE — 99232 SBSQ HOSP IP/OBS MODERATE 35: CPT | Performed by: INTERNAL MEDICINE

## 2018-01-01 PROCEDURE — 160025 RECOVERY II MINUTES (STATS): Performed by: UROLOGY

## 2018-01-01 PROCEDURE — P9016 RBC LEUKOCYTES REDUCED: HCPCS

## 2018-01-01 PROCEDURE — 11100 HCHG BX LESION SUBQ MUCOUS SINGLE: CPT

## 2018-01-01 PROCEDURE — G8979 MOBILITY GOAL STATUS: HCPCS | Mod: CI

## 2018-01-01 PROCEDURE — 85007 BL SMEAR W/DIFF WBC COUNT: CPT

## 2018-01-01 PROCEDURE — 99239 HOSP IP/OBS DSCHRG MGMT >30: CPT | Performed by: INTERNAL MEDICINE

## 2018-01-01 PROCEDURE — 770006 HCHG ROOM/CARE - MED/SURG/GYN SEMI*

## 2018-01-01 PROCEDURE — 700111 HCHG RX REV CODE 636 W/ 250 OVERRIDE (IP): Performed by: FAMILY MEDICINE

## 2018-01-01 PROCEDURE — 700105 HCHG RX REV CODE 258: Performed by: HOSPITALIST

## 2018-01-01 PROCEDURE — 770020 HCHG ROOM/CARE - TELE (206)

## 2018-01-01 PROCEDURE — 88307 TISSUE EXAM BY PATHOLOGIST: CPT

## 2018-01-01 PROCEDURE — 85014 HEMATOCRIT: CPT

## 2018-01-01 PROCEDURE — 84134 ASSAY OF PREALBUMIN: CPT

## 2018-01-01 PROCEDURE — A4346 CATH INDW FOLEY 3 WAY: HCPCS | Performed by: UROLOGY

## 2018-01-01 PROCEDURE — 99211 OFF/OP EST MAY X REQ PHY/QHP: CPT | Performed by: NURSE PRACTITIONER

## 2018-01-01 PROCEDURE — 82728 ASSAY OF FERRITIN: CPT

## 2018-01-01 PROCEDURE — 770022 HCHG ROOM/CARE - ICU (200)

## 2018-01-01 PROCEDURE — 83735 ASSAY OF MAGNESIUM: CPT

## 2018-01-01 PROCEDURE — 99214 OFFICE O/P EST MOD 30 MIN: CPT | Performed by: INTERNAL MEDICINE

## 2018-01-01 PROCEDURE — 99217 PR OBSERVATION CARE DISCHARGE: CPT | Performed by: HOSPITALIST

## 2018-01-01 PROCEDURE — 700101 HCHG RX REV CODE 250

## 2018-01-01 PROCEDURE — 83540 ASSAY OF IRON: CPT

## 2018-01-01 PROCEDURE — 85652 RBC SED RATE AUTOMATED: CPT

## 2018-01-01 PROCEDURE — 99215 OFFICE O/P EST HI 40 MIN: CPT | Performed by: INTERNAL MEDICINE

## 2018-01-01 PROCEDURE — 160039 HCHG SURGERY MINUTES - EA ADDL 1 MIN LEVEL 3: Performed by: UROLOGY

## 2018-01-01 PROCEDURE — 84443 ASSAY THYROID STIM HORMONE: CPT

## 2018-01-01 PROCEDURE — 82962 GLUCOSE BLOOD TEST: CPT | Mod: 91

## 2018-01-01 PROCEDURE — 99211 OFF/OP EST MAY X REQ PHY/QHP: CPT | Mod: 25

## 2018-01-01 PROCEDURE — 71045 X-RAY EXAM CHEST 1 VIEW: CPT

## 2018-01-01 PROCEDURE — 160028 HCHG SURGERY MINUTES - 1ST 30 MINS LEVEL 3: Performed by: UROLOGY

## 2018-01-01 PROCEDURE — 83605 ASSAY OF LACTIC ACID: CPT

## 2018-01-01 PROCEDURE — 306780 HCHG STAT FOR TRANSFUSION PER CASE

## 2018-01-01 PROCEDURE — 97165 OT EVAL LOW COMPLEX 30 MIN: CPT

## 2018-01-01 PROCEDURE — 88305 TISSUE EXAM BY PATHOLOGIST: CPT

## 2018-01-01 PROCEDURE — 94760 N-INVAS EAR/PLS OXIMETRY 1: CPT

## 2018-01-01 PROCEDURE — 99205 OFFICE O/P NEW HI 60 MIN: CPT | Performed by: INTERNAL MEDICINE

## 2018-01-01 PROCEDURE — 86900 BLOOD TYPING SEROLOGIC ABO: CPT

## 2018-01-01 PROCEDURE — 99220 PR INITIAL OBSERVATION CARE,LEVL III: CPT | Performed by: HOSPITALIST

## 2018-01-01 PROCEDURE — G8988 SELF CARE GOAL STATUS: HCPCS | Mod: CI

## 2018-01-01 PROCEDURE — A9270 NON-COVERED ITEM OR SERVICE: HCPCS | Performed by: NURSE PRACTITIONER

## 2018-01-01 PROCEDURE — 99211 OFF/OP EST MAY X REQ PHY/QHP: CPT

## 2018-01-01 PROCEDURE — 87186 SC STD MICRODIL/AGAR DIL: CPT

## 2018-01-01 PROCEDURE — G8987 SELF CARE CURRENT STATUS: HCPCS | Mod: CI

## 2018-01-01 PROCEDURE — 36430 TRANSFUSION BLD/BLD COMPNT: CPT

## 2018-01-01 PROCEDURE — 700105 HCHG RX REV CODE 258

## 2018-01-01 PROCEDURE — 99213 OFFICE O/P EST LOW 20 MIN: CPT | Performed by: NURSE PRACTITIONER

## 2018-01-01 PROCEDURE — 86140 C-REACTIVE PROTEIN: CPT

## 2018-01-01 PROCEDURE — 700102 HCHG RX REV CODE 250 W/ 637 OVERRIDE(OP)

## 2018-01-01 PROCEDURE — P9047 ALBUMIN (HUMAN), 25%, 50ML: HCPCS | Performed by: INTERNAL MEDICINE

## 2018-01-01 PROCEDURE — 93010 ELECTROCARDIOGRAM REPORT: CPT | Performed by: INTERNAL MEDICINE

## 2018-01-01 PROCEDURE — 160009 HCHG ANES TIME/MIN: Performed by: UROLOGY

## 2018-01-01 PROCEDURE — 84100 ASSAY OF PHOSPHORUS: CPT

## 2018-01-01 PROCEDURE — 99239 HOSP IP/OBS DSCHRG MGMT >30: CPT | Performed by: HOSPITALIST

## 2018-01-01 PROCEDURE — 96367 TX/PROPH/DG ADDL SEQ IV INF: CPT

## 2018-01-01 PROCEDURE — 88312 SPECIAL STAINS GROUP 1: CPT

## 2018-01-01 PROCEDURE — 94640 AIRWAY INHALATION TREATMENT: CPT

## 2018-01-01 PROCEDURE — 700105 HCHG RX REV CODE 258: Performed by: NURSE PRACTITIONER

## 2018-01-01 PROCEDURE — 97161 PT EVAL LOW COMPLEX 20 MIN: CPT

## 2018-01-01 PROCEDURE — 99213 OFFICE O/P EST LOW 20 MIN: CPT | Mod: 25 | Performed by: PHYSICIAN ASSISTANT

## 2018-01-01 PROCEDURE — 160036 HCHG PACU - EA ADDL 30 MINS PHASE I: Performed by: UROLOGY

## 2018-01-01 PROCEDURE — 82378 CARCINOEMBRYONIC ANTIGEN: CPT

## 2018-01-01 PROCEDURE — 6650990 HC HOSPICE AND HOME CARE RX REV CODE 0250

## 2018-01-01 PROCEDURE — 97162 PT EVAL MOD COMPLEX 30 MIN: CPT

## 2018-01-01 PROCEDURE — 96365 THER/PROPH/DIAG IV INF INIT: CPT

## 2018-01-01 PROCEDURE — 17250 CHEM CAUT OF GRANLTJ TISSUE: CPT | Performed by: NURSE PRACTITIONER

## 2018-01-01 PROCEDURE — 500042 HCHG BAG, URINARY DRAINAGE (CLOSED): Performed by: UROLOGY

## 2018-01-01 PROCEDURE — 99231 SBSQ HOSP IP/OBS SF/LOW 25: CPT | Performed by: INTERNAL MEDICINE

## 2018-01-01 PROCEDURE — 99214 OFFICE O/P EST MOD 30 MIN: CPT | Performed by: NURSE PRACTITIONER

## 2018-01-01 PROCEDURE — 83690 ASSAY OF LIPASE: CPT

## 2018-01-01 PROCEDURE — 99245 OFF/OP CONSLTJ NEW/EST HI 55: CPT | Performed by: INTERNAL MEDICINE

## 2018-01-01 PROCEDURE — 700117 HCHG RX CONTRAST REV CODE 255: Performed by: INTERNAL MEDICINE

## 2018-01-01 PROCEDURE — 85018 HEMOGLOBIN: CPT

## 2018-01-01 PROCEDURE — 99212 OFFICE O/P EST SF 10 MIN: CPT

## 2018-01-01 PROCEDURE — 83036 HEMOGLOBIN GLYCOSYLATED A1C: CPT

## 2018-01-01 PROCEDURE — 81001 URINALYSIS AUTO W/SCOPE: CPT

## 2018-01-01 PROCEDURE — 87070 CULTURE OTHR SPECIMN AEROBIC: CPT

## 2018-01-01 PROCEDURE — 700101 HCHG RX REV CODE 250: Performed by: HOSPITALIST

## 2018-01-01 PROCEDURE — 160046 HCHG PACU - 1ST 60 MINS PHASE II: Performed by: UROLOGY

## 2018-01-01 PROCEDURE — A9270 NON-COVERED ITEM OR SERVICE: HCPCS

## 2018-01-01 PROCEDURE — G0155 HHCP-SVS OF CSW,EA 15 MIN: HCPCS

## 2018-01-01 PROCEDURE — 160002 HCHG RECOVERY MINUTES (STAT): Performed by: UROLOGY

## 2018-01-01 PROCEDURE — 82962 GLUCOSE BLOOD TEST: CPT

## 2018-01-01 PROCEDURE — 87015 SPECIMEN INFECT AGNT CONCNTJ: CPT

## 2018-01-01 PROCEDURE — 36591 DRAW BLOOD OFF VENOUS DEVICE: CPT

## 2018-01-01 PROCEDURE — 99285 EMERGENCY DEPT VISIT HI MDM: CPT

## 2018-01-01 PROCEDURE — G8978 MOBILITY CURRENT STATUS: HCPCS | Mod: CJ

## 2018-01-01 PROCEDURE — 85610 PROTHROMBIN TIME: CPT | Performed by: FAMILY MEDICINE

## 2018-01-01 PROCEDURE — 700105 HCHG RX REV CODE 258: Performed by: EMERGENCY MEDICINE

## 2018-01-01 PROCEDURE — 302084 SET,INFUSION NEEDLE 20 X 1": Performed by: HOSPITALIST

## 2018-01-01 PROCEDURE — A9270 NON-COVERED ITEM OR SERVICE: HCPCS | Performed by: EMERGENCY MEDICINE

## 2018-01-01 PROCEDURE — 99255 IP/OBS CONSLTJ NEW/EST HI 80: CPT | Performed by: INTERNAL MEDICINE

## 2018-01-01 PROCEDURE — 93005 ELECTROCARDIOGRAM TRACING: CPT | Performed by: UROLOGY

## 2018-01-01 PROCEDURE — 99999 PR NO CHARGE: CPT | Performed by: FAMILY MEDICINE

## 2018-01-01 PROCEDURE — 88342 IMHCHEM/IMCYTCHM 1ST ANTB: CPT

## 2018-01-01 PROCEDURE — 71046 X-RAY EXAM CHEST 2 VIEWS: CPT

## 2018-01-01 PROCEDURE — 87040 BLOOD CULTURE FOR BACTERIA: CPT

## 2018-01-01 PROCEDURE — 99211 OFF/OP EST MAY X REQ PHY/QHP: CPT | Performed by: FAMILY MEDICINE

## 2018-01-01 PROCEDURE — 87086 URINE CULTURE/COLONY COUNT: CPT | Mod: 91

## 2018-01-01 PROCEDURE — 500880 HCHG PACK, CYSTO W/SEP LEGGINGS: Performed by: UROLOGY

## 2018-01-01 PROCEDURE — 36593 DECLOT VASCULAR DEVICE: CPT

## 2018-01-01 PROCEDURE — 85025 COMPLETE CBC W/AUTO DIFF WBC: CPT | Mod: 91

## 2018-01-01 PROCEDURE — 11100 PR BIOPSY OF SKIN LESION: CPT | Performed by: NURSE PRACTITIONER

## 2018-01-01 PROCEDURE — 99232 SBSQ HOSP IP/OBS MODERATE 35: CPT | Performed by: HOSPITALIST

## 2018-01-01 PROCEDURE — G8987 SELF CARE CURRENT STATUS: HCPCS | Mod: CJ

## 2018-01-01 PROCEDURE — 99203 OFFICE O/P NEW LOW 30 MIN: CPT

## 2018-01-01 PROCEDURE — G0156 HHCP-SVS OF AIDE,EA 15 MIN: HCPCS

## 2018-01-01 PROCEDURE — 93005 ELECTROCARDIOGRAM TRACING: CPT | Performed by: EMERGENCY MEDICINE

## 2018-01-01 PROCEDURE — 82306 VITAMIN D 25 HYDROXY: CPT

## 2018-01-01 PROCEDURE — 700111 HCHG RX REV CODE 636 W/ 250 OVERRIDE (IP): Performed by: EMERGENCY MEDICINE

## 2018-01-01 PROCEDURE — 700102 HCHG RX REV CODE 250 W/ 637 OVERRIDE(OP): Performed by: NURSE PRACTITIONER

## 2018-01-01 PROCEDURE — 83605 ASSAY OF LACTIC ACID: CPT | Mod: 91

## 2018-01-01 PROCEDURE — 99223 1ST HOSP IP/OBS HIGH 75: CPT | Performed by: HOSPITALIST

## 2018-01-01 PROCEDURE — 97602 WOUND(S) CARE NON-SELECTIVE: CPT

## 2018-01-01 PROCEDURE — 30243N1 TRANSFUSION OF NONAUTOLOGOUS RED BLOOD CELLS INTO CENTRAL VEIN, PERCUTANEOUS APPROACH: ICD-10-PCS | Performed by: INTERNAL MEDICINE

## 2018-01-01 PROCEDURE — 700102 HCHG RX REV CODE 250 W/ 637 OVERRIDE(OP): Performed by: EMERGENCY MEDICINE

## 2018-01-01 PROCEDURE — 99223 1ST HOSP IP/OBS HIGH 75: CPT | Performed by: INTERNAL MEDICINE

## 2018-01-01 PROCEDURE — 88341 IMHCHEM/IMCYTCHM EA ADD ANTB: CPT

## 2018-01-01 PROCEDURE — 160035 HCHG PACU - 1ST 60 MINS PHASE I: Performed by: UROLOGY

## 2018-01-01 PROCEDURE — G8980 MOBILITY D/C STATUS: HCPCS | Mod: CI

## 2018-01-01 RX ORDER — OXYCODONE HYDROCHLORIDE 5 MG/1
5 TABLET ORAL
Status: DISCONTINUED | OUTPATIENT
Start: 2018-01-01 | End: 2018-01-01 | Stop reason: HOSPADM

## 2018-01-01 RX ORDER — MICONAZOLE NITRATE 20 MG/G
CREAM TOPICAL EVERY 6 HOURS
Status: DISCONTINUED | OUTPATIENT
Start: 2018-01-01 | End: 2018-01-01 | Stop reason: HOSPADM

## 2018-01-01 RX ORDER — WARFARIN SODIUM 6 MG/1
3-6 TABLET ORAL EVERY EVENING
COMMUNITY
End: 2018-01-01 | Stop reason: SDUPTHER

## 2018-01-01 RX ORDER — ACETAMINOPHEN 325 MG/1
650 TABLET ORAL PRN
Status: CANCELLED | OUTPATIENT
Start: 2018-01-01

## 2018-01-01 RX ORDER — 0.9 % SODIUM CHLORIDE 0.9 %
20 VIAL (ML) INJECTION PRN
Status: CANCELLED | OUTPATIENT
Start: 2018-01-01

## 2018-01-01 RX ORDER — FERROUS SULFATE 325(65) MG
325 TABLET ORAL 2 TIMES DAILY
Status: DISCONTINUED | OUTPATIENT
Start: 2018-01-01 | End: 2018-01-01 | Stop reason: HOSPADM

## 2018-01-01 RX ORDER — SULFAMETHOXAZOLE AND TRIMETHOPRIM 800; 160 MG/1; MG/1
TABLET ORAL
COMMUNITY
Start: 2018-01-01 | End: 2018-01-01

## 2018-01-01 RX ORDER — MAGNESIUM SULFATE HEPTAHYDRATE 40 MG/ML
2 INJECTION, SOLUTION INTRAVENOUS ONCE
Status: COMPLETED | OUTPATIENT
Start: 2018-01-01 | End: 2018-01-01

## 2018-01-01 RX ORDER — ONDANSETRON 2 MG/ML
8 INJECTION INTRAMUSCULAR; INTRAVENOUS ONCE
Status: COMPLETED | OUTPATIENT
Start: 2018-01-01 | End: 2018-01-01

## 2018-01-01 RX ORDER — FLUCONAZOLE 200 MG/1
200 TABLET ORAL DAILY
Status: COMPLETED | OUTPATIENT
Start: 2018-01-01 | End: 2018-01-01

## 2018-01-01 RX ORDER — LOSARTAN POTASSIUM 50 MG/1
12.5 TABLET ORAL
Status: DISCONTINUED | OUTPATIENT
Start: 2018-01-01 | End: 2018-01-01 | Stop reason: HOSPADM

## 2018-01-01 RX ORDER — SODIUM CHLORIDE 9 MG/ML
500 INJECTION, SOLUTION INTRAVENOUS ONCE
Status: CANCELLED | OUTPATIENT
Start: 2018-01-01 | End: 2018-01-01

## 2018-01-01 RX ORDER — 0.9 % SODIUM CHLORIDE 0.9 %
20 VIAL (ML) INJECTION PRN
Status: CANCELLED | OUTPATIENT
Start: 2019-05-14

## 2018-01-01 RX ORDER — LIDOCAINE HYDROCHLORIDE 10 MG/ML
INJECTION, SOLUTION INFILTRATION; PERINEURAL
Status: COMPLETED
Start: 2018-01-01 | End: 2018-01-01

## 2018-01-01 RX ORDER — SODIUM CHLORIDE 9 MG/ML
INJECTION, SOLUTION INTRAVENOUS CONTINUOUS
Status: DISCONTINUED | OUTPATIENT
Start: 2018-01-01 | End: 2018-01-01 | Stop reason: HOSPADM

## 2018-01-01 RX ORDER — POLYETHYLENE GLYCOL 3350 17 G/17G
1 POWDER, FOR SOLUTION ORAL
Status: DISCONTINUED | OUTPATIENT
Start: 2018-01-01 | End: 2018-01-01 | Stop reason: HOSPADM

## 2018-01-01 RX ORDER — LIDOCAINE HYDROCHLORIDE 10 MG/ML
20 INJECTION, SOLUTION INFILTRATION; PERINEURAL
Status: CANCELLED | OUTPATIENT
Start: 2018-01-01

## 2018-01-01 RX ORDER — WARFARIN SODIUM 1 MG/1
1 TABLET ORAL
Status: DISCONTINUED | OUTPATIENT
Start: 2018-01-01 | End: 2018-01-01

## 2018-01-01 RX ORDER — ALBUTEROL SULFATE 90 UG/1
2 AEROSOL, METERED RESPIRATORY (INHALATION) EVERY 4 HOURS PRN
Status: DISCONTINUED | OUTPATIENT
Start: 2018-01-01 | End: 2018-01-01 | Stop reason: HOSPADM

## 2018-01-01 RX ORDER — SODIUM CHLORIDE, SODIUM LACTATE, POTASSIUM CHLORIDE, CALCIUM CHLORIDE 600; 310; 30; 20 MG/100ML; MG/100ML; MG/100ML; MG/100ML
INJECTION, SOLUTION INTRAVENOUS CONTINUOUS
Status: DISCONTINUED | OUTPATIENT
Start: 2018-01-01 | End: 2018-01-01 | Stop reason: HOSPADM

## 2018-01-01 RX ORDER — SODIUM CHLORIDE 9 MG/ML
INJECTION, SOLUTION INTRAVENOUS CONTINUOUS
Status: ACTIVE | OUTPATIENT
Start: 2018-01-01 | End: 2018-01-01

## 2018-01-01 RX ORDER — ACETAMINOPHEN 325 MG/1
650 TABLET ORAL ONCE
Status: CANCELLED | OUTPATIENT
Start: 2018-01-01 | End: 2018-01-01

## 2018-01-01 RX ORDER — TRAZODONE HYDROCHLORIDE 100 MG/1
TABLET ORAL
Qty: 90 TAB | Refills: 0 | Status: SHIPPED | OUTPATIENT
Start: 2018-01-01 | End: 2018-01-01

## 2018-01-01 RX ORDER — PROMETHAZINE HYDROCHLORIDE 25 MG/1
12.5-25 TABLET ORAL EVERY 4 HOURS PRN
Status: DISCONTINUED | OUTPATIENT
Start: 2018-01-01 | End: 2018-01-01 | Stop reason: HOSPADM

## 2018-01-01 RX ORDER — LIDOCAINE HYDROCHLORIDE 10 MG/ML
20 INJECTION, SOLUTION INFILTRATION; PERINEURAL
Status: DISCONTINUED | OUTPATIENT
Start: 2018-01-01 | End: 2018-01-01 | Stop reason: HOSPADM

## 2018-01-01 RX ORDER — CEFDINIR 300 MG/1
CAPSULE ORAL
Status: ON HOLD | COMMUNITY
Start: 2018-01-01 | End: 2018-01-01

## 2018-01-01 RX ORDER — WARFARIN SODIUM 3 MG/1
6 TABLET ORAL
Status: DISCONTINUED | OUTPATIENT
Start: 2018-01-01 | End: 2018-01-01

## 2018-01-01 RX ORDER — LANOLIN ALCOHOL/MO/W.PET/CERES
325 CREAM (GRAM) TOPICAL 2 TIMES DAILY
Qty: 180 TAB | Refills: 1 | Status: SHIPPED | OUTPATIENT
Start: 2018-01-01

## 2018-01-01 RX ORDER — LOSARTAN POTASSIUM 25 MG/1
12.5 TABLET ORAL
COMMUNITY
End: 2018-01-01

## 2018-01-01 RX ORDER — FERROUS SULFATE 325(65) MG
325 TABLET ORAL 2 TIMES DAILY WITH MEALS
Status: DISCONTINUED | OUTPATIENT
Start: 2018-01-01 | End: 2018-01-01 | Stop reason: HOSPADM

## 2018-01-01 RX ORDER — WARFARIN SODIUM 1 MG/1
1-2 TABLET ORAL EVERY EVENING
Status: ON HOLD | COMMUNITY
End: 2018-01-01

## 2018-01-01 RX ORDER — MAGNESIUM SULFATE 1 G/100ML
1 INJECTION INTRAVENOUS ONCE
Status: COMPLETED | OUTPATIENT
Start: 2018-01-01 | End: 2018-01-01

## 2018-01-01 RX ORDER — CALCIUM PHOSPHATE
POWDER (GRAM) MISCELLANEOUS
Status: ON HOLD | COMMUNITY
End: 2018-01-01

## 2018-01-01 RX ORDER — SODIUM CHLORIDE, SODIUM LACTATE, POTASSIUM CHLORIDE, CALCIUM CHLORIDE 600; 310; 30; 20 MG/100ML; MG/100ML; MG/100ML; MG/100ML
500 INJECTION, SOLUTION INTRAVENOUS ONCE
Status: COMPLETED | OUTPATIENT
Start: 2018-01-01 | End: 2018-01-01

## 2018-01-01 RX ORDER — PROMETHAZINE HYDROCHLORIDE 25 MG/1
25 TABLET ORAL 3 TIMES DAILY PRN
Qty: 15 TAB | Refills: 0 | Status: ON HOLD | OUTPATIENT
Start: 2018-01-01 | End: 2018-01-01

## 2018-01-01 RX ORDER — AMOXICILLIN 250 MG
2 CAPSULE ORAL 2 TIMES DAILY
Status: DISCONTINUED | OUTPATIENT
Start: 2018-01-01 | End: 2018-01-01 | Stop reason: HOSPADM

## 2018-01-01 RX ORDER — WARFARIN SODIUM 1 MG/1
1 TABLET ORAL
Status: DISCONTINUED | OUTPATIENT
Start: 2018-01-01 | End: 2018-01-01 | Stop reason: HOSPADM

## 2018-01-01 RX ORDER — TRAZODONE HYDROCHLORIDE 50 MG/1
50 TABLET ORAL ONCE
Status: COMPLETED | OUTPATIENT
Start: 2018-01-01 | End: 2018-01-01

## 2018-01-01 RX ORDER — ACETAMINOPHEN 325 MG/1
650 TABLET ORAL ONCE
Status: COMPLETED | OUTPATIENT
Start: 2018-01-01 | End: 2018-01-01

## 2018-01-01 RX ORDER — SODIUM CHLORIDE 9 MG/ML
30 INJECTION, SOLUTION INTRAVENOUS
Status: DISCONTINUED | OUTPATIENT
Start: 2018-01-01 | End: 2018-01-01

## 2018-01-01 RX ORDER — WARFARIN SODIUM 1 MG/1
TABLET ORAL
Qty: 60 TAB | Refills: 1 | Status: SHIPPED | OUTPATIENT
Start: 2018-01-01 | End: 2018-01-01 | Stop reason: SDUPTHER

## 2018-01-01 RX ORDER — SODIUM CHLORIDE 9 MG/ML
1000 INJECTION, SOLUTION INTRAVENOUS ONCE
Status: COMPLETED | OUTPATIENT
Start: 2018-01-01 | End: 2018-01-01

## 2018-01-01 RX ORDER — BISACODYL 10 MG
10 SUPPOSITORY, RECTAL RECTAL
Status: DISCONTINUED | OUTPATIENT
Start: 2018-01-01 | End: 2018-01-01 | Stop reason: HOSPADM

## 2018-01-01 RX ORDER — ACETAMINOPHEN 325 MG/1
650 TABLET ORAL EVERY 6 HOURS PRN
Status: DISCONTINUED | OUTPATIENT
Start: 2018-01-01 | End: 2018-01-01 | Stop reason: HOSPADM

## 2018-01-01 RX ORDER — OXYCODONE HCL 5 MG/5 ML
SOLUTION, ORAL ORAL
Status: COMPLETED
Start: 2018-01-01 | End: 2018-01-01

## 2018-01-01 RX ORDER — HYDROCODONE BITARTRATE AND ACETAMINOPHEN 5; 325 MG/1; MG/1
1-2 TABLET ORAL EVERY 4 HOURS PRN
Status: DISCONTINUED | OUTPATIENT
Start: 2018-01-01 | End: 2018-01-01 | Stop reason: HOSPADM

## 2018-01-01 RX ORDER — OXYBUTYNIN CHLORIDE 5 MG/1
TABLET ORAL
COMMUNITY
End: 2018-01-01

## 2018-01-01 RX ORDER — FUROSEMIDE 20 MG/1
20 TABLET ORAL
COMMUNITY
End: 2018-01-01 | Stop reason: DRUGHIGH

## 2018-01-01 RX ORDER — LABETALOL HYDROCHLORIDE 5 MG/ML
10 INJECTION, SOLUTION INTRAVENOUS EVERY 4 HOURS PRN
Status: DISCONTINUED | OUTPATIENT
Start: 2018-01-01 | End: 2018-01-01 | Stop reason: HOSPADM

## 2018-01-01 RX ORDER — WARFARIN SODIUM 3 MG/1
6 TABLET ORAL
Status: COMPLETED | OUTPATIENT
Start: 2018-01-01 | End: 2018-01-01

## 2018-01-01 RX ORDER — HYDROCODONE BITARTRATE AND ACETAMINOPHEN 5; 325 MG/1; MG/1
1 TABLET ORAL EVERY 4 HOURS PRN
Qty: 12 TAB | Refills: 0 | Status: ON HOLD | OUTPATIENT
Start: 2018-01-01 | End: 2018-01-01

## 2018-01-01 RX ORDER — LOSARTAN POTASSIUM 25 MG/1
25 TABLET ORAL DAILY
Qty: 90 TAB | Refills: 0 | Status: ON HOLD | OUTPATIENT
Start: 2018-01-01 | End: 2018-01-01

## 2018-01-01 RX ORDER — WARFARIN SODIUM 3 MG/1
3 TABLET ORAL
Status: COMPLETED | OUTPATIENT
Start: 2018-01-01 | End: 2018-01-01

## 2018-01-01 RX ORDER — FEEDER CONTAINER WITH PUMP SET
1 EACH MISCELLANEOUS 2 TIMES DAILY
Qty: 60 CAN | Refills: 11 | Status: ON HOLD | OUTPATIENT
Start: 2018-01-01 | End: 2018-01-01

## 2018-01-01 RX ORDER — DIPHENHYDRAMINE HCL 25 MG
25 TABLET ORAL ONCE
Status: CANCELLED | OUTPATIENT
Start: 2018-01-01 | End: 2018-01-01

## 2018-01-01 RX ORDER — 0.9 % SODIUM CHLORIDE 0.9 %
20 VIAL (ML) INJECTION PRN
Status: CANCELLED | OUTPATIENT
Start: 2019-04-16

## 2018-01-01 RX ORDER — HYDROCODONE BITARTRATE AND ACETAMINOPHEN 5; 325 MG/1; MG/1
TABLET ORAL
Status: ON HOLD | COMMUNITY
End: 2018-01-01

## 2018-01-01 RX ORDER — LIDOCAINE HYDROCHLORIDE 10 MG/ML
0.5 INJECTION, SOLUTION INFILTRATION; PERINEURAL
Status: DISCONTINUED | OUTPATIENT
Start: 2018-01-01 | End: 2018-01-01 | Stop reason: HOSPADM

## 2018-01-01 RX ORDER — 0.9 % SODIUM CHLORIDE 0.9 %
20 VIAL (ML) INJECTION PRN
Status: CANCELLED | OUTPATIENT
Start: 2018-12-25

## 2018-01-01 RX ORDER — METOPROLOL TARTRATE 1 MG/ML
2.5 INJECTION, SOLUTION INTRAVENOUS
Status: DISCONTINUED | OUTPATIENT
Start: 2018-01-01 | End: 2018-01-01 | Stop reason: HOSPADM

## 2018-01-01 RX ORDER — ONDANSETRON 4 MG/1
4 TABLET, ORALLY DISINTEGRATING ORAL EVERY 4 HOURS PRN
Status: DISCONTINUED | OUTPATIENT
Start: 2018-01-01 | End: 2018-01-01 | Stop reason: HOSPADM

## 2018-01-01 RX ORDER — ALPRAZOLAM 0.25 MG/1
0.25 TABLET ORAL EVERY 6 HOURS PRN
Status: DISCONTINUED | OUTPATIENT
Start: 2018-01-01 | End: 2018-01-01 | Stop reason: HOSPADM

## 2018-01-01 RX ORDER — MIRTAZAPINE 15 MG/1
30 TABLET, FILM COATED ORAL NIGHTLY PRN
Status: DISCONTINUED | OUTPATIENT
Start: 2018-01-01 | End: 2018-01-01

## 2018-01-01 RX ORDER — FLUCONAZOLE 200 MG/1
200 TABLET ORAL DAILY
Status: DISCONTINUED | OUTPATIENT
Start: 2018-01-01 | End: 2018-01-01 | Stop reason: HOSPADM

## 2018-01-01 RX ORDER — PROMETHAZINE HYDROCHLORIDE 25 MG/1
12.5-25 SUPPOSITORY RECTAL EVERY 4 HOURS PRN
Status: DISCONTINUED | OUTPATIENT
Start: 2018-01-01 | End: 2018-01-01 | Stop reason: HOSPADM

## 2018-01-01 RX ORDER — SULFAMETHOXAZOLE AND TRIMETHOPRIM 800; 160 MG/1; MG/1
1 TABLET ORAL 2 TIMES DAILY
Qty: 20 TAB | Refills: 0 | Status: SHIPPED | OUTPATIENT
Start: 2018-01-01 | End: 2018-01-01

## 2018-01-01 RX ORDER — WARFARIN SODIUM 1 MG/1
TABLET ORAL
Qty: 60 TAB | Refills: 1 | Status: SHIPPED | OUTPATIENT
Start: 2018-01-01 | End: 2018-01-01

## 2018-01-01 RX ORDER — PHENAZOPYRIDINE HYDROCHLORIDE 200 MG/1
200 TABLET, FILM COATED ORAL 3 TIMES DAILY PRN
Qty: 12 TAB | Refills: 0 | Status: SHIPPED | OUTPATIENT
Start: 2018-01-01 | End: 2018-01-01

## 2018-01-01 RX ORDER — WARFARIN SODIUM 3 MG/1
3 TABLET ORAL
Status: DISCONTINUED | OUTPATIENT
Start: 2018-01-01 | End: 2018-01-01 | Stop reason: HOSPADM

## 2018-01-01 RX ORDER — DRONABINOL 5 MG/1
5 CAPSULE ORAL 4 TIMES DAILY
Status: DISCONTINUED | OUTPATIENT
Start: 2018-01-01 | End: 2018-01-01 | Stop reason: HOSPADM

## 2018-01-01 RX ORDER — CIPROFLOXACIN 500 MG/1
TABLET, FILM COATED ORAL
COMMUNITY
Start: 2018-01-01 | End: 2018-01-01

## 2018-01-01 RX ORDER — NITROFURANTOIN MACROCRYSTALS 50 MG/1
100 CAPSULE ORAL DAILY
Qty: 60 CAP | Refills: 0 | Status: SHIPPED | OUTPATIENT
Start: 2018-01-01 | End: 2018-01-01

## 2018-01-01 RX ORDER — CIPROFLOXACIN 500 MG/1
TABLET, FILM COATED ORAL
COMMUNITY
End: 2018-01-01

## 2018-01-01 RX ORDER — SODIUM CHLORIDE 9 MG/ML
30 INJECTION, SOLUTION INTRAVENOUS
Status: DISCONTINUED | OUTPATIENT
Start: 2018-01-01 | End: 2018-01-01 | Stop reason: HOSPADM

## 2018-01-01 RX ORDER — PROMETHAZINE HYDROCHLORIDE 12.5 MG/1
12.5-25 SUPPOSITORY RECTAL EVERY 4 HOURS PRN
Status: DISCONTINUED | OUTPATIENT
Start: 2018-01-01 | End: 2018-01-01 | Stop reason: HOSPADM

## 2018-01-01 RX ORDER — DEXTROSE MONOHYDRATE 25 G/50ML
25 INJECTION, SOLUTION INTRAVENOUS
Status: DISCONTINUED | OUTPATIENT
Start: 2018-01-01 | End: 2018-01-01 | Stop reason: HOSPADM

## 2018-01-01 RX ORDER — 0.9 % SODIUM CHLORIDE 0.9 %
20 VIAL (ML) INJECTION PRN
Status: CANCELLED | OUTPATIENT
Start: 2019-06-11

## 2018-01-01 RX ORDER — PHENAZOPYRIDINE HYDROCHLORIDE 100 MG/1
100 TABLET, FILM COATED ORAL
Qty: 90 TAB | Refills: 0 | Status: SHIPPED | OUTPATIENT
Start: 2018-01-01

## 2018-01-01 RX ORDER — FLUCONAZOLE 2 MG/ML
200 INJECTION, SOLUTION INTRAVENOUS EVERY 24 HOURS
Status: DISCONTINUED | OUTPATIENT
Start: 2018-01-01 | End: 2018-01-01

## 2018-01-01 RX ORDER — SODIUM CHLORIDE 9 MG/ML
INJECTION, SOLUTION INTRAVENOUS
Status: ACTIVE
Start: 2018-01-01 | End: 2018-01-01

## 2018-01-01 RX ORDER — ALBUMIN (HUMAN) 12.5 G/50ML
50 SOLUTION INTRAVENOUS ONCE
Status: COMPLETED | OUTPATIENT
Start: 2018-01-01 | End: 2018-01-01

## 2018-01-01 RX ORDER — DRONABINOL 5 MG/1
5 CAPSULE ORAL ONCE
Status: COMPLETED | OUTPATIENT
Start: 2018-01-01 | End: 2018-01-01

## 2018-01-01 RX ORDER — CEFUROXIME AXETIL 500 MG/1
TABLET ORAL
COMMUNITY
End: 2018-01-01

## 2018-01-01 RX ORDER — PHENAZOPYRIDINE HYDROCHLORIDE 200 MG/1
200 TABLET, FILM COATED ORAL
Status: DISCONTINUED | OUTPATIENT
Start: 2018-01-01 | End: 2018-01-01 | Stop reason: HOSPADM

## 2018-01-01 RX ORDER — 0.9 % SODIUM CHLORIDE 0.9 %
20 VIAL (ML) INJECTION PRN
Status: CANCELLED | OUTPATIENT
Start: 2019-03-19

## 2018-01-01 RX ORDER — 0.9 % SODIUM CHLORIDE 0.9 %
20 VIAL (ML) INJECTION PRN
Status: CANCELLED | OUTPATIENT
Start: 2018-11-27

## 2018-01-01 RX ORDER — SODIUM CHLORIDE 9 MG/ML
INJECTION, SOLUTION INTRAVENOUS
Status: COMPLETED
Start: 2018-01-01 | End: 2018-01-01

## 2018-01-01 RX ORDER — IPRATROPIUM BROMIDE AND ALBUTEROL SULFATE 2.5; .5 MG/3ML; MG/3ML
SOLUTION RESPIRATORY (INHALATION)
Status: DISCONTINUED
Start: 2018-01-01 | End: 2018-01-01

## 2018-01-01 RX ORDER — OXYCODONE HYDROCHLORIDE 5 MG/1
5 TABLET ORAL EVERY 6 HOURS PRN
Qty: 12 TAB | Refills: 0 | Status: SHIPPED | OUTPATIENT
Start: 2018-01-01 | End: 2018-01-01

## 2018-01-01 RX ORDER — WARFARIN SODIUM 2 MG/1
2 TABLET ORAL
Status: DISCONTINUED | OUTPATIENT
Start: 2018-01-01 | End: 2018-01-01

## 2018-01-01 RX ORDER — OXYCODONE HYDROCHLORIDE 5 MG/1
2.5 TABLET ORAL
Status: DISCONTINUED | OUTPATIENT
Start: 2018-01-01 | End: 2018-01-01 | Stop reason: HOSPADM

## 2018-01-01 RX ORDER — ALPRAZOLAM 0.25 MG/1
0.25 TABLET ORAL 3 TIMES DAILY PRN
Qty: 30 TAB | Refills: 0 | Status: SHIPPED | OUTPATIENT
Start: 2018-01-01 | End: 2018-01-01

## 2018-01-01 RX ORDER — GRANULES FOR ORAL 3 G/1
3 POWDER ORAL
Qty: 3 EACH | Refills: 1 | Status: ON HOLD | OUTPATIENT
Start: 2018-01-01 | End: 2018-01-01

## 2018-01-01 RX ORDER — SODIUM CHLORIDE 9 MG/ML
1000 INJECTION, SOLUTION INTRAVENOUS
Status: DISCONTINUED | OUTPATIENT
Start: 2018-01-01 | End: 2018-01-01

## 2018-01-01 RX ORDER — OXYCODONE AND ACETAMINOPHEN 7.5; 325 MG/1; MG/1
1 TABLET ORAL EVERY 4 HOURS PRN
Qty: 30 TAB | Refills: 0 | Status: SHIPPED | OUTPATIENT
Start: 2018-01-01 | End: 2018-01-01

## 2018-01-01 RX ORDER — SIMETHICONE 80 MG
80 TABLET,CHEWABLE ORAL 3 TIMES DAILY PRN
Status: DISCONTINUED | OUTPATIENT
Start: 2018-01-01 | End: 2018-01-01 | Stop reason: HOSPADM

## 2018-01-01 RX ORDER — LEVOFLOXACIN 500 MG/1
500 TABLET, FILM COATED ORAL DAILY
Qty: 10 TAB | Refills: 0 | Status: SHIPPED | OUTPATIENT
Start: 2018-01-01 | End: 2018-01-01

## 2018-01-01 RX ORDER — HEPARIN SODIUM 5000 [USP'U]/ML
5000 INJECTION, SOLUTION INTRAVENOUS; SUBCUTANEOUS EVERY 8 HOURS
Status: DISCONTINUED | OUTPATIENT
Start: 2018-01-01 | End: 2018-01-01

## 2018-01-01 RX ORDER — DIPHENHYDRAMINE HCL 25 MG
25 TABLET ORAL ONCE
Status: COMPLETED | OUTPATIENT
Start: 2018-01-01 | End: 2018-01-01

## 2018-01-01 RX ORDER — REGORAFENIB 40 MG/1
TABLET, FILM COATED ORAL
COMMUNITY
Start: 2018-01-01 | End: 2018-01-01

## 2018-01-01 RX ORDER — LOSARTAN POTASSIUM 25 MG/1
25 TABLET ORAL DAILY
Qty: 90 TAB | Refills: 0 | Status: SHIPPED | OUTPATIENT
Start: 2018-01-01 | End: 2018-01-01 | Stop reason: SDUPTHER

## 2018-01-01 RX ORDER — WARFARIN SODIUM 6 MG/1
TABLET ORAL
Status: ON HOLD | COMMUNITY
End: 2018-01-01

## 2018-01-01 RX ORDER — WARFARIN SODIUM 3 MG/1
3 TABLET ORAL DAILY
Status: ON HOLD | COMMUNITY
End: 2018-01-01

## 2018-01-01 RX ORDER — ERGOCALCIFEROL 1.25 MG/1
50000 CAPSULE ORAL
COMMUNITY
End: 2018-01-01

## 2018-01-01 RX ORDER — ATROPA BELLADONNA AND OPIUM 16.2; 6 MG/1; MG/1
SUPPOSITORY RECTAL
Status: COMPLETED
Start: 2018-01-01 | End: 2018-01-01

## 2018-01-01 RX ORDER — 0.9 % SODIUM CHLORIDE 0.9 %
20 VIAL (ML) INJECTION PRN
Status: CANCELLED | OUTPATIENT
Start: 2018-10-30

## 2018-01-01 RX ORDER — HYDROMORPHONE HYDROCHLORIDE 2 MG/ML
0.25 INJECTION, SOLUTION INTRAMUSCULAR; INTRAVENOUS; SUBCUTANEOUS
Status: DISCONTINUED | OUTPATIENT
Start: 2018-01-01 | End: 2018-01-01 | Stop reason: HOSPADM

## 2018-01-01 RX ORDER — WARFARIN SODIUM 6 MG/1
6 TABLET ORAL
Status: ON HOLD | COMMUNITY
End: 2018-01-01

## 2018-01-01 RX ORDER — ONDANSETRON 2 MG/ML
4 INJECTION INTRAMUSCULAR; INTRAVENOUS EVERY 4 HOURS PRN
Status: DISCONTINUED | OUTPATIENT
Start: 2018-01-01 | End: 2018-01-01 | Stop reason: HOSPADM

## 2018-01-01 RX ORDER — 0.9 % SODIUM CHLORIDE 0.9 %
20 VIAL (ML) INJECTION PRN
Status: CANCELLED | OUTPATIENT
Start: 2019-02-19

## 2018-01-01 RX ORDER — PHENAZOPYRIDINE HYDROCHLORIDE 200 MG/1
100 TABLET, FILM COATED ORAL
Status: DISCONTINUED | OUTPATIENT
Start: 2018-01-01 | End: 2018-01-01 | Stop reason: HOSPADM

## 2018-01-01 RX ORDER — ATROPA BELLADONNA AND OPIUM 16.2; 6 MG/1; MG/1
60 SUPPOSITORY RECTAL
Status: COMPLETED | OUTPATIENT
Start: 2018-01-01 | End: 2018-01-01

## 2018-01-01 RX ORDER — ACETAMINOPHEN 325 MG/1
1000 TABLET ORAL ONCE
Status: COMPLETED | OUTPATIENT
Start: 2018-01-01 | End: 2018-01-01

## 2018-01-01 RX ORDER — LEVOFLOXACIN 500 MG/1
500 TABLET, FILM COATED ORAL DAILY
Qty: 10 TAB | Refills: 0 | Status: ON HOLD | OUTPATIENT
Start: 2018-01-01 | End: 2018-01-01

## 2018-01-01 RX ORDER — ONDANSETRON 4 MG/1
4 TABLET, FILM COATED ORAL EVERY 4 HOURS PRN
Qty: 40 TAB | Refills: 1 | Status: SHIPPED | OUTPATIENT
Start: 2018-01-01 | End: 2018-01-01

## 2018-01-01 RX ORDER — TRAZODONE HYDROCHLORIDE 50 MG/1
100 TABLET ORAL
Status: DISCONTINUED | OUTPATIENT
Start: 2018-01-01 | End: 2018-01-01 | Stop reason: HOSPADM

## 2018-01-01 RX ORDER — OXYCODONE HYDROCHLORIDE AND ACETAMINOPHEN 5; 325 MG/1; MG/1
1 TABLET ORAL EVERY 4 HOURS PRN
Status: DISCONTINUED | OUTPATIENT
Start: 2018-01-01 | End: 2018-01-01 | Stop reason: HOSPADM

## 2018-01-01 RX ORDER — FERROUS SULFATE 325(65) MG
325 TABLET ORAL
Status: DISCONTINUED | OUTPATIENT
Start: 2018-01-01 | End: 2018-01-01 | Stop reason: HOSPADM

## 2018-01-01 RX ORDER — 0.9 % SODIUM CHLORIDE 0.9 %
20 VIAL (ML) INJECTION PRN
Status: CANCELLED | OUTPATIENT
Start: 2019-01-22

## 2018-01-01 RX ORDER — WARFARIN SODIUM 6 MG/1
3-6 TABLET ORAL EVERY EVENING
Qty: 90 TAB | Refills: 1 | Status: ON HOLD | OUTPATIENT
Start: 2018-01-01 | End: 2018-01-01

## 2018-01-01 RX ORDER — SODIUM CHLORIDE, SODIUM LACTATE, POTASSIUM CHLORIDE, CALCIUM CHLORIDE 600; 310; 30; 20 MG/100ML; MG/100ML; MG/100ML; MG/100ML
INJECTION, SOLUTION INTRAVENOUS CONTINUOUS
Status: ACTIVE | OUTPATIENT
Start: 2018-01-01 | End: 2018-01-01

## 2018-01-01 RX ORDER — PHENAZOPYRIDINE HYDROCHLORIDE 200 MG/1
200 TABLET, FILM COATED ORAL 3 TIMES DAILY PRN
Qty: 6 TAB | Refills: 0 | Status: SHIPPED | OUTPATIENT
Start: 2018-01-01 | End: 2018-01-01

## 2018-01-01 RX ORDER — OXYCODONE HYDROCHLORIDE 5 MG/1
2.5 TABLET ORAL EVERY 4 HOURS PRN
Status: DISCONTINUED | OUTPATIENT
Start: 2018-01-01 | End: 2018-01-01 | Stop reason: HOSPADM

## 2018-01-01 RX ORDER — SODIUM CHLORIDE 9 MG/ML
INJECTION, SOLUTION INTRAVENOUS CONTINUOUS
Status: DISCONTINUED | OUTPATIENT
Start: 2018-01-01 | End: 2018-01-01

## 2018-01-01 RX ORDER — MIRABEGRON 50 MG/1
TABLET, FILM COATED, EXTENDED RELEASE ORAL
Status: ON HOLD | COMMUNITY
Start: 2018-01-01 | End: 2018-01-01

## 2018-01-01 RX ORDER — DESMOPRESSIN ACETATE 150/SPRAY
1 AEROSOL, SPRAY WITH PUMP (EA) NASAL ONCE
COMMUNITY
End: 2018-01-01

## 2018-01-01 RX ORDER — NITROFURANTOIN MACROCRYSTALS 100 MG/1
CAPSULE ORAL
COMMUNITY
End: 2018-01-01

## 2018-01-01 RX ORDER — OXYCODONE HYDROCHLORIDE 15 MG/1
15 TABLET ORAL EVERY 8 HOURS PRN
Qty: 30 TAB | Refills: 0 | Status: SHIPPED | OUTPATIENT
Start: 2018-01-01 | End: 2018-01-01

## 2018-01-01 RX ORDER — GASTROSTOMY TUBE 18 FR
1 KIT MISCELLANEOUS DAILY
Qty: 30 CAN | Refills: 11 | Status: ON HOLD | OUTPATIENT
Start: 2018-01-01 | End: 2018-01-01

## 2018-01-01 RX ORDER — ACETAMINOPHEN 500 MG
500-1000 TABLET ORAL PRN
Status: DISCONTINUED | OUTPATIENT
Start: 2018-01-01 | End: 2018-01-01

## 2018-01-01 RX ORDER — LOSARTAN POTASSIUM 25 MG/1
25 TABLET ORAL DAILY
Status: DISCONTINUED | OUTPATIENT
Start: 2018-01-01 | End: 2018-01-01 | Stop reason: HOSPADM

## 2018-01-01 RX ORDER — IPRATROPIUM BROMIDE AND ALBUTEROL SULFATE 2.5; .5 MG/3ML; MG/3ML
3 SOLUTION RESPIRATORY (INHALATION)
Status: DISCONTINUED | OUTPATIENT
Start: 2018-01-01 | End: 2018-01-01 | Stop reason: HOSPADM

## 2018-01-01 RX ORDER — SODIUM CHLORIDE 9 MG/ML
500 INJECTION, SOLUTION INTRAVENOUS
Status: DISCONTINUED | OUTPATIENT
Start: 2018-01-01 | End: 2018-01-01 | Stop reason: HOSPADM

## 2018-01-01 RX ORDER — CEFTRIAXONE 1 G/1
1 INJECTION, POWDER, FOR SOLUTION INTRAMUSCULAR; INTRAVENOUS ONCE
Status: COMPLETED | OUTPATIENT
Start: 2018-01-01 | End: 2018-01-01

## 2018-01-01 RX ORDER — CEFUROXIME AXETIL 500 MG/1
500 TABLET ORAL 2 TIMES DAILY
Qty: 10 TAB | Refills: 0 | Status: SHIPPED | OUTPATIENT
Start: 2018-01-01 | End: 2018-01-01

## 2018-01-01 RX ADMIN — MICONAZOLE NITRATE: 20 CREAM TOPICAL at 12:16

## 2018-01-01 RX ADMIN — Medication 325 MG: at 17:42

## 2018-01-01 RX ADMIN — IOHEXOL 100 ML: 350 INJECTION, SOLUTION INTRAVENOUS at 16:38

## 2018-01-01 RX ADMIN — DRONABINOL 5 MG: 5 CAPSULE ORAL at 16:42

## 2018-01-01 RX ADMIN — STANDARDIZED SENNA CONCENTRATE AND DOCUSATE SODIUM 2 TABLET: 8.6; 5 TABLET ORAL at 17:49

## 2018-01-01 RX ADMIN — DIPHENHYDRAMINE HCL 25 MG: 25 TABLET ORAL at 12:05

## 2018-01-01 RX ADMIN — SODIUM CHLORIDE, PRESERVATIVE FREE 500 UNITS: 5 INJECTION INTRAVENOUS at 15:10

## 2018-01-01 RX ADMIN — ONDANSETRON 4 MG: 2 INJECTION INTRAMUSCULAR; INTRAVENOUS at 15:17

## 2018-01-01 RX ADMIN — DRONABINOL 5 MG: 5 CAPSULE ORAL at 09:10

## 2018-01-01 RX ADMIN — MEROPENEM 500 MG: 500 INJECTION, POWDER, FOR SOLUTION INTRAVENOUS at 12:17

## 2018-01-01 RX ADMIN — MEROPENEM 500 MG: 500 INJECTION, POWDER, FOR SOLUTION INTRAVENOUS at 11:47

## 2018-01-01 RX ADMIN — FERROUS SULFATE TAB 325 MG (65 MG ELEMENTAL FE) 325 MG: 325 (65 FE) TAB at 16:43

## 2018-01-01 RX ADMIN — PHENAZOPYRIDINE HYDROCHLORIDE 100 MG: 200 TABLET ORAL at 09:07

## 2018-01-01 RX ADMIN — HEPARIN 500 UNITS: 100 SYRINGE at 15:11

## 2018-01-01 RX ADMIN — LIDOCAINE HYDROCHLORIDE 0.5 ML: 10 INJECTION, SOLUTION INFILTRATION; PERINEURAL at 08:30

## 2018-01-01 RX ADMIN — MICONAZOLE NITRATE: 20 CREAM TOPICAL at 04:30

## 2018-01-01 RX ADMIN — ENOXAPARIN SODIUM 30 MG: 100 INJECTION SUBCUTANEOUS at 11:45

## 2018-01-01 RX ADMIN — Medication 325 MG: at 17:55

## 2018-01-01 RX ADMIN — HEPARIN 500 UNITS: 100 SYRINGE at 16:09

## 2018-01-01 RX ADMIN — IOHEXOL 50 ML: 240 INJECTION, SOLUTION INTRATHECAL; INTRAVASCULAR; INTRAVENOUS; ORAL at 07:45

## 2018-01-01 RX ADMIN — MICONAZOLE NITRATE: 20 CREAM TOPICAL at 16:44

## 2018-01-01 RX ADMIN — MICONAZOLE NITRATE: 20 CREAM TOPICAL at 22:30

## 2018-01-01 RX ADMIN — MEROPENEM 500 MG: 500 INJECTION, POWDER, FOR SOLUTION INTRAVENOUS at 17:42

## 2018-01-01 RX ADMIN — DRONABINOL 5 MG: 5 CAPSULE ORAL at 12:16

## 2018-01-01 RX ADMIN — STANDARDIZED SENNA CONCENTRATE AND DOCUSATE SODIUM 2 TABLET: 8.6; 5 TABLET ORAL at 18:10

## 2018-01-01 RX ADMIN — MEROPENEM 500 MG: 500 INJECTION, POWDER, FOR SOLUTION INTRAVENOUS at 12:15

## 2018-01-01 RX ADMIN — PIPERACILLIN AND TAZOBACTAM 3.38 G: 3; .375 INJECTION, POWDER, FOR SOLUTION INTRAVENOUS at 22:15

## 2018-01-01 RX ADMIN — HEPARIN 500 UNITS: 100 SYRINGE at 18:30

## 2018-01-01 RX ADMIN — ENOXAPARIN SODIUM 30 MG: 100 INJECTION SUBCUTANEOUS at 17:42

## 2018-01-01 RX ADMIN — LIDOCAINE HYDROCHLORIDE 0.5 ML: 10 INJECTION, SOLUTION INFILTRATION; PERINEURAL at 09:51

## 2018-01-01 RX ADMIN — STANDARDIZED SENNA CONCENTRATE AND DOCUSATE SODIUM 2 TABLET: 8.6; 5 TABLET ORAL at 04:56

## 2018-01-01 RX ADMIN — IPRATROPIUM BROMIDE AND ALBUTEROL SULFATE 3 ML: .5; 3 SOLUTION RESPIRATORY (INHALATION) at 00:24

## 2018-01-01 RX ADMIN — FERROUS SULFATE TAB 325 MG (65 MG ELEMENTAL FE) 325 MG: 325 (65 FE) TAB at 08:37

## 2018-01-01 RX ADMIN — STANDARDIZED SENNA CONCENTRATE AND DOCUSATE SODIUM 2 TABLET: 8.6; 5 TABLET ORAL at 16:42

## 2018-01-01 RX ADMIN — HYDROCODONE BITARTRATE AND ACETAMINOPHEN 1 TABLET: 5; 325 TABLET ORAL at 04:34

## 2018-01-01 RX ADMIN — DRONABINOL 5 MG: 5 CAPSULE ORAL at 17:19

## 2018-01-01 RX ADMIN — HYDROCODONE BITARTRATE AND ACETAMINOPHEN 1 TABLET: 5; 325 TABLET ORAL at 20:52

## 2018-01-01 RX ADMIN — FERROUS SULFATE TAB 325 MG (65 MG ELEMENTAL FE) 325 MG: 325 (65 FE) TAB at 22:13

## 2018-01-01 RX ADMIN — HYDROCODONE BITARTRATE AND ACETAMINOPHEN 1 TABLET: 5; 325 TABLET ORAL at 11:17

## 2018-01-01 RX ADMIN — WARFARIN SODIUM 6 MG: 3 TABLET ORAL at 17:24

## 2018-01-01 RX ADMIN — HEPARIN 500 UNITS: 100 SYRINGE at 14:58

## 2018-01-01 RX ADMIN — MICONAZOLE NITRATE: 20 CREAM TOPICAL at 05:32

## 2018-01-01 RX ADMIN — MICONAZOLE NITRATE: 20 CREAM TOPICAL at 12:07

## 2018-01-01 RX ADMIN — ACETAMINOPHEN 650 MG: 325 TABLET, FILM COATED ORAL at 03:28

## 2018-01-01 RX ADMIN — SODIUM BICARBONATE: 84 INJECTION, SOLUTION INTRAVENOUS at 02:38

## 2018-01-01 RX ADMIN — DRONABINOL 5 MG: 5 CAPSULE ORAL at 23:45

## 2018-01-01 RX ADMIN — MAGNESIUM SULFATE IN DEXTROSE 1 G: 10 INJECTION, SOLUTION INTRAVENOUS at 22:12

## 2018-01-01 RX ADMIN — MICONAZOLE NITRATE: 20 CREAM TOPICAL at 23:31

## 2018-01-01 RX ADMIN — FLUCONAZOLE 200 MG: 200 TABLET ORAL at 08:37

## 2018-01-01 RX ADMIN — FLUCONAZOLE 200 MG: 200 TABLET ORAL at 07:45

## 2018-01-01 RX ADMIN — MEROPENEM 500 MG: 500 INJECTION, POWDER, FOR SOLUTION INTRAVENOUS at 11:27

## 2018-01-01 RX ADMIN — DRONABINOL 5 MG: 5 CAPSULE ORAL at 17:47

## 2018-01-01 RX ADMIN — SODIUM CHLORIDE: 9 INJECTION, SOLUTION INTRAVENOUS at 22:08

## 2018-01-01 RX ADMIN — Medication 325 MG: at 08:12

## 2018-01-01 RX ADMIN — HEPARIN 500 UNITS: 100 SYRINGE at 11:30

## 2018-01-01 RX ADMIN — MEROPENEM 500 MG: 500 INJECTION, POWDER, FOR SOLUTION INTRAVENOUS at 05:07

## 2018-01-01 RX ADMIN — MICONAZOLE NITRATE: 20 CREAM TOPICAL at 04:41

## 2018-01-01 RX ADMIN — PIPERACILLIN AND TAZOBACTAM 3.38 G: 3; .375 INJECTION, POWDER, FOR SOLUTION INTRAVENOUS at 12:05

## 2018-01-01 RX ADMIN — Medication 325 MG: at 12:07

## 2018-01-01 RX ADMIN — SODIUM BICARBONATE: 84 INJECTION, SOLUTION INTRAVENOUS at 11:19

## 2018-01-01 RX ADMIN — DRONABINOL 5 MG: 5 CAPSULE ORAL at 08:20

## 2018-01-01 RX ADMIN — FERROUS SULFATE TAB 325 MG (65 MG ELEMENTAL FE) 325 MG: 325 (65 FE) TAB at 09:33

## 2018-01-01 RX ADMIN — PIPERACILLIN AND TAZOBACTAM 3.38 G: 3; .375 INJECTION, POWDER, FOR SOLUTION INTRAVENOUS at 20:53

## 2018-01-01 RX ADMIN — SODIUM CHLORIDE, POTASSIUM CHLORIDE, SODIUM LACTATE AND CALCIUM CHLORIDE 500 ML: 600; 310; 30; 20 INJECTION, SOLUTION INTRAVENOUS at 20:37

## 2018-01-01 RX ADMIN — MEROPENEM 500 MG: 500 INJECTION, POWDER, FOR SOLUTION INTRAVENOUS at 17:55

## 2018-01-01 RX ADMIN — HYDROCODONE BITARTRATE AND ACETAMINOPHEN 1 TABLET: 5; 325 TABLET ORAL at 14:08

## 2018-01-01 RX ADMIN — DRONABINOL 5 MG: 5 CAPSULE ORAL at 14:01

## 2018-01-01 RX ADMIN — MEROPENEM 500 MG: 500 INJECTION, POWDER, FOR SOLUTION INTRAVENOUS at 23:45

## 2018-01-01 RX ADMIN — MAGNESIUM SULFATE IN WATER 2 G: 40 INJECTION, SOLUTION INTRAVENOUS at 06:45

## 2018-01-01 RX ADMIN — MICONAZOLE NITRATE: 20 CREAM TOPICAL at 17:42

## 2018-01-01 RX ADMIN — MEROPENEM 500 MG: 500 INJECTION, POWDER, FOR SOLUTION INTRAVENOUS at 17:56

## 2018-01-01 RX ADMIN — FLUCONAZOLE 200 MG: 200 TABLET ORAL at 12:17

## 2018-01-01 RX ADMIN — MICONAZOLE NITRATE: 20 CREAM TOPICAL at 11:03

## 2018-01-01 RX ADMIN — MICONAZOLE NITRATE: 20 CREAM TOPICAL at 22:26

## 2018-01-01 RX ADMIN — OXYCODONE HYDROCHLORIDE 2.5 MG: 5 TABLET ORAL at 23:51

## 2018-01-01 RX ADMIN — ACETAMINOPHEN 650 MG: 325 TABLET, FILM COATED ORAL at 04:10

## 2018-01-01 RX ADMIN — DRONABINOL 5 MG: 5 CAPSULE ORAL at 08:25

## 2018-01-01 RX ADMIN — TRAZODONE HYDROCHLORIDE 100 MG: 50 TABLET ORAL at 22:48

## 2018-01-01 RX ADMIN — Medication 325 MG: at 09:13

## 2018-01-01 RX ADMIN — DRONABINOL 5 MG: 5 CAPSULE ORAL at 23:31

## 2018-01-01 RX ADMIN — MEROPENEM 500 MG: 500 INJECTION, POWDER, FOR SOLUTION INTRAVENOUS at 23:31

## 2018-01-01 RX ADMIN — MEROPENEM 500 MG: 500 INJECTION, POWDER, FOR SOLUTION INTRAVENOUS at 12:11

## 2018-01-01 RX ADMIN — ENOXAPARIN SODIUM 30 MG: 100 INJECTION SUBCUTANEOUS at 05:14

## 2018-01-01 RX ADMIN — OXYCODONE HYDROCHLORIDE 2.5 MG: 5 TABLET ORAL at 09:19

## 2018-01-01 RX ADMIN — LOSARTAN POTASSIUM 25 MG: 25 TABLET, FILM COATED ORAL at 08:06

## 2018-01-01 RX ADMIN — ERTAPENEM SODIUM 1000 MG: 1 INJECTION, POWDER, LYOPHILIZED, FOR SOLUTION INTRAMUSCULAR; INTRAVENOUS at 16:39

## 2018-01-01 RX ADMIN — OXYCODONE HYDROCHLORIDE 2.5 MG: 5 TABLET ORAL at 04:10

## 2018-01-01 RX ADMIN — PIPERACILLIN AND TAZOBACTAM 3.38 G: 3; .375 INJECTION, POWDER, FOR SOLUTION INTRAVENOUS at 02:00

## 2018-01-01 RX ADMIN — PIPERACILLIN AND TAZOBACTAM 3.38 G: 3; .375 INJECTION, POWDER, FOR SOLUTION INTRAVENOUS at 04:34

## 2018-01-01 RX ADMIN — IPRATROPIUM BROMIDE AND ALBUTEROL SULFATE 3 ML: .5; 3 SOLUTION RESPIRATORY (INHALATION) at 09:14

## 2018-01-01 RX ADMIN — MEROPENEM 500 MG: 500 INJECTION, POWDER, FOR SOLUTION INTRAVENOUS at 23:59

## 2018-01-01 RX ADMIN — Medication 325 MG: at 12:15

## 2018-01-01 RX ADMIN — SODIUM CHLORIDE: 9 INJECTION, SOLUTION INTRAVENOUS at 16:38

## 2018-01-01 RX ADMIN — OXYCODONE HYDROCHLORIDE 2.5 MG: 5 TABLET ORAL at 00:07

## 2018-01-01 RX ADMIN — SODIUM CHLORIDE: 9 INJECTION, SOLUTION INTRAVENOUS at 20:00

## 2018-01-01 RX ADMIN — SODIUM CHLORIDE 500 ML: 9 INJECTION, SOLUTION INTRAVENOUS at 19:28

## 2018-01-01 RX ADMIN — SODIUM CHLORIDE: 9 INJECTION, SOLUTION INTRAVENOUS at 13:17

## 2018-01-01 RX ADMIN — MICONAZOLE NITRATE: 20 CREAM TOPICAL at 22:08

## 2018-01-01 RX ADMIN — MICONAZOLE NITRATE: 20 CREAM TOPICAL at 00:02

## 2018-01-01 RX ADMIN — LIDOCAINE HYDROCHLORIDE 2 ML: 10 INJECTION, SOLUTION EPIDURAL; INFILTRATION; INTRACAUDAL; PERINEURAL at 15:18

## 2018-01-01 RX ADMIN — LIDOCAINE HYDROCHLORIDE 0.5 ML: 10 INJECTION, SOLUTION EPIDURAL; INFILTRATION; INTRACAUDAL; PERINEURAL at 14:12

## 2018-01-01 RX ADMIN — Medication 325 MG: at 09:10

## 2018-01-01 RX ADMIN — HYDROCODONE BITARTRATE AND ACETAMINOPHEN 1 TABLET: 5; 325 TABLET ORAL at 05:01

## 2018-01-01 RX ADMIN — Medication 325 MG: at 09:06

## 2018-01-01 RX ADMIN — STANDARDIZED SENNA CONCENTRATE AND DOCUSATE SODIUM 2 TABLET: 8.6; 5 TABLET ORAL at 04:41

## 2018-01-01 RX ADMIN — Medication 325 MG: at 11:22

## 2018-01-01 RX ADMIN — MEROPENEM 500 MG: 500 INJECTION, POWDER, FOR SOLUTION INTRAVENOUS at 00:13

## 2018-01-01 RX ADMIN — Medication 325 MG: at 12:17

## 2018-01-01 RX ADMIN — OXYCODONE HYDROCHLORIDE 2.5 MG: 5 TABLET ORAL at 20:30

## 2018-01-01 RX ADMIN — DRONABINOL 5 MG: 5 CAPSULE ORAL at 20:25

## 2018-01-01 RX ADMIN — PHENAZOPYRIDINE HYDROCHLORIDE 100 MG: 200 TABLET ORAL at 11:22

## 2018-01-01 RX ADMIN — OXYCODONE HYDROCHLORIDE 10 MG: 5 SOLUTION ORAL at 11:54

## 2018-01-01 RX ADMIN — FERROUS SULFATE TAB 325 MG (65 MG ELEMENTAL FE) 325 MG: 325 (65 FE) TAB at 07:45

## 2018-01-01 RX ADMIN — IOHEXOL 50 ML: 240 INJECTION, SOLUTION INTRATHECAL; INTRAVASCULAR; INTRAVENOUS; ORAL at 14:19

## 2018-01-01 RX ADMIN — WARFARIN SODIUM 3 MG: 3 TABLET ORAL at 18:42

## 2018-01-01 RX ADMIN — DIPHENHYDRAMINE HCL 25 MG: 25 TABLET ORAL at 11:23

## 2018-01-01 RX ADMIN — DRONABINOL 5 MG: 5 CAPSULE ORAL at 08:04

## 2018-01-01 RX ADMIN — SODIUM BICARBONATE: 84 INJECTION, SOLUTION INTRAVENOUS at 00:19

## 2018-01-01 RX ADMIN — HYDROCODONE BITARTRATE AND ACETAMINOPHEN 1 TABLET: 5; 325 TABLET ORAL at 05:40

## 2018-01-01 RX ADMIN — FENTANYL CITRATE 50 MCG: 50 INJECTION, SOLUTION INTRAMUSCULAR; INTRAVENOUS at 12:10

## 2018-01-01 RX ADMIN — DRONABINOL 5 MG: 5 CAPSULE ORAL at 14:44

## 2018-01-01 RX ADMIN — DRONABINOL 5 MG: 5 CAPSULE ORAL at 17:55

## 2018-01-01 RX ADMIN — FERROUS SULFATE TAB 325 MG (65 MG ELEMENTAL FE) 325 MG: 325 (65 FE) TAB at 20:18

## 2018-01-01 RX ADMIN — MEROPENEM 500 MG: 500 INJECTION, POWDER, FOR SOLUTION INTRAVENOUS at 04:56

## 2018-01-01 RX ADMIN — OXYCODONE HYDROCHLORIDE 2.5 MG: 5 TABLET ORAL at 00:18

## 2018-01-01 RX ADMIN — DRONABINOL 5 MG: 5 CAPSULE ORAL at 15:30

## 2018-01-01 RX ADMIN — OXYCODONE HYDROCHLORIDE 5 MG: 5 TABLET ORAL at 22:07

## 2018-01-01 RX ADMIN — MEROPENEM 500 MG: 500 INJECTION, POWDER, FOR SOLUTION INTRAVENOUS at 12:07

## 2018-01-01 RX ADMIN — SODIUM CHLORIDE, POTASSIUM CHLORIDE, SODIUM LACTATE AND CALCIUM CHLORIDE: 600; 310; 30; 20 INJECTION, SOLUTION INTRAVENOUS at 23:46

## 2018-01-01 RX ADMIN — PIPERACILLIN AND TAZOBACTAM 3.38 G: 3; .375 INJECTION, POWDER, FOR SOLUTION INTRAVENOUS at 13:08

## 2018-01-01 RX ADMIN — PHENAZOPYRIDINE HYDROCHLORIDE 100 MG: 200 TABLET ORAL at 17:48

## 2018-01-01 RX ADMIN — MICONAZOLE NITRATE: 20 CREAM TOPICAL at 17:57

## 2018-01-01 RX ADMIN — SODIUM CHLORIDE, SODIUM LACTATE, POTASSIUM CHLORIDE, CALCIUM CHLORIDE: 600; 310; 30; 20 INJECTION, SOLUTION INTRAVENOUS at 09:05

## 2018-01-01 RX ADMIN — HYDROCODONE BITARTRATE AND ACETAMINOPHEN 1 TABLET: 5; 325 TABLET ORAL at 11:25

## 2018-01-01 RX ADMIN — Medication 325 MG: at 12:16

## 2018-01-01 RX ADMIN — TRAZODONE HYDROCHLORIDE 50 MG: 50 TABLET ORAL at 22:49

## 2018-01-01 RX ADMIN — ALTEPLASE 2 MG: 2.2 INJECTION, POWDER, LYOPHILIZED, FOR SOLUTION INTRAVENOUS at 10:44

## 2018-01-01 RX ADMIN — MICONAZOLE NITRATE: 20 CREAM TOPICAL at 16:30

## 2018-01-01 RX ADMIN — STANDARDIZED SENNA CONCENTRATE AND DOCUSATE SODIUM 2 TABLET: 8.6; 5 TABLET ORAL at 17:41

## 2018-01-01 RX ADMIN — MEROPENEM 500 MG: 500 INJECTION, POWDER, FOR SOLUTION INTRAVENOUS at 17:41

## 2018-01-01 RX ADMIN — FERROUS SULFATE TAB 325 MG (65 MG ELEMENTAL FE) 325 MG: 325 (65 FE) TAB at 20:52

## 2018-01-01 RX ADMIN — DRONABINOL 5 MG: 5 CAPSULE ORAL at 21:20

## 2018-01-01 RX ADMIN — IOHEXOL 100 ML: 350 INJECTION, SOLUTION INTRAVENOUS at 09:52

## 2018-01-01 RX ADMIN — OXYCODONE HYDROCHLORIDE 2.5 MG: 5 TABLET ORAL at 11:55

## 2018-01-01 RX ADMIN — HEPARIN SODIUM 5000 UNITS: 5000 INJECTION, SOLUTION INTRAVENOUS; SUBCUTANEOUS at 01:16

## 2018-01-01 RX ADMIN — STANDARDIZED SENNA CONCENTRATE AND DOCUSATE SODIUM 2 TABLET: 8.6; 5 TABLET, FILM COATED ORAL at 07:44

## 2018-01-01 RX ADMIN — MEROPENEM 500 MG: 500 INJECTION, POWDER, FOR SOLUTION INTRAVENOUS at 14:44

## 2018-01-01 RX ADMIN — MICONAZOLE NITRATE: 20 CREAM TOPICAL at 00:13

## 2018-01-01 RX ADMIN — MEROPENEM 500 MG: 500 INJECTION, POWDER, FOR SOLUTION INTRAVENOUS at 06:11

## 2018-01-01 RX ADMIN — MEROPENEM 500 MG: 500 INJECTION, POWDER, FOR SOLUTION INTRAVENOUS at 05:23

## 2018-01-01 RX ADMIN — PIPERACILLIN AND TAZOBACTAM 3.38 G: 3; .375 INJECTION, POWDER, FOR SOLUTION INTRAVENOUS at 20:19

## 2018-01-01 RX ADMIN — PHENAZOPYRIDINE HYDROCHLORIDE 100 MG: 200 TABLET ORAL at 09:12

## 2018-01-01 RX ADMIN — MICONAZOLE NITRATE: 20 CREAM TOPICAL at 17:13

## 2018-01-01 RX ADMIN — MICONAZOLE NITRATE: 20 CREAM TOPICAL at 12:14

## 2018-01-01 RX ADMIN — HEPARIN 500 UNITS: 100 SYRINGE at 14:45

## 2018-01-01 RX ADMIN — OXYCODONE HYDROCHLORIDE 2.5 MG: 5 TABLET ORAL at 18:38

## 2018-01-01 RX ADMIN — WARFARIN SODIUM 3 MG: 3 TABLET ORAL at 17:42

## 2018-01-01 RX ADMIN — DRONABINOL 5 MG: 5 CAPSULE ORAL at 17:54

## 2018-01-01 RX ADMIN — HEPARIN 500 UNITS: 100 SYRINGE at 15:33

## 2018-01-01 RX ADMIN — LOSARTAN POTASSIUM 25 MG: 25 TABLET, FILM COATED ORAL at 07:45

## 2018-01-01 RX ADMIN — MEROPENEM 500 MG: 500 INJECTION, POWDER, FOR SOLUTION INTRAVENOUS at 05:32

## 2018-01-01 RX ADMIN — SODIUM CHLORIDE 1000 ML: 9 INJECTION, SOLUTION INTRAVENOUS at 20:07

## 2018-01-01 RX ADMIN — Medication 325 MG: at 16:43

## 2018-01-01 RX ADMIN — Medication 325 MG: at 17:47

## 2018-01-01 RX ADMIN — MICONAZOLE NITRATE: 20 CREAM TOPICAL at 17:20

## 2018-01-01 RX ADMIN — PHENAZOPYRIDINE HYDROCHLORIDE 100 MG: 200 TABLET ORAL at 17:19

## 2018-01-01 RX ADMIN — Medication 325 MG: at 08:04

## 2018-01-01 RX ADMIN — TRAZODONE HYDROCHLORIDE 50 MG: 50 TABLET ORAL at 00:34

## 2018-01-01 RX ADMIN — LIDOCAINE HYDROCHLORIDE 0.5 ML: 10 INJECTION, SOLUTION EPIDURAL; INFILTRATION; INTRACAUDAL; PERINEURAL at 09:51

## 2018-01-01 RX ADMIN — CEFTRIAXONE 1 G: 1 INJECTION, POWDER, FOR SOLUTION INTRAMUSCULAR; INTRAVENOUS at 14:38

## 2018-01-01 RX ADMIN — Medication 325 MG: at 08:25

## 2018-01-01 RX ADMIN — PIPERACILLIN AND TAZOBACTAM 3.38 G: 3; .375 INJECTION, POWDER, FOR SOLUTION INTRAVENOUS at 05:40

## 2018-01-01 RX ADMIN — VANCOMYCIN HYDROCHLORIDE 2400 MG: 100 INJECTION, POWDER, LYOPHILIZED, FOR SOLUTION INTRAVENOUS at 20:29

## 2018-01-01 RX ADMIN — MEROPENEM 500 MG: 500 INJECTION, POWDER, FOR SOLUTION INTRAVENOUS at 16:43

## 2018-01-01 RX ADMIN — ALBUMIN (HUMAN) 50 G: 0.25 INJECTION, SOLUTION INTRAVENOUS at 23:46

## 2018-01-01 RX ADMIN — MEROPENEM 500 MG: 500 INJECTION, POWDER, FOR SOLUTION INTRAVENOUS at 17:50

## 2018-01-01 RX ADMIN — Medication 325 MG: at 11:48

## 2018-01-01 RX ADMIN — FLUCONAZOLE 200 MG: 2 INJECTION INTRAVENOUS at 08:23

## 2018-01-01 RX ADMIN — OXYCODONE HYDROCHLORIDE 2.5 MG: 5 TABLET ORAL at 10:59

## 2018-01-01 RX ADMIN — MEROPENEM 500 MG: 500 INJECTION, POWDER, FOR SOLUTION INTRAVENOUS at 17:20

## 2018-01-01 RX ADMIN — LIDOCAINE HYDROCHLORIDE: 10 INJECTION, SOLUTION INFILTRATION; PERINEURAL at 14:25

## 2018-01-01 RX ADMIN — SODIUM CHLORIDE: 9 INJECTION, SOLUTION INTRAVENOUS at 19:08

## 2018-01-01 RX ADMIN — MEROPENEM 500 MG: 500 INJECTION, POWDER, FOR SOLUTION INTRAVENOUS at 00:00

## 2018-01-01 RX ADMIN — ACETAMINOPHEN 650 MG: 325 TABLET ORAL at 12:05

## 2018-01-01 RX ADMIN — HYDROCODONE BITARTRATE AND ACETAMINOPHEN 1 TABLET: 5; 325 TABLET ORAL at 00:34

## 2018-01-01 RX ADMIN — HYDROCODONE BITARTRATE AND ACETAMINOPHEN 2 TABLET: 5; 325 TABLET ORAL at 20:18

## 2018-01-01 RX ADMIN — MEROPENEM 500 MG: 500 INJECTION, POWDER, FOR SOLUTION INTRAVENOUS at 23:21

## 2018-01-01 RX ADMIN — MICONAZOLE NITRATE: 20 CREAM TOPICAL at 09:12

## 2018-01-01 RX ADMIN — DRONABINOL 5 MG: 5 CAPSULE ORAL at 11:02

## 2018-01-01 RX ADMIN — HYDROCODONE BITARTRATE AND ACETAMINOPHEN 1 TABLET: 5; 325 TABLET ORAL at 01:06

## 2018-01-01 RX ADMIN — OXYCODONE HYDROCHLORIDE 2.5 MG: 5 TABLET ORAL at 22:26

## 2018-01-01 RX ADMIN — STANDARDIZED SENNA CONCENTRATE AND DOCUSATE SODIUM 2 TABLET: 8.6; 5 TABLET, FILM COATED ORAL at 08:38

## 2018-01-01 RX ADMIN — DRONABINOL 5 MG: 5 CAPSULE ORAL at 17:41

## 2018-01-01 RX ADMIN — OXYCODONE HYDROCHLORIDE 2.5 MG: 5 TABLET ORAL at 22:49

## 2018-01-01 RX ADMIN — IRON SUCROSE 200 MG: 20 INJECTION, SOLUTION INTRAVENOUS at 14:28

## 2018-01-01 RX ADMIN — HYDROCODONE BITARTRATE AND ACETAMINOPHEN 1 TABLET: 5; 325 TABLET ORAL at 08:44

## 2018-01-01 RX ADMIN — MICONAZOLE NITRATE: 20 CREAM TOPICAL at 10:30

## 2018-01-01 RX ADMIN — HEPARIN 500 UNITS: 100 SYRINGE at 15:04

## 2018-01-01 RX ADMIN — IRON SUCROSE 200 MG: 20 INJECTION, SOLUTION INTRAVENOUS at 14:38

## 2018-01-01 RX ADMIN — DRONABINOL 5 MG: 5 CAPSULE ORAL at 12:07

## 2018-01-01 RX ADMIN — LOSARTAN POTASSIUM 25 MG: 25 TABLET, FILM COATED ORAL at 08:37

## 2018-01-01 RX ADMIN — MICONAZOLE NITRATE: 20 CREAM TOPICAL at 04:55

## 2018-01-01 RX ADMIN — OXYCODONE HYDROCHLORIDE 2.5 MG: 5 TABLET ORAL at 08:12

## 2018-01-01 RX ADMIN — IRON SUCROSE 200 MG: 20 INJECTION, SOLUTION INTRAVENOUS at 15:35

## 2018-01-01 RX ADMIN — MICONAZOLE NITRATE: 20 CREAM TOPICAL at 11:23

## 2018-01-01 RX ADMIN — MEROPENEM 500 MG: 500 INJECTION, POWDER, FOR SOLUTION INTRAVENOUS at 05:55

## 2018-01-01 RX ADMIN — STANDARDIZED SENNA CONCENTRATE AND DOCUSATE SODIUM 2 TABLET: 8.6; 5 TABLET ORAL at 05:23

## 2018-01-01 RX ADMIN — DRONABINOL 5 MG: 5 CAPSULE ORAL at 17:42

## 2018-01-01 RX ADMIN — SODIUM CHLORIDE: 9 INJECTION, SOLUTION INTRAVENOUS at 12:01

## 2018-01-01 RX ADMIN — FENTANYL CITRATE 50 MCG: 50 INJECTION, SOLUTION INTRAMUSCULAR; INTRAVENOUS at 11:51

## 2018-01-01 RX ADMIN — OXYCODONE HYDROCHLORIDE 2.5 MG: 5 TABLET ORAL at 03:15

## 2018-01-01 RX ADMIN — IRON SUCROSE 200 MG: 20 INJECTION, SOLUTION INTRAVENOUS at 14:25

## 2018-01-01 RX ADMIN — Medication 325 MG: at 17:19

## 2018-01-01 RX ADMIN — IRON SUCROSE 200 MG: 20 INJECTION, SOLUTION INTRAVENOUS at 14:59

## 2018-01-01 RX ADMIN — MEROPENEM 500 MG: 500 INJECTION, POWDER, FOR SOLUTION INTRAVENOUS at 01:59

## 2018-01-01 RX ADMIN — MEROPENEM 500 MG: 500 INJECTION, POWDER, FOR SOLUTION INTRAVENOUS at 07:09

## 2018-01-01 RX ADMIN — ALBUMIN (HUMAN) 50 G: 0.25 INJECTION, SOLUTION INTRAVENOUS at 05:14

## 2018-01-01 RX ADMIN — WARFARIN SODIUM 1 MG: 1 TABLET ORAL at 18:30

## 2018-01-01 RX ADMIN — ACETAMINOPHEN 650 MG: 325 TABLET, FILM COATED ORAL at 11:22

## 2018-01-01 RX ADMIN — Medication 0.5 ML: at 14:12

## 2018-01-01 RX ADMIN — IOHEXOL 100 ML: 350 INJECTION, SOLUTION INTRAVENOUS at 14:19

## 2018-01-01 RX ADMIN — MAGNESIUM SULFATE IN WATER 2 G: 40 INJECTION, SOLUTION INTRAVENOUS at 15:31

## 2018-01-01 RX ADMIN — WARFARIN SODIUM 6 MG: 3 TABLET ORAL at 22:14

## 2018-01-01 RX ADMIN — DRONABINOL 5 MG: 5 CAPSULE ORAL at 12:31

## 2018-01-01 RX ADMIN — ACETAMINOPHEN 975 MG: 325 TABLET, FILM COATED ORAL at 19:31

## 2018-01-01 RX ADMIN — Medication 325 MG: at 14:44

## 2018-01-01 RX ADMIN — STANDARDIZED SENNA CONCENTRATE AND DOCUSATE SODIUM 2 TABLET: 8.6; 5 TABLET ORAL at 17:19

## 2018-01-01 RX ADMIN — FERROUS SULFATE TAB 325 MG (65 MG ELEMENTAL FE) 325 MG: 325 (65 FE) TAB at 08:07

## 2018-01-01 RX ADMIN — CEFEPIME 2 G: 2 INJECTION, POWDER, FOR SOLUTION INTRAVENOUS at 20:05

## 2018-01-01 RX ADMIN — ATROPA BELLADONNA AND OPIUM 60 MG: 16.2; 6 SUPPOSITORY RECTAL at 12:30

## 2018-01-01 RX ADMIN — SODIUM CHLORIDE: 9 INJECTION, SOLUTION INTRAVENOUS at 08:53

## 2018-01-01 RX ADMIN — STANDARDIZED SENNA CONCENTRATE AND DOCUSATE SODIUM 2 TABLET: 8.6; 5 TABLET, FILM COATED ORAL at 20:18

## 2018-01-01 RX ADMIN — Medication 325 MG: at 17:56

## 2018-01-01 RX ADMIN — PIPERACILLIN AND TAZOBACTAM: 3; .375 INJECTION, POWDER, LYOPHILIZED, FOR SOLUTION INTRAVENOUS; PARENTERAL at 22:50

## 2018-01-01 RX ADMIN — DRONABINOL 5 MG: 5 CAPSULE ORAL at 08:12

## 2018-01-01 RX ADMIN — MEROPENEM 500 MG: 500 INJECTION, POWDER, FOR SOLUTION INTRAVENOUS at 05:29

## 2018-01-01 RX ADMIN — DRONABINOL 5 MG: 5 CAPSULE ORAL at 20:29

## 2018-01-01 RX ADMIN — DRONABINOL 5 MG: 5 CAPSULE ORAL at 09:13

## 2018-01-01 RX ADMIN — ONDANSETRON 4 MG: 2 INJECTION INTRAMUSCULAR; INTRAVENOUS at 09:17

## 2018-01-01 RX ADMIN — HYDROCODONE BITARTRATE AND ACETAMINOPHEN 1 TABLET: 5; 325 TABLET ORAL at 15:19

## 2018-01-01 RX ADMIN — ONDANSETRON 8 MG: 2 INJECTION INTRAMUSCULAR; INTRAVENOUS at 14:13

## 2018-01-01 RX ADMIN — MICONAZOLE NITRATE: 20 CREAM TOPICAL at 05:14

## 2018-01-01 RX ADMIN — OXYCODONE HYDROCHLORIDE 2.5 MG: 5 TABLET ORAL at 00:19

## 2018-01-01 RX ADMIN — ONDANSETRON 4 MG: 2 INJECTION INTRAMUSCULAR; INTRAVENOUS at 08:56

## 2018-01-01 RX ADMIN — DOCUSATE SODIUM AND SENNOSIDES 2 TABLET: 8.6; 5 TABLET, FILM COATED ORAL at 09:33

## 2018-01-01 RX ADMIN — STANDARDIZED SENNA CONCENTRATE AND DOCUSATE SODIUM 2 TABLET: 8.6; 5 TABLET, FILM COATED ORAL at 20:53

## 2018-01-01 RX ADMIN — DRONABINOL 5 MG: 5 CAPSULE ORAL at 12:17

## 2018-01-01 RX ADMIN — Medication 325 MG: at 08:20

## 2018-01-01 RX ADMIN — MICONAZOLE NITRATE: 20 CREAM TOPICAL at 05:24

## 2018-01-01 RX ADMIN — PHENAZOPYRIDINE HYDROCHLORIDE 100 MG: 200 TABLET ORAL at 14:45

## 2018-01-01 SDOH — ECONOMIC STABILITY: HOUSING INSECURITY: UNSAFE APPLIANCES: 0

## 2018-01-01 SDOH — ECONOMIC STABILITY: HOUSING INSECURITY: UNSAFE COOKING RANGE AREA: 0

## 2018-01-01 ASSESSMENT — LIFESTYLE VARIABLES
EVER_SMOKED: NEVER
EVER_SMOKED: NEVER
SUBSTANCE_ABUSE: 0
ALCOHOL_USE: NO
ALCOHOL_USE: NO
EVER_SMOKED: NEVER

## 2018-01-01 ASSESSMENT — PAIN SCALES - GENERAL
PAINLEVEL_OUTOF10: 0
PAINLEVEL_OUTOF10: 3
PAINLEVEL_OUTOF10: 0
PAINLEVEL_OUTOF10: 0
PAINLEVEL_OUTOF10: 1
PAINLEVEL_OUTOF10: 0
PAINLEVEL_OUTOF10: 5
PAINLEVEL_OUTOF10: 0
PAINLEVEL_OUTOF10: 6
PAINLEVEL_OUTOF10: 4
PAINLEVEL_OUTOF10: 4
PAINLEVEL_OUTOF10: 0
PAINLEVEL: NO PAIN
PAINLEVEL_OUTOF10: 0
PAINLEVEL_OUTOF10: 1
PAINLEVEL_OUTOF10: 5
PAINLEVEL_OUTOF10: 5
PAINLEVEL_OUTOF10: 4
PAINLEVEL_OUTOF10: 2
PAINLEVEL_OUTOF10: 3
PAINLEVEL_OUTOF10: 5
PAINLEVEL_OUTOF10: 3
PAINLEVEL_OUTOF10: 2
PAINLEVEL_OUTOF10: 0
PAINLEVEL_OUTOF10: 4
PAINLEVEL_OUTOF10: 4
PAINLEVEL_OUTOF10: 0
PAINLEVEL_OUTOF10: 4
PAINLEVEL_OUTOF10: 3
PAINLEVEL_OUTOF10: 4
PAINLEVEL_OUTOF10: 5
PAINLEVEL_OUTOF10: 2
PAINLEVEL_OUTOF10: 4
PAINLEVEL: 4=SLIGHT-MODERATE PAIN
PAINLEVEL_OUTOF10: 7
PAINLEVEL_OUTOF10: 4
PAINLEVEL: 6=MODERATE PAIN
PAINLEVEL_OUTOF10: 3
PAINLEVEL_OUTOF10: 2
PAINLEVEL_OUTOF10: 0
PAINLEVEL_OUTOF10: 3
PAINLEVEL_OUTOF10: 0
PAINLEVEL_OUTOF10: 3
PAINLEVEL_OUTOF10: 4
PAINLEVEL_OUTOF10: 3
PAINLEVEL_OUTOF10: 3
PAINLEVEL_OUTOF10: 0
PAINLEVEL_OUTOF10: 4
PAINLEVEL: 3=SLIGHT PAIN
PAINLEVEL_OUTOF10: 2
PAINLEVEL_OUTOF10: 0
PAINLEVEL_OUTOF10: 1
PAINLEVEL_OUTOF10: 4
PAINLEVEL_OUTOF10: 2
PAINLEVEL_OUTOF10: 4
PAINLEVEL_OUTOF10: 3
PAINLEVEL_OUTOF10: 3
PAINLEVEL_OUTOF10: 2
PAINLEVEL_OUTOF10: 4
PAINLEVEL_OUTOF10: 0
PAINLEVEL_OUTOF10: 0
PAINLEVEL_OUTOF10: 4
PAINLEVEL_OUTOF10: 3
PAINLEVEL_OUTOF10: 5
PAINLEVEL_OUTOF10: 0
PAINLEVEL_OUTOF10: 0
PAINLEVEL_OUTOF10: 2
PAINLEVEL_OUTOF10: 3
PAINLEVEL_OUTOF10: 2
PAINLEVEL_OUTOF10: 3
PAINLEVEL_OUTOF10: 3
PAINLEVEL_OUTOF10: 0
PAINLEVEL_OUTOF10: 2
PAINLEVEL_OUTOF10: 6
PAINLEVEL_OUTOF10: 3
PAINLEVEL_OUTOF10: 5
PAINLEVEL_OUTOF10: 3
PAINLEVEL_OUTOF10: 0
PAINLEVEL_OUTOF10: 3
PAINLEVEL_OUTOF10: 7
PAINLEVEL_OUTOF10: 5
PAINLEVEL: 5=MODERATE PAIN
PAINLEVEL_OUTOF10: 6
PAINLEVEL_OUTOF10: 1
PAINLEVEL_OUTOF10: 4
PAINLEVEL_OUTOF10: 0

## 2018-01-01 ASSESSMENT — ENCOUNTER SYMPTOMS
FOCAL WEAKNESS: 0
SHORTNESS OF BREATH: 0
SENSORY CHANGE: 1
SOMNOLENCE: 1
HEADACHES: 0
SHORTNESS OF BREATH: 0
EYE PAIN: 0
ROS GI COMMENTS: MODERATE APPETITE
VOMITING: 0
CHILLS: 0
PND: 0
DEPRESSION: 0
DIAPHORESIS: 0
NERVOUS/ANXIOUS: 0
DIZZINESS: 0
WEAKNESS: 1
SOMNOLENCE: 1
BACK PAIN: 0
MYALGIAS: 0
WEAKNESS: 1
NAUSEA: 0
COUGH: 0
BRUISES/BLEEDS EASILY: 0
NECK PAIN: 0
DOUBLE VISION: 0
DIAPHORESIS: 0
CLAUDICATION: 0
PALPITATIONS: 0
BLOOD IN STOOL: 0
DIARRHEA: 0
COUGH: 0
COUGH: 0
LOWER EXTREMITY EDEMA: 1
COUGH: 0
MYALGIAS: 0
FATIGUE: 1
FATIGUES EASILY: 1
VOMITING: 0
NAUSEA: 0
HEADACHES: 0
BLURRED VISION: 0
SENSORY CHANGE: 0
TREMORS: 0
RESPIRATORY NEGATIVE: 1
DEPRESSION: 0
COUGH: 0
DOUBLE VISION: 0
FLANK PAIN: 0
HEARTBURN: 0
HEADACHES: 0
FATIGUES EASILY: 1
BACK PAIN: 0
SENSORY CHANGE: 0
HEARTBURN: 0
SORE THROAT: 0
TREMORS: 0
VOMITING: 0
SENSORY CHANGE: 0
PALPITATIONS: 0
FLANK PAIN: 0
FOCAL WEAKNESS: 0
DEPRESSION: 0
DIARRHEA: 0
WEIGHT LOSS: 1
BACK PAIN: 0
CHILLS: 0
HEARTBURN: 0
DIAPHORESIS: 0
NAUSEA: 1
BRUISES/BLEEDS EASILY: 0
ABDOMINAL PAIN: 1
CONSTIPATION: 0
BLURRED VISION: 0
SPUTUM PRODUCTION: 0
SINUS PAIN: 0
DYSPNEA ACTIVITY LEVEL: AFTER AMBULATING LESS THAN 10 FT
BLURRED VISION: 0
DIAPHORESIS: 0
WEAKNESS: 0
SENSORY CHANGE: 0
TINGLING: 0
FLANK PAIN: 0
BACK PAIN: 0
BRUISES/BLEEDS EASILY: 0
SOMNOLENCE: 1
BLURRED VISION: 0
CARDIOVASCULAR NEGATIVE: 1
FEVER: 0
NAUSEA: 0
FLANK PAIN: 0
WEAKNESS: 0
CONSTIPATION: 0
LOWER EXTREMITY EDEMA: 1
DYSPNEA ACTIVITY LEVEL: AT REST
ORTHOPNEA: 0
VOMITING: 0
WEAKNESS: 1
WEAKNESS: 1
SHORTNESS OF BREATH: 0
CHILLS: 0
BACK PAIN: 1
WEIGHT LOSS: 0
HEADACHES: 0
VOMITING: 0
ABDOMINAL PAIN: 1
MEMORY LOSS: 0
NAUSEA: 1
FEVER: 0
BLURRED VISION: 0
ROS GI COMMENTS: MODERATE APPETITE
SHORTNESS OF BREATH: 1
PALPITATIONS: 0
SORE THROAT: 0
ORTHOPNEA: 0
BACK PAIN: 0
DEPRESSION: 0
MYALGIAS: 0
NECK PAIN: 0
DIARRHEA: 0
PALPITATIONS: 0
SENSORY CHANGE: 0
CHILLS: 0
WEAKNESS: 1
ORTHOPNEA: 0
COUGH: 0
NAUSEA: 0
WEAKNESS: 1
WEAKNESS: 1
SPEECH CHANGE: 0
COUGH: 0
FATIGUE: 1
SLEEP QUALITY: FAIR
FOCAL WEAKNESS: 0
SPEECH CHANGE: 0
NAUSEA: 0
FEVER: 0
NAUSEA: 0
VOMITING: 0
SEIZURES: 0
CONSTITUTIONAL NEGATIVE: 1
DOUBLE VISION: 0
SORE THROAT: 0
DYSPNEA ON EXERTION: 1
ABDOMINAL PAIN: 0
INSOMNIA: 0
WHEEZING: 0
NECK PAIN: 0
DIARRHEA: 0
SENSORY CHANGE: 1
DIZZINESS: 0
DRY SKIN: 1
ROS GI COMMENTS: +SUPRAPUBIC PRESS.
STRIDOR: 0
INSOMNIA: 0
FLANK PAIN: 0
NAUSEA: 0
NAUSEA: 0
MYALGIAS: 0
FLANK PAIN: 1
PALPITATIONS: 0
WEAKNESS: 0
NERVOUS/ANXIOUS: 0
CHILLS: 0
HEMOPTYSIS: 0
HEADACHES: 0
VOMITING: 0
NECK PAIN: 0
SHORTNESS OF BREATH: 0
TINGLING: 0
FEVER: 0
BRUISES/BLEEDS EASILY: 0
LOSS OF CONSCIOUSNESS: 0
PALPITATIONS: 0
ABDOMINAL PAIN: 1
HEARTBURN: 0
ABDOMINAL PAIN: 1
HEMOPTYSIS: 0
DIARRHEA: 0
CONSTIPATION: 0
ABDOMINAL PAIN: 1
PALPITATIONS: 0
SHORTNESS OF BREATH: 1
PSYCHIATRIC NEGATIVE: 1
ABDOMINAL PAIN: 1
HEADACHES: 0
MYALGIAS: 0
VOMITING: 0
PHOTOPHOBIA: 0
FORGETFULNESS: 1
BLOOD IN STOOL: 0
COUGH: 0
TINGLING: 0
SHORTNESS OF BREATH: 0
PALPITATIONS: 0
WEAKNESS: 0
TREMORS: 0
FOCAL WEAKNESS: 0
PND: 0
FEVER: 0
HEMOPTYSIS: 0
FORGETFULNESS: 1
BACK PAIN: 0
LOWER EXTREMITY EDEMA: 1
SHORTNESS OF BREATH: 1
FATIGUE: 1
CHILLS: 0
PALPITATIONS: 0
SHORTNESS OF BREATH: 1
VOMITING: 0
EYE PAIN: 0
FEVER: 0
CONSTIPATION: 1
NECK PAIN: 0
SPUTUM PRODUCTION: 0
COUGH: 0
HEADACHES: 0
SPUTUM PRODUCTION: 0
INSOMNIA: 0
NAUSEA: 0
FATIGUE: 1
EYE PAIN: 0
STOOL FREQUENCY: DAILY
SINUS PAIN: 0
SHORTNESS OF BREATH: 0
FOCAL WEAKNESS: 0
DIARRHEA: 0
DOUBLE VISION: 0
DEPRESSION: 0
DIZZINESS: 0
SLEEP QUALITY: ADEQUATE
MYALGIAS: 1
INSOMNIA: 0
FEVER: 0
VOMITING: 0
COUGH: 0
SPEECH CHANGE: 0
BLURRED VISION: 0
CHILLS: 1
SPEECH CHANGE: 0
DIARRHEA: 0
FOCAL WEAKNESS: 0
SHORTNESS OF BREATH: 0
BLOOD IN STOOL: 0
PALPITATIONS: 0
HEMOPTYSIS: 0
SPUTUM PRODUCTION: 0
BLOOD IN STOOL: 0
MYALGIAS: 0
SORE THROAT: 0
CHILLS: 0
ABDOMINAL PAIN: 1
DEPRESSION: 0
EYES NEGATIVE: 1
CONSTIPATION: 0
VOMITING: 0
SPEECH CHANGE: 0
SHORTNESS OF BREATH: 0
WEIGHT LOSS: 0
CHILLS: 0
BLURRED VISION: 0
MUSCULOSKELETAL NEGATIVE: 1
PND: 0
BLOOD IN STOOL: 0
FORGETFULNESS: 1
DIAPHORESIS: 0
ABDOMINAL PAIN: 0
FEVER: 0
FEVER: 0
NERVOUS/ANXIOUS: 0
FLANK PAIN: 0
DEPRESSION: 0
LAST BOWEL MOVEMENT: 64915
SPEECH CHANGE: 0
ABDOMINAL PAIN: 1
CHILLS: 0
ABDOMINAL PAIN: 1
BACK PAIN: 1
SLEEP QUALITY: ADEQUATE
NAUSEA: 0
SHORTNESS OF BREATH: 0
COUGH: 0
STOOL FREQUENCY: LESS THAN DAILY
DIARRHEA: 0
SLEEP QUALITY: POOR
WEAKNESS: 0
PALPITATIONS: 0
NAUSEA: 1
ABDOMINAL PAIN: 1
ABDOMINAL PAIN: 1
BACK PAIN: 0
HEARTBURN: 0
SENSORY CHANGE: 1
CHILLS: 0
DYSPNEA ACTIVITY LEVEL: AFTER AMBULATING MORE THAN 20 FT
DIZZINESS: 0
DIZZINESS: 0
FATIGUES EASILY: 1
HEADACHES: 0
DYSPNEA ON EXERTION: 1
FATIGUES EASILY: 1
TREMORS: 0
DEPRESSION: 0
CONSTIPATION: 0
TREMORS: 0
WEIGHT LOSS: 0
NECK PAIN: 0
HALLUCINATIONS: 0
WEAKNESS: 0
BACK PAIN: 0
CHILLS: 1
ABDOMINAL PAIN: 1
ABDOMINAL PAIN: 0
DIZZINESS: 0
DEPRESSION: 0
FEVER: 0
ABDOMINAL PAIN: 0
SORE THROAT: 0
BRUISES/BLEEDS EASILY: 0
DIZZINESS: 0
FEVER: 0
CONSTIPATION: 0
PALPITATIONS: 0
HEADACHES: 0
FOCAL WEAKNESS: 0
NERVOUS/ANXIOUS: 0
SENSORY CHANGE: 0
DYSPNEA ON EXERTION: 1
WEIGHT LOSS: 0
SHORTNESS OF BREATH: 0
DIAPHORESIS: 0
LOWER EXTREMITY EDEMA: 1
DOUBLE VISION: 0
SORE THROAT: 0
NERVOUS/ANXIOUS: 0
HEADACHES: 0
DIZZINESS: 0
COUGH: 0
INSOMNIA: 0
BLURRED VISION: 0
SPEECH CHANGE: 0
FEVER: 0
SENSORY CHANGE: 0
ABDOMINAL PAIN: 1
FLANK PAIN: 0
VOMITING: 0
SORE THROAT: 0
ORTHOPNEA: 0
SHORTNESS OF BREATH: 0
BLURRED VISION: 0
CHILLS: 0
COUGH: 0
WHEEZING: 0
SINUS PAIN: 0
EYE DISCHARGE: 0
SHORTNESS OF BREATH: 1
VOMITING: 1
VOMITING: 0
STOOL FREQUENCY: DAILY
BACK PAIN: 0
FEVER: 0
CHILLS: 0
PND: 0
NAUSEA: 1

## 2018-01-01 ASSESSMENT — COGNITIVE AND FUNCTIONAL STATUS - GENERAL
MOVING TO AND FROM BED TO CHAIR: A LITTLE
DRESSING REGULAR UPPER BODY CLOTHING: A LITTLE
TOILETING: A LITTLE
TURNING FROM BACK TO SIDE WHILE IN FLAT BAD: A LITTLE
DAILY ACTIVITIY SCORE: 23
EATING MEALS: A LITTLE
MOBILITY SCORE: 16
STANDING UP FROM CHAIR USING ARMS: A LITTLE
SUGGESTED CMS G CODE MODIFIER MOBILITY: CH
MOBILITY SCORE: 18
MOBILITY SCORE: 24
SUGGESTED CMS G CODE MODIFIER DAILY ACTIVITY: CK
MOVING TO AND FROM BED TO CHAIR: A LITTLE
SUGGESTED CMS G CODE MODIFIER DAILY ACTIVITY: CJ
TURNING FROM BACK TO SIDE WHILE IN FLAT BAD: A LITTLE
SUGGESTED CMS G CODE MODIFIER MOBILITY: CK
DRESSING REGULAR LOWER BODY CLOTHING: A LITTLE
WALKING IN HOSPITAL ROOM: A LITTLE
HELP NEEDED FOR BATHING: A LITTLE
DRESSING REGULAR LOWER BODY CLOTHING: A LOT
CLIMB 3 TO 5 STEPS WITH RAILING: A LOT
MOVING FROM LYING ON BACK TO SITTING ON SIDE OF FLAT BED: A LITTLE
DAILY ACTIVITIY SCORE: 20
MOVING FROM LYING ON BACK TO SITTING ON SIDE OF FLAT BED: A LITTLE
PERSONAL GROOMING: A LITTLE
STANDING UP FROM CHAIR USING ARMS: A LITTLE
PERSONAL GROOMING: A LITTLE
TOILETING: A LITTLE
HELP NEEDED FOR BATHING: A LITTLE
CLIMB 3 TO 5 STEPS WITH RAILING: A LITTLE
DAILY ACTIVITIY SCORE: 17
WALKING IN HOSPITAL ROOM: A LOT
SUGGESTED CMS G CODE MODIFIER DAILY ACTIVITY: CI
HELP NEEDED FOR BATHING: A LITTLE
SUGGESTED CMS G CODE MODIFIER MOBILITY: CK

## 2018-01-01 ASSESSMENT — COPD QUESTIONNAIRES
HAVE YOU SMOKED AT LEAST 100 CIGARETTES IN YOUR ENTIRE LIFE: NO/DON'T KNOW
COPD SCREENING SCORE: 2
DURING THE PAST 4 WEEKS HOW MUCH DID YOU FEEL SHORT OF BREATH: NONE/LITTLE OF THE TIME
DO YOU EVER COUGH UP ANY MUCUS OR PHLEGM?: NO/ONLY WITH OCCASIONAL COLDS OR INFECTIONS

## 2018-01-01 ASSESSMENT — SOCIAL DETERMINANTS OF HEALTH (SDOH)
ACTIVE STRESSOR - EXHAUSTION: 1
ACTIVE STRESSOR - LACK OF CAREGIVERS: 1
ACTIVE STRESSOR - EXPRESSED EMOTIONAL NEED: 1
ACTIVE STRESSOR - HEALTH CHANGES: 1
ACTIVE STRESSOR - HEALTH CHANGES: 1
ACTIVE STRESSOR - LOSS OF CONTROL: 1
ACTIVE STRESSOR - EXHAUSTION: 1
ACTIVE STRESSOR - LOSS OF CONTROL: 1

## 2018-01-01 ASSESSMENT — ACTIVITIES OF DAILY LIVING (ADL)
TOILETING: INDEPENDENT
MONEY MANAGEMENT (EXPENSES/BILLS): TOTALLY DEPENDENT
MONEY MANAGEMENT (EXPENSES/BILLS): NEEDS ASSISTANCE
TOILETING: INDEPENDENT

## 2018-01-01 ASSESSMENT — PATIENT HEALTH QUESTIONNAIRE - PHQ9
1. LITTLE INTEREST OR PLEASURE IN DOING THINGS: NOT AT ALL
2. FEELING DOWN, DEPRESSED, IRRITABLE, OR HOPELESS: NOT AT ALL
1. LITTLE INTEREST OR PLEASURE IN DOING THINGS: NOT AT ALL
2. FEELING DOWN, DEPRESSED, IRRITABLE, OR HOPELESS: NOT AT ALL
SUM OF ALL RESPONSES TO PHQ9 QUESTIONS 1 AND 2: 0
1. LITTLE INTEREST OR PLEASURE IN DOING THINGS: NOT AT ALL
2. FEELING DOWN, DEPRESSED, IRRITABLE, OR HOPELESS: NOT AT ALL

## 2018-01-01 ASSESSMENT — GAIT ASSESSMENTS
ASSISTIVE DEVICE: FRONT WHEEL WALKER
DISTANCE (FEET): 100
DEVIATION: ATAXIC;BRADYKINETIC
DISTANCE (FEET): 65
GAIT LEVEL OF ASSIST: SUPERVISED
DEVIATION: BRADYKINETIC;SHUFFLED GAIT
GAIT LEVEL OF ASSIST: CONTACT GUARD ASSIST
ASSISTIVE DEVICE: OTHER (COMMENTS)

## 2018-01-08 NOTE — PROGRESS NOTES
Anticoagulation Summary  As of 1/8/2018    INR goal:   2.0-3.0   TTR:   46.9 % (1.6 y)   Today's INR:   5.0!   Maintenance plan:   3 mg (6 mg x 0.5) on Mon; 6 mg (6 mg x 1) all other days   Weekly total:   39 mg   Plan last modified:   ASTRID ShinPINES (1/8/2018)   Next INR check:      Target end date:   Indefinite    Indications    Deep vein thrombosis (DVT) of both lower extremities (CMS-[facility]) [I82.403]             Anticoagulation Episode Summary     INR check location:       Preferred lab:       Send INR reminders to:       Comments:   [facility]      Anticoagulation Care Providers     Provider Role Specialty Phone number    Yimi Martinez M.D. Referring Surgery [phone]    Cesar SchofieldD Responsible      [facility] Responsible  [phone]        Anticoagulation Patient Findings      HPI:  Tamara Flynn seen in clinic today for follow up on anticoagulation therapy in the presence of DVT hx. Denies any changes to current medical/health status since last appointment. Denies any medication or diet changes. No current symptoms of bleeding or thrombosis reported. On Stivarga daily for 3 weeks then off one week. She restarts on Thursday. It can increased INR.    A/P:   INR supratherapeutic. HOLD two doses and decrease regimen.    Follow up appointment in 1 week(s).    Next Appointment: Thursday, January 18, 2018 at 10:45 am.     Re HUITRON

## 2018-01-18 NOTE — PROGRESS NOTES
Anticoagulation Summary  As of 1/18/2018    INR goal:   2.0-3.0   TTR:   46.3 % (1.6 y)   Today's INR:   2.7   Maintenance plan:   3 mg (6 mg x 0.5) on Mon, Fri; 6 mg (6 mg x 1) all other days   Weekly total:   36 mg   Plan last modified:   ASTRID ShinPINES (1/8/2018)   Next INR check:      Target end date:   Indefinite    Indications    Deep vein thrombosis (DVT) of both lower extremities (CMS-[facility]) [I82.403]             Anticoagulation Episode Summary     INR check location:       Preferred lab:       Send INR reminders to:       Comments:         Anticoagulation Care Providers     Provider Role Specialty Phone number    Yimi Martinez M.D. Referring Surgery [phone]    Cesar SchofieldD Responsible      [facility] Responsible  [phone]        Anticoagulation Patient Findings      HPI:  Tamara Flynn seen in clinic today for follow up on anticoagulation therapy in the presence of DVT hx. Denies any changes to current medical/health status since last appointment. Denies any medication or diet changes. No current symptoms of bleeding or thrombosis reported.    A/P:   INR therapeutic. Continue current regimen. BP recorded in vitals.    Follow up appointment in 2 week(s).    Next Appointment: Thursday, February 1, 2018 at 11:00 am.     Re HUITRON

## 2018-02-01 NOTE — PROGRESS NOTES
Anticoagulation Summary  As of 2/1/2018    INR goal:   2.0-3.0   TTR:   46.6 % (1.7 y)   Today's INR:   1.5!   Maintenance plan:   3 mg (6 mg x 0.5) on Mon; 6 mg (6 mg x 1) all other days   Weekly total:   39 mg   Plan last modified:   ASTRID ShinPINES (2/1/2018)   Next INR check:   2/8/2018   Target end date:   Indefinite    Indications    Deep vein thrombosis (DVT) of both lower extremities (CMS-[facility]) [I82.403]             Anticoagulation Episode Summary     INR check location:       Preferred lab:       Send INR reminders to:       Comments:         Anticoagulation Care Providers     Provider Role Specialty Phone number    Yimi Martinez M.D. Referring Surgery [phone]    Cesar SchofieldD Responsible      [facility] Responsible  [phone]        Anticoagulation Patient Findings      HPI:  Tamara Flynn seen in clinic today for follow up on anticoagulation therapy in the presence of DVT hx. Denies any changes to current medical/health status since last appointment. Denies any medication or diet changes. No current symptoms of bleeding or thrombosis reported.    A/P:   INR subtherapeutic. Started Stivarga yesterday which increases INR. No recent VTEsWill increase tomorrow then continue current regimen.     Follow up appointment in 1 week(s).    Next Appointment: Thursday, February 8, 2018 at 11:15 am.    Re HUITRON

## 2018-02-05 NOTE — CERTIFICATION
[facility]   Initial Ostomy Evaluation   2/5/18 -5/5/18  Referring Doctor: Yimi Martinez MD  Primary Doctor:   Surgeon: Yimi Martinez MD  Date of Evaluation: 2/5/18  Start of Care Date: 2/5/18    Surgical Procedure: 4/17/2016 Small bowel resection, sigmoid colectomy, colostomy  Discharge: 5/4/16    Current medical Status: Pt has stage 4 colon CA. She has a hepatic mass and a bladder mass, both of which are enlarging, even with chemotherapy. Dr. Martinez does not have anymore surgeries planned. Pt was treated for peristomal wound at [facility], but then insurance changed and now she is coming here.    Past Medical Status:   Past Medical History:   Diagnosis Date   • Blood clotting disorder (CMS-[facility]) 4-    left leg   • Blood loss anemia 2016   • Bowel habit changes     has colostomy   • Cancer (CMS-HCC) 2016    liver, bladder, colon   • Essential hypertension    • Obesity    • Urinary bladder disorder     1/2 bladder due to cancer surgery     Past Medical History/Medications:   Current Outpatient Prescriptions on File Prior to Visit   Medication Sig Dispense Refill   • furosemide (LASIX) 20 MG Tab Take 0.5 Tabs by mouth 1 time daily as needed. 45 Tab 0   • warfarin (COUMADIN) 6 MG Tab Take one to one and one-half (1-1.5) tablets daily as directed by [facility] 135 Tab 1   • vitamin D (CHOLECALCIFEROL) 1000 UNIT Tab Take 1,000 Units by mouth every day.     • acetaminophen (TYLENOL) 500 MG Tab Take 500-1,000 mg by mouth as needed.       No current facility-administered medications on file prior to visit.        Social History:   Social History     Social History   • Marital status:      Spouse name: N/A   • Number of children: N/A   • Years of education: N/A     Occupational History   • Not on file.     Social History Main Topics   • Smoking status: Never Smoker   • Smokeless tobacco: Never Used   • Alcohol use No   • Drug use: No   • Sexual activity: Yes      Partners: Male     Other Topics Concern   • Not on file     Social History Narrative    Lives with .     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     Past Surgical History:   Procedure Laterality Date   • CATH PLACEMENT Left 8/9/2016    Procedure: CATH PLACEMENT power port ;  Surgeon: Yimi Martinez M.D.;  Location: SURGERY [facility];  Service:    • COLOSTOMY Left 4/19/2016    Procedure: COLOSTOMY;  Surgeon: Yimi Martinez M.D.;  Location: SURGERY [facility];  Service:    • EXPLORATORY LAPAROTOMY  4/18/2016    Procedure: EXPLORATORY LAPAROTOMY;  Surgeon: Yimi Martinez M.D.;  Location: SURGERY [facility];  Service:    • CYSTECTOMY  4/18/2016    Procedure: CYSTECTOMY;  Surgeon: Yunior Abdullahi M.D.;  Location: SURGERY [facility];  Service:    • EXPLORATORY LAPAROTOMY  4/17/2016    Procedure: EXPLORATORY LAPAROTOMY-illeostomy creation ;  Surgeon: Yimi Martinez M.D.;  Location: SURGERY [facility];  Service:    • COLONOSCOPY - ENDO N/A 3/22/2016    Procedure: COLONOSCOPY - ENDO;  Surgeon: Yimi Martinez M.D.;  Location: ENDOSCOPY [facility];  Service:    • RECOVERY  3/21/2016    Procedure: CT-CT Guided liver biopsy-Dr.Michael Martinez;  Surgeon: [facility];  Location: SURGERY PRE-POST PROC UNIT [facility];  Service:    • CYSTOSCOPY  3/17/2016    Procedure: CYSTOSCOPY;  Surgeon: Yunior Abdullahi M.D.;  Location: SURGERY [facility];  Service:    • TRANS URETHRAL RESECTION BLADDER  3/17/2016    Procedure: TRANS URETHRAL RESECTION BLADDER Tumor;  Surgeon: Yunior Abdullahi M.D.;  Location: SURGERY [facility];  Service:    • PB EXCISION OF TONSIL TAGS         Allergies: Patient has no known allergies.    Hospital discharge Date:   Peristomal Wound Etiology:   Current Therapy:   ICD-9: 569.62/K94.03 Colostomy dysfunction  Ostomy Evaluation:   Type of Stoma: Colostomy  Location of the stoma: LLQ  Shape of the stoma: Almost round  Color of the stoma:  "Red  Size of the stoma: 35mm x 42mm  Mucocutaneous junction: intact    The stoma protrudes  <1\"   The lumen is located: near center  Skin indentations, creases or scar tissue: peristomal wound very close to stoma, medially  Peristomal Skin problems: Wound - 1cm x3.3cm x hypergranular  Treatment: Hydrofera blue to wound  Effluent/flatus: Brown, formed  Nutritional status: Appears well nourished.  Empties and rinses the pouch when 1/2 full of stool: yes  Previous or present pouching system: Coloplase SenSuraMio CTF two piece. Place piece of HFB over wound. Cover with Brava. Place wafer and apply pouch. Pt is independent with care. She has had stoma for well over a year.    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 It has no slough in the granulation tissue. HFB was chosen, though, for the hypergranulation and for infection prevention. Also, pt is familiar with it and knows how to use under wafer.    Evaluation for education and training with the new pouch system: We will continue with pouching system that is working for pt.  Provided literature for the patient with regard to Colostomy, Ileostomy, Urostomy and Other________________.   Clinician's Signature:---------------------------------------------------------- Date:____________   Physician's signature:____________________________________Date:____________     "

## 2018-02-08 NOTE — PROGRESS NOTES
Anticoagulation Summary  As of 2/8/2018    INR goal:   2.0-3.0   TTR:   46.4 % (1.7 y)   Today's INR:   2.2   Maintenance plan:   3 mg (6 mg x 0.5) on Mon; 6 mg (6 mg x 1) all other days   Weekly total:   39 mg   Plan last modified:   SATRID ShinPINES (2/1/2018)   Next INR check:   2/22/2018   Target end date:   Indefinite    Indications    Deep vein thrombosis (DVT) of both lower extremities (CMS-[facility]) [I82.403]             Anticoagulation Episode Summary     INR check location:       Preferred lab:       Send INR reminders to:       Comments:         Anticoagulation Care Providers     Provider Role Specialty Phone number    Yimi Martinez M.D. Referring Surgery [phone]    Cesar SchofieldD Responsible      [facility] Responsible  [phone]        Anticoagulation Patient Findings      HPI:  Tamara Flynn seen in clinic today for follow up on anticoagulation therapy in the presence of DVT hx. Denies any changes to current medical/health status since last appointment. Denies any medication or diet changes. No current symptoms of bleeding or thrombosis reported.    A/P:   INR therapeutic. Continue current regimen.     Follow up appointment in 2 week(s).    Next Appointment: Thursday, February 22, 2018 at 2:00 pm.      Re HUITRON

## 2018-02-09 NOTE — WOUND TEAM
[facility]   Encounter  2/5/18 -5/5/18  Referring Doctor: Yimi Martinez MD  Primary Doctor:   Surgeon: Yimi Martinez MD  Date of Evaluation: 2/5/18  Start of Care Date: 2/5/18    Surgical Procedure: 4/17/2016 Small bowel resection, sigmoid colectomy, colostomy  Discharge: 5/4/16    Current medical Status: Pt has stage 4 colon CA. She has a hepatic mass and a bladder mass, both of which are enlarging, even with chemotherapy. Dr. Martinez does not have anymore surgeries planned. Pt was treated for peristomal wound at [facility], but then insurance changed and now she is coming here.    Past Medical Status:   Past Medical History:   Diagnosis Date   • Blood clotting disorder (CMS-HCC) 4-    left leg   • Blood loss anemia 2016   • Bowel habit changes     has colostomy   • Cancer (CMS-HCC) 2016    liver, bladder, colon   • Essential hypertension    • Obesity    • Urinary bladder disorder     1/2 bladder due to cancer surgery     Past Medical History/Medications:   Current Outpatient Prescriptions on File Prior to Visit   Medication Sig Dispense Refill   • desmopressin, DDAVP, (STIMATE) 1.5 MG/ML Solution Spray 1 Spray in nose Once.     • furosemide (LASIX) 20 MG Tab Take 0.5 Tabs by mouth 1 time daily as needed. 45 Tab 0   • warfarin (COUMADIN) 6 MG Tab Take one to one and one-half (1-1.5) tablets daily as directed by [facility] 135 Tab 1   • vitamin D (CHOLECALCIFEROL) 1000 UNIT Tab Take 1,000 Units by mouth every day.     • acetaminophen (TYLENOL) 500 MG Tab Take 500-1,000 mg by mouth as needed.       No current facility-administered medications on file prior to visit.        Social History:   Social History     Social History   • Marital status:      Spouse name: N/A   • Number of children: N/A   • Years of education: N/A     Occupational History   • Not on file.     Social History Main Topics   • Smoking status: Never Smoker   • Smokeless tobacco: Never  Used   • Alcohol use No   • Drug use: No   • Sexual activity: Yes     Partners: Male     Other Topics Concern   • Not on file     Social History Narrative    Lives with .     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     Past Surgical History:   Procedure Laterality Date   • CATH PLACEMENT Left 8/9/2016    Procedure: CATH PLACEMENT power port ;  Surgeon: Yimi Martinez M.D.;  Location: SURGERY [facility];  Service:    • COLOSTOMY Left 4/19/2016    Procedure: COLOSTOMY;  Surgeon: Yimi Martinez M.D.;  Location: SURGERY [facility];  Service:    • EXPLORATORY LAPAROTOMY  4/18/2016    Procedure: EXPLORATORY LAPAROTOMY;  Surgeon: Yimi Martinez M.D.;  Location: SURGERY [facility];  Service:    • CYSTECTOMY  4/18/2016    Procedure: CYSTECTOMY;  Surgeon: Yunior Abdullahi M.D.;  Location: SURGERY [facility];  Service:    • EXPLORATORY LAPAROTOMY  4/17/2016    Procedure: EXPLORATORY LAPAROTOMY-illeostomy creation ;  Surgeon: Yimi Martinez M.D.;  Location: SURGERY [facility];  Service:    • COLONOSCOPY - ENDO N/A 3/22/2016    Procedure: COLONOSCOPY - ENDO;  Surgeon: Yimi Martinez M.D.;  Location: ENDOSCOPY [facility];  Service:    • RECOVERY  3/21/2016    Procedure: CT-CT Guided liver biopsy-Dr.Michael Martinez;  Surgeon: [facility];  Location: SURGERY [facility] PROC [facility];  Service:    • CYSTOSCOPY  3/17/2016    Procedure: CYSTOSCOPY;  Surgeon: Yunior Abdullahi M.D.;  Location: SURGERY [facility];  Service:    • TRANS URETHRAL RESECTION BLADDER  3/17/2016    Procedure: TRANS URETHRAL RESECTION BLADDER Tumor;  Surgeon: Yunior Abdullahi M.D.;  Location: SURGERY [facility];  Service:    • PB EXCISION OF TONSIL TAGS         Allergies: Patient has no known allergies.    Hospital discharge Date:   Peristomal Wound Etiology:   Current Therapy:   ICD-9: 569.62/K94.03 Colostomy dysfunction  Ostomy Evaluation:   Type of Stoma: Colostomy  Location of the  "stoma: LLQ  Shape of the stoma: Almost round  Color of the stoma: Red  Size of the stoma: 35mm x 42mm  Mucocutaneous junction: intact    The stoma protrudes  <1\"   The lumen is located: near center  Skin indentations, creases or scar tissue: peristomal wound very close to stoma, medially  Peristomal Skin problems: Wound - 1cm x3.3cm x hypergranular  Treatment: Hydrofera blue to wound  Effluent/flatus: Brown, formed  Nutritional status: Appears well nourished.  Empties and rinses the pouch when 1/2 full of stool: yes  Previous or present pouching system: Coloplase SenSuraMio CTF two piece. Place piece of HFB over wound. Cover with Brava. Place wafer and apply pouch. Pt is independent with care. She has had stoma for well over a year.    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 It has no slough in the granulation tissue. HFB was chosen, though, for the hypergranulation and for infection prevention. Also, pt is familiar with it and knows how to use under wafer.    Evaluation for education and training with the new pouch system: We will continue with pouching system that is working for pt.  Provided literature for the patient with regard to Colostomy, Ileostomy, Urostomy and Other________________.   Clinician's Signature:---------------------------------------------------------- Date:____________   Physician's signature:____________________________________Date:____________     "

## 2018-02-09 NOTE — WOUND TEAM
[facility]   Encounter  2/5/18 -5/5/18  Referring Doctor: Yimi Martinez MD  Primary Doctor:   Surgeon: Yimi Martinez MD  Date of Evaluation: 2/5/18  Start of Care Date: 2/5/18    Surgical Procedure: 4/17/2016 Small bowel resection, sigmoid colectomy, colostomy  Discharge: 5/4/16    Current medical Status: Pt has stage 4 colon CA. She has a hepatic mass and a bladder mass, both of which are enlarging, even with chemotherapy. Dr. Martinez does not have anymore surgeries planned. Pt was treated for peristomal wound at [facility], but then insurance changed and now she is coming here.    Past Medical Status:   Past Medical History:   Diagnosis Date   • Blood clotting disorder (CMS-HCC) 4-    left leg   • Blood loss anemia 2016   • Bowel habit changes     has colostomy   • Cancer (CMS-HCC) 2016    liver, bladder, colon   • Essential hypertension    • Obesity    • Urinary bladder disorder     1/2 bladder due to cancer surgery     Past Medical History/Medications:   Current Outpatient Prescriptions on File Prior to Visit   Medication Sig Dispense Refill   • desmopressin, DDAVP, (STIMATE) 1.5 MG/ML Solution Spray 1 Spray in nose Once.     • furosemide (LASIX) 20 MG Tab Take 0.5 Tabs by mouth 1 time daily as needed. 45 Tab 0   • warfarin (COUMADIN) 6 MG Tab Take one to one and one-half (1-1.5) tablets daily as directed by [facility] 135 Tab 1   • vitamin D (CHOLECALCIFEROL) 1000 UNIT Tab Take 1,000 Units by mouth every day.     • acetaminophen (TYLENOL) 500 MG Tab Take 500-1,000 mg by mouth as needed.       No current facility-administered medications on file prior to visit.        Social History:   Social History     Social History   • Marital status:      Spouse name: N/A   • Number of children: N/A   • Years of education: N/A     Occupational History   • Not on file.     Social History Main Topics   • Smoking status: Never Smoker   • Smokeless tobacco: Never  Used   • Alcohol use No   • Drug use: No   • Sexual activity: Yes     Partners: Male     Other Topics Concern   • Not on file     Social History Narrative    Lives with .     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     Past Surgical History:   Procedure Laterality Date   • CATH PLACEMENT Left 8/9/2016    Procedure: CATH PLACEMENT power port ;  Surgeon: Yimi Martinez M.D.;  Location: SURGERY [facility];  Service:    • COLOSTOMY Left 4/19/2016    Procedure: COLOSTOMY;  Surgeon: Yimi Martinez M.D.;  Location: SURGERY [facility];  Service:    • EXPLORATORY LAPAROTOMY  4/18/2016    Procedure: EXPLORATORY LAPAROTOMY;  Surgeon: Yimi Martinez M.D.;  Location: SURGERY [facility];  Service:    • CYSTECTOMY  4/18/2016    Procedure: CYSTECTOMY;  Surgeon: Yunior Abdullahi M.D.;  Location: SURGERY [facility];  Service:    • EXPLORATORY LAPAROTOMY  4/17/2016    Procedure: EXPLORATORY LAPAROTOMY-illeostomy creation ;  Surgeon: Yimi Martinez M.D.;  Location: SURGERY [facility];  Service:    • COLONOSCOPY - ENDO N/A 3/22/2016    Procedure: COLONOSCOPY - ENDO;  Surgeon: Yimi Martinez M.D.;  Location: ENDOSCOPY [facility];  Service:    • RECOVERY  3/21/2016    Procedure: CT-CT Guided liver biopsy-Dr.Michael Martinez;  Surgeon: [facility];  Location: SURGERY PRE[facility] PROC [facility];  Service:    • CYSTOSCOPY  3/17/2016    Procedure: CYSTOSCOPY;  Surgeon: Yunior Abdullahi M.D.;  Location: SURGERY [facility];  Service:    • TRANS URETHRAL RESECTION BLADDER  3/17/2016    Procedure: TRANS URETHRAL RESECTION BLADDER Tumor;  Surgeon: Yunior Abdullahi M.D.;  Location: SURGERY [facility];  Service:    • PB EXCISION OF TONSIL TAGS         Allergies: Patient has no known allergies.    Hospital discharge Date:   Peristomal Wound Etiology:   Current Therapy:   ICD-9: 569.62/K94.03 Colostomy dysfunction  Ostomy Evaluation:   Type of Stoma: Colostomy  Location of the  "stoma: LLQ  Shape of the stoma: Almost round  Color of the stoma: Red  Size of the stoma: 35mm x 42mm  Mucocutaneous junction: intact    The stoma protrudes  <1\"   The lumen is located: near center  Skin indentations, creases or scar tissue: peristomal wound very close to stoma, medially  Peristomal Skin problems: Wound - 1cm x3.3cm x hypergranular  Treatment: Hydrofera blue to wound  Effluent/flatus: Brown, formed  Nutritional status: Appears well nourished.  Empties and rinses the pouch when 1/2 full of stool: yes  Previous or present pouching system: Coloplase SenSuraMio convex CTF two piece. Place piece of HFB over wound. Cover with Brava. Place wafer and apply pouch. Pt is independent with care. She has had stoma for well over a year.    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 It has no slough in the granulation tissue. HFB was chosen, though, for the hypergranulation and for infection prevention. Also, pt is familiar with it and knows how to use under wafer.    2/9/18 - Hypergranulation resolved and wound appears smaller. Will measure at next appt.    Pt to come 1x/week for financial reasons. Pt is able to change wound dressing and ostomy appliance independently.    Evaluation for education and training with the new pouch system: We will continue with pouching system that is working for pt.  Provided literature for the patient with regard to Colostomy, Ileostomy, Urostomy and Other________________.   Clinician's Signature:---------------------------------------------------------- Date:____________   Physician's signature:____________________________________Date:____________     "

## 2018-02-22 NOTE — PROGRESS NOTES
Anticoagulation Summary  As of 2/22/2018    INR goal:   2.0-3.0   TTR:   47.6 % (1.7 y)   Today's INR:   3.0   Maintenance plan:   3 mg (6 mg x 0.5) on Mon; 6 mg (6 mg x 1) all other days   Weekly total:   39 mg   Plan last modified:   ASTRID ShinPINES (2/1/2018)   Next INR check:   3/1/2018   Target end date:   Indefinite    Indications    Deep vein thrombosis (DVT) of both lower extremities (CMS-HCC) [I82.403]             Anticoagulation Episode Summary     INR check location:       Preferred lab:       Send INR reminders to:       Comments:         Anticoagulation Care Providers     Provider Role Specialty Phone number    Yimi Martinez M.D. Referring Surgery [phone]    Cesar SchofieldD Responsible      [facility] Responsible  [phone]        Anticoagulation Patient Findings      HPI:  Tamara Flynn seen in clinic today for follow up on anticoagulation therapy in the presence of DVT hx. Denies any changes to current medical/health status since last appointment. Denies any medication or diet changes. No current symptoms of bleeding or thrombosis reported. Off Stivarga this week. Missed two doses last week.    A/P:   INR therapeutic. Continue current regimen.     Follow up appointment in 1 week(s).    Next Appointment: Thursday, March 1, 2018 at 10:30 am.     Re HUITRON

## 2018-02-22 NOTE — WOUND TEAM
[facility]   Encounter  2/5/18 -5/5/18  Referring Doctor: Yimi Martinez MD  Primary Doctor:   Surgeon: Yimi Martinez MD  Date of Evaluation: 2/5/18  Start of Care Date: 2/5/18    Surgical Procedure: 4/17/2016 Small bowel resection, sigmoid colectomy, colostomy  Discharge: 5/4/16    Current medical Status: Pt has stage 4 colon CA. She has a hepatic mass and a bladder mass, both of which are enlarging, even with chemotherapy. Dr. Martinez does not have anymore surgeries planned. Pt was treated for peristomal wound at [facility], but then insurance changed and now she is coming here.    Past Medical Status:   Past Medical History:   Diagnosis Date   • Blood clotting disorder (CMS-HCC) 4-    left leg   • Blood loss anemia 2016   • Bowel habit changes     has colostomy   • Cancer (CMS-HCC) 2016    liver, bladder, colon   • Essential hypertension    • Obesity    • Urinary bladder disorder     1/2 bladder due to cancer surgery     Past Medical History/Medications:   Current Outpatient Prescriptions on File Prior to Visit   Medication Sig Dispense Refill   • Non Formulary Request Take 4 Tabs by mouth every day.     • desmopressin, DDAVP, (STIMATE) 1.5 MG/ML Solution Spray 1 Spray in nose Once.     • furosemide (LASIX) 20 MG Tab Take 0.5 Tabs by mouth 1 time daily as needed. 45 Tab 0   • warfarin (COUMADIN) 6 MG Tab Take one to one and one-half (1-1.5) tablets daily as directed by [facility] 135 Tab 1   • vitamin D (CHOLECALCIFEROL) 1000 UNIT Tab Take 1,000 Units by mouth every day.     • acetaminophen (TYLENOL) 500 MG Tab Take 500-1,000 mg by mouth as needed.       No current facility-administered medications on file prior to visit.        Social History:   Social History     Social History   • Marital status:      Spouse name: N/A   • Number of children: N/A   • Years of education: N/A     Occupational History   • Not on file.     Social History Main Topics   •  Smoking status: Never Smoker   • Smokeless tobacco: Never Used   • Alcohol use No   • Drug use: No   • Sexual activity: Yes     Partners: Male     Other Topics Concern   • Not on file     Social History Narrative    Lives with .     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     Past Surgical History:   Procedure Laterality Date   • CATH PLACEMENT Left 8/9/2016    Procedure: CATH PLACEMENT power port ;  Surgeon: Yimi Martinez M.D.;  Location: SURGERY [facility];  Service:    • COLOSTOMY Left 4/19/2016    Procedure: COLOSTOMY;  Surgeon: Yimi Martinez M.D.;  Location: SURGERY [facility];  Service:    • EXPLORATORY LAPAROTOMY  4/18/2016    Procedure: EXPLORATORY LAPAROTOMY;  Surgeon: Yimi Martinez M.D.;  Location: SURGERY [facility];  Service:    • CYSTECTOMY  4/18/2016    Procedure: CYSTECTOMY;  Surgeon: Yunior Abdullahi M.D.;  Location: SURGERY [facility];  Service:    • EXPLORATORY LAPAROTOMY  4/17/2016    Procedure: EXPLORATORY LAPAROTOMY-illeostomy creation ;  Surgeon: Yimi Martinez M.D.;  Location: SURGERY [facility];  Service:    • COLONOSCOPY - ENDO N/A 3/22/2016    Procedure: COLONOSCOPY - ENDO;  Surgeon: Yimi Martinez M.D.;  Location: ENDOSCOPY [facility];  Service:    • RECOVERY  3/21/2016    Procedure: CT-CT Guided liver biopsy-Dr.Michael Martinez;  Surgeon: [facility];  Location: SURGERY PRE-POST PROC UNIT [facility];  Service:    • CYSTOSCOPY  3/17/2016    Procedure: CYSTOSCOPY;  Surgeon: Yunior Abdullahi M.D.;  Location: SURGERY [facility];  Service:    • TRANS URETHRAL RESECTION BLADDER  3/17/2016    Procedure: TRANS URETHRAL RESECTION BLADDER Tumor;  Surgeon: Yunior Abdullahi M.D.;  Location: SURGERY [facility];  Service:    • PB EXCISION OF TONSIL TAGS         Allergies: Patient has no known allergies.    Hospital discharge Date:   Peristomal Wound Etiology: Unknown. 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   02/22/2018 - wound larger, now 2.4x3.7x0.5cm  Treatment: Hydrofera blue dc'd due to too much moisture causing den wound denudation; applied AqAg to wound, crusted den wound denuded skin with skin prep/stoma powder x 3; Deyanira medical adhesive applied, then Brava strips to cover wound and crusted area. Medical adhesive spray again to den stomal skin, then Coloplast appliance, framed with Brava strips  Effluent/flatus: Brown, formed  Nutritional status: Appears well nourished.  Empties and rinses the pouch when 1/2 full of stool: yes  Previous or present pouching system: Coloplase SenSuraMio CTF two piece.     02/22/2018 - pt was seen at my request today by Messi HUITRON. Pt's den stomal wound is getting larger, has a dark grey area at 8-10:00, den wound skin is denuded. 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 It has no slough in the granulation tissue. HFB was chosen, though, for the hypergranulation and for infection prevention. Also, pt is familiar with it and knows how to use under wafer.    Evaluation for education and training with the new pouch system: We will continue with pouching system that is working for pt.  Provided literature for the patient with regard to Colostomy, Ileostomy, Urostomy and Other________________.   Clinician's Signature:---------------------------------------------------------- Date:____________ "   Physician's signature:____________________________________Date:____________

## 2018-03-01 NOTE — WOUND TEAM
[facility]   Encounter  2/5/18 -5/5/18  Referring Doctor: Yimi Martinez MD  Primary Doctor:   Surgeon: Yimi Martinez MD  Date of Evaluation: 2/5/18  Start of Care Date: 2/5/18      Surgical Procedure:  4/17/2016 Small bowel resection, sigmoid colectomy, colostomy    Discharge: 5/4/16    Current medical Status: Pt has stage 4 colon CA. She has a hepatic mass and a bladder mass, both of which are enlarging, even with chemotherapy. Dr. Martinez does not have anymore surgeries planned. Pt was treated for peristomal wound at [facility], but then insurance changed and now she is coming here.    Past Medical Status:     Past Medical History:   Diagnosis Date   • Blood clotting disorder (CMS-HCC) 4-    left leg   • Blood loss anemia 2016   • Bowel habit changes     has colostomy   • Cancer (CMS-HCC) 2016    liver, bladder, colon   • Essential hypertension    • Obesity    • Urinary bladder disorder     1/2 bladder due to cancer surgery     Past Medical History/Medications:   Current Outpatient Prescriptions on File Prior to Visit   Medication Sig Dispense Refill   • Regorafenib (STIVARGA PO) Take  by mouth.     • Non Formulary Request Take 4 Tabs by mouth every day.     • desmopressin, DDAVP, (STIMATE) 1.5 MG/ML Solution Spray 1 Spray in nose Once.     • furosemide (LASIX) 20 MG Tab Take 0.5 Tabs by mouth 1 time daily as needed. 45 Tab 0   • warfarin (COUMADIN) 6 MG Tab Take one to one and one-half (1-1.5) tablets daily as directed by [facility] 135 Tab 1   • vitamin D (CHOLECALCIFEROL) 1000 UNIT Tab Take 1,000 Units by mouth every day.     • acetaminophen (TYLENOL) 500 MG Tab Take 500-1,000 mg by mouth as needed.       No current facility-administered medications on file prior to visit.        Social History:   Social History     Social History   • Marital status:      Spouse name: N/A   • Number of children: N/A   • Years of education: N/A     Occupational  History   • Not on file.     Social History Main Topics   • Smoking status: Never Smoker   • Smokeless tobacco: Never Used   • Alcohol use No   • Drug use: No   • Sexual activity: Yes     Partners: Male     Other Topics Concern   • Not on file     Social History Narrative    Lives with .     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     Past Surgical History:   Procedure Laterality Date   • CATH PLACEMENT Left 8/9/2016    Procedure: CATH PLACEMENT power port ;  Surgeon: Yimi Martinez M.D.;  Location: SURGERY [facility];  Service:    • COLOSTOMY Left 4/19/2016    Procedure: COLOSTOMY;  Surgeon: Yimi Martinez M.D.;  Location: SURGERY [facility];  Service:    • EXPLORATORY LAPAROTOMY  4/18/2016    Procedure: EXPLORATORY LAPAROTOMY;  Surgeon: Yimi Martinez M.D.;  Location: SURGERY [facility];  Service:    • CYSTECTOMY  4/18/2016    Procedure: CYSTECTOMY;  Surgeon: Yunior Abdullahi M.D.;  Location: SURGERY [facility];  Service:    • EXPLORATORY LAPAROTOMY  4/17/2016    Procedure: EXPLORATORY LAPAROTOMY-illeostomy creation ;  Surgeon: Yimi Martinez M.D.;  Location: SURGERY [facility];  Service:    • COLONOSCOPY - ENDO N/A 3/22/2016    Procedure: COLONOSCOPY - ENDO;  Surgeon: Yimi Martinez M.D.;  Location: ENDOSCOPY [facility];  Service:    • RECOVERY  3/21/2016    Procedure: CT-CT Guided liver biopsy-Dr.Michael Martinez;  Surgeon: [facility];  Location: SURGERY PRE-POST PROC UNIT [facility];  Service:    • CYSTOSCOPY  3/17/2016    Procedure: CYSTOSCOPY;  Surgeon: Yunior Abdullahi M.D.;  Location: SURGERY [facility];  Service:    • TRANS URETHRAL RESECTION BLADDER  3/17/2016    Procedure: TRANS URETHRAL RESECTION BLADDER Tumor;  Surgeon: Yunior Abdullahi M.D.;  Location: SURGERY [facility];  Service:    • PB EXCISION OF TONSIL TAGS         Allergies: Patient has no known allergies.    Hospital discharge Date:   Peristomal Wound Etiology: Unknown.  "Possible PG, possible neoplasm     ICD-9: 569.62/K94.03 Colostomy dysfunction    Ostomy Evaluation:     Type of Stoma: Colostomy  Location of the stoma: LLQ  Shape of the stoma: Almost round  Color of the stoma: Red  Size of the stoma: 35mm x 42mm  Mucocutaneous junction: intact        The stoma protrudes  <1\"   The lumen is located: near center  Skin indentations, creases or scar tissue: peristomal wound very close to stoma, medially  Peristomal Skin problems: Wound - 1.8cm x3.5cm x 0.3 to hypergranular (ELANA Boss applied silver nitrate to hypergranulation tissue)  02/22/2018 - wound measurements 2.4x3.7x0.5cm  Treatment: Hydrofera blue dc'd due to too much moisture causing den wound denudation; applied AqAg to wound, crusted den wound denuded skin with skin prep/stoma powder x 3; Deyanira medical adhesive applied, then Brava strips to cover wound and crusted area. Medical adhesive spray again to den stomal skin, then Coloplast appliance, framed with Brava strips  Effluent/flatus: Brown, pasty  Nutritional status: Appears well nourished.  Empties and rinses the pouch when 1/2 full of stool: yes  Previous or present pouching system: Coloplast SenSuraMio CTF two piece.     02/22/2018 - pt was seen at my request today by Messi HUITRON. Pt's den stomal wound is getting larger, has a dark grey area at 8-10:00, den wound skin is denuded. 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 It has no slough in the granulation tissue. HFB was chosen, though, for the hypergranulation and for infection prevention. Also, pt is familiar with it and knows how to use under wafer.    3/1/18 ELANA Bsos will look into steroid injections for pt but it will depent on chemo and CA treatment.    Evaluation for education and training with the new pouch system: We " will continue with pouching system that is working for pt.  Provided literature for the patient with regard to Colostomy, Ileostomy, Urostomy and Other________________.   Clinician's Signature:---------------------------------------------------------- Date:____________   Physician's signature:____________________________________Date:____________

## 2018-03-01 NOTE — PROGRESS NOTES
Anticoagulation Summary  As of 3/1/2018    INR goal:   2.0-3.0   TTR:   47.0 % (1.7 y)   Today's INR:   3.9!   Maintenance plan:   3 mg (6 mg x 0.5) on Mon; 6 mg (6 mg x 1) all other days   Weekly total:   39 mg   Plan last modified:   ASTRID ShinPINES (2/1/2018)   Next INR check:   3/9/2018   Target end date:   Indefinite    Indications    Deep vein thrombosis (DVT) of both lower extremities (CMS-HCC) [I82.403]             Anticoagulation Episode Summary     INR check location:       Preferred lab:       Send INR reminders to:       Comments:         Anticoagulation Care Providers     Provider Role Specialty Phone number    Yimi Martinez M.D. Referring Surgery [phone]    Cesar SchofieldD Responsible      [facility] Responsible  [phone]        Anticoagulation Patient Findings      HPI:  Tamara Flynn seen in clinic today for follow up on anticoagulation therapy in the presence of malignancy related DVT hx. Denies any changes to current medical/health status since last appointment. Restarted Stivarga this week, on for 3 weeks, off 1 week. Denies any diet changes. No current symptoms of bleeding or thrombosis reported.    A/P:   INR supratherapeutic. Will HOLD and decrease regimen to account for chemo.     Follow up appointment in 1 week(s).    Next Appointment: Friday, March 9, 2018 at 11:45 am.     Re HUITRON

## 2018-03-02 PROBLEM — S31.109A OPEN WOUND OF ABDOMEN: Status: ACTIVE | Noted: 2018-01-01

## 2018-03-03 NOTE — PROGRESS NOTES
Pt seen in collaboration with  Rosa Coto  RN, WOCN      HPI: 61 y/o female with obesity, DVT of BLE, and stage IV adenocarcinoma of colon metastatic to liver. Pt underwent small bowel resection, sigmoid colectomy, and partial cystectomy on 4/18/16 by Dr. Martinez and Dr. Abdullahi. She returned to surgery the next day for closure of abdominal wall fascia with VAC placement, anastomosis of small bowel resection and colostomy. The cancer continues to metastasize despite chemotherapy. Currently she is taking stivarga which she is tolerating without side effects.   Per pt, Dr. Martinez does not have anymore surgeries planned. She was previously seen at [facility] for peristomal wound thought to be pyoderma gangrenosum. Pt denies any auto-immune disorders. Due to insurance changing, she is now being treated at [facility] for wound and ostomy care.     Today she is seen for assessment of peristomal ulcer with possible biopsy of ulcer as wound has been increasing in size. Compared to last week, the periwound skin and peristomal skin have significantly improved, skin intact. The ulcer has decreased in size and grey discolored area at 8 o clock has resolved. There is hypergranular tissue between the stoma and the ulcer, as well as hypergranular tissue to stoma at 6 o clock. ROS is negative other than what is mentioned in HPI.       PROCEDURE: chemical cauterization using AgNO3 applicators x2 applied to hypergranular tissue between stoma and ulcer and to stoma at 6 o clock. Wound and ostomy care completed by Rosa.       CHARACTERISTICS:  Wound measures: 1.8 x 3.5 x 0.3cm, 100% moist red tissue    Type of Stoma: Colostomy  Location of the stoma: LLQ  Shape of the stoma: Almost round  Color of the stoma: Red  Size of the stoma: 35mm x 42mm  Mucocutaneous junction: intact            ASSESSMENT/PLAN    ICD-10-CM   1. Colostomy in place (CMS-HCC) Z93.3   2. Adenocarcinoma of colon metastatic to liver (CMS-HCC) C18.9     C78.7   3. Open wound of abdomen, subsequent encounter S31.109D     -continue wound and ostomy care weekly  -Ddx to include pyoderma gangrenosum. No interaction between steroids and stivarga seen in literature.   -if no improvement to ulcer, consider steroid injections to peristomal wound but first will discuss with pt's oncologist.

## 2018-03-05 NOTE — TELEPHONE ENCOUNTER
ESTABLISHED PATIENT PRE-VISIT PLANNING     Note: Patient will not be contacted if there is no indication to call.     1.  Reviewed notes from the last few office visits within the medical group: Yes 03/01/2018    2.  If any orders were placed at last visit or intended to be done for this visit (i.e. 6 mos follow-up), do we have Results/Consult Notes?        •  Labs - Labs were not ordered at last office visit.   Note: If patient appointment is for lab review and patient did not complete labs, check with provider if OK to reschedule patient until labs completed.       •  Imaging - Imaging was not ordered at last office visit.       •  Referrals - No referrals were ordered at last office visit.    3. Is this appointment scheduled as a Hospital Follow-Up? No    4.  Immunizations were updated in [employer] using WebIZ?: Yes       •  Web [employer] Recommendations: TD and ZOSTAVAX (Shingles)    5.  Patient is due for the following Health Maintenance Topics:   Health Maintenance Due   Topic Date Due   • IMM ZOSTER VACCINE  10/23/2014   • MAMMOGRAM (ordered)  11/01/2017           6.  Patient was NOT informed to arrive 15 min prior to their scheduled appointment and bring in their medication bottles.

## 2018-03-08 NOTE — PROGRESS NOTES
CHIEF COMPLAINT  Colon cancer    HPI  Patient is a 62 y.o. female patient who presents today for the following     Adenocarcinoma of sigmoid colon with liver meta / colostomy   Interval course:  - Started experimental treatment with Regorafenib (STIVARGA PO) and f/u by [facility]  - Tolerating well    - the last PET scan:   - liver meta;    - left hypogastric and external iliac adenopathy is unchanged   - possible meta anterior to antrum   - mediastinal LAD w/o increased activity  - CEA was elevated at 262 on 2/26/18.  - DVT related to cancer,  on Coumadin, follow-up by Coumadin clinic;    DVT due to cancer, Chronic anticoagulation  DVT in 4/2016  On coumadin, coumadin clinic f/u.    HYPERTENSION  The patient has had elevated blood pressure after she started new treatment for colon cancer, as above.   Meds: Start losartan 25 mg daily, taking as prescribed.   Measuring BP at home: yes. It has been > 140/90.  Denies:  -  headaches, vision problems, tinnitus.                 -  chest pain/pressure, palpitations, irregular heart beats, exertional, dyspnea, peripheral edema.      - medication side effect: unusual fatigue, slow heartbeat, foot/leg swelling, cough.  Low salt diet: N  Diet: regular  Exercise: sedentary lifestyle  BMI: 44  FH of HTN: mother    IFG  The patient had elevated fasting blood sugar, as below.   Diet: decreased bettye  Exercise: Some  BMI: 40  No polydipsia, polyphagia, polyuria.  No abdominal pain, weight loss, fatigue.     Obesity, BMI 44  Increased weight since the last office visit.  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     • Non Formulary Request Take 4 Tabs by mouth every day.     • desmopressin, DDAVP, (STIMATE) 1.5 MG/ML Solution Spray 1 Spray in nose Once.     • furosemide (LASIX) 20 MG Tab Take 0.5 Tabs by mouth 1 time daily as needed. 45 Tab 0   • warfarin (COUMADIN) 6 MG Tab Take one to one and one-half (1-1.5) tablets daily as directed by [facility] 135 Tab 1   • vitamin D (CHOLECALCIFEROL) 1000 UNIT Tab Take 1,000 Units by mouth every day.     • acetaminophen (TYLENOL) 500 MG Tab Take 500-1,000 mg by mouth as needed.       No current facility-administered medications for this visit.      ALLERGIES  Allergies: Patient has no known allergies.  PAST MEDICAL HISTORY  Past Medical History:   Diagnosis Date   • Blood clotting disorder (CMS-HCC) 4-    left leg   • Cancer (CMS-HCC) 2016    liver, bladder, colon   • Blood loss anemia 2016   • Bowel habit changes     has colostomy   • Essential hypertension    • Obesity    • Urinary bladder disorder     1/2 bladder due to cancer surgery     SURGICAL HISTORY  She  has a past surgical history that includes pr excision of tonsil tags; cystoscopy (3/17/2016); trans urethral resection bladder (3/17/2016); colonoscopy - endo (N/A, 3/22/2016); recovery (3/21/2016); exploratory laparotomy (4/17/2016); exploratory laparotomy (4/18/2016); cystectomy (4/18/2016); colostomy (Left, 4/19/2016); and cath placement (Left, 8/9/2016).  SOCIAL  "HISTORY  Social History   Substance Use Topics   • Smoking status: Never Smoker   • Smokeless tobacco: Never Used   • Alcohol use No     Social History     Social History Narrative    Lives with .     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     FAMILY HISTORY  Family History   Problem Relation Age of Onset   • Diabetes Mother      prediabetes   • Heart Disease Mother    • Hypertension Mother    • Stroke Maternal Grandmother    • Cancer Neg Hx    • Hyperlipidemia Neg Hx    • Psychiatry Neg Hx    • Thyroid Neg Hx      Family Status   Relation Status   • Mother    • Maternal Grandmother    • Neg Hx      ROS   Constitutional: Negative for fever, chills.  HENT: Negative for congestion, sore throat.  Eyes: Negative for blurred vision.   Respiratory: Negative for cough, shortness of breath.  Cardiovascular: Negative for chest pain, palpitations.   Gastrointestinal: Negative for heartburn, nausea, abdominal pain.   Genitourinary: Negative for dysuria.  Musculoskeletal: Negative for significant myalgias, back pain and joint pain.   Skin: Negative for rash and itching.   Neuro: Negative for dizziness, weakness and headaches.   Endo/Heme/Allergies: Does not bruise/bleed easily.   Onc: as above.  Psychiatric/Behavioral: Negative for depression, anxiety    PHYSICAL EXAM   BP (!) 162/94   Pulse 82   Temp 36.6 °C (97.8 °F)   Ht 1.549 m (5' 1\")   Wt 107 kg (236 lb)   SpO2 97%   BMI 44.59 kg/m²   General:  NAD, well appearing, obese.  HEENT:   NC/AT, PERRLA, EOMI, TMs are clear. Oropharyngeal mucosa is pink,  without lesions;  no cervical / supraclavicular  lymphadenopathy, no thyromegaly.    Cardiovascular: RRR.   No m/r/g. No carotid bruits .      Lungs:   CTAB, no w/r/r, no respiratory distress.  Abdomen: Soft, NT/ND + BS; no suprapubic tenderness; could not palpate liver or spleen due to obesity.  Colostomy in place.  Extremities:  2+ DP and radial pulses bilaterally.  No c/c/e.   Skin:  Warm, dry.  No " erythema. No rash.   Neurologic: Alert & oriented x 3.  No focal deficits.  Psychiatric:  Affect normal, mood normal, judgment normal.    LABS     Labs are reviewed and discussed with a patient  Lab Results   Component Value Date/Time    TRIGLYCERIDE 146 04/20/2016 04:15 AM       Lab Results   Component Value Date/Time    SODIUM 140 02/26/2018 12:40 PM    POTASSIUM 3.6 02/26/2018 12:40 PM    CHLORIDE 107 02/26/2018 12:40 PM    CO2 24 02/26/2018 12:40 PM    GLUCOSE 99 02/26/2018 12:40 PM    BUN 14 02/26/2018 12:40 PM    CREATININE 0.52 02/26/2018 12:40 PM     Lab Results   Component Value Date/Time    ALKPHOSPHAT 114 (H) 02/26/2018 12:40 PM    ASTSGOT 24 02/26/2018 12:40 PM    ALTSGPT 23 02/26/2018 12:40 PM    TBILIRUBIN 0.5 02/26/2018 12:40 PM      Lab Results   Component Value Date/Time    HBA1C 6.1 08/24/2017 12:01 PM    HBA1C 6.0 05/18/2017    HBA1C 6.1 02/07/2017     No results found for: TSH  No results found for: FREET4  Lab Results   Component Value Date/Time    WBC 6.9 02/23/2018 10:24 AM    RBC 4.64 02/23/2018 10:24 AM    HEMOGLOBIN 12.8 02/23/2018 10:24 AM    HEMATOCRIT 40.6 02/23/2018 10:24 AM    MCV 87.5 02/23/2018 10:24 AM    MCH 27.6 02/23/2018 10:24 AM    MCHC 31.5 (L) 02/23/2018 10:24 AM    MPV 9.7 02/23/2018 10:24 AM    NEUTSPOLYS 68.20 02/23/2018 10:24 AM    LYMPHOCYTES 22.20 02/23/2018 10:24 AM    MONOCYTES 7.30 02/23/2018 10:24 AM    EOSINOPHILS 1.00 02/23/2018 10:24 AM    BASOPHILS 0.70 02/23/2018 10:24 AM    HYPOCHROMIA 1+ 04/27/2016 03:14 AM    ANISOCYTOSIS 1+ 04/27/2016 03:14 AM       Ref. Range 2/26/2018    Carcinoembryonic Antigen Latest Ref Range: 0.0 - 3.0 ng/mL 262.7 (H)     IMAGING     Reviewed the last PET scan from 2/3/17:  1.  Liver masses in segments 5 and 6 of the right lobe of the liver are identified measuring up to 4 cm in diameter with activity measuring up to 9.4 SUV. Findings are consistent with liver metastases. There is an adjacent calcified mass in the liver   with no  elevated activity.  2.  Left hypogastric and external iliac adenopathy is unchanged compared to the prior CT and does demonstrate elevated activity which is likely indicative of tumor involvement of these nodes.  3.  Oval-shaped soft tissue mass anterior to the antrum of stomach is identified. Small area of elevated activity is noted posteriorly within the lesion which could indicate presence of tumor at this location.  4.  Mildly enlarged lymph nodes in the mediastinum are noted but no elevated activity is identified at this time.    ASSESMENT AND PLAN        1. Adenocarcinoma of colon metastatic to liver (CMS-HCC)  2. Colostomy in place (CMS-HCC)  3. 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   Continue oncology follow-up    4. Deep vein thrombosis (DVT) of both lower extremities, unspecified chronicity, unspecified vein (CMS-HCC)  No recurrence of DVT, continue anticoagulation and Coumadin clinic follow-up    5. Essential hypertension  Start losartan 25 mg daily; advised to continue monitoring blood pressure at home  - losartan (COZAAR) 25 MG Tab; Take 1 Tab by mouth every day.  Dispense: 90 Tab; Refill: 0    6. Prediabetes  Stable, advised low-calorie diet, increase activity physical activity, weight control.    7. Obesity, Class III, BMI 40-49.9 (morbid obesity) (CMS-HCC)  - Patient identified as having weight management issue.  Appropriate orders and counseling given.    Counseling:   - Smoking:  Nonsmoker    Followup: in 4 weeks, f/u HTN    All questions are answered.    Please note that this dictation was created using voice recognition software, and that there might be errors of butch and possibly content.

## 2018-03-09 NOTE — PROGRESS NOTES
Anticoagulation Summary  As of 3/9/2018    INR goal:   2.0-3.0   TTR:   46.5 % (1.8 y)   Today's INR:   3.1!   Maintenance plan:   3 mg (6 mg x 0.5) on Mon, Fri; 6 mg (6 mg x 1) all other days   Weekly total:   36 mg   Plan last modified:   Adalid Oliveira PharmD (3/9/2018)   Next INR check:   3/16/2018   Target end date:   Indefinite    Indications    Deep vein thrombosis (DVT) of both lower extremities (CMS-HCC) [I82.403]             Anticoagulation Episode Summary     INR check location:       Preferred lab:       Send INR reminders to:       Comments:         Anticoagulation Care Providers     Provider Role Specialty Phone number    Yimi Martinez M.D. Referring Surgery [phone]    Jael Garcia PharmD Responsible      [facility] Responsible  [phone]        Anticoagulation Patient Findings      HPI:  Tamara Flynn seen in clinic today, on anticoagulation therapy with warfarin for DVT.   Changes to current medical/health status since last appt: none  Denies signs/symptoms of bleeding and/or thrombosis since the last appt.    Denies any interval changes to diet  Denies any interval changes to medications since last appt.   Denies any complications or cost restrictions with current therapy.   BP recorded in vitals.  BP is very elevated, pt is starting losartan today.  She plans to pick it up soon.  Deneis s/s of end organ damage.     A/P   INR  SUPRA-therapeutic.   Begin reduced regimen.     Follow up appointment in 1 week(s).    Adalid Oliveira PharmD

## 2018-03-09 NOTE — WOUND TEAM
[facility]   Encounter  2/5/18 -5/5/18  Referring Doctor: Yimi Martinez MD  Primary Doctor:   Surgeon: Yimi Martinez MD  Date of Evaluation: 2/5/18  Start of Care Date: 2/5/18      Surgical Procedure:  4/17/2016 Small bowel resection, sigmoid colectomy, colostomy    Discharge: 5/4/16    Current medical Status: Pt has stage 4 colon CA. She has a hepatic mass and a bladder mass, both of which are enlarging, even with chemotherapy. Dr. Martinez does not have anymore surgeries planned. Pt was treated for peristomal wound at [facility], but then insurance changed and now she is coming here.    Past Medical Status:     Past Medical History:   Diagnosis Date   • Blood clotting disorder (CMS-HCC) 4-    left leg   • Blood loss anemia 2016   • Bowel habit changes     has colostomy   • Cancer (CMS-HCC) 2016    liver, bladder, colon   • Essential hypertension    • Obesity    • Urinary bladder disorder     1/2 bladder due to cancer surgery     Past Medical History/Medications:   Current Outpatient Prescriptions on File Prior to Visit   Medication Sig Dispense Refill   • STIVARGA 40 MG Tab      • losartan (COZAAR) 25 MG Tab Take 1 Tab by mouth every day. 90 Tab 0   • Regorafenib (STIVARGA PO) Take  by mouth.     • Non Formulary Request Take 4 Tabs by mouth every day.     • desmopressin, DDAVP, (STIMATE) 1.5 MG/ML Solution Spray 1 Spray in nose Once.     • furosemide (LASIX) 20 MG Tab Take 0.5 Tabs by mouth 1 time daily as needed. 45 Tab 0   • warfarin (COUMADIN) 6 MG Tab Take one to one and one-half (1-1.5) tablets daily as directed by [facility] 135 Tab 1   • vitamin D (CHOLECALCIFEROL) 1000 UNIT Tab Take 1,000 Units by mouth every day.     • acetaminophen (TYLENOL) 500 MG Tab Take 500-1,000 mg by mouth as needed.       No current facility-administered medications on file prior to visit.        Social History:   Social History     Social History   • Marital status:       Spouse name: N/A   • Number of children: N/A   • Years of education: N/A     Occupational History   • Not on file.     Social History Main Topics   • Smoking status: Never Smoker   • Smokeless tobacco: Never Used   • Alcohol use No   • Drug use: No   • Sexual activity: Yes     Partners: Male     Other Topics Concern   • Not on file     Social History Narrative    Lives with .     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     Past Surgical History:   Procedure Laterality Date   • CATH PLACEMENT Left 8/9/2016    Procedure: CATH PLACEMENT power port ;  Surgeon: Yimi Martinez M.D.;  Location: SURGERY [facility];  Service:    • COLOSTOMY Left 4/19/2016    Procedure: COLOSTOMY;  Surgeon: Yimi Martinez M.D.;  Location: SURGERY [facility];  Service:    • EXPLORATORY LAPAROTOMY  4/18/2016    Procedure: EXPLORATORY LAPAROTOMY;  Surgeon: Yimi Martinez M.D.;  Location: SURGERY [facility];  Service:    • CYSTECTOMY  4/18/2016    Procedure: CYSTECTOMY;  Surgeon: Yunior Abdullahi M.D.;  Location: SURGERY [facility];  Service:    • EXPLORATORY LAPAROTOMY  4/17/2016    Procedure: EXPLORATORY LAPAROTOMY-illeostomy creation ;  Surgeon: Yimi Martinez M.D.;  Location: SURGERY [facility];  Service:    • COLONOSCOPY - ENDO N/A 3/22/2016    Procedure: COLONOSCOPY - ENDO;  Surgeon: Yimi Martinez M.D.;  Location: ENDOSCOPY [facility];  Service:    • RECOVERY  3/21/2016    Procedure: CT-CT Guided liver biopsy-Dr.Michael Martinez;  Surgeon: [facility];  [place] [facility]  Service:    • CYSTOSCOPY  3/17/2016    Procedure: CYSTOSCOPY;  Surgeon: Yunior Abdullhai M.D.;  Location: SURGERY [facility];  Service:    • TRANS URETHRAL RESECTION BLADDER  3/17/2016    Procedure: TRANS URETHRAL RESECTION BLADDER Tumor;  Surgeon: Yunior Abdullahi M.D.;  Location: SURGERY [facility];  Service:    • PB EXCISION OF TONSIL TAGS         Allergies:  "Patient has no known allergies.    Hospital discharge Date:   Peristomal Wound Etiology: Unknown. Possible PG, possible neoplasm     ICD-9: 569.62/K94.03 Colostomy dysfunction    Ostomy Evaluation:     Type of Stoma: Colostomy  Location of the stoma: LLQ  Shape of the stoma: Almost round  Color of the stoma: Red  Size of the stoma: 35mm x 42mm  Mucocutaneous junction: intact        The stoma protrudes  <1\"   The lumen is located: near center  Skin indentations, creases or scar tissue: peristomal wound very close to stoma, medially  Peristomal Skin problems: Wound - 1.8cm x3.5cm x 0.3 to hypergranular (ELANA Boss applied silver nitrate to hypergranulation tissue)  02/22/2018 - wound measurements 2.4x3.7x0.5cm    3/9/18 2.5 x 4 x 0.5  Treatment: 3/9/18 - Hydrofera blue dc'd due to too much moisture causing den wound denudation; applied AqAg to wound, crusted den wound denuded skin with skin prep/stoma powder x 3; [place] medical adhesive applied, then Brava strips to cover wound and crusted area. Medical adhesive spray again to den stomal skin, then Coloplast appliance, framed with Brava strips  Effluent/flatus: Brown, pasty  Nutritional status: Appears well nourished.  Empties and rinses the pouch when 1/2 full of stool: yes  Previous or present pouching system: Coloplast SenSuraMio CTF two piece.     3/9/18 - Wound is slightly larger today by measurement.     02/22/2018 - pt was seen at my request today by Messi HUITRON. Pt's den stomal wound is getting larger, has a dark grey area at 8-10:00, den wound skin is denuded. 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 It has no slough in the granulation tissue. HFB was chosen, though, for the hypergranulation and for infection prevention. Also, pt is familiar with it and knows how to use under " wafer.    3/1/18 ELANA Boss will look into steroid injections for pt but it will depent on chemo and CA treatment.    Evaluation for education and training with the new pouch system: We will continue with pouching system that is working for pt.  Provided literature for the patient with regard to Colostomy, Ileostomy, Urostomy and Other________________.   Clinician's Signature:---------------------------------------------------------- Date:____________   Physician's signature:____________________________________Date:____________

## 2018-03-15 NOTE — WOUND TEAM
[facility]   Encounter  2/5/18 -5/5/18  Referring Doctor: Yimi Martinez MD  Primary Doctor:   Surgeon: Yimi Martinez MD  Date of Evaluation: 2/5/18  Start of Care Date: 2/5/18      Surgical Procedure:  4/17/2016 Small bowel resection, sigmoid colectomy, colostomy    Discharge: 5/4/16    Current medical Status: Pt has stage 4 colon CA. She has a hepatic mass and a bladder mass, both of which are enlarging, even with chemotherapy. Dr. Martinez does not have anymore surgeries planned. Pt was treated for peristomal wound at [facility], but then insurance changed and now she is coming here.    Past Medical Status:     Past Medical History:   Diagnosis Date   • Blood clotting disorder (CMS-HCC) 4-    left leg   • Blood loss anemia 2016   • Bowel habit changes     has colostomy   • Cancer (CMS-HCC) 2016    liver, bladder, colon   • Essential hypertension    • Obesity    • Urinary bladder disorder     1/2 bladder due to cancer surgery     Past Medical History/Medications:   Current Outpatient Prescriptions on File Prior to Visit   Medication Sig Dispense Refill   • STIVARGA 40 MG Tab      • losartan (COZAAR) 25 MG Tab Take 1 Tab by mouth every day. 90 Tab 0   • Regorafenib (STIVARGA PO) Take  by mouth.     • Non Formulary Request Take 4 Tabs by mouth every day.     • desmopressin, DDAVP, (STIMATE) 1.5 MG/ML Solution Spray 1 Spray in nose Once.     • furosemide (LASIX) 20 MG Tab Take 0.5 Tabs by mouth 1 time daily as needed. 45 Tab 0   • warfarin (COUMADIN) 6 MG Tab Take one to one and one-half (1-1.5) tablets daily as directed by [facility] 135 Tab 1   • vitamin D (CHOLECALCIFEROL) 1000 UNIT Tab Take 1,000 Units by mouth every day.     • acetaminophen (TYLENOL) 500 MG Tab Take 500-1,000 mg by mouth as needed.       No current facility-administered medications on file prior to visit.        Social History:   Social History     Social History   • Marital status:       Spouse name: N/A   • Number of children: N/A   • Years of education: N/A     Occupational History   • Not on file.     Social History Main Topics   • Smoking status: Never Smoker   • Smokeless tobacco: Never Used   • Alcohol use No   • Drug use: No   • Sexual activity: Yes     Partners: Male     Other Topics Concern   • Not on file     Social History Narrative    Lives with .     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     Past Surgical History:   Procedure Laterality Date   • CATH PLACEMENT Left 8/9/2016    Procedure: CATH PLACEMENT power port ;  Surgeon: Yimi Martinez M.D.;  Location: SURGERY [facility];  Service:    • COLOSTOMY Left 4/19/2016    Procedure: COLOSTOMY;  Surgeon: Yimi Martinez M.D.;  Location: SURGERY [facility];  Service:    • EXPLORATORY LAPAROTOMY  4/18/2016    Procedure: EXPLORATORY LAPAROTOMY;  Surgeon: Yimi Martinez M.D.;  Location: SURGERY [facility];  Service:    • CYSTECTOMY  4/18/2016    Procedure: CYSTECTOMY;  Surgeon: Yunior Abdullahi M.D.;  Location: SURGERY [facility];  Service:    • EXPLORATORY LAPAROTOMY  4/17/2016    Procedure: EXPLORATORY LAPAROTOMY-illeostomy creation ;  Surgeon: Yimi Martinez M.D.;  Location: SURGERY [facility];  Service:    • COLONOSCOPY - ENDO N/A 3/22/2016    Procedure: COLONOSCOPY - ENDO;  Surgeon: Yimi Martinez M.D.;  Location: ENDOSCOPY [facility];  Service:    • RECOVERY  3/21/2016    Procedure: CT-CT Guided liver biopsy-Dr.Michael Martinez;  Surgeon: [facility];  [place] [facility]  Service:    • CYSTOSCOPY  3/17/2016    Procedure: CYSTOSCOPY;  Surgeon: Yunior Abdullahi M.D.;  Location: SURGERY [facility];  Service:    • TRANS URETHRAL RESECTION BLADDER  3/17/2016    Procedure: TRANS URETHRAL RESECTION BLADDER Tumor;  Surgeon: Yunior Abdullahi M.D.;  Location: SURGERY [facility];  Service:    • PB EXCISION OF TONSIL TAGS         Allergies:  "Patient has no known allergies.    Hospital discharge Date:   Peristomal Wound Etiology: Unknown. Possible PG, possible neoplasm     ICD-9: 569.62/K94.03 Colostomy dysfunction    Ostomy Evaluation:     Type of Stoma: Colostomy  Location of the stoma: LLQ  Shape of the stoma: Almost round  Color of the stoma: Red  Size of the stoma: 35mm x 42mm  Mucocutaneous junction: intact        The stoma protrudes  <1\"   The lumen is located: near center  Skin indentations, creases or scar tissue: peristomal wound very close to stoma, medially  Peristomal Skin problems: Wound    3/9/18 2.5 x 4 x 0.5  Treatment: 3/15/18 -  applied AqAg to wound (including chanelle into punch biopsy sites x 2), crusted den wound denuded skin with skin prep/stoma powder x 3; [place] medical adhesive applied, then Brava strips to cover wound and crusted area. Medical adhesive spray again to den stomal skin, then Coloplast appliance, framed with Brava strips  Effluent/flatus: Brown, pasty  Nutritional status: Appears well nourished.  Empties and rinses the pouch when 1/2 full of stool: yes  Previous or present pouching system: Coloplast SenSuraMio CTF two piece.     3/15/18 - punch biopsy and tissue for culture taken today by Kristine HUITRON    Evaluation for education and training with the new pouch system: We will continue with pouching system that is working for pt.  Provided literature for the patient with regard to Colostomy, Ileostomy, Urostomy and Other________________.   Clinician's Signature:---------------------------------------------------------- Date:____________   Physician's signature:____________________________________Date:____________     "

## 2018-03-15 NOTE — PROGRESS NOTES
Anticoagulation Summary  As of 3/15/2018    INR goal:   2.0-3.0   TTR:   46.5 % (1.8 y)   Today's INR:   2.9   Maintenance plan:   3 mg (6 mg x 0.5) on Mon, Fri; 6 mg (6 mg x 1) all other days   Weekly total:   36 mg   Plan last modified:   Adalid Oliveira, PharmD (3/9/2018)   Next INR check:      Target end date:   Indefinite    Indications    Deep vein thrombosis (DVT) of both lower extremities (CMS-HCC) [I82.403]             Anticoagulation Episode Summary     INR check location:       Preferred lab:       Send INR reminders to:       Comments:         Anticoagulation Care Providers     Provider Role Specialty Phone number    Yimi Martinez M.D. Referring Surgery [phone]    Cesar SchofieldD Responsible      [facility] Responsible  [phone]        Anticoagulation Patient Findings      HPI:  Tamara Flynn seen in clinic today for follow up on anticoagulation therapy in the presence of DVT hx. Denies any changes to current medical/health status since last appointment. Denies any medication or diet changes. No current symptoms of bleeding or thrombosis reported.    A/P:   INR therapeutic. Continue current regimen.     Follow up appointment in 2 week(s).    Next Appointment: Friday, March 30, 2018 at 2:15 pm.     Re HUITRON

## 2018-03-16 NOTE — PROGRESS NOTES
PROCEDURE NOTE    Patient seen in collaboration with wound care provider,  Surjit Resendez RN, CWS, CFCN    HPI:  Patient is a 62-year-old female with past medical history that includes Adenocarcinoma of the colon metastatic to liver, currently on Chemo, Colostomy, prediabetes, and DVT of both lower extremities, currently on coumadin.  She is currently being seen in the clinic for an ulcer to her peristomal skin.  She has been seen in the clinic weekly for treatment of this wound.  Unfortunately, the wound has not improved, and has in fact become slightly larger.   She is independent with her ostomy.     She is seen by me today for a punch biopsy of the ulcer, to be sent to pathology, and a for excision of tissue (with 4mm punch) to be sent for culture and gram stain. The procedure, rationale for doing so, and the possible risks- including infection, pain and bleeding- have been discussed with the patient.  The patient  verbalized understanding and is agreeable with proceeding.  Consent signed.   Increased risk for bleeding due to Coumadin use discussed.  Patient states that her last INR was around 3.0, and that she is due to have it checked again later today.       DESCRIPTION OF PROCEDURE:  Punch biopsy of LLQ abdomen, Tissue excision (via punch) for tissue cx and gram stain    PMH, medications and recent labs reviewed    ANESTHESIA:  6 ml 2% lidocaine with epinephrine    PROCEDURE: A formal timeout was performed to confirm patient's correct name, correct site, correct procedure and correct laterality.  Skin prepped with Chlorahexidine.  Lidocaine injected subcutaneously into the medial periwound skin. 4mm punch used to obtain specimen from wound edge at ~ 6:00, placed into specimen container with formalin.  A second 4mm punch was used to obtain specimen from inferior wound bed, placed in specimen container without formalin.   Bleeding controlled with manual pressure.  Wound care and ostomy care completed by  Surjit.  Patient tolerated the procedure well, without c/o pain or discomfort.        PLAN:  - Patient to continue to be seen in wound clinic every 1-2 weeks for assessment and management of her wound  -Await pathology and culture results  -Pt advised to go to ER for any increased redness, swelling, drainage or odor, or if she develops fever, chills, nausea or vomiting.

## 2018-03-21 NOTE — WOUND TEAM
[facility]   Encounter  2/5/18 -5/5/18  Referring Doctor: Yimi Martinez MD  Primary Doctor:   Surgeon: Yimi Martinez MD  Date of Evaluation: 2/5/18  Start of Care Date: 2/5/18      Surgical Procedure:  4/17/2016 Small bowel resection, sigmoid colectomy, colostomy    Discharge: 5/4/16    Current medical Status: Pt has stage 4 colon CA. She has a hepatic mass and a bladder mass, both of which are enlarging, even with chemotherapy. Dr. Martinez does not have anymore surgeries planned. Pt was treated for peristomal wound at [facility], but then insurance changed and now she is coming here.    Past Medical Status:     Past Medical History:   Diagnosis Date   • Blood clotting disorder (CMS-HCC) 4-    left leg   • Blood loss anemia 2016   • Bowel habit changes     has colostomy   • Cancer (CMS-HCC) 2016    liver, bladder, colon   • Essential hypertension    • Obesity    • Urinary bladder disorder     1/2 bladder due to cancer surgery     Past Medical History/Medications:   Current Outpatient Prescriptions on File Prior to Visit   Medication Sig Dispense Refill   • STIVARGA 40 MG Tab      • losartan (COZAAR) 25 MG Tab Take 1 Tab by mouth every day. 90 Tab 0   • Regorafenib (STIVARGA PO) Take  by mouth.     • Non Formulary Request Take 4 Tabs by mouth every day.     • desmopressin, DDAVP, (STIMATE) 1.5 MG/ML Solution Spray 1 Spray in nose Once.     • furosemide (LASIX) 20 MG Tab Take 0.5 Tabs by mouth 1 time daily as needed. 45 Tab 0   • warfarin (COUMADIN) 6 MG Tab Take one to one and one-half (1-1.5) tablets daily as directed by [facility] 135 Tab 1   • vitamin D (CHOLECALCIFEROL) 1000 UNIT Tab Take 1,000 Units by mouth every day.     • acetaminophen (TYLENOL) 500 MG Tab Take 500-1,000 mg by mouth as needed.       No current facility-administered medications on file prior to visit.            Past Surgical History:   Procedure Laterality Date   • CATH PLACEMENT Left  "8/9/2016    Procedure: CATH PLACEMENT power port ;  Surgeon: Yimi Martinez M.D.;  Location: SURGERY [facility];  Service:    • COLOSTOMY Left 4/19/2016    Procedure: COLOSTOMY;  Surgeon: Yimi Martinez M.D.;  Location: SURGERY [facility];  Service:    • EXPLORATORY LAPAROTOMY  4/18/2016    Procedure: EXPLORATORY LAPAROTOMY;  Surgeon: Yimi Martinez M.D.;  Location: SURGERY [facility];  Service:    • CYSTECTOMY  4/18/2016    Procedure: CYSTECTOMY;  Surgeon: Yunior Abdullahi M.D.;  Location: SURGERY [facility];  Service:    • EXPLORATORY LAPAROTOMY  4/17/2016    Procedure: EXPLORATORY LAPAROTOMY-illeostomy creation ;  Surgeon: Yimi Martniez M.D.;  Location: SURGERY [facility];  Service:    • COLONOSCOPY - ENDO N/A 3/22/2016    Procedure: COLONOSCOPY - ENDO;  Surgeon: Yimi Martinez M.D.;  Location: ENDOSCOPY [facility];  Service:    • RECOVERY  3/21/2016    Procedure: CT-CT Guided liver biopsy-Dr.Michael Martinez;  Surgeon: [facility];  Location: SURGERY PRE-POST PROC [facility]IT [facility];  Service:    • CYSTOSCOPY  3/17/2016    Procedure: CYSTOSCOPY;  Surgeon: Yunior Abdullahi M.D.;  Location: SURGERY [facility];  Service:    • TRANS URETHRAL RESECTION BLADDER  3/17/2016    Procedure: TRANS URETHRAL RESECTION BLADDER Tumor;  Surgeon: Yunior Abdullahi M.D.;  Location: SURGERY [facility];  Service:    • PB EXCISION OF TONSIL TAGS         Allergies: Patient has no known allergies.    Hospital discharge Date:   Peristomal Wound Etiology: Unknown. Possible PG, possible neoplasm     ICD-9: 569.62/K94.03 Colostomy dysfunction    Ostomy Evaluation:     Type of Stoma: Colostomy  Location of the stoma: LLQ  Shape of the stoma: Almost round  Color of the stoma: Red  Size of the stoma: 35mm x 42mm  Mucocutaneous junction: intact        The stoma protrudes  <1\"   The lumen is located: near center  Skin indentations, creases or scar tissue: peristomal wound very close to stoma, " medially  Peristomal Skin problems: Wound    3/21/18 - 2.6 x 4 x 1.7  Treatment: 3/21/18 -  applied AqAg to wound (including chanelle into punch biopsy sites x 2),  Deyanira medical adhesive applied, then Brava strips to cover wound and crusted area. Medical adhesive spray again to den stomal skin, then Coloplast appliance, framed with Brava strips  Effluent/flatus: Brown, pasty  Nutritional status: Appears well nourished.  Empties and rinses the pouch when 1/2 full of stool: yes  Previous or present pouching system: Coloplast SenSuraMio CTF two piece.     3/15/18 - punch biopsy and tissue for culture taken today by Kristine HUITRON  03/21/2018 - biopsy NEG for malignancy. Tissue culture POS for E. Coli, but this would be expected in a den colostomy wound      Evaluation for education and training with the new pouch system: We will continue with pouching system that is working for pt.  Provided literature for the patient with regard to Colostomy, Ileostomy, Urostomy and Other________________.   Clinician's Signature:---------------------------------------------------------- Date:____________   Physician's signature:____________________________________Date:____________

## 2018-03-23 PROBLEM — L08.9 WOUND INFECTION: Status: ACTIVE | Noted: 2018-01-01

## 2018-03-23 PROBLEM — T14.8XXA WOUND INFECTION: Status: ACTIVE | Noted: 2018-01-01

## 2018-03-23 NOTE — PROGRESS NOTES
Wound culture results reviewed.  Discussed with pt via phone, pharmacy info verified. Bactrim DS ordered BID x 10 days.

## 2018-03-30 NOTE — PROGRESS NOTES
Patient seen in clinic today for her wound care appt.  States that she was not able to  her Bactrim Ds prescription because the pharmacist told her there was a problem and they needed to contact prescriber (me).  I did not receive any notifications.  TC to [facility] pharmacy on [address].  Spoke with pharmacist, informed that med not given because pt is on coumadin and that this can raise INR.  Informed pharmacist that we often prescribe bactrim with coumadin and advise pt to inform their coumadin clinic that they are on this med.  Pharmacy to release rx. Pt is going to her coumadin clinic today and will inform them that she is on Bactrim.

## 2018-03-30 NOTE — WOUND TEAM
[facility]   Encounter  2/5/18 -5/5/18  Referring Doctor: Yimi Martinez MD  Primary Doctor:   Surgeon: Yimi Martinez MD  Date of Evaluation: 2/5/18  Start of Care Date: 2/5/18      Surgical Procedure:  4/17/2016 Small bowel resection, sigmoid colectomy, colostomy    Discharge: 5/4/16    Current medical Status: Pt has stage 4 colon CA. She has a hepatic mass and a bladder mass, both of which are enlarging, even with chemotherapy. Dr. Martinez does not have anymore surgeries planned. Pt was treated for peristomal wound at [facility], but then insurance changed and now she is coming here.    Past Medical Status:     Past Medical History:   Diagnosis Date   • Blood clotting disorder (CMS-HCC) 4-    left leg   • Blood loss anemia 2016   • Bowel habit changes     has colostomy   • Cancer (CMS-HCC) 2016    liver, bladder, colon   • Essential hypertension    • Obesity    • Urinary bladder disorder     1/2 bladder due to cancer surgery     Past Medical History/Medications:   Current Outpatient Prescriptions on File Prior to Visit   Medication Sig Dispense Refill   • sulfamethoxazole-trimethoprim (BACTRIM DS) 800-160 MG tablet Take 1 Tab by mouth 2 times a day for 10 days. 20 Tab 0   • STIVARGA 40 MG Tab      • losartan (COZAAR) 25 MG Tab Take 1 Tab by mouth every day. 90 Tab 0   • Regorafenib (STIVARGA PO) Take  by mouth.     • Non Formulary Request Take 4 Tabs by mouth every day.     • desmopressin, DDAVP, (STIMATE) 1.5 MG/ML Solution Spray 1 Spray in nose Once.     • furosemide (LASIX) 20 MG Tab Take 0.5 Tabs by mouth 1 time daily as needed. 45 Tab 0   • warfarin (COUMADIN) 6 MG Tab Take one to one and one-half (1-1.5) tablets daily as directed by [facility] 135 Tab 1   • vitamin D (CHOLECALCIFEROL) 1000 UNIT Tab Take 1,000 Units by mouth every day.     • acetaminophen (TYLENOL) 500 MG Tab Take 500-1,000 mg by mouth as needed.       No current facility-administered  medications on file prior to visit.            Past Surgical History:   Procedure Laterality Date   • CATH PLACEMENT Left 8/9/2016    Procedure: CATH PLACEMENT power port ;  Surgeon: Yimi Martinez M.D.;  Location: SURGERY [facility];  Service:    • COLOSTOMY Left 4/19/2016    Procedure: COLOSTOMY;  Surgeon: Yimi Martinez M.D.;  Location: SURGERY [facility];  Service:    • EXPLORATORY LAPAROTOMY  4/18/2016    Procedure: EXPLORATORY LAPAROTOMY;  Surgeon: Yimi Martinez M.D.;  Location: SURGERY [facility];  Service:    • CYSTECTOMY  4/18/2016    Procedure: CYSTECTOMY;  Surgeon: Yunior Abdullahi M.D.;  Location: SURGERY [facility];  Service:    • EXPLORATORY LAPAROTOMY  4/17/2016    Procedure: EXPLORATORY LAPAROTOMY-illeostomy creation ;  Surgeon: Yimi Martinez M.D.;  Location: SURGERY [facility];  Service:    • COLONOSCOPY - ENDO N/A 3/22/2016    Procedure: COLONOSCOPY - ENDO;  Surgeon: Yimi Martinez M.D.;  Location: ENDOSCOPY [facility];  Service:    • RECOVERY  3/21/2016    Procedure: CT-CT Guided liver biopsy-Dr.Michael Martinez;  Surgeon: [facility];  Location: SURGERY PRE-POST PROC [facility]IT [facility];  Service:    • CYSTOSCOPY  3/17/2016    Procedure: CYSTOSCOPY;  Surgeon: Yunior Abdullahi M.D.;  Location: SURGERY [facility];  Service:    • TRANS URETHRAL RESECTION BLADDER  3/17/2016    Procedure: TRANS URETHRAL RESECTION BLADDER Tumor;  Surgeon: Yunior Abdullahi M.D.;  Location: SURGERY [facility];  Service:    • PB EXCISION OF TONSIL TAGS         Allergies: Patient has no known allergies.    Hospital discharge Date:   Peristomal Wound Etiology: Unknown. Possible PG, possible neoplasm     ICD-9: 569.62/K94.03 Colostomy dysfunction    Ostomy Evaluation:     Type of Stoma: Colostomy  Location of the stoma: LLQ  Shape of the stoma: Almost round  Color of the stoma: Red  Size of the stoma: 35mm x 42mm  Mucocutaneous junction: intact        The stoma protrudes   "<1\"   The lumen is located: near center  Skin indentations, creases or scar tissue: peristomal wound very close to stoma, medially  Peristomal Skin problems: Wound    3/21/18 - 2.6 x 4 x 1.7  Treatment: 3/21/18 -  applied AqAg to wound,  Deyanira medical adhesive applied, then Brava strips to cover wound and crusted area. Medical adhesive spray again to den stomal skin, then Coloplast appliance, framed with Brava strips  Effluent/flatus: Brown, pasty  Nutritional status: Appears well nourished.  Empties and rinses the pouch when 1/2 full of stool: yes  Previous or present pouching system: Coloplast SenSuraMio CTF two piece.     3/15/18 - punch biopsy and tissue for culture taken today by Kristine Griggs APRN  03/21/2018 - biopsy NEG for malignancy. Tissue culture POS for E. Coli, but this would be expected in a den colostomy wound  3/30/18 - Wound bed friable and there is surrounding erythema and induration, so pt will be taking Bactrim as soon as she fills rx.      Evaluation for education and training with the new pouch system: We will continue with pouching system that is working for pt.  Provided literature for the patient with regard to Colostomy, Ileostomy, Urostomy and Other________________.   Clinician's Signature:---------------------------------------------------------- Date:____________   Physician's signature:____________________________________Date:____________     "

## 2018-03-30 NOTE — PROGRESS NOTES
Anticoagulation Summary  As of 3/30/2018    INR goal:   2.0-3.0   TTR:   46.0 % (1.8 y)   Today's INR:   3.3!   Maintenance plan:   3 mg (6 mg x 0.5) on Mon, Wed, Fri; 6 mg (6 mg x 1) all other days   Weekly total:   33 mg   Plan last modified:   Adalid Oliveira PharmD (3/30/2018)   Next INR check:   4/3/2018   Target end date:   Indefinite    Indications    Deep vein thrombosis (DVT) of both lower extremities (CMS-HCC) [I82.403]             Anticoagulation Episode Summary     INR check location:       Preferred lab:       Send INR reminders to:       Comments:         Anticoagulation Care Providers     Provider Role Specialty Phone number    Yimi Martinez M.D. Referring Surgery [phone]    Jael Garcia PharmD Responsible      [facility] Responsible  [phone]        Anticoagulation Patient Findings      HPI:  Tamara Blood Gee seen in clinic today, on anticoagulation therapy with warfarin for DVT.  Changes to current medical/health status since last appt: none.   Denies signs/symptoms of bleeding and/or thrombosis since the last appt.    Denies any interval changes to diet  Pt is starting Bactrim today or tomorrow.  Denies any complications or cost restrictions with current therapy.   BP recorded in vitals.  Confirmed dosing regimen.     A/P   INR  SUPRA-therapeutic.   Begin reduced regimen as pt's INR is elevated and is starting new DDI.     Follow up appointment in 4 days.     Adalid Oliveira PharmD

## 2018-04-03 PROBLEM — Z79.01 CHRONIC ANTICOAGULATION: Status: ACTIVE | Noted: 2018-01-01

## 2018-04-03 NOTE — PROGRESS NOTES
OP Anticoagulation Service Note    Date: 4/3/2018  Vitals:    04/03/18 1525   BP: 159/79   Pulse: 87         Anticoagulation Summary  As of 4/3/2018    INR goal:   2.0-3.0   TTR:   46.1 % (1.8 y)   Today's INR:   2.4   Maintenance plan:   3 mg (6 mg x 0.5) on Mon, Wed, Fri; 6 mg (6 mg x 1) all other days   Weekly total:   33 mg   Plan last modified:   Adalid Oliveira, PharmD (3/30/2018)   Next INR check:   4/10/2018   Target end date:   Indefinite    Indications    Deep vein thrombosis (DVT) of both lower extremities (CMS-[facility]) [I82.403]             Anticoagulation Episode Summary     INR check location:       Preferred lab:       Send INR reminders to:       Comments:         Anticoagulation Care Providers     Provider Role Specialty Phone number    Yimi Martinez M.D. Referring Surgery [phone]    Cesar SchofieldD Responsible      [facility] Responsible  [phone]        Anticoagulation Patient Findings      HPI:   Tamara Flynn seen in clinic today, on anticoagulation therapy with warfarin (a high risk medication) for hx of DVT      Pt is here today to evaluate anticoagulation therapy    Previous INR was  3.3 on 3-30-18    Pt was instructed to lower weekly regimen as she was started on Bactrim    Confirmed warfarin dosing regimen, denies missed or extra doses of coumadin.   Diet has been consistent with foods rich in vitamin K: Yes  Changes in ETOH:  No  Changes in smoking status: No  Changes in medication: Yes, pt is on Bactrim for another 5 days  Cost restriction: No  S/s of bleeding:  No  Signs/symptoms  thrombosis since the last appt: No    A/P   INR  therapeutic today,   Continue with 3 mg of warfarin (36% reduction) until you complete Bactrim then resume usual weekly regimen     Pt educated to contact our clinic with any changes in medications or s/s of bleeding or thrombosis    Follow up appointment in 1 week(s) to reduce risk of adverse events from warfarin   Zari  Phil, Pharm D

## 2018-04-09 NOTE — PROGRESS NOTES
Anticoagulation Summary  As of 4/9/2018    INR goal:   2.0-3.0   TTR:   46.2 % (1.9 y)   Today's INR:   1.7!   Maintenance plan:   3 mg (6 mg x 0.5) on Mon, Wed, Fri; 6 mg (6 mg x 1) all other days   Weekly total:   33 mg   Plan last modified:   Adalid Oliveira PharmD (3/30/2018)   Next INR check:   4/18/2018   Target end date:   Indefinite    Indications    Deep vein thrombosis (DVT) of both lower extremities (CMS-[facility]) [I82.403]  Chronic anticoagulation [Z79.01]             Anticoagulation Episode Summary     INR check location:       Preferred lab:       Send INR reminders to:       Comments:         Anticoagulation Care Providers     Provider Role Specialty Phone number    Yimi Martinez M.D. Referring Surgery [phone]    Cesar SchofieldD Responsible      [facility] Responsible  [phone]        Anticoagulation Patient Findings      HPI:  Tamara Flynn seen in clinic today for follow up on anticoagulation therapy in the presence of DVT hx. Denies any changes to current medical/health status since last appointment. Denies any medication or diet changes. No current symptoms of bleeding or thrombosis reported. Last dose of Bactrim tonight.     A/P:   INR subtherapeutic. Will have pt resume her previous regimen.     Follow up appointment in 1 week(s).    Next Appointment: Wednesday, April 18, 2018 at 10:30 am.     Re HUITRON

## 2018-04-12 NOTE — WOUND TEAM
[facility]   Encounter  2/5/18 -5/5/18  Referring Doctor: Yimi Martinez MD  Primary Doctor:   Surgeon: Yimi Martinez MD  Date of Evaluation: 2/5/18  Start of Care Date: 2/5/18      Surgical Procedure:  4/17/2016 Small bowel resection, sigmoid colectomy, colostomy    Discharge: 5/4/16    Current medical Status: Pt has stage 4 colon CA. She has a hepatic mass and a bladder mass, both of which are enlarging, even with chemotherapy. Dr. Martinez does not have anymore surgeries planned. Pt was treated for peristomal wound at [facility], but then insurance changed and now she is coming here to [facility]    Pain: - pt denies pain    Past Medical Status:     Past Medical History:   Diagnosis Date   • Blood clotting disorder (CMS-HCC) 4-    left leg   • Blood loss anemia 2016   • Bowel habit changes     has colostomy   • Cancer (CMS-HCC) 2016    liver, bladder, colon   • Essential hypertension    • Obesity    • Urinary bladder disorder     1/2 bladder due to cancer surgery     Past Medical History/Medications: no med changes per pt. She has finished abx  Current Outpatient Prescriptions on File Prior to Visit   Medication Sig Dispense Refill   • STIVARGA 40 MG Tab      • losartan (COZAAR) 25 MG Tab Take 1 Tab by mouth every day. 90 Tab 0   • Regorafenib (STIVARGA PO) Take  by mouth.     • Non Formulary Request Take 4 Tabs by mouth every day.     • desmopressin, DDAVP, (STIMATE) 1.5 MG/ML Solution Spray 1 Spray in nose Once.     • furosemide (LASIX) 20 MG Tab Take 0.5 Tabs by mouth 1 time daily as needed. 45 Tab 0   • warfarin (COUMADIN) 6 MG Tab Take one to one and one-half (1-1.5) tablets daily as directed by [facility] 135 Tab 1   • vitamin D (CHOLECALCIFEROL) 1000 UNIT Tab Take 1,000 Units by mouth every day.     • acetaminophen (TYLENOL) 500 MG Tab Take 500-1,000 mg by mouth as needed.       No current facility-administered medications on file prior to visit.   "          Past Surgical History:   Procedure Laterality Date   • CATH PLACEMENT Left 8/9/2016    Procedure: CATH PLACEMENT power port ;  Surgeon: Yimi Martinez M.D.;  Location: SURGERY [facility];  Service:    • COLOSTOMY Left 4/19/2016    Procedure: COLOSTOMY;  Surgeon: Yimi Martinez M.D.;  Location: SURGERY [facility];  Service:    • EXPLORATORY LAPAROTOMY  4/18/2016    Procedure: EXPLORATORY LAPAROTOMY;  Surgeon: Yimi Martinez M.D.;  Location: SURGERY [facility];  Service:    • CYSTECTOMY  4/18/2016    Procedure: CYSTECTOMY;  Surgeon: Yunior Abdullahi M.D.;  Location: SURGERY [facility];  Service:    • EXPLORATORY LAPAROTOMY  4/17/2016    Procedure: EXPLORATORY LAPAROTOMY-illeostomy creation ;  Surgeon: Yimi Martinez M.D.;  Location: SURGERY [facility];  Service:    • COLONOSCOPY - ENDO N/A 3/22/2016    Procedure: COLONOSCOPY - ENDO;  Surgeon: Yimi Martinez M.D.;  Location: ENDOSCOPY [facility];  Service:    • RECOVERY  3/21/2016    Procedure: CT-CT Guided liver biopsy-Dr.Michael Martinez;  Surgeon: [facility];  Location: SURGERY PRE-POST PROC UNIT [facility];  Service:    • CYSTOSCOPY  3/17/2016    Procedure: CYSTOSCOPY;  Surgeon: Yunior Abdullahi M.D.;  Location: SURGERY [facility];  Service:    • TRANS URETHRAL RESECTION BLADDER  3/17/2016    Procedure: TRANS URETHRAL RESECTION BLADDER Tumor;  Surgeon: Yunior Abdullahi M.D.;  Location: SURGERY [facility];  Service:    • PB EXCISION OF TONSIL TAGS         Allergies: Patient has no known allergies.    Hospital discharge Date:   Peristomal Wound Etiology: Unknown. Possible PG (03/15/2018 - Neoplasm r/o by neg punch biopsy x 2)     ICD-9: 569.62/K94.03 Colostomy dysfunction    Ostomy Evaluation:     Type of Stoma: Colostomy  Location of the stoma: LLQ  Shape of the stoma: Almost round  Color of the stoma: Red  Size of the stoma: 35mm x 42mm  Mucocutaneous junction: intact        The stoma protrudes  <1\" "   The lumen is located: near center  Skin indentations, creases or scar tissue: peristomal wound very close to stoma, medially  Peristomal Skin problems: Wound   04/12/2018 - 2.5x5.0x2.0 (slightly larger than previous measurements)  Treatment: -  applied AqAg to wound,  Deyanira medical adhesive applied, then Brava strips to cover wound and crusted area. Medical adhesive spray again to den stomal skin, then Coloplast appliance, framed with Brava strips  Effluent/flatus: Brown, pasty  Nutritional status: Appears well nourished.  Empties and rinses the pouch when 1/2 full of stool: yes  Previous or present pouching system: Coloplast SenSuraMio CTF two piece.     3/15/18 - punch biopsy and tissue for culture taken today by Kristine HUITRON  03/21/2018 - biopsy NEG for malignancy. Tissue culture POS for E. Coli, but this would be expected in a edn colostomy wound  3/30/18 - Wound bed friable and there is surrounding erythema and induration, so pt will be taking Bactrim as soon as she fills rx.  04/12/2018 - pt finished bactrim. No clinical ss infection noted today    Evaluation for education and training with the new pouch system: We will continue with pouching system that is working for pt.  Provided literature for the patient with regard to Colostomy, Ileostomy, Urostomy and Other________________.   Clinician's Signature:---------------------------------------------------------- Date:____________   Physician's signature:____________________________________Date:____________

## 2018-04-18 NOTE — PROGRESS NOTES
Anticoagulation Summary  As of 4/18/2018    INR goal:   2.0-3.0   TTR:   46.5 % (1.9 y)   Today's INR:   3.2!   Maintenance plan:   6 mg (6 mg x 1) on Tue, Sat; 3 mg (6 mg x 0.5) all other days   Weekly total:   27 mg   Plan last modified:   ASTRID ShinPINES (4/18/2018)   Next INR check:   5/2/2018   Target end date:   Indefinite    Indications    Deep vein thrombosis (DVT) of both lower extremities (CMS-[facility]) [I82.403]  Chronic anticoagulation [Z79.01]             Anticoagulation Episode Summary     INR check location:       Preferred lab:       Send INR reminders to:       Comments:         Anticoagulation Care Providers     Provider Role Specialty Phone number    Ymii Martinez M.D. Referring Surgery [phone]    Cesar SchofieldD Responsible      [facility] Responsible  [phone]        Anticoagulation Patient Findings      HPI:  Tamara Flynn seen in clinic today for follow up on anticoagulation therapy in the presence of DVT hx. Denies any changes to current medical/health status since last appointment. Stopped Strivarga which increases the INR.  Denies any diet changes. No current symptoms of bleeding or thrombosis reported.    A/P:   INR supratherapeutic. Will decrease regimen.     Follow up appointment in 2 week(s).    Next Appointment: Wednesday, May 2, 2018 at 11:00 am.    Re HUITRON

## 2018-04-19 PROBLEM — S31.109A OPEN WOUND OF ABDOMEN: Status: RESOLVED | Noted: 2018-01-01 | Resolved: 2018-01-01

## 2018-04-19 PROBLEM — R73.01 IFG (IMPAIRED FASTING GLUCOSE): Status: ACTIVE | Noted: 2018-01-01

## 2018-04-19 PROBLEM — T14.8XXA WOUND INFECTION: Status: RESOLVED | Noted: 2018-01-01 | Resolved: 2018-01-01

## 2018-04-19 PROBLEM — L08.9 WOUND INFECTION: Status: RESOLVED | Noted: 2018-01-01 | Resolved: 2018-01-01

## 2018-04-19 NOTE — PROGRESS NOTES
CHIEF COMPLAINT  Chief Complaint   Patient presents with   • Hypertension     bp Check   Advanced colon cancer    HPI  Patient is a 62 y.o. female patient who presents today for the following     Adenocarcinoma of sigmoid colon with liver meta / colostomy   DVT due to cancer, Chronic anticoagulation  Interval course:  - uncontrolled malignancy  - the last option is lonsurf, experimental medication     - She had treatment with Regorafenib (STIVARGA PO) and f/u by [facility]     The last PET scan:              - liver meta;               - left hypogastric and external iliac adenopathy is unchanged              - possible meta anterior to antrum              - mediastinal LAD w/o increased activity  - CEA was elevated 403.8 in 3/2018, was at 262 on 2/26/18.  - DVT related to cancer in 4/2016r,    - on Coumadin, follow-up by Coumadin clinic f/u.     HYPERTENSION  The patient has had elevated blood pressure after she started new treatment for colon cancer, as above.  - resolved.  Meds: Stopped losartan 25 mg daily, taking as prescribed.   Measuring BP at home: yes. It has been > 140/90.  Denies:  -  headaches, vision problems, tinnitus.                 -  chest pain/pressure, palpitations, irregular heart beats, exertional, dyspnea, peripheral edema.  Low salt diet: N  Diet: regular  Exercise: sedentary lifestyle  BMI: 45  FH of HTN: mother     IFG  The patient had elevated fasting blood sugar, as below.   Diet: decreased bettye  Exercise: Some  BMI: 45  No polydipsia, polyphagia, polyuria.  No abdominal pain, weight loss, fatigue.     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     • warfarin (COUMADIN) 6 MG Tab Take 6 mg by mouth.     • losartan (COZAAR) 25 MG Tab Take 1 Tab by mouth every day. 90 Tab 0   • warfarin (COUMADIN) 6 MG Tab Take one to one and one-half (1-1.5) tablets daily as directed by [facility] 135 Tab 1   • vitamin D (CHOLECALCIFEROL) 1000 UNIT Tab Take 1,000 Units by mouth every day.     • acetaminophen (TYLENOL) 500 MG Tab Take 500-1,000 mg by mouth as needed.     • Calcium Phosphate Dibasic Powder Take  by mouth.     • STIVARGA 40 MG Tab      • Regorafenib (STIVARGA PO) Take  by mouth.     • Non Formulary Request Take 4 Tabs by mouth every day.     • desmopressin, DDAVP, (STIMATE) 1.5 MG/ML Solution Spray 1 Spray in nose Once.     • furosemide (LASIX) 20 MG Tab Take 0.5 Tabs by mouth 1 time daily as needed. 45 Tab 0     No current facility-administered medications for this visit.      ALLERGIES  Allergies: Patient has no known allergies.  PAST MEDICAL HISTORY  Past Medical History:   Diagnosis Date   • Blood clotting disorder (CMS-HCC) 4-    left leg   • Cancer (CMS-HCC) 2016    liver, bladder, colon   • Blood loss anemia 2016   • Bowel habit changes     has colostomy   • Essential hypertension    • Obesity    • Urinary bladder disorder     1/2 bladder due to cancer surgery     SURGICAL HISTORY  She  has a  "past surgical history that includes pr excision of tonsil tags; cystoscopy (3/17/2016); trans urethral resection bladder (3/17/2016); colonoscopy - endo (N/A, 3/22/2016); recovery (3/21/2016); exploratory laparotomy (4/17/2016); exploratory laparotomy (4/18/2016); cystectomy (4/18/2016); colostomy (Left, 4/19/2016); and cath placement (Left, 8/9/2016).  SOCIAL HISTORY  Social History   Substance Use Topics   • Smoking status: Never Smoker   • Smokeless tobacco: Never Used   • Alcohol use No     Social History     Social History Narrative    Lives with .     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     FAMILY HISTORY  Family History   Problem Relation Age of Onset   • Diabetes Mother      prediabetes   • Heart Disease Mother    • Hypertension Mother    • Stroke Maternal Grandmother    • Cancer Neg Hx    • Hyperlipidemia Neg Hx    • Psychiatry Neg Hx    • Thyroid Neg Hx      Family Status   Relation Status   • Mother    • Maternal Grandmother    • Neg Hx      ROS   Constitutional: Negative for fever, chills.  HENT: Negative for congestion, sore throat.  Eyes: Negative for blurred vision.   Respiratory: Negative for cough, shortness of breath.  Cardiovascular: Negative for chest pain, palpitations.   Gastrointestinal: Negative for heartburn, nausea, abdominal pain.   Genitourinary: Negative for dysuria.  Musculoskeletal: Negative for significant myalgias, back pain and joint pain.   Skin: Negative for rash and itching.   Neuro: Negative for dizziness, weakness and headaches.   Endo/Heme/Allergies: Does not bruise/bleed easily.   Onc: as above.  Psychiatric/Behavioral: Negative for depression, anxiety    PHYSICAL EXAM   Blood pressure 122/68, pulse (!) 108, temperature 36.6 °C (97.8 °F), height 1.549 m (5' 1\"), weight 108.8 kg (239 lb 13.8 oz), SpO2 97 %. Body mass index is 45.32 kg/m².  General:  NAD, well appearing  HEENT:   NC/AT, PERRLA, EOMI, TMs are clear. Oropharyngeal mucosa is pink,  without " lesions;  no cervical / supraclavicular  lymphadenopathy, no thyromegaly.    Cardiovascular: RRR.   No m/r/g. No carotid bruits .      Lungs:   CTAB, no w/r/r, no respiratory distress.  Abdomen: Soft, NT/ND + BS; no suprapubic tenderness; no masses or hepatosplenomegaly.  Extremities:  2+ DP and radial pulses bilaterally.  No c/c/e.   Skin:  Warm, dry.  No erythema. No rash.   Neurologic: Alert & oriented x 3. No focal deficits.  Psychiatric:  Affect normal, mood normal, judgment normal.    LABS     Labs are reviewed and discussed with a patient  Lab Results   Component Value Date/Time    TRIGLYCERIDE 146 04/20/2016 04:15 AM       Lab Results   Component Value Date/Time    SODIUM 138 03/23/2018 10:55 PM    POTASSIUM 3.7 03/23/2018 10:55 PM    CHLORIDE 108 03/23/2018 10:55 PM    CO2 19 (L) 03/23/2018 10:55 PM    GLUCOSE 114 (H) 03/23/2018 10:55 PM    BUN 17 03/23/2018 10:55 PM    CREATININE 0.63 03/23/2018 10:55 PM     Lab Results   Component Value Date/Time    ALKPHOSPHAT 112 (H) 03/23/2018 10:55 PM    ASTSGOT 25 03/23/2018 10:55 PM    ALTSGPT 22 03/23/2018 10:55 PM    TBILIRUBIN 0.7 03/23/2018 10:55 PM      Lab Results   Component Value Date/Time    HBA1C 6.1 08/24/2017 12:01 PM    HBA1C 6.0 05/18/2017    HBA1C 6.1 02/07/2017     No results found for: TSH  No results found for: FREET4    Lab Results   Component Value Date/Time    WBC 6.9 02/23/2018 10:24 AM    RBC 4.64 02/23/2018 10:24 AM    HEMOGLOBIN 12.8 02/23/2018 10:24 AM    HEMATOCRIT 40.6 02/23/2018 10:24 AM    MCV 87.5 02/23/2018 10:24 AM    MCH 27.6 02/23/2018 10:24 AM    MCHC 31.5 (L) 02/23/2018 10:24 AM    MPV 9.7 02/23/2018 10:24 AM    NEUTSPOLYS 68.20 02/23/2018 10:24 AM    LYMPHOCYTES 22.20 02/23/2018 10:24 AM    MONOCYTES 7.30 02/23/2018 10:24 AM    EOSINOPHILS 1.00 02/23/2018 10:24 AM    BASOPHILS 0.70 02/23/2018 10:24 AM    HYPOCHROMIA 1+ 04/27/2016 03:14 AM    ANISOCYTOSIS 1+ 04/27/2016 03:14 AM        IMAGING     None    ASSESMENT AND PLAN         1. Adenocarcinoma of colon metastatic to liver (CMS-HCC)  Advised to proceed with experimental treatment if possible.   Advised to continue oncology follow-up and recommendations    2. Colostomy in place (CMS-HCC)  No complications    3. Deep vein thrombosis (DVT) of both lower extremities, unspecified chronicity, unspecified vein (CMS-HCC)  4. Chronic anticoagulation  Continue current treatment and coagulation clinic follow-up    5. IFG (impaired fasting glucose)  Improving, due to previous chemotherapy    6. Morbid obesity with BMI of 40.0-44.9, adult (CMS-HCC)  Appropriate counseling given    7. Health care maintenance  UTD    Counseling:   - Smoking:  Nonsmoker    Followup: Return in about 3 months (around 7/19/2018) for Short.    All questions are answered.    Please note that this dictation was created using voice recognition software, and that there might be errors of butch and possibly content.

## 2018-04-26 NOTE — WOUND TEAM
[facility]   Encounter  2/5/18 -5/5/18  Referring Doctor: Yimi Martinez MD  Primary Doctor:   Surgeon: Yimi Martinez MD  Date of Evaluation: 2/5/18  Start of Care Date: 2/5/18      Surgical Procedure:  4/17/2016 Small bowel resection, sigmoid colectomy, colostomy    Discharge: 5/4/16    Current medical Status: Pt has stage 4 colon CA. She has a hepatic mass and a bladder mass, both of which are enlarging, even with chemotherapy. Dr. Martinez does not have anymore surgeries planned. Pt was treated for peristomal wound at [facility], but then insurance changed and now she is coming here to [facility]    Pain: - pt denies pain    Past Medical Status:     Past Medical History:   Diagnosis Date   • Blood clotting disorder (CMS-HCC) 4-    left leg   • Blood loss anemia 2016   • Bowel habit changes     has colostomy   • Cancer (CMS-HCC) 2016    liver, bladder, colon   • Essential hypertension    • Obesity    • Urinary bladder disorder     1/2 bladder due to cancer surgery     Past Medical History/Medications: Pt states the stivarga was dc'd about 10 days ago by oncologist          Past Surgical History:   Procedure Laterality Date   • CATH PLACEMENT Left 8/9/2016    Procedure: CATH PLACEMENT power port ;  Surgeon: Yimi Martinez M.D.;  Location: [facility];  Service:    • COLOSTOMY Left 4/19/2016    Procedure: COLOSTOMY;  Surgeon: Yimi Martinez M.D.;  Location: [facility];  Service:    • EXPLORATORY LAPAROTOMY  4/18/2016    Procedure: EXPLORATORY LAPAROTOMY;  Surgeon: Yimi Martinez M.D.;  Location: [facility];  Service:    • CYSTECTOMY  4/18/2016    Procedure: CYSTECTOMY;  Surgeon: Yunior Abdullahi M.D.;  Location: [facility];  Service:    • EXPLORATORY LAPAROTOMY  4/17/2016    Procedure: EXPLORATORY LAPAROTOMY-illeostomy creation ;  Surgeon: Yimi Martinez M.D.;  Location: [facility];  Service:    • COLONOSCOPY - ENDO  "N/A 3/22/2016    Procedure: COLONOSCOPY - ENDO;  Surgeon: Yimi Martinez M.D.;  Location: ENDOSCOPY [facility];  Service:    • RECOVERY  3/21/2016    Procedure: CT-CT Guided liver biopsy-Dr.Michael Martinez;  Surgeon: [facility];  Location: SURGERY PRE-POST PROC UNIT [facility];  Service:    • CYSTOSCOPY  3/17/2016    Procedure: CYSTOSCOPY;  Surgeon: Yunior Abdullahi M.D.;  Location: SURGERY [facility];  Service:    • TRANS URETHRAL RESECTION BLADDER  3/17/2016    Procedure: TRANS URETHRAL RESECTION BLADDER Tumor;  Surgeon: Yunior Abdullahi M.D.;  Location: SURGERY [facility];  Service:    • PB EXCISION OF TONSIL TAGS         Allergies: Patient has no known allergies.    Hospital discharge Date:   Peristomal Wound Etiology: Unknown. Possible PG (03/15/2018 - Neoplasm r/o by neg punch biopsy x 2)     ICD-9: 569.62/K94.03 Colostomy dysfunction    Ostomy Evaluation:     Type of Stoma: Colostomy  Location of the stoma: LLQ  Shape of the stoma: Almost round  Color of the stoma: Red  Size of the stoma: 35mm x 42mm  Mucocutaneous junction: intact        The stoma protrudes  <1\"   The lumen is located: near center  Skin indentations, creases or scar tissue: peristomal wound very close to stoma, medially  Peristomal Skin problems: Wound   04/26/2018 - 1.8x5.0x1.3 (smaller than previous measurements)  Treatment: -  applied AqAg to wound,  Deyanira medical adhesive applied, then Brava strips to cover wound and crusted area. Medical adhesive spray again to den stomal skin, then Coloplast appliance, framed with Brava strips  Effluent/flatus: Brown, pasty  Nutritional status: Appears well nourished.  Empties and rinses the pouch when 1/2 full of stool: yes  Previous or present pouching system: Coloplast SenSuraMio CTF two piece.     3/15/18 - punch biopsy and tissue for culture taken today by Kristine HUITRON  03/21/2018 - biopsy NEG for malignancy. Tissue culture POS for E. Coli, but this would be expected " in a den colostomy wound      Evaluation for education and training with the new pouch system: We will continue with pouching system that is working for pt.  Provided literature for the patient with regard to Colostomy, Ileostomy, Urostomy and Other________________.   Clinician's Signature:---------------------------------------------------------- Date:____________   Physician's signature:____________________________________Date:____________

## 2018-05-02 NOTE — PROGRESS NOTES
Anticoagulation Summary  As of 5/2/2018    INR goal:   2.0-3.0   TTR:   47.0 % (1.9 y)   Today's INR:   1.8!   Warfarin maintenance plan:   6 mg (6 mg x 1) on Mon, Wed, Fri; 3 mg (6 mg x 0.5) all other days   Weekly warfarin total:   30 mg   Plan last modified:   Adalid Oliveira PharmD (5/2/2018)   Next INR check:   5/16/2018   Target end date:   Indefinite    Indications    Deep vein thrombosis (DVT) of both lower extremities (HCC) [I82.403]  Chronic anticoagulation [Z79.01]             Anticoagulation Episode Summary     INR check location:       Preferred lab:       Send INR reminders to:       Comments:         Anticoagulation Care Providers     Provider Role Specialty Phone number    Yimi Martinez M.D. Referring Surgery [phone]    Jael Garcia PharmD Responsible      [facility] Responsible  [phone]        Anticoagulation Patient Findings      HPI:  Tamara Flynn seen in clinic today, on anticoagulation therapy with warfarin for DVT  Changes to current medical/health status since last appt: none  Denies signs/symptoms of bleeding and/or thrombosis since the last appt.    Denies any interval changes to diet  Denies any interval changes to medications since last appt.   Denies any complications or cost restrictions with current therapy.   BP recorded in vitals.  Confirmed dosing regimen.    A/P   INR  SUB-therapeutic.   Warfarin dose was reduced due to Stivarga discontinuation.   INR may have dropped too much due to decreased regimen.  Begin increased warfarin regimen.    Follow up appointment in 2 week(s).    Adalid Oliveira PharmD

## 2018-05-15 NOTE — WOUND TEAM
"[facility]   Encounter  2/5/18 -5/5/18  Referring Doctor: Yimi Martinez MD  Primary Doctor:   Surgeon: Yimi Martinez MD  Date of Evaluation: 2/5/18  Start of Care Date: 2/5/18      Surgical Procedure:  4/17/2016 Small bowel resection, sigmoid colectomy, colostomy    Discharge: 5/4/16    Current medical Status: Pt has stage 4 colon CA. She has a hepatic mass and a bladder mass, both of which are enlarging, even with chemotherapy. Dr. Martinez does not have anymore surgeries planned. Pt was treated for peristomal wound at [facility], but then insurance changed and now she is coming here to [facility]  5/15/2018 Pt will be starting clinical trial in [place] in coming weeks. Pt very happy about that.   Pain: - pt denies pain    Past Medical Status:     Past Medical History:   Diagnosis Date   • Blood clotting disorder (HCC) 4-    left leg   • Blood loss anemia 2016   • Bowel habit changes     has colostomy   • Cancer (HCC) 2016    liver, bladder, colon   • Essential hypertension    • Obesity    • Urinary bladder disorder     1/2 bladder due to cancer surgery     Past Medical History/Medications: Pt states the stivarga was dc'd about 10 days ago by oncologist    Allergies: Patient has no known allergies.    Hospital discharge Date:   Peristomal Wound Etiology: Unknown. Possible PG (03/15/2018 - Neoplasm r/o by neg punch biopsy x 2)     ICD-9: 569.62/K94.03 Colostomy dysfunction    Ostomy Evaluation:     Type of Stoma: Colostomy  Location of the stoma: LLQ  Shape of the stoma: Almost round  Color of the stoma: Red  Size of the stoma: 35mm x 42mm  Mucocutaneous junction: intact        The stoma protrudes  <1\"   The lumen is located: near center  Skin indentations, creases or scar tissue: peristomal wound very close to stoma, medially  Peristomal Skin problems: Wound   5/15/2018 0.8 x 3 x hypergranulation (smaller than 4/26/2018) pt states that wound is much better since stopping stivarga.   04/26/2018 " - 1.8x5.0x1.3 (smaller than previous measurements)  Treatment: - silver nitrate to hypergranulation, crusting to periwound and periostomy with ostomy powder and No Sting,  applied fenestrated HFB to wound,  Deyanira medical adhesive applied, then Brava strips to cover wound and crusted area. Medical adhesive spray again to den stomal skin, then Coloplast appliance, framed with Brava strips  Effluent/flatus: Brown, pasty  Nutritional status: Appears well nourished.  Empties and rinses the pouch when 1/2 full of stool: yes  Previous or present pouching system: Coloplast SenSuraMio CTF two piece.     3/15/18 - punch biopsy and tissue for culture taken today by Kristine Griggs APRN  03/21/2018 - biopsy NEG for malignancy. Tissue culture POS for E. Coli, but this would be expected in a den colostomy wound          Evaluation for education and training with the new pouch system: We will continue with pouching system that is working for pt.  Provided literature for the patient with regard to Colostomy, Ileostomy, Urostomy and Other___[place] [facility] [employer]_____________.   Clinician's Signature:---------------------------------------------------------- Date:____________   Physician's signature:____________________________________Date:____________

## 2018-05-17 NOTE — PROGRESS NOTES
Anticoagulation Summary  As of 5/17/2018    INR goal:   2.0-3.0   TTR:   46.0 % (2 y)   Today's INR:   1.7!   Warfarin maintenance plan:   3 mg (6 mg x 0.5) on Sun, Tue, Thu; 6 mg (6 mg x 1) all other days   Weekly warfarin total:   33 mg   Plan last modified:   Re Briones A.P.NClaire (5/17/2018)   Next INR check:   5/31/2018   Target end date:   Indefinite    Indications    Deep vein thrombosis (DVT) of both lower extremities (HCC) [I82.403]  Chronic anticoagulation [Z79.01]             Anticoagulation Episode Summary     INR check location:       Preferred lab:       Send INR reminders to:       Comments:         Anticoagulation Care Providers     Provider Role Specialty Phone number    Yimi Martinez M.D. Referring Surgery [phone]    Cesar SchofieldD Responsible      [facility] Responsible  [phone]        Anticoagulation Patient Findings      HPI:  Tamara Flynn seen in clinic today for follow up on anticoagulation therapy in the presence of DVT hx. Denies any changes to current medical/health status since last appointment. Denies any medication or diet changes. No current symptoms of bleeding or thrombosis reported.    A/P:   INR subtherapeutic. DVT in May 2016. Will increase regimen. BP recorded in vitals.    Follow up appointment in 2 week(s).    Next Appointment: Thursday, May 31, 2018 at 11:15 am.    Re HUITRON

## 2018-05-22 NOTE — PROGRESS NOTES
CHIEF COMPLAINT  Chief Complaint   Patient presents with   • Tired     10 days   • Back Pain     10 days     HPI  Patient is a 62 y.o. female patient who presents today for the following    Adenocarcinoma of sigmoid colon with liver meta / colostomy   DVT due to cancer, Chronic anticoagulation  Ba lower abdominal pain  Anorexia and fatigue     Interval course:  -The patient was discharged from oncology, advised to schedule palliative care  -Pending trial with experimental treatment; she will be the first patient  -In [facility] [place] [place]   -Treatment may start in the next 2-3 weeks    Complains of  1) LBP and lower abdominal pain  -The pain is mild to moderate  -Abdominal  -Without radiation  -She did not use any medication  2) fatigue  3) decreased appetite   - she did not lose WT     The last PET scan, 2/3/18              - liver meta;               - left hypogastric and external iliac adenopathy is unchanged              - possible meta anterior to antrum              - mediastinal LAD w/o increased activity  - CEA was elevated 403.8 in 3/2018, was at 262 on 2/26/18.  - DVT related to cancer in 4/2016r,    - on Coumadin, follow-up by Coumadin clinic f/u.    Previous treatment: Regorafenib (STIVARGA PO); f/u by [facility]    HYPERTENSION  Meds: Losartan 25 mg daily, taking as prescribed.   Denies:  -  headaches, vision problems, tinnitus.                 -  chest pain/pressure, palpitations, irregular heart beats, exertional, dyspnea, peripheral edema.      - medication side effect: unusual fatigue,  foot/leg swelling, cough.  Low salt diet: Decrease salt intake   Diet: Regular  Exercise: Limited   BMI: 45.03 kg/m².  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 30 Can 11   • losartan (COZAAR) 25 MG Tab Take 1 Tab by mouth every day. 90 Tab 0   • MYRBETRIQ 50 MG TABLET SR 24 HR      • enoxaparin (LOVENOX) 100 MG/ML Solution inj enoxaparin 100 mg/mL subcutaneous syringe     • polyethylene glycol-electrolytes (GOLYTELY) 236 GM Recon Soln Golytely 236 gram-22.74 gram-6.74 gram-5.86 gram oral solution     • HYDROcodone-acetaminophen (NORCO) 5-325 MG Tab per tablet hydrocodone 5 mg-acetaminophen 325 mg tablet     • warfarin (COUMADIN) 6 MG Tab warfarin 6 mg tablet     • vitamin D, Ergocalciferol, (DRISDOL) 85049 units Cap capsule Take 50,000 Units by mouth.     • Calcium Phosphate Dibasic Powder Take  by mouth.     • warfarin (COUMADIN) 6 MG Tab Take 6 mg by mouth.     • Non Formulary Request Take 4 Tabs by mouth every day.     • furosemide (LASIX) 20 MG Tab Take 0.5 Tabs by mouth 1 time daily as needed. 45 Tab 0   • warfarin (COUMADIN) 6 MG Tab Take one to one and one-half (1-1.5) tablets daily as directed by [facility] 135 Tab 1   • vitamin D (CHOLECALCIFEROL) 1000 UNIT Tab Take 1,000 Units by mouth every day.     • acetaminophen (TYLENOL) 500 MG Tab Take 500-1,000 mg by mouth as needed.       No current facility-administered medications for this visit.      ALLERGIES  Allergies: Patient has no known  allergies.  PAST MEDICAL HISTORY  Past Medical History:   Diagnosis Date   • Blood clotting disorder (HCC) 4-    left leg   • Cancer (HCC) 2016    liver, bladder, colon   • Blood loss anemia 2016   • Bowel habit changes     has colostomy   • Essential hypertension    • Obesity    • Urinary bladder disorder     1/2 bladder due to cancer surgery     SURGICAL HISTORY  She  has a past surgical history that includes pr excision of tonsil tags; cystoscopy (3/17/2016); trans urethral resection bladder (3/17/2016); colonoscopy - endo (N/A, 3/22/2016); recovery (3/21/2016); exploratory laparotomy (4/17/2016); exploratory laparotomy (4/18/2016); cystectomy (4/18/2016); colostomy (Left, 4/19/2016); and cath placement (Left, 8/9/2016).  SOCIAL HISTORY  Social History   Substance Use Topics   • Smoking status: Never Smoker   • Smokeless tobacco: Never Used   • Alcohol use No     Social History     Social History Narrative    Lives with .     Children: no    Work:  [employer]     FAMILY HISTORY  Family History   Problem Relation Age of Onset   • Diabetes Mother      prediabetes   • Heart Disease Mother    • Hypertension Mother    • Stroke Maternal Grandmother    • Cancer Neg Hx    • Hyperlipidemia Neg Hx    • Psychiatry Neg Hx    • Thyroid Neg Hx      Family Status   Relation Status   • Mother    • Maternal Grandmother    • Neg Hx      ROS   Constitutional: Negative for fever, chills. And per HPI.  HENT: Negative for congestion, sore throat.  Eyes: Negative for blurred vision.   Respiratory: Negative for cough, shortness of breath.  Cardiovascular: Negative for chest pain, palpitations. And per HPI.  Gastrointestinal: Negative for heartburn, nausea. And per HPI.  mGenitourinary: Negative for dysuria.  Musculoskeletal: As above.    Skin: Negative for rash and itching.   Neuro: Negative for dizziness, weakness and headaches.   Endo/Heme/Allergies: Does not bruise/bleed easily.   Onc: As  "above.  Psychiatric/Behavioral: no depression.    PHYSICAL EXAM   Blood pressure 122/72, pulse 100, temperature 37.1 °C (98.7 °F), height 1.549 m (5' 1\"), weight 108.1 kg (238 lb 5.1 oz), SpO2 95 %. Body mass index is 45.03 kg/m².  General:  NAD, well appearing  HEENT:   NC/AT, PERRLA, EOMI, TMs are clear. Oropharyngeal mucosa is pink,  without lesions;  no cervical / supraclavicular  lymphadenopathy, no thyromegaly.    Cardiovascular: RRR.   No m/r/g. No carotid bruits .      Lungs:   CTAB, no w/r/r, no respiratory distress.  Abdomen: Soft, NT/ND + BS; Colostomy in place.  Unable to palpate liver/spleen due to obesity.  Extremities:  2+ DP and radial pulses bilaterally.  No c/c/e.   Skin:  Warm, dry.  No erythema. No rash.   Neurologic: Alert & oriented x 3. No focal deficits.  Psychiatric:  Affect normal, mood normal, judgment normal.  MS: No TTP over spine/paraspinal muscles.    LABS     Labs are reviewed and discussed with a patient  Lab Results   Component Value Date/Time    TRIGLYCERIDE 146 04/20/2016 04:15 AM       Lab Results   Component Value Date/Time    SODIUM 138 03/23/2018 10:55 PM    POTASSIUM 3.7 03/23/2018 10:55 PM    CHLORIDE 108 03/23/2018 10:55 PM    CO2 19 (L) 03/23/2018 10:55 PM    GLUCOSE 114 (H) 03/23/2018 10:55 PM    BUN 17 03/23/2018 10:55 PM    CREATININE 0.63 03/23/2018 10:55 PM     Lab Results   Component Value Date/Time    ALKPHOSPHAT 112 (H) 03/23/2018 10:55 PM    ASTSGOT 25 03/23/2018 10:55 PM    ALTSGPT 22 03/23/2018 10:55 PM    TBILIRUBIN 0.7 03/23/2018 10:55 PM      Lab Results   Component Value Date/Time    HBA1C 6.1 08/24/2017 12:01 PM    HBA1C 6.0 05/18/2017    HBA1C 6.1 02/07/2017     Lab Results   Component Value Date/Time    WBC 6.9 02/23/2018 10:24 AM    RBC 4.64 02/23/2018 10:24 AM    HEMOGLOBIN 12.8 02/23/2018 10:24 AM    HEMATOCRIT 40.6 02/23/2018 10:24 AM    MCV 87.5 02/23/2018 10:24 AM    MCH 27.6 02/23/2018 10:24 AM    MCHC 31.5 (L) 02/23/2018 10:24 AM    MPV 9.7 " 02/23/2018 10:24 AM    NEUTSPOLYS 68.20 02/23/2018 10:24 AM    LYMPHOCYTES 22.20 02/23/2018 10:24 AM    MONOCYTES 7.30 02/23/2018 10:24 AM    EOSINOPHILS 1.00 02/23/2018 10:24 AM    BASOPHILS 0.70 02/23/2018 10:24 AM    HYPOCHROMIA 1+ 04/27/2016 03:14 AM    ANISOCYTOSIS 1+ 04/27/2016 03:14 AM      IMAGING     None    ASSESMENT AND PLAN        1. Adenocarcinoma of colon metastatic to liver (HCC)  - Controlled Substance Treatment Agreement  - [facility] PAIN MANAGEMENT SCREEN; Future  2. 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 Colostomy in place (HCC)  The patient has metastatic advanced adenocarcinoma of the colon.   Pending experimental new treatment in [place].  The patient is a fighter, and want to continue treatment.  Exam was benign.    4. Deep vein thrombosis (DVT) of both lower extremities, unspecified chronicity, unspecified vein (HCC)  5. Chronic anticoagulation  Controlled, continue current treatment    6. Anorexia  Given Ensure    7. Lumbosacral pain  - Controlled Substance Treatment Agreement  [facility]; Future  The pain is mild to moderate.   Discussed about goals for pain control.   She will use Tylenol as needed, up to 3000 mg per day.    8. Lower abdominal pain  As above    9. Fatigue, unspecified type  Pending labs  - CBC WITH DIFFERENTIAL; Future  - COMP METABOLIC PANEL; Future  - TSH; Future  - VITAMIN D,25 HYDROXY; Future    10. Essential hypertension  Controlled, continue current treatment  - losartan (COZAAR) 25 MG Tab; Take 1 Tab by mouth every day.  Dispense: 90 Tab; Refill: 0    Counseling:   - Smoking:  Nonsmoker    Followup: Return if symptoms worsen or fail to improve.    All questions are answered.    Please note that this dictation was created using voice recognition software, and that there might be errors of buthc and possibly content.

## 2018-05-24 NOTE — CERTIFICATION
"[facility]   Recertification  05/5/18 - 08/05/2018  Referring Doctor: Yimi Martinez MD  Primary Doctor:   Surgeon: Yimi Martinez MD  Date of Evaluation: 2/5/18  Start of Care Date: 2/5/18      Surgical Procedure:  4/17/2016 Small bowel resection, sigmoid colectomy, colostomy    Discharge: 5/4/16    Current medical Status: Pt has stage 4 colon CA. She has a hepatic mass and a bladder mass, both of which are enlarging, even with chemotherapy. Dr. Martinez does not have anymore surgeries planned. Pt was treated for peristomal wound at [facility], but then insurance changed and now she is coming here to [facility]  5/15/2018 Pt will be starting clinical trial in [place] in coming weeks. Pt very happy about that.   Pain: - pt denies pain    Past Medical Status:     Past Medical History:   Diagnosis Date   • Blood clotting disorder (HCC) 4-    left leg   • Blood loss anemia 2016   • Bowel habit changes     has colostomy   • Cancer (HCC) 2016    liver, bladder, colon   • Essential hypertension    • Obesity    • Urinary bladder disorder     1/2 bladder due to cancer surgery     Past Medical History/Medications: Pt states the stivarga was dc'd about 10 days ago by oncologist    Allergies: Patient has no known allergies.    Hospital discharge Date:   Peristomal Wound Etiology: Unknown. Possible PG (03/15/2018 - Neoplasm r/o by neg punch biopsy x 2)     ICD-9: 569.62/K94.03 Colostomy dysfunction    Ostomy Evaluation:     Type of Stoma: Colostomy  Location of the stoma: LLQ  Shape of the stoma: Almost round  Color of the stoma: Red  Size of the stoma: 35mm x 42mm  Mucocutaneous junction: intact        The stoma protrudes  <1\"   The lumen is located: near center  Skin indentations, creases or scar tissue: peristomal wound very close to stoma, medially  Peristomal Skin problems: Wound   5/24/2018  4.5x1.1x0.1 (granulated to surface and almost resolved) -  pt states that wound is much better since stopping " stivarga.   04/26/2018 - 1.8x5.0x1.3 (smaller than previous measurements)          Treatment: - skin prep to den wound skin,  applied fenestrated HFB to wound,  Deyanira medical adhesive applied, then Brava strips to cover wound and crusted area. Medical adhesive spray again to den stomal skin, then Coloplast appliance, framed with Brava strips  Effluent/flatus: Brown, pasty  Nutritional status: Appears well nourished.  Empties and rinses the pouch when 1/2 full of stool: yes  Previous or present pouching system: Coloplast SenSuraMio CTF two piece.     3/15/18 - punch biopsy and tissue for culture taken today by Kristine Griggs APRN  03/21/2018 - biopsy NEG for malignancy. Tissue culture POS for E. Coli, but this would be expected in a den colostomy wound          Evaluation for education and training with the new pouch system: We will continue with pouching system that is working for pt.  Provided literature for the patient with regard to Colostomy, Ileostomy, Urostomy and Other___[place] [facility] [employer]_____________.   Clinician's Signature:---------------------------------------------------------- Date:____________   Physician's signature:____________________________________Date:____________

## 2018-05-27 NOTE — PROGRESS NOTES
"Subjective:      Tamara Flynn is a 62 y.o. female who presents with UTI (tested 3 days ago and taking antibiotics)            UTI   This is a new (on cipro for uti tx; having acute lbp (not over kidneys); no fever; has some naus.) problem. The current episode started in the past 7 days. The problem occurs constantly. The problem has been unchanged. Associated symptoms include abdominal pain and urinary symptoms. Pertinent negatives include no fever. Nothing aggravates the symptoms. Treatments tried: abx. The treatment provided mild relief.       Review of Systems   Constitutional: Negative.  Negative for fever.   Gastrointestinal: Positive for abdominal pain.        +suprapubic press.   Genitourinary: Positive for dysuria, frequency and urgency. Negative for flank pain and hematuria.   Musculoskeletal: Negative.  Negative for back pain.        No flank or CVAT    Skin: Negative.           Objective:     /86   Pulse (!) 119   Temp 36.1 °C (97 °F)   Resp 20   Ht 1.549 m (5' 1\")   Wt 107 kg (235 lb 12.8 oz)   SpO2 97%   BMI 44.55 kg/m²      Physical Exam   Constitutional: She is oriented to person, place, and time. She appears well-developed and well-nourished. No distress.   Abdominal: Soft. She exhibits no distension. There is tenderness (+suprapubic press.; no CVAT).   No flank or CVAT     Neurological: She is alert and oriented to person, place, and time.   Skin: Skin is warm and dry.   Psychiatric: She has a normal mood and affect. Her behavior is normal.   Nursing note and vitals reviewed.    Active Ambulatory Problems     Diagnosis Date Noted   • Morbid obesity with BMI of 40.0-44.9, adult (HCC) 11/12/2015   • Health care maintenance 11/12/2015   • Adenocarcinoma of sigmoid colon (HCC) 03/22/2016   • Family history of primary TB 04/20/2016   • Adenocarcinoma of colon metastatic to liver (HCC) 04/20/2016   • Deep vein thrombosis (DVT) of both lower extremities (HCC) 05/23/2016   • Secondary " malignant neoplasm of large intestine and rectum ([facility]) 08/09/2016   • Colostomy in place ([facility]) 02/17/2017   • Chronic anticoagulation 04/03/2018   • IFG (impaired fasting glucose) 04/19/2018     Resolved Ambulatory Problems     Diagnosis Date Noted   • Essential hypertension 11/12/2015   • Recurrent UTI 11/12/2015   • Prediabetes 12/07/2015   • Neoplasm of genitourinary organs 03/17/2016   • Sepsis ([facility]) 04/17/2016   • Respiratory failure following trauma and surgery ([facility]) 04/18/2016   • Necrotizing soft tissue infection of abdominal wall 04/18/2016   • Acute blood loss anemia 04/18/2016   • Renal insufficiency 04/20/2016   • Hypertension, essential 04/20/2016   • 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   • 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 04/20/2016   • Encounter for screening mammogram for malignant neoplasm of breast 09/20/2016   • Open wound of abdomen 03/02/2018   • Wound infection 03/23/2018     Past Medical History:   Diagnosis Date   • Blood clotting disorder ([facility]) 4-   • Blood loss anemia 2016   • Bowel habit changes    • Cancer ([facility]) 2016   • Essential hypertension    • Obesity    • Urinary bladder disorder      Current Outpatient Prescriptions on File Prior to Visit   Medication Sig Dispense Refill   • vitamin D, Ergocalciferol, (DRISDOL) 43473 units Cap capsule Take 50,000 Units by mouth.     • warfarin (COUMADIN) 6 MG Tab Take 6 mg by mouth.     • furosemide (LASIX) 20 MG Tab Take 0.5 Tabs by mouth 1 time daily as needed. 45 Tab 0   • warfarin (COUMADIN) 6 MG Tab Take one to one and one-half (1-1.5) tablets daily as directed by [facility] 135 Tab 1   • vitamin D (CHOLECALCIFEROL) 1000 UNIT Tab Take 1,000 Units by mouth every day.     • acetaminophen (TYLENOL) 500 MG Tab Take 500-1,000 mg by mouth as needed.     • MYRBETRIQ 50 MG TABLET SR 24 HR      • Nutritional Supplements (ENSURE NUTRA SHAKE HI-VIVIANE) Liquid Take 1 Bottle by mouth every day. 30 Can 11   • losartan (COZAAR) 25 MG  Tab Take 1 Tab by mouth every day. 90 Tab 0   • enoxaparin (LOVENOX) 100 MG/ML Solution inj enoxaparin 100 mg/mL subcutaneous syringe     • polyethylene glycol-electrolytes (GOLYTELY) 236 GM Recon Soln Golytely 236 gram-22.74 gram-6.74 gram-5.86 gram oral solution     • HYDROcodone-acetaminophen (NORCO) 5-325 MG Tab per tablet hydrocodone 5 mg-acetaminophen 325 mg tablet     • warfarin (COUMADIN) 6 MG Tab warfarin 6 mg tablet     • Calcium Phosphate Dibasic Powder Take  by mouth.     • Non Formulary Request Take 4 Tabs by mouth every day.       No current facility-administered medications on file prior to visit.      Social History     Social History   • Marital status:      Spouse name: N/A   • Number of children: N/A   • Years of education: N/A     Occupational History   • Not on file.     Social History Main Topics   • Smoking status: Never Smoker   • Smokeless tobacco: Never Used   • Alcohol use No   • Drug use: No   • Sexual activity: Yes     Partners: Male     Other Topics Concern   • Not on file     Social History Narrative    Lives with .     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     Family History   Problem Relation Age of Onset   • Diabetes Mother      prediabetes   • Heart Disease Mother    • Hypertension Mother    • Stroke Maternal Grandmother    • Cancer Neg Hx    • Hyperlipidemia Neg Hx    • Psychiatry Neg Hx    • Thyroid Neg Hx      Patient has no known allergies.         ua      Assessment/Plan:     1. Cystitis    - cefTRIAXone (ROCEPHIN) injection 1 g; 1,000 mg by Intramuscular route Once.  - cefUROXime (CEFTIN) 500 MG Tab; Take 1 Tab by mouth 2 times a day for 5 days.  Dispense: 10 Tab; Refill: 0  - HYDROcodone-acetaminophen (NORCO) 5-325 MG Tab per tablet; Take 1 Tab by mouth every four hours as needed for up to 5 days.  Dispense: 12 Tab; Refill: 0  - phenazopyridine (PYRIDIUM) 200 MG Tab; Take 1 Tab by mouth 3 times a day as needed (for painful urination).  Dispense: 6  Tab; Refill: 0  - CONSENT FOR OPIATE PRESCRIPTION    No CVAT but poss early sign of pyelo; told pt go to   ER if sx worsen over next 24hrs

## 2018-06-01 PROBLEM — N39.0 UTI (URINARY TRACT INFECTION): Status: ACTIVE | Noted: 2018-01-01

## 2018-06-02 NOTE — ED NOTES
Patient states she had blood work done today and told by her doctor to come to ER  She states she has bladder cancer

## 2018-06-02 NOTE — PROGRESS NOTES
[facility] Hospitalist Progress Note    Date of Service: 2018    Chief Complaint  62 y.o. female admitted 2018 with abdominal pain and fatigue. She has been found to have a UTI and then after admission developed sepsis .She has a history of stage 4 colon cancer and a related entero-vesicular fistula.     Interval Problem Update  Feeling improved. Tachy. No overt fevers. Pain controlled with meds.     Consultants/Specialty  none    Disposition  Home once improved.     We discussed code status for 18min and she has elected DNR    Review of Systems   Constitutional: Positive for malaise/fatigue. Negative for chills and fever.   HENT: Negative for sore throat.    Eyes: Negative for blurred vision and pain.   Respiratory: Negative for cough and shortness of breath.    Cardiovascular: Negative for chest pain and palpitations.   Gastrointestinal: Positive for abdominal pain. Negative for nausea and vomiting.   Genitourinary: Positive for urgency. Negative for dysuria.   Musculoskeletal: Negative for back pain and neck pain.   Skin: Negative for itching and rash.   Neurological: Positive for weakness. Negative for dizziness, tingling and headaches.   Psychiatric/Behavioral: Negative for depression. The patient does not have insomnia.    All other systems reviewed and are negative.     Physical Exam  Laboratory/Imaging   Hemodynamics  Temp (24hrs), Av.8 °C (98.3 °F), Min:36.7 °C (98 °F), Max:37.1 °C (98.7 °F)   Temperature: 36.8 °C (98.2 °F)  Pulse  Av.3  Min: 102  Max: 126    Blood Pressure: 126/68, NIBP: 160/76      Respiratory      Respiration: 18, Pulse Oximetry: 94 %             Fluids    Intake/Output Summary (Last 24 hours) at 18 0756  Last data filed at 18 0400   Gross per 24 hour   Intake              767 ml   Output                0 ml   Net              767 ml       Nutrition  Orders Placed This Encounter   Procedures   • Diet Order     Standing Status:   Standing     Number of  Occurrences:   1     Order Specific Question:   Diet:     Answer:   Regular [1]     Physical Exam   Constitutional: She is oriented to person, place, and time. She appears well-developed and well-nourished. No distress.   Patient seen and examined   HENT:   Right Ear: External ear normal.   Left Ear: External ear normal.   Nose: Nose normal.   Eyes: Conjunctivae are normal. Right eye exhibits no discharge. Left eye exhibits no discharge.   Neck: No JVD present.   Cardiovascular: Regular rhythm and normal heart sounds.    No murmur heard.  Cap refill 2sec  Pulses 2+ throughout  Normal skin  Color.    Pulmonary/Chest: Effort normal and breath sounds normal. No stridor. No respiratory distress. She has no wheezes. She has no rales.   Abdominal: Soft. Bowel sounds are normal. She exhibits no distension. There is no tenderness.   Musculoskeletal: She exhibits no edema or tenderness.   Neurological: She is alert and oriented to person, place, and time.   Skin: Skin is warm and dry. She is not diaphoretic. No erythema.   Psychiatric: She has a normal mood and affect. Her behavior is normal.   Nursing note and vitals reviewed.      Recent Labs      06/01/18   0700  06/01/18 1857 06/02/18 0448 06/02/18 0628   WBC  11.8*  13.4*  7.9   --    RBC  3.37*  3.64*  2.90*   --    HEMOGLOBIN  8.7*  9.4*  7.3*  7.3*   HEMATOCRIT  29.0*  30.4*  24.2*  23.4*   MCV  86.1  83.5  83.4   --    MCH  25.8*  25.8*  25.2*   --    MCHC  30.0*  30.9*  30.2*   --    RDW  56.1*  53.5*  54.0*   --    PLATELETCT  737*  759*  543*   --    MPV  8.9*  8.5*  8.7*   --      Recent Labs      06/01/18 1857 06/02/18 0447   SODIUM  133*  132*   POTASSIUM  4.3  4.1   CHLORIDE  97  101   CO2  22  22   GLUCOSE  117*  163*   BUN  21  17   CREATININE  0.77  0.68   CALCIUM  8.9  8.1*     Recent Labs      06/01/18 1857 06/02/18 0447   APTT  52.3*   --    INR  2.47*  2.51*                  Assessment/Plan     * UTI (urinary tract infection)    Assessment & Plan    UA+ packed WBCS.  History of fistula.   She was treated twice in past 2 weeks, she had received Cipro x 7 days which she completed, she had 1g IM ceftriaxone followed by Cefexin.  Previous Urine cultures showed skin lucy, and candida  Add diflucan  Continue IV zosyn.  Urine cultures pending.             Adenocarcinoma of colon metastatic to liver (HCC)- (present on admission)   Assessment & Plan    Follows with , no further treatments possible except for patient has a clinical trial at a [place] center she is waiting to undergo in the next 2 weeks.   Wei talked about hospice, and patient has accepted her situation but wants to give herself one more chance.          Sepsis (HCC)- (present on admission)   Assessment & Plan    This is sepsis (without associated acute organ dysfunction).   this was NOT present on admission and developed with repeat labs and leukocytosis/tachycardia.   Start sepsis protocol  Iv fluids  Iv abx  Trend lactic acid  F/u on cultures          Colostomy in place (HCC)- (present on admission)   Assessment & Plan    Continue ostomy care         Deep vein thrombosis (DVT) of both lower extremities (HCC)- (present on admission)   Assessment & Plan    Recently diagnosed.  continue with coumadin.          Quality-Core Measures   Reviewed items::  EKG reviewed, Radiology images reviewed, Labs reviewed and Medications reviewed  Traore catheter::  No Traore  DVT prophylaxis pharmacological::  Heparin  DVT prophylaxis - mechanical:  SCDs  Ulcer Prophylaxis::  Not indicated  Antibiotics:  Treating active infection/contamination beyond 24 hours perioperative coverage  Assessed for rehabilitation services:  Patient was assess for and/or received rehabilitation services during this hospitalization

## 2018-06-02 NOTE — PROGRESS NOTES
2 RN skin assessment done; skin not WDL.Pt has old midline scar. Colostomy on Left lower quadrant.

## 2018-06-02 NOTE — PROGRESS NOTES
Received Bedside report. Assumed care at 0715. This pt is AOx4, ambulatory without assistance, no N/V this morning, voiding, declines pain intervention at this time. Patient and RN discussed plan of care including pain mgmt, labs, monitoring: questions answered. Labs noted, assessment complete, patient tolerating regular diet. Call light in place, fall precautions in place, patient educated on importance of calling for assistance. No additional needs at this time. VSS

## 2018-06-02 NOTE — PROGRESS NOTES
Inpatient Anticoagulation Service Note    Date: 6/2/2018  Reason for Anticoagulation: Deep Vein Thrombosis        Hemoglobin Value: (!) 7.8  Hematocrit Value: (!) 25  Lab Platelet Value: (!) 543  Target INR: 2.0 to 3.0    INR from last 7 days     Date/Time INR Value    06/02/18 0447 (!)  2.51    06/01/18 1857 (!)  2.47        Dose from last 7 days     Date/Time Dose (mg)    06/02/18 1157  6    06/01/18 2100  6        Significant Interactions: Antibiotics  Bridge Therapy: No (If less than 5 days and overlap therapy discontinued -- document reason (i.e. Bleed Risk))    (If still on overlap therapy, if No -- document reason (i.e. Bleed Risk))    Comments:  (Home warfarin 3 mg Tue/Thur/Sun 6 mg all other days)    Plan:  Repeat warfarin 6 mg PO today  Education Material Provided?: No (Coumadin clinic patient)  Pharmacist suggested discharge dosing: Resume home regimen     Abner Joel, PharmD

## 2018-06-02 NOTE — ED NOTES
ERP at bedside. Pt agrees with plan of care discussed by ERP. AIDET acknowledged with patient. Ángela in low position, side rail up for pt safety. IV established. Blood sent to lab. Urine to lab. Call light within reach. Will continue to monitor.

## 2018-06-02 NOTE — ASSESSMENT & PLAN NOTE
UA+ packed WBCS.  History of fistula.   She was treated twice in past 2 weeks, she had received Cipro x 7 days which she completed, she had 1g IM ceftriaxone followed by Cefexin.  Previous Urine cultures showed skin lucy, and candida  Add diflucan  Continue IV zosyn.  Urine cultures ewith diptheroids.

## 2018-06-02 NOTE — ED PROVIDER NOTES
ED Provider Note    CHIEF COMPLAINT  Chief Complaint   Patient presents with   • Anxiety   • Abdominal Pain       HPI  Tamara Flynn is a 62 y.o. female who presents with abdominal pain and lethargy. The patient states she has not felt well over the last 7-10 days. Last Monday she was diagnosed with a urinary tract infection at the urgent care and started on ciprofloxacin as well as one other antibiotic that she is unaware of the name. Specifically she still has not felt well when she saw her primary care doctor today who noticed that she has had an increased drop in her hemoglobin and her urine still appears to be infected. Therefore she was instructed to come into the emergency department. She says she has a known history of bladder cancer from metastatic disease from colon cancer that is not amenable to surgical intervention. She is not currently on any chemotherapy nor radiation therapy. She says she does have associated nausea and vomiting in the morning. She does have anorexia and generalized malaise. She continues to have some bilateral low back pain.    REVIEW OF SYSTEMS  See HPI for further details. All other systems are negative.     PAST MEDICAL HISTORY  Past Medical History:   Diagnosis Date   • Blood clotting disorder (HCC) 4-    left leg   • Blood loss anemia 2016   • Bowel habit changes     has colostomy   • Cancer (HCC) 2016    liver, bladder, colon   • Essential hypertension    • Obesity    • Urinary bladder disorder     1/2 bladder due to cancer surgery       SOCIAL HISTORY  Social History     Social History   • Marital status:      Spouse name: N/A   • Number of children: N/A   • Years of education: N/A     Social History Main Topics   • Smoking status: Never Smoker   • Smokeless tobacco: Never Used   • Alcohol use No   • Drug use: No   • Sexual activity: Yes     Partners: Male     Other Topics Concern   • Not on file     Social History Narrative    Lives with .      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   HENT: Normocephalic, Atraumatic, tympanic membranes are intact and nonerythematous bilaterally, Oropharynx moist without exudates or erythema, Nose normal.   Eyes: PERRLA, EOMI, Conjunctiva normal.  Neck: Supple without meningismus  Lymphatic: No lymphadenopathy noted.   Cardiovascular: Tachycardic heart rate, Normal rhythm, No murmurs, No rubs, No gallops.   Thorax & Lungs: Normal breath sounds, No respiratory distress, No wheezing, No chest tenderness.   Abdomen: Suprapubic fullness and tenderness to deep palpation, no rebound, no guarding, hypoactive bowel sounds, colostomy bag in place   Skin: Warm, Dry, No erythema, No rash.   Back: No tenderness, No CVA tenderness.   Extremities: Atraumatic with symmetric distal pulses, No edema, No tenderness, No cyanosis, No clubbing.   Neurologic: Alert & oriented x 3, cranial nerves II through XII are intact, Normal motor function, Normal sensory function, No focal deficits noted.   Psychiatric: Anxious    COURSE & MEDICAL DECISION MAKING  Pertinent Labs & Imaging studies reviewed. (See chart for details)  This is 62-year-old female who presents to the emergency department with abdominal discomfort. She is sent in by her primary care provider she is noted to have a decreasing hemoglobin and urinary tract infection despite antibiotics. The patient does have some tenderness in the suprapubic region. However I looked up her urine culture from 25 May and this did not show any pathological organisms just some normal skin lucy. Therefore the urinalysis abnormality male be from her metastatic colon cancer that is extended into the bladder. This could also be from some type of fistula tract. Due " to the continued discomfort, increasing leukocytosis, and urinary abnormalities we will get the patient to the hospital for observation. I have ordered intravenous fluids for resuscitation. We will hold off on antibiotics pending the next urine culture. The patient does have blood in her urine visually and I suspect this is the cause of her anemia. This will be followed as the patient is on anticoagulants. The patient be admitted in stable condition.    FINAL IMPRESSION  1. Abdominal pain suspect secondary to metastatic colon cancer  2. Rule out urinary tract infection   3. Anemia  4. Iatrogenic coagulopathy   Disposition  The patient will be admitted in stable condition    Electronically signed by: Manoj Campbell, 6/1/2018 6:54 PM

## 2018-06-02 NOTE — ED NOTES
Pt is accompanied by family,  She C/O anxiety, and recurring diffuse, moderate abdominal discomfort for the past 12 days with episodic nausea.  Recently diagnosed with metastatic colon cancer.

## 2018-06-02 NOTE — DIETARY
"Nutrition services: Day 0 of admit.  Tamara Flynn is a 62 y.o. female with admitting DX of UTI.  Hx of Metastatic colon cancer, colostomy.  Consult received for Nutrition Admit Screen trigger of poor oral intake for 4 or more days.      Assessment:  Height: 154.9 cm (5' 1\") (Stated)  Weight: 105 kg (231 lb 7.7 oz)  Body mass index is 43.74 kg/m².  Diet/Intake: Regular    Evaluation:   1. Recorded intake at Breakfast today was 50-75%.  Tolerating Regular diet per nursing notes.  BMI - Pt with BMI >40 (=43.74). Weight loss counseling not appropriate in acute care setting.     Recommendations/Plan:  1. Continue Regular diet as tolerated.   2. Encourage continued intake of 50-75% of meals.  3. Document intake of all PO as % taken in ADL's to provide interdisciplinary communication across all shifts.  4. Nutrition rep will continue to see patient for ongoing meal and snack preferences.   5. RD to monitor.            "

## 2018-06-02 NOTE — PROGRESS NOTES
Dr Chua returned call.  Ordered 1-2 tablets 5-325 Norco Q 4 hours PRN pain and one more dose of 50 mg of Trazodone.

## 2018-06-02 NOTE — PROGRESS NOTES
Shai from Lab called with critical result of Hemaglobin 7.3 at 0545. Critical lab result read back to Shai.   Dr. Chua notified of critical lab result at 0615.  Critical lab result read back by Dr. Chua. Dr chua ordered recheck of hemoglobin and hematocrit

## 2018-06-02 NOTE — DISCHARGE PLANNING
Care Transition Team Assessment    Information Source  Orientation : Oriented x 4  Information Given By: Patient         Elopement Risk  Legal Hold: No  Ambulatory or Self Mobile in Wheelchair: Yes  Disoriented: No  Psychiatric Symptoms: None  History of Wandering: No  Elopement this Admit: No  Vocalizing Wanting to Leave: No  Displays Behaviors, Body Language Wanting to Leave: No-Not at Risk for Elopement  Elopement Risk: Not at Risk for Elopement    Interdisciplinary Discharge Planning  Does Admitting Nurse Feel This Could be a Complex Discharge?: No  Primary Care Physician: Dr. Gaytan  Lives with - Patient's Self Care Capacity: Spouse  Patient or legal guardian wants to designate a caregiver (see row info): No  Support Systems: Family Member(s), Friends / Neighbors  Housing / Facility: [address]  Do You Take your Prescribed Medications Regularly: Yes  Able to Return to Previous ADL's: No  Mobility Issues: No  Prior Services: Home-Independent  Patient Expects to be Discharged to:: Home  Assistance Needed: No  Durable Medical Equipment: Walker    Discharge Preparedness  What is your plan after discharge?: Home with help  Prior Functional Level: Independent with Activities of Daily Living  Difficulity with ADLs: None  Difficulity with IADLs: None    Functional Assesment  Prior Functional Level: Independent with Activities of Daily Living    Finances  Financial Barriers to Discharge: No  Prescription Coverage: Yes    Vision / Hearing Impairment  Vision Impairment : Yes  Right Eye Vision: Impaired, Wears Glasses  Left Eye Vision: Impaired, Wears Glasses  Hearing Impairment : No    Values / Beliefs / Concerns  Values / Beliefs Concerns : No         Domestic Abuse  Have you ever been the victim of abuse or violence?: No  Physical Abuse or Sexual Abuse: No  Verbal Abuse or Emotional Abuse: No  Possible Abuse Reported to:: Not Applicable    Plan: Pt reports she will discharge home when medically cleared and has no  discharge needs at this time. Pt has a walker at home as uses it as needed.

## 2018-06-02 NOTE — ASSESSMENT & PLAN NOTE
This is sepsis (without associated acute organ dysfunction).   this was NOT present on admission   Continue sepsis protocol  Iv fluids  Iv abx  F/u on cultures  Improving

## 2018-06-02 NOTE — PROGRESS NOTES
Report received from ED RN. Pt transferred via gurney to bed by ambulation. Pt tolerated well. Pt awake and alert. Pt states pain is tolerable at this time.

## 2018-06-02 NOTE — CARE PLAN
Problem: Bowel/Gastric:  Goal: Normal bowel function is maintained or improved  Outcome: PROGRESSING AS EXPECTED  Pt educated to take stool softeners when needed        Problem: Pain Management  Goal: Pain level will decrease to patient's comfort goal  Outcome: PROGRESSING AS EXPECTED  Pt educated to call for pain medication when needed

## 2018-06-02 NOTE — ED NOTES
Report received from Asiya MURDOCK.   Assumed patient care. Pt assesement done.  Plan of care reviewed with patient.

## 2018-06-02 NOTE — H&P
Hospital Medicine History and Physical    Date of Service  6/1/2018    Chief Complaint  Chief Complaint   Patient presents with   • Anxiety   • Abdominal Pain       History of Presenting Illness  62 y.o. female with a past medical history of Metastatic colon cancer, follows with , Anemia, DVT on warfarin  presented 6/1/2018 for evaluation after her PCP told her to go to the ER. Patient was having abdominal discomfort and had noted decreasing in her hemogobin with recurrent UTI, was instructed to go to the ER for further evaluation.    Patient has recently been diagnosed with recurrent UTIs over the past several weeks, patient says she finished a seven-day course of ciprofloxacin and then underwent an intramuscular shot of ceftriaxone with cefoxtin. However despite this patient is still having dysuria and is having increased leukocytosis. 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 At this point patient appears to be dehydrated and has a UTI with packed WBCs on her UA. She'll be admitted for IV antibiotics, IV fluids and supportive therapy.                  Primary Care Physician  Maritza Esparza M.D.    Consultants  None    Code Status  Code: DNR    Review of Systems  Review of Systems   Constitutional: Positive for malaise/fatigue. Negative for chills and fever.   HENT: Negative for congestion, hearing loss, sore throat and tinnitus.    Eyes: Negative for blurred vision, double vision, photophobia and pain.   Respiratory: Negative for cough, hemoptysis, sputum production, shortness of breath and stridor.    Cardiovascular: Negative for chest pain, palpitations, orthopnea, claudication and PND.   Gastrointestinal: Negative for blood in stool, constipation, heartburn, melena, nausea and vomiting.   Genitourinary: Positive for dysuria, frequency, hematuria  and urgency.   Musculoskeletal: Negative for back pain, myalgias and neck pain.   Neurological: Positive for weakness. Negative for dizziness, tingling, tremors, sensory change, speech change and headaches.   Psychiatric/Behavioral: Negative for depression, memory loss and suicidal ideas. The patient is not nervous/anxious.           Past Medical History  Past Medical History:   Diagnosis Date   • Blood clotting disorder (HCC) 4-    left leg   • Cancer (HCC) 2016    liver, bladder, colon   • Blood loss anemia 2016   • Bowel habit changes     has colostomy   • Essential hypertension    • Obesity    • Urinary bladder disorder     1/2 bladder due to cancer surgery       Surgical History  Past Surgical History:   Procedure Laterality Date   • CATH PLACEMENT Left 8/9/2016    Procedure: CATH PLACEMENT power port ;  Surgeon: Yimi Martinez M.D.;  Location: SURGERY [facility];  Service:    • COLOSTOMY Left 4/19/2016    Procedure: COLOSTOMY;  Surgeon: Yimi Martinez M.D.;  Location: SURGERY [facility];  Service:    • EXPLORATORY LAPAROTOMY  4/18/2016    Procedure: EXPLORATORY LAPAROTOMY;  Surgeon: Yimi Martinez M.D.;  Location: SURGERY [facility];  Service:    • CYSTECTOMY  4/18/2016    Procedure: CYSTECTOMY;  Surgeon: Yunior Abdullahi M.D.;  Location: SURGERY [facility];  Service:    • EXPLORATORY LAPAROTOMY  4/17/2016    Procedure: EXPLORATORY LAPAROTOMY-illeostomy creation ;  Surgeon: Yimi Martinez M.D.;  Location: SURGERY [facility];  Service:    • COLONOSCOPY - ENDO N/A 3/22/2016    Procedure: COLONOSCOPY - ENDO;  Surgeon: Yimi Martinez M.D.;  Location: ENDOSCOPY [facility];  Service:    • RECOVERY  3/21/2016    Procedure: CT-CT Guided liver biopsy-Dr.Michael Martinez;  Surgeon: Recoveryonly Surgery;  Location: SURGERY PRE-POST PROC UNIT [facility];  Service:    • CYSTOSCOPY  3/17/2016    Procedure: CYSTOSCOPY;  Surgeon: Yunior Abdullahi M.D.;  Location: SURGERY [facility]  ORS;  Service:    • TRANS URETHRAL RESECTION BLADDER  3/17/2016    Procedure: TRANS URETHRAL RESECTION BLADDER Tumor;  Surgeon: Yunior Abdullahi M.D.;  Location: SURGERY [facility] ORS;  Service:    • PB EXCISION OF TONSIL TAGS         Medications  No current facility-administered medications on file prior to encounter.      Current Outpatient Prescriptions on File Prior to Encounter   Medication Sig Dispense Refill   • levoFLOXacin (LEVAQUIN) 500 MG tablet Take 1 Tab by mouth every day. 10 Tab 0   • ferrous sulfate 325 (65 Fe) MG EC tablet Take 1 Tab by mouth 2 Times a Day. 180 Tab 1   • HYDROcodone-acetaminophen (NORCO) 5-325 MG Tab per tablet Take 1 Tab by mouth every four hours as needed for up to 5 days. 12 Tab 0   • phenazopyridine (PYRIDIUM) 200 MG Tab Take 1 Tab by mouth 3 times a day as needed (for painful urination). 6 Tab 0   • promethazine (PHENERGAN) 25 MG Tab Take 1 Tab by mouth 3 times a day as needed for Nausea/Vomiting. 15 Tab 0   • MYRBETRIQ 50 MG TABLET SR 24 HR      • Nutritional Supplements (ENSURE NUTRA SHAKE HI-VIVIANE) Liquid Take 1 Bottle by mouth every day. 30 Can 11   • losartan (COZAAR) 25 MG Tab Take 1 Tab by mouth every day. 90 Tab 0   • enoxaparin (LOVENOX) 100 MG/ML Solution inj enoxaparin 100 mg/mL subcutaneous syringe     • polyethylene glycol-electrolytes (GOLYTELY) 236 GM Recon Soln Golytely 236 gram-22.74 gram-6.74 gram-5.86 gram oral solution     • HYDROcodone-acetaminophen (NORCO) 5-325 MG Tab per tablet hydrocodone 5 mg-acetaminophen 325 mg tablet     • warfarin (COUMADIN) 6 MG Tab warfarin 6 mg tablet     • vitamin D, Ergocalciferol, (DRISDOL) 17021 units Cap capsule Take 50,000 Units by mouth.     • Calcium Phosphate Dibasic Powder Take  by mouth.     • warfarin (COUMADIN) 6 MG Tab Take 6 mg by mouth.     • Non Formulary Request Take 4 Tabs by mouth every day.     • furosemide (LASIX) 20 MG Tab Take 0.5 Tabs by mouth 1 time daily as needed. 45 Tab 0   • warfarin (COUMADIN) 6 MG  Tab Take one to one and one-half (1-1.5) tablets daily as directed by [facility] 135 Tab 1   • vitamin D (CHOLECALCIFEROL) 1000 UNIT Tab Take 1,000 Units by mouth every day.     • acetaminophen (TYLENOL) 500 MG Tab Take 500-1,000 mg by mouth as needed.         Family History  Family History   Problem Relation Age of Onset   • Diabetes Mother      prediabetes   • Heart Disease Mother    • Hypertension Mother    • Stroke Maternal Grandmother    • Cancer Neg Hx    • Hyperlipidemia Neg Hx    • Psychiatry Neg Hx    • Thyroid Neg Hx        Social History  Social History   Substance Use Topics   • Smoking status: Never Smoker   • Smokeless tobacco: Never Used   • Alcohol use No       Allergies  No Known Allergies     Physical Exam  Laboratory   Hemodynamics  Temp (24hrs), Av.7 °C (98 °F), Min:36.7 °C (98 °F), Max:36.7 °C (98 °F)   Temperature: 36.7 °C (98 °F)  Pulse  Av.7  Min: 110  Max: 126    Blood Pressure: 135/77, NIBP: 154/80      Respiratory      Respiration: 18, Pulse Oximetry: 98 %             Physical Exam   Constitutional: She is oriented to person, place, and time. She appears well-developed and well-nourished. No distress.   HENT:   Head: Normocephalic and atraumatic.   Mouth/Throat: No oropharyngeal exudate.   Eyes: Conjunctivae are normal. Pupils are equal, round, and reactive to light. Right eye exhibits no discharge. No scleral icterus.   Neck: Neck supple. No JVD present. No thyromegaly present.   Cardiovascular: Intact distal pulses.    No murmur heard.  Pulmonary/Chest: Effort normal and breath sounds normal. No stridor. No respiratory distress. She has no wheezes. She has no rales.   Abdominal: Soft. She exhibits no distension. There is no tenderness. There is no rebound.   Colostomy, mucous membranes pink healthy with stool in ostomy bag   Musculoskeletal: Normal range of motion. She exhibits no edema.   Neurological: She is alert and oriented to person, place, and time.  No cranial nerve deficit.   Skin: Skin is warm. She is not diaphoretic. No erythema.   Psychiatric: She has a normal mood and affect. Her behavior is normal. Thought content normal.         Assessment/Plan  * UTI (urinary tract infection)   Assessment & Plan    UA+ packed WBCS.  She was treated twice in past 2 weeks, she had received Cipro x 7 days which she completed, she had 1g IM ceftriaxone followed by Cefexin.  Previous Urine cultures showed skin lucy,  We will trial IV zosyn.  Urine cultures pending.   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 Recently saw urology, no further recs.          Adenocarcinoma of colon metastatic to liver (HCC)- (present on admission)   Assessment & Plan    Follows with , no further treatments possible except for patient has a clinical trial at a [place] center she is waiting to undergo in the next 2 weeks.   Wei talked about hospice, and patient has accepted her situation but wants to give herself one more chance.          Colostomy in place (HCC)- (present on admission)   Assessment & Plan    Continue ostomy care         Deep vein thrombosis (DVT) of both lower extremities (HCC)- (present on admission)   Assessment & Plan    Recently diagnosed.  continue with coumadin.            I anticipate this patient is appropriate for observation status at this time.    Prophylaxis: warfarin    Recent Labs      06/01/18   0700  06/01/18   1857   WBC  11.8*  13.4*   RBC  3.37*  3.64*   HEMOGLOBIN  8.7*  9.4*   HEMATOCRIT  29.0*  30.4*   MCV  86.1  83.5   MCH  25.8*  25.8*   MCHC  30.0*  30.9*   RDW  56.1*  53.5*   PLATELETCT  737*  759*   MPV  8.9*  8.5*     Recent Labs      06/01/18   1857   SODIUM  133*   POTASSIUM  4.3   CHLORIDE  97   CO2  22   GLUCOSE  117*   BUN  21   CREATININE  0.77   CALCIUM  8.9     Recent Labs      06/01/18   1857   ALTSGPT  50    ASTSGOT  121*   ALKPHOSPHAT  339*   TBILIRUBIN  0.6   LIPASE  27   GLUCOSE  117*     Recent Labs      06/01/18   1857   APTT  52.3*   INR  2.47*             No results found for: TROPONINI    Imaging  No orders to display

## 2018-06-02 NOTE — ASSESSMENT & PLAN NOTE
Follows with , no further treatments possible except for patient has a clinical trial at a [facility] she is waiting to undergo in the next 2 weeks.   Wei talked about hospice, and patient has accepted her situation but wants to give herself one more chance.

## 2018-06-02 NOTE — PROGRESS NOTES
Pt states she is still having difficulty sleeping and her pain is getting worse. Dr. hamlet grissom.

## 2018-06-03 NOTE — PROGRESS NOTES
Inpatient Anticoagulation Service Note    Date: 6/3/2018  Reason for Anticoagulation: Deep Vein Thrombosis        Hemoglobin Value: (!) 7.6  Hematocrit Value: (!) 25.5  Lab Platelet Value: (!) 606  Target INR: 2.0 to 3.0    INR from last 7 days     Date/Time INR Value    06/03/18 0429 (!)  2.82    06/02/18 0447 (!)  2.51    06/01/18 1857 (!)  2.47        Dose from last 7 days     Date/Time Dose (mg)    06/03/18 0429  3    06/02/18 1157  6    06/01/18 2100  6        Significant Interactions: Antibiotics  Bridge Therapy: No (If less than 5 days and overlap therapy discontinued -- document reason (i.e. Bleed Risk))    (If still on overlap therapy, if No -- document reason (i.e. Bleed Risk))    Comments:  (Home warfarin 3 mg Tue/Thur/Sun 6 mg all other days)    Plan:  Give Coumadin 3 mg tonight per home regimen and INR of 2.82  Education Material Provided?: No (Coumadin clinic patient)  Pharmacist suggested discharge dosing: Resume home refimen     Yu Cramer

## 2018-06-03 NOTE — PROGRESS NOTES
Received report from Al MURDOCK. Assumed care. This pt is AOx4,   denies pain, Patient and RN discussed plan of care including IV abx, pending cultures, pain managment: questions answered. Chart reviewed. Call light in place, fall precautions in place, patient educated on importance of calling for assistance. No additional needs at this time.

## 2018-06-03 NOTE — PROGRESS NOTES
[facility] Hospitalist Progress Note    Date of Service: 6/3/2018    Chief Complaint  62 y.o. female admitted 2018 with abdominal pain and fatigue. She has been found to have a UTI and then after admission developed sepsis .She has a history of stage 4 colon cancer and a related entero-vesicular fistula.     Interval Problem Update  Feeling improved. Tachy. No overt fevers. Pain controlled with meds.     Consultants/Specialty  none    Disposition  Home once improved.     We discussed code status for 18min and she has elected DNR    Review of Systems   Constitutional: Positive for malaise/fatigue.   HENT: Negative for sore throat.    Eyes: Negative for blurred vision and pain.   Respiratory: Negative for cough and shortness of breath.    Cardiovascular: Negative for chest pain and palpitations.   Gastrointestinal: Positive for abdominal pain. Negative for vomiting.   Genitourinary: Negative for dysuria and urgency.   Musculoskeletal: Negative for back pain, myalgias and neck pain.   Skin: Negative for itching and rash.   Neurological: Negative for dizziness, tingling and weakness.   Psychiatric/Behavioral: Negative for depression. The patient does not have insomnia.    All other systems reviewed and are negative.     Physical Exam  Laboratory/Imaging   Hemodynamics  Temp (24hrs), Av.9 °C (98.4 °F), Min:36.8 °C (98.2 °F), Max:37.1 °C (98.7 °F)   Temperature: 36.8 °C (98.2 °F)  Pulse  Av.6  Min: 102  Max: 126    Blood Pressure: 140/79      Respiratory      Respiration: 18, Pulse Oximetry: 95 %             Fluids    Intake/Output Summary (Last 24 hours) at 18  Last data filed at 18 1600   Gross per 24 hour   Intake              678 ml   Output              775 ml   Net              -97 ml       Nutrition  Orders Placed This Encounter   Procedures   • Diet Order     Standing Status:   Standing     Number of Occurrences:   1     Order Specific Question:   Diet:     Answer:   Regular [1]      Physical Exam   Constitutional: She is oriented to person, place, and time. She appears well-developed and well-nourished. No distress.   Patient seen and examined   HENT:   Right Ear: External ear normal.   Left Ear: External ear normal.   Nose: Nose normal.   Eyes: Conjunctivae are normal. Right eye exhibits no discharge. Left eye exhibits no discharge.   Neck: No JVD present.   Cardiovascular: Regular rhythm and normal heart sounds.    No murmur heard.  Pulmonary/Chest: Effort normal and breath sounds normal. No stridor. No respiratory distress. She has no rales.   Abdominal: Soft. Bowel sounds are normal. There is no tenderness. There is no rebound.   Obese  LLQ ostomy   Musculoskeletal: She exhibits no edema or tenderness.   Neurological: She is alert and oriented to person, place, and time.   Skin: Skin is warm and dry. She is not diaphoretic. No erythema.   Psychiatric: She has a normal mood and affect. Her behavior is normal.   Nursing note and vitals reviewed.      Recent Labs      06/01/18 1857 06/02/18 0448 06/02/18   0628  06/02/18   1157  06/03/18   0429   WBC  13.4*  7.9   --    --   9.1   RBC  3.64*  2.90*   --    --   3.04*   HEMOGLOBIN  9.4*  7.3*  7.3*  7.8*  7.6*   HEMATOCRIT  30.4*  24.2*  23.4*  25.0*  25.5*   MCV  83.5  83.4   --    --   83.9   MCH  25.8*  25.2*   --    --   25.0*   MCHC  30.9*  30.2*   --    --   29.8*   RDW  53.5*  54.0*   --    --   53.8*   PLATELETCT  759*  543*   --    --   606*   MPV  8.5*  8.7*   --    --   8.5*     Recent Labs      06/01/18 1857 06/02/18   0447  06/03/18   0429   SODIUM  133*  132*  134*   POTASSIUM  4.3  4.1  4.2   CHLORIDE  97  101  105   CO2  22  22  21   GLUCOSE  117*  163*  140*   BUN  21  17  13   CREATININE  0.77  0.68  0.61   CALCIUM  8.9  8.1*  8.4     Recent Labs      06/01/18   1857  06/02/18   0447  06/03/18   0429   APTT  52.3*   --    --    INR  2.47*  2.51*  2.82*                  Assessment/Plan     * UTI (urinary tract  infection)   Assessment & Plan    UA+ packed WBCS.  History of fistula.   She was treated twice in past 2 weeks, she had received Cipro x 7 days which she completed, she had 1g IM ceftriaxone followed by Cefexin.  Previous Urine cultures showed skin lucy, and candida  Add diflucan  Continue IV zosyn.  Urine cultures ewith diptheroids.             Adenocarcinoma of colon metastatic to liver (HCC)- (present on admission)   Assessment & Plan    Follows with , no further treatments possible except for patient has a clinical trial at a [place] [facility] she is waiting to undergo in the next 2 weeks.   Wei talked about hospice, and patient has accepted her situation but wants to give herself one more chance.          Sepsis (HCC)- (present on admission)   Assessment & Plan    This is sepsis (without associated acute organ dysfunction).   this was NOT present on admission   Continue sepsis protocol  Iv fluids  Iv abx  F/u on cultures  Improving           Colostomy in place (HCC)- (present on admission)   Assessment & Plan    Continue ostomy care         Deep vein thrombosis (DVT) of both lower extremities (HCC)- (present on admission)   Assessment & Plan    Recently diagnosed.  continue with coumadin.          Quality-Core Measures   Reviewed items::  EKG reviewed, Radiology images reviewed, Labs reviewed and Medications reviewed  Traore catheter::  No Traore  DVT prophylaxis pharmacological::  Heparin  DVT prophylaxis - mechanical:  SCDs  Ulcer Prophylaxis::  Not indicated  Antibiotics:  Treating active infection/contamination beyond 24 hours perioperative coverage  Assessed for rehabilitation services:  Patient was assess for and/or received rehabilitation services during this hospitalization

## 2018-06-04 NOTE — DISCHARGE INSTRUCTIONS
Discharge Instructions    Discharged to home by car with relative. Discharged via wheelchair, hospital escort: Yes.  Special equipment needed: Not Applicable    Be sure to schedule a follow-up appointment with your primary care doctor or any specialists as instructed.     Discharge Plan:   Influenza Vaccine Indication: Not indicated: Previously immunized this influenza season and > 8 years of age    I understand that a diet low in cholesterol, fat, and sodium is recommended for good health. Unless I have been given specific instructions below for another diet, I accept this instruction as my diet prescription.   Other diet: Regular    Special Instructions:   Last dose of Norco Received at 11:25am    · Is patient discharged on Warfarin / Coumadin?   No       Urinary Tract Infection, Adult  A urinary tract infection (UTI) is an infection of any part of the urinary tract, which includes the kidneys, ureters, bladder, and urethra. These organs make, store, and get rid of urine in the body. UTI can be a bladder infection (cystitis) or kidney infection (pyelonephritis).  What are the causes?  This infection may be caused by fungi, viruses, or bacteria. Bacteria are the most common cause of UTIs. This condition can also be caused by repeated incomplete emptying of the bladder during urination.  What increases the risk?  This condition is more likely to develop if:  · You ignore your need to urinate or hold urine for long periods of time.  · You do not empty your bladder completely during urination.  · You wipe back to front after urinating or having a bowel movement, if you are female.  · You are uncircumcised, if you are male.  · You are constipated.  · You have a urinary catheter that stays in place (indwelling).  · You have a weak defense (immune) system.  · You have a medical condition that affects your bowels, kidneys, or bladder.  · You have diabetes.  · You take antibiotic medicines frequently or for long periods of  time, and the antibiotics no longer work well against certain types of infections (antibiotic resistance).  · You take medicines that irritate your urinary tract.  · You are exposed to chemicals that irritate your urinary tract.  · You are female.  What are the signs or symptoms?  Symptoms of this condition include:  · Fever.  · Frequent urination or passing small amounts of urine frequently.  · Needing to urinate urgently.  · Pain or burning with urination.  · Urine that smells bad or unusual.  · Cloudy urine.  · Pain in the lower abdomen or back.  · Trouble urinating.  · Blood in the urine.  · Vomiting or being less hungry than normal.  · Diarrhea or abdominal pain.  · Vaginal discharge, if you are female.  How is this diagnosed?  This condition is diagnosed with a medical history and physical exam. You will also need to provide a urine sample to test your urine. Other tests may be done, including:  · Blood tests.  · Sexually transmitted disease (STD) testing.  If you have had more than one UTI, a cystoscopy or imaging studies may be done to determine the cause of the infections.  How is this treated?  Treatment for this condition often includes a combination of two or more of the following:  · Antibiotic medicine.  · Other medicines to treat less common causes of UTI.  · Over-the-counter medicines to treat pain.  · Drinking enough water to stay hydrated.  Follow these instructions at home:  · Take over-the-counter and prescription medicines only as told by your health care provider.  · If you were prescribed an antibiotic, take it as told by your health care provider. Do not stop taking the antibiotic even if you start to feel better.  · Avoid alcohol, caffeine, tea, and carbonated beverages. They can irritate your bladder.  · Drink enough fluid to keep your urine clear or pale yellow.  · Keep all follow-up visits as told by your health care provider. This is important.  · Make sure to:  ¨ Empty your bladder  often and completely. Do not hold urine for long periods of time.  ¨ Empty your bladder before and after sex.  ¨ Wipe from front to back after a bowel movement if you are female. Use each tissue one time when you wipe.  Contact a health care provider if:  · You have back pain.  · You have a fever.  · You feel nauseous or vomit.  · Your symptoms do not get better after 3 days.  · Your symptoms go away and then return.  Get help right away if:  · You have severe back pain or lower abdominal pain.  · You are vomiting and cannot keep down any medicines or water.  This information is not intended to replace advice given to you by your health care provider. Make sure you discuss any questions you have with your health care provider.  Document Released: 09/27/2006 Document Revised: 05/31/2017 Document Reviewed: 11/07/2016  [employer] Interactive Patient Education © 2017 [employer]    Nitrofurantoin tablets or capsules  What is this medicine?  NITROFURANTOIN (roseline FAITH toyn) is an antibiotic. It is used to treat urinary tract infections.  This medicine may be used for other purposes; ask your health care provider or pharmacist if you have questions.  COMMON BRAND NAME(S): Macrobid, Macrodantin, Urotoin  What should I tell my health care provider before I take this medicine?  They need to know if you have any of these conditions:  -anemia  -diabetes  -glucose-6-phosphate dehydrogenase deficiency  -kidney disease  -liver disease  -lung disease  -other chronic illness  -an unusual or allergic reaction to nitrofurantoin, other antibiotics, other medicines, foods, dyes or preservatives  -pregnant or trying to get pregnant  -breast-feeding  How should I use this medicine?  Take this medicine by mouth with a glass of water. Follow the directions on the prescription label. Take this medicine with food or milk. Take your doses at regular intervals. Do not take your medicine more often than directed. Do not stop taking  except on your doctor's advice.  Talk to your pediatrician regarding the use of this medicine in children. While this drug may be prescribed for selected conditions, precautions do apply.  Overdosage: If you think you have taken too much of this medicine contact a poison control center or emergency room at once.  NOTE: This medicine is only for you. Do not share this medicine with others.  What if I miss a dose?  If you miss a dose, take it as soon as you can. If it is almost time for your next dose, take only that dose. Do not take double or extra doses.  What may interact with this medicine?  -antacids containing magnesium trisilicate  -probenecid  -quinolone antibiotics like ciprofloxacin, lomefloxacin, norfloxacin and ofloxacin  -sulfinpyrazone  This list may not describe all possible interactions. Give your health care provider a list of all the medicines, herbs, non-prescription drugs, or dietary supplements you use. Also tell them if you smoke, drink alcohol, or use illegal drugs. Some items may interact with your medicine.  What should I watch for while using this medicine?  Tell your doctor or health care professional if your symptoms do not improve or if you get new symptoms. Drink several glasses of water a day. If you are taking this medicine for a long time, visit your doctor for regular checks on your progress.  If you are diabetic, you may get a false positive result for sugar in your urine with certain brands of urine tests. Check with your doctor.  What side effects may I notice from receiving this medicine?  Side effects that you should report to your doctor or health care professional as soon as possible:  -allergic reactions like skin rash or hives, swelling of the face, lips, or tongue  -chest pain  -cough  -difficulty breathing  -dizziness, drowsiness  -fever or infection  -joint aches or pains  -pale or blue-tinted skin  -redness, blistering, peeling or loosening of the skin, including inside  the mouth  -tingling, burning, pain, or numbness in hands or feet  -unusual bleeding or bruising  -unusually weak or tired  -yellowing of eyes or skin  Side effects that usually do not require medical attention (report to your doctor or health care professional if they continue or are bothersome):  -dark urine  -diarrhea  -headache  -loss of appetite  -nausea or vomiting  -temporary hair loss  This list may not describe all possible side effects. Call your doctor for medical advice about side effects. You may report side effects to FDA at 0-785-NJY-0851.  Where should I keep my medicine?  Keep out of the reach of children.  Store at room temperature between 15 and 30 degrees C (59 and 86 degrees F). Protect from light. Throw away any unused medicine after the expiration date.  NOTE: This sheet is a summary. It may not cover all possible information. If you have questions about this medicine, talk to your doctor, pharmacist, or health care provider.  © 2018 Elsevier/Gold Standard (2009-07-08 15:56:47)    Anemia, Nonspecific  Anemia is a condition in which the concentration of red blood cells or hemoglobin in the blood is below normal. Hemoglobin is a substance in red blood cells that carries oxygen to the tissues of the body. Anemia results in not enough oxygen reaching these tissues.  What are the causes?  Common causes of anemia include:  · Excessive bleeding. Bleeding may be internal or external. This includes excessive bleeding from periods (in women) or from the intestine.  · Poor nutrition.  · Chronic kidney, thyroid, and liver disease.  · Bone marrow disorders that decrease red blood cell production.  · Cancer and treatments for cancer.  · HIV, AIDS, and their treatments.  · Spleen problems that increase red blood cell destruction.  · Blood disorders.  · Excess destruction of red blood cells due to infection, medicines, and autoimmune disorders.  What are the signs or symptoms?  · Minor  weakness.  · Dizziness.  · Headache.  · Palpitations.  · Shortness of breath, especially with exercise.  · Paleness.  · Cold sensitivity.  · Indigestion.  · Nausea.  · Difficulty sleeping.  · Difficulty concentrating.  Symptoms may occur suddenly or they may develop slowly.  How is this diagnosed?  Additional blood tests are often needed. These help your health care provider determine the best treatment. Your health care provider will check your stool for blood and look for other causes of blood loss.  How is this treated?  Treatment varies depending on the cause of the anemia. Treatment can include:  · Supplements of iron, vitamin B12, or folic acid.  · Hormone medicines.  · A blood transfusion. This may be needed if blood loss is severe.  · Hospitalization. This may be needed if there is significant continual blood loss.  · Dietary changes.  · Spleen removal.  Follow these instructions at home:  Keep all follow-up appointments. It often takes many weeks to correct anemia, and having your health care provider check on your condition and your response to treatment is very important.  Get help right away if:  · You develop extreme weakness, shortness of breath, or chest pain.  · You become dizzy or have trouble concentrating.  · You develop heavy vaginal bleeding.  · You develop a rash.  · You have bloody or black, tarry stools.  · You faint.  · You vomit up blood.  · You vomit repeatedly.  · You have abdominal pain.  · You have a fever or persistent symptoms for more than 2-3 days.  · You have a fever and your symptoms suddenly get worse.  · You are dehydrated.  This information is not intended to replace advice given to you by your health care provider. Make sure you discuss any questions you have with your health care provider.  Document Released: 01/25/2006 Document Revised: 05/31/2017 Document Reviewed: 06/13/2014  [employer] Interactive Patient Education © 2017 [employer]    Depression / Suicide Risk    As you  are discharged from this [facility] facility, it is important to learn how to keep safe from harming yourself.    Recognize the warning signs:  · Abrupt changes in personality, positive or negative- including increase in energy   · Giving away possessions  · Change in eating patterns- significant weight changes-  positive or negative  · Change in sleeping patterns- unable to sleep or sleeping all the time   · Unwillingness or inability to communicate  · Depression  · Unusual sadness, discouragement and loneliness  · Talk of wanting to die  · Neglect of personal appearance   · Rebelliousness- reckless behavior  · Withdrawal from people/activities they love  · Confusion- inability to concentrate     If you or a loved one observes any of these behaviors or has concerns about self-harm, here's what you can do:  · Talk about it- your feelings and reasons for harming yourself  · Remove any means that you might use to hurt yourself (examples: pills, rope, extension cords, firearm)  · Get professional help from the community (Mental Health, Substance Abuse, psychological counseling)  · Do not be alone:Call your Safe Contact- someone whom you trust who will be there for you.  · Call your local CRISIS HOTLINE 988-1221 or 352-719-1688  · Call your local Children's Mobile Crisis Response Team [place] (793) 225-0197 or www.[employer]  · Call the toll free National Suicide Prevention Hotlines   · National Suicide Prevention Lifeline 915-092-NSXC (0137)  · National Hope Line Network 800-SUICIDE (880-8589)

## 2018-06-04 NOTE — PROGRESS NOTES
A&Ox4, denies pain with urination, voiding without difficulty, experiencing frequency but states this has decreased since admission.  Ostomy with small output, +flatus, -N/V, tolerating diet, decreased appetite but improving.

## 2018-06-04 NOTE — PROGRESS NOTES
Inpatient Anticoagulation Service Note    Date: 6/4/2018  Reason for Anticoagulation: Deep Vein Thrombosis        Hemoglobin Value: (!) 7  Hematocrit Value: (!) 23.6  Lab Platelet Value: (!) 482  Target INR: 2.0 to 3.0    INR from last 7 days     Date/Time INR Value    06/04/18 0630 (!)  3    06/03/18 0429 (!)  2.82    06/02/18 0447 (!)  2.51    06/01/18 1857 (!)  2.47        Dose from last 7 days     Date/Time Dose (mg)    06/04/18 0630  3    06/03/18 0429  3    06/02/18 1157  6    06/01/18 2100  6        Significant Interactions: Antibiotics  Bridge Therapy: No    Comments:  (Home warfarin 3 mg Tue/Thur/Sun 6 mg all other days)    Plan: Warfarin 3 mg tonight for INR 3.0. Monitor INR closely.   Education Material Provided?: No (Coumadin clinic patient)  Pharmacist suggested discharge dosing: Continue home regimen.     Don Harrington PharmD Candidate      [facility]

## 2018-06-04 NOTE — CARE PLAN
Problem: Knowledge Deficit  Goal: Knowledge of disease process/condition, treatment plan, diagnostic tests, and medications will improve  Discussed POC, treatments & discharge plan with pt, pt verbalizes understanding.     Problem: Pain Management  Goal: Pain level will decrease to patient's comfort goal  PRN Norco given - see MAR

## 2018-06-04 NOTE — CARE PLAN
Problem: Fluid Volume:  Goal: Will maintain balanced intake and output    Intervention: Monitor, educate, and encourage compliance with therapeutic intake of liquids  Tolerating oral fluids      Problem: Urinary Elimination:  Goal: Ability to reestablish a normal urinary elimination pattern will improve  Voiding without difficulty

## 2018-06-04 NOTE — DISCHARGE SUMMARY
Discharge Summary    CHIEF COMPLAINT ON ADMISSION  Chief Complaint   Patient presents with   • Anxiety   • Abdominal Pain       Reason for Admission  Abdominal pain    Admission Date  6/1/2018    CODE STATUS  Prior    HPI & HOSPITAL COURSE  This is a 62 y.o. female here with untreatable metastatic colon cancer here wiht abdominal pain. 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 She was treated with IV abx and improved through her hospital course. Cultures only grew diphtheroids consistent with skin lucy. She has no current symptoms of a fistula. Given her recurrent issues she will be placed onto trial of prophylactic macrodantin. She plans on going into a clinical trial at [facility] for her cancer. Oncology here has recommended hospice. We discussed code status on this admission and she has elected to be DNR.        Therefore, she is discharged in good and stable condition to home with close outpatient follow-up.    The patient met 2-midnight criteria for an inpatient stay at the time of discharge.    Discharge Date  6/4/2018    FOLLOW UP ITEMS POST DISCHARGE  none    DISCHARGE DIAGNOSES  Principal Problem:    UTI (urinary tract infection) POA: Unknown  Active Problems:    Sepsis (HCC) POA: Yes      Overview: 4/17 - Zosyn and vancomycin initiated.      4/21 - Antibiotic day 5, Zosyn changed to Unasyn, vancomycin discontinued    Adenocarcinoma of colon metastatic to liver (HCC) POA: Yes      Overview: She has had 12 + 12 chemotherapies, the last was on 8/10/17    Deep vein thrombosis (DVT) of both lower extremities (HCC) POA: Yes      Overview: Jacquelineois update 10/1/2016    Colostomy in place (HCC) POA: Yes  Resolved Problems:    * No resolved hospital problems. *      FOLLOW UP  Future Appointments  Date Time Provider Department Center   6/6/2018 11:00 AM [facility] EXAM 1 [facility] None   6/7/2018 3:00 PM MIKAELA Herring [address]   6/13/2018 1:40  PM MONTSE Santamaria SHIL[facility] [place]   6/21/2018 1:00 PM Surjit Resendez R.N. PW[facility] 2nd [facility]     Maritza Esparza M.D.  [address]  [address]  [place] [address]  [phone]            MEDICATIONS ON DISCHARGE     Medication List      START taking these medications      Instructions   nitrofurantoin 50 MG Caps  Commonly known as:  MACRODANTIN   Take 2 Caps by mouth every day for 30 days.  Dose:  100 mg        CONTINUE taking these medications      Instructions   acetaminophen 500 MG Tabs  Commonly known as:  TYLENOL   Take 500-1,000 mg by mouth as needed.  Dose:  500-1000 mg     ferrous sulfate 325 (65 Fe) MG EC tablet   Take 1 Tab by mouth 2 Times a Day.  Dose:  325 mg     furosemide 20 MG Tabs  Commonly known as:  LASIX   Take 0.5 Tabs by mouth 1 time daily as needed.  Dose:  10 mg     levoFLOXacin 500 MG tablet  Commonly known as:  LEVAQUIN   Take 1 Tab by mouth every day.  Dose:  500 mg     losartan 25 MG Tabs  Commonly known as:  COZAAR   Take 1 Tab by mouth every day.  Dose:  25 mg     phenazopyridine 200 MG Tabs  Commonly known as:  PYRIDIUM   Take 1 Tab by mouth 3 times a day as needed (for painful urination).  Dose:  200 mg     STIVARGA 40 MG Tabs  Generic drug:  Regorafenib   Take  by mouth.     vitamin D (Ergocalciferol) 99266 units Caps capsule  Commonly known as:  DRISDOL   Take 50,000 Units by mouth.  Dose:  18303 Units     vitamin D 1000 UNIT Tabs  Commonly known as:  cholecalciferol   Take 1,000 Units by mouth every day.  Dose:  1000 Units     warfarin 6 MG Tabs  Commonly known as:  COUMADIN   Take 3-6 mg by mouth every evening. 3 mg on Sun, Tues, Thur 6 mg all other days [facility]  Dose:  3-6 mg            Allergies  No Known Allergies    DIET  No orders of the defined types were placed in this encounter.      ACTIVITY  As tolerated.  Weight bearing as tolerated    CONSULTATIONS  none    PROCEDURES  none    LABORATORY  Lab Results   Component Value Date    SODIUM  133 (L) 06/04/2018    POTASSIUM 3.8 06/04/2018    CHLORIDE 105 06/04/2018    CO2 22 06/04/2018    GLUCOSE 141 (H) 06/04/2018    BUN 8 06/04/2018    CREATININE 0.61 06/04/2018        Lab Results   Component Value Date    WBC 8.1 06/04/2018    HEMOGLOBIN 7.0 (L) 06/04/2018    HEMATOCRIT 23.6 (L) 06/04/2018    PLATELETCT 482 (H) 06/04/2018        Total time of the discharge process exceeds 32 minutes.

## 2018-06-06 NOTE — PROGRESS NOTES
Anticoagulation Summary  As of 6/6/2018    INR goal:   2.0-3.0   TTR:   46.4 % (2 y)   Today's INR:   5.3!   Warfarin maintenance plan:   3 mg (6 mg x 0.5) on Sun, Tue, Thu; 6 mg (6 mg x 1) all other days   Weekly warfarin total:   33 mg   Plan last modified:   ASTRID ShinPINES (5/17/2018)   Next INR check:   6/11/2018   Target end date:   Indefinite    Indications    Deep vein thrombosis (DVT) of both lower extremities (HCC) [I82.403]  Chronic anticoagulation [Z79.01]             Anticoagulation Episode Summary     INR check location:       Preferred lab:       Send INR reminders to:       Comments:         Anticoagulation Care Providers     Provider Role Specialty Phone number    Yimi Martinez M.D. Referring Surgery [phone]    Cesar SchofieldD Responsible      [facility] Responsible  [phone]        Anticoagulation Patient Findings      HPI:  Tamara Flynn seen in clinic today for follow up on anticoagulation therapy in the presence of DVT hx. Hospitalized over the weekend with UTI. Cultures neg. Was on IV Zosyn. Prescribed Levaquin but pt never started due to being admitted. Currently taking nitrofurantoin. Poor appetite. No current symptoms of bleeding or thrombosis reported.    A/P:   INR supratherapeutic. HOLD two doses the resume previous regimen.     Follow up appointment on Monday.    Next Appointment: Monday, June 11, 2018 at 11:00 am.    Re HUITRON

## 2018-06-11 NOTE — PROGRESS NOTES
Anticoagulation Summary  As of 6/11/2018    INR goal:   2.0-3.0   TTR:   46.0 % (2 y)   Today's INR:   3.0   Warfarin maintenance plan:   6 mg (6 mg x 1) on Mon, Wed, Fri; 3 mg (6 mg x 0.5) all other days   Weekly warfarin total:   30 mg   Plan last modified:   ASTRID ShinPINES (6/11/2018)   Next INR check:      Target end date:   Indefinite    Indications    Deep vein thrombosis (DVT) of both lower extremities (HCC) [I82.403]  Chronic anticoagulation [Z79.01]             Anticoagulation Episode Summary     INR check location:       Preferred lab:       Send INR reminders to:       Comments:         Anticoagulation Care Providers     Provider Role Specialty Phone number    Yimi Martinez M.D. Referring Surgery [phone]    Cesar SchofieldD Responsible      [facility] Responsible  [phone]        Anticoagulation Patient Findings      HPI:  Tamara Flynn seen in clinic today for follow up on anticoagulation therapy in the presence of DVT. Denies any changes to current medical/health status since last appointment. Denies any medication or diet changes. No current symptoms of bleeding or thrombosis reported.    A/P:   INR therapeutic. INR down from 5.3 last week. Will decrease regimen.     Follow up appointment in 1 week(s).    Next Appointment: Monday, June 18, 2018 at 11:00 am.     Re HUITRON

## 2018-06-18 NOTE — PROGRESS NOTES
Anticoagulation Summary  As of 6/18/2018    INR goal:   2.0-3.0   TTR:   45.6 % (2 y)   Today's INR:   4.2!   Warfarin maintenance plan:   6 mg (6 mg x 1) on Mon, Fri; 3 mg (6 mg x 0.5) all other days   Weekly warfarin total:   27 mg   Plan last modified:   ASTRID ShinPINES (6/18/2018)   Next INR check:      Target end date:   Indefinite    Indications    Deep vein thrombosis (DVT) of both lower extremities (HCC) [I82.403]  Chronic anticoagulation [Z79.01]             Anticoagulation Episode Summary     INR check location:       Preferred lab:       Send INR reminders to:       Comments:         Anticoagulation Care Providers     Provider Role Specialty Phone number    Yimi Martinez M.D. Referring Surgery [phone]    Cesar SchofieldD Responsible      [facility] Responsible  [phone]        Anticoagulation Patient Findings      HPI:  Tamara Flynn seen in clinic today for follow up on anticoagulation therapy in the presence of DVT hx. Today is day 6/10 of Levaquin for UTI. Continues to poor appetite. No current symptoms of bleeding or thrombosis reported.    A/P:   INR supratherapeutic. HOLD tonight and decrease regimen.    Follow up appointment in 1 week(s).    Next Appointment: Wednesday, June 27, 2018 at 11:45 am.     Re HUITRON

## 2018-06-21 PROBLEM — N39.0 UTI (URINARY TRACT INFECTION): Status: RESOLVED | Noted: 2018-01-01 | Resolved: 2018-01-01

## 2018-06-21 NOTE — WOUND TEAM
"[facility]   Recertification  05/5/18 - 08/05/2018  Referring Doctor: Yimi Martinez MD  Primary Doctor:   Surgeon: Yimi Martinez MD  Date of Evaluation: 2/5/18  Start of Care Date: 2/5/18      Surgical Procedure:  4/17/2016 Small bowel resection, sigmoid colectomy, colostomy    Discharge: 5/4/16    Current medical Status: Pt has stage 4 colon CA. She has a hepatic mass and a bladder mass, both of which are enlarging, even with chemotherapy. Dr. Martinez does not have anymore surgeries planned. Pt was treated for peristomal wound at [facility], but then insurance changed and now she is coming here to [facility]  5/15/2018 Pt will be starting clinical trial in [place] in coming weeks. Pt very happy about that.   06/21/2018 - pt is still awaiting admission into clinical trial in [place].    Pain: - pt denies pain    Past Medical Status:     Past Medical History:   Diagnosis Date   • Blood clotting disorder (HCC) 4-    left leg   • Blood loss anemia 2016   • Bowel habit changes     has colostomy   • Cancer (HCC) 2016    liver, bladder, colon   • Essential hypertension    • Obesity    • Urinary bladder disorder     1/2 bladder due to cancer surgery     Past Medical History/Medications: Pt states the stivarga was dc'd about 10 days ago by oncologist    Allergies: Patient has no known allergies.    Hospital discharge Date:   Peristomal Wound Etiology: Unknown. Possible PG (03/15/2018 - Neoplasm r/o by neg punch biopsy x 2)     ICD-9: 569.62/K94.03 Colostomy dysfunction    Ostomy Evaluation:     Type of Stoma: Colostomy  Location of the stoma: LLQ  Shape of the stoma: Almost round  Color of the stoma: Red  Size of the stoma: 35mm x 42mm  Mucocutaneous junction: intact        The stoma protrudes  <1\"   The lumen is located: near center  Skin indentations, creases or scar tissue: peristomal wound very close to stoma, medially  Peristomal Skin problems: Wound resolved today            Treatment: - Coloplast " appliance, framed with Brava strips  Effluent/flatus: Darn brown (pt taking iron), pasty  Nutritional status: Appears well nourished.  Empties and rinses the pouch when 1/2 full of stool: yes  Previous or present pouching system: Coloplast SenSuraMio CTF two piece.           Evaluation for education and training with the new pouch system: We will continue with pouching system that is working for pt. Wound resolved today 06/21/2018. She will cx any further scheduled appts and call for appt prn for problems       Provided literature for the patient with regard to Colostomy, Ileostomy, Urostomy and Other___[facility]_____________.   Clinician's Signature:---------------------------------------------------------- Date:____________   Physician's signature:____________________________________Date:____________

## 2018-06-21 NOTE — PROGRESS NOTES
CHIEF COMPLAINT  Chief Complaint   Patient presents with   • Hospital Follow-up     Still Not Feeling Well   • Results     Lab results     HPI  Patient is a 62 y.o. female patient who presents today for the following     Hospital discharge follow-up, sepsis, colon cancer, anemia  SOB, Fatigue, urinary frequency  The patient was hospitalized from 6/1/18 to 6/4/18, discharge summary was reviewed with the following:  DISCHARGE DIAGNOSES  Principal Problem:  UTI (urinary tract infection) POA: Unknown  Active Problems:  Sepsis (HCC) POA: Yes  Overview: 4/17 - Zosyn and vancomycin initiated.  4/21 - Antibiotic day 5, Zosyn changed to Unasyn, vancomycin discontinued  Adenocarcinoma of colon metastatic to liver (HCC) POA: Yes  Overview: She has had 12 + 12 chemotherapies, the last was on 8/10/17  Deep vein thrombosis (DVT) of both lower extremities (HCC) POA: Yes  Overview: Diagnois update 10/1/2016  Colostomy in place ([facility]) POA: Yes    HPI & HOSPITAL COURSE  This is a 62 y.o. female here with untreatable metastatic colon cancer here wiht abdominal pain. 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 She was treated with IV abx and improved through her hospital course. Cultures only grew diphtheroids consistent with skin lucy. She has no current symptoms of a fistula. Given her recurrent issues she will be placed onto trial of prophylactic macrodantin. She plans on going into a clinical trial at [facility] for her cancer. Oncology here has recommended hospice. We discussed code status on this admission and she has elected to be DNR.      Posthospitalization course  After the discharge, the patient remained to be short of breath, with fatigue.  Also complains of urinary frequency.    She has not had fever, chills.  Continued to have pain in the lower abdomen.    Adenocarcinoma of sigmoid colon with liver meta / colostomy   DVT due to cancer, Chronic  anticoagulation  Lower abdominal pain  Anorexia and fatigue  Nausea   Interval course:  -Became short of breath, increased urinary frequency, fatigue, and      1) LBP and lower abdominal pain  -The pain is moderate to severe  -Abdominal  -Without radiation  -She did not use any medication  2) fatigue  3) decreased appetite with weight loss     The last PET scan, 2/3/18              - liver meta;               - left hypogastric and external iliac adenopathy is unchanged              - possible meta anterior to antrum              - mediastinal LAD w/o increased activity  - CEA was elevated 403.8 in 3/2018, was at 262 on 2/26/18.  - DVT related to cancer in 4/2016r,    - on Coumadin, follow-up by Coumadin clinic f/u.     Previous treatment: Regorafenib (STIVARGA PO); f/u by [facility]    Reviewed PMH, PSH, FH, SH, ALL, HCM/IMM, no changes  Reviewed MEDS, no changes    Patient Active Problem List    Diagnosis Date Noted   • UTI (urinary tract infection) 06/01/2018     Priority: High   • Adenocarcinoma of colon metastatic to liver (HCC) 04/20/2016     Priority: High   • Sepsis (HCC) 04/17/2016     Priority: High   • Family history of primary TB 04/20/2016     Priority: Low   • IFG (impaired fasting glucose) 04/19/2018   • Chronic anticoagulation 04/03/2018   • Colostomy in place (HCC) 02/17/2017   • 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 40 Tab 1   • oxycodone (OXY-IR) 15 MG immediate release tablet Take 1 Tab by mouth every 8 hours as needed for Severe Pain for up  to 30 days. 30 Tab 0   • Nutritional Supplements (ENSURE HIGH PROTEIN) Liquid Take 1 Bottle by mouth 2 Times a Day. 60 Can 11   • ciprofloxacin (CIPRO) 500 MG Tab      • warfarin (COUMADIN) 6 MG Tab Take 0.5-1 Tabs by mouth every evening. 90 Tab 1   • nitrofurantoin (MACRODANTIN) 50 MG Cap Take 2 Caps by mouth every day for 30 days. 60 Cap 0   • ferrous sulfate 325 (65 Fe) MG EC tablet Take 1 Tab by mouth 2 Times a Day. 180 Tab 1   • losartan (COZAAR) 25 MG Tab Take 1 Tab by mouth every day. 90 Tab 0   • vitamin D, Ergocalciferol, (DRISDOL) 31587 units Cap capsule Take 50,000 Units by mouth.     • furosemide (LASIX) 20 MG Tab Take 0.5 Tabs by mouth 1 time daily as needed. 45 Tab 0   • vitamin D (CHOLECALCIFEROL) 1000 UNIT Tab Take 1,000 Units by mouth every day.     • acetaminophen (TYLENOL) 500 MG Tab Take 500-1,000 mg by mouth as needed.       No current facility-administered medications for this visit.      ALLERGIES  Allergies: Patient has no known allergies.  PAST MEDICAL HISTORY  Past Medical History:   Diagnosis Date   • Blood clotting disorder (HCC) 4-    left leg   • Cancer (HCC) 2016    liver, bladder, colon   • Blood loss anemia 2016   • Bowel habit changes     has colostomy   • Essential hypertension    • Obesity    • Urinary bladder disorder     1/2 bladder due to cancer surgery     SURGICAL HISTORY  She  has a past surgical history that includes pr excision of tonsil tags; cystoscopy (3/17/2016); trans urethral resection bladder (3/17/2016); colonoscopy - endo (N/A, 3/22/2016); recovery (3/21/2016); exploratory laparotomy (4/17/2016); exploratory laparotomy (4/18/2016); cystectomy (4/18/2016); colostomy (Left, 4/19/2016); and cath placement (Left, 8/9/2016).  SOCIAL HISTORY  Social History   Substance Use Topics   • Smoking status: Never Smoker   • Smokeless tobacco: Never Used   • Alcohol use No     Social History     Social History Narrative    Lives with .     Children: no    Work:  " [employer]     FAMILY HISTORY  Family History   Problem Relation Age of Onset   • Diabetes Mother      prediabetes   • Heart Disease Mother    • Hypertension Mother    • Stroke Maternal Grandmother    • Cancer Neg Hx    • Hyperlipidemia Neg Hx    • Psychiatry Neg Hx    • Thyroid Neg Hx      Family Status   Relation Status   • Mother    • Maternal Grandmother    • Neg Hx      ROS   Constitutional: Negative for fever, chills.  HENT: Negative for congestion, sore throat.  Eyes: Negative for blurred vision.   Respiratory: As above  Cardiovascular: Negative for chest pain, palpitations.   Gastrointestinal: As above.  Genitourinary: Negative for dysuria.  Musculoskeletal: As above.  Skin: Negative for rash and itching.   Neuro: Negative for dizziness, weakness and headaches.   Endo/Heme/Allergies: Does not bruise/bleed easily.   Psychiatric/Behavioral: Negative for depression, anxiety    PHYSICAL EXAM   Blood pressure 122/84, pulse (!) 116, temperature 37.2 °C (98.9 °F), height 1.549 m (5' 1\"), weight 101.9 kg (224 lb 10.4 oz), SpO2 95 %. Body mass index is 42.45 kg/m².  General:  NAD, well appearing  HEENT:   NC/AT, PERRLA, EOMI, TMs are clear. Oropharyngeal mucosa is pink,  without lesions;  no cervical / supraclavicular  lymphadenopathy, no thyromegaly.    Cardiovascular: RRR.   No m/r/g. No carotid bruits .      Lungs:   CTAB, no w/r/r, no respiratory distress.  Abdomen: Soft, NT/ND + BS; no suprapubic tenderness; no masses or hepatosplenomegaly.  Extremities:  2+ DP and radial pulses bilaterally.  No c/c/e.   Skin:  Warm, dry.  No erythema. No rash.   Neurologic: Alert & oriented x 3. CN II-XII grossly intact. Brachioradialis / knee DTR are 2/4, symmetric. Strength and sensation grossly intact.  No focal deficits.  Psychiatric:  Affect normal, mood normal, judgment normal.    LABS     Labs are reviewed and discussed with a patient  Lab Results   Component Value Date/Time    TRIGLYCERIDE 146 " 04/20/2016 04:15 AM       Lab Results   Component Value Date/Time    SODIUM 135 06/15/2018 10:43 AM    POTASSIUM 4.2 06/15/2018 10:43 AM    CHLORIDE 102 06/15/2018 10:43 AM    CO2 19 (L) 06/15/2018 10:43 AM    GLUCOSE 133 (H) 06/15/2018 10:43 AM    BUN 22 06/15/2018 10:43 AM    CREATININE 0.66 06/15/2018 10:43 AM     Lab Results   Component Value Date/Time    ALKPHOSPHAT 176 (H) 06/15/2018 10:43 AM    ASTSGOT 20 06/15/2018 10:43 AM    ALTSGPT 12 06/15/2018 10:43 AM    TBILIRUBIN 0.4 06/15/2018 10:43 AM      Lab Results   Component Value Date/Time    HBA1C 6.1 08/24/2017 12:01 PM    HBA1C 6.0 05/18/2017    HBA1C 6.1 02/07/2017     No results found for: TSH  No results found for: FREET4    Lab Results   Component Value Date/Time    WBC 7.6 06/15/2018 10:43 AM    RBC 3.10 (L) 06/15/2018 10:43 AM    HEMOGLOBIN 7.8 (L) 06/15/2018 10:43 AM    HEMATOCRIT 27.2 (L) 06/15/2018 10:43 AM    MCV 87.7 06/15/2018 10:43 AM    MCH 25.2 (L) 06/15/2018 10:43 AM    MCHC 28.7 (L) 06/15/2018 10:43 AM    MPV 8.9 (L) 06/15/2018 10:43 AM    NEUTSPOLYS 72.70 (H) 06/15/2018 10:43 AM    LYMPHOCYTES 15.00 (L) 06/15/2018 10:43 AM    MONOCYTES 8.90 06/15/2018 10:43 AM    EOSINOPHILS 0.50 06/15/2018 10:43 AM    BASOPHILS 0.50 06/15/2018 10:43 AM    HYPOCHROMIA 1+ 06/15/2018 10:43 AM    ANISOCYTOSIS 1+ 06/15/2018 10:43 AM        IMAGING     Chest x-ray from today, by my interpretation and consistent with radiology:  1.  Worsening of mediastinal adenopathy.    ASSESMENT AND PLAN        1. Hospital discharge follow-up  The patient continued with worsening symptoms due to advanced colon cancer.  Advised to continue with Ensure.  Follow-up labs.    2. Sepsis, due to unspecified organism (HCC)  Resolved    3. Adenocarcinoma of colon metastatic to liver (HCC)  She is given referral for oncology for second opinion  Given pain medication due to worsening pain.  She is waking acceptance for new colon cancer trial.    - COMP METABOLIC PANEL; Future  -  REFERRAL TO HEMATOLOGY ONCOLOGY Referral to? [facility]  - oxycodone (OXY-IR) 15 MG immediate release tablet; Take 1 Tab by mouth every 8 hours as needed for Severe Pain for up to 30 days.  Dispense: 30 Tab; Refill: 0  - Nutritional Supplements (ENSURE HIGH PROTEIN) Liquid; Take 1 Bottle by mouth 2 Times a Day.  Dispense: 60 Can; Refill: 11    4. Anemia, unspecified type  Stable, follow-up labs  - CBC WITH DIFFERENTIAL; Future  - IRON/TOTAL IRON BIND; Future    5. Urinary frequency  - URINALYSIS,CULTURE IF INDICATED; Future    6. Nausea  - Nutritional Supplements (ENSURE HIGH PROTEIN) Liquid; Take 1 Bottle by mouth 2 Times a Day.  Dispense: 60 Can; Refill: 11    7. SOB (shortness of breath)  Chest x-ray showed worsening perihilar lymphadenopathy  - DX-CHEST-2 VIEWS; Future    8. Deep vein thrombosis (DVT) of both lower extremities, unspecified chronicity, unspecified vein (HCC)  9. Chronic anticoagulation  Continue current treatment.    10. Anorexia  - Nutritional Supplements (ENSURE HIGH PROTEIN) Liquid; Take 1 Bottle by mouth 2 Times a Day.  Dispense: 60 Can; Refill: 11    Counseling:   - Smoking:  Nonsmoker    Followup: Return in about 4 weeks (around 7/19/2018), or if symptoms worsen or fail to improve.    All questions are answered.    High complexity.   Time spent 40 minutes face to face, with > 50% spent counseling and coordinating care.    Please note that this dictation was created using voice recognition software, and that there might be errors of butch and possibly content.

## 2018-06-27 NOTE — PROGRESS NOTES
Anticoagulation Summary  As of 6/27/2018    INR goal:   2.0-3.0   TTR:   45.1 % (2.1 y)   Today's INR:   3.2!   Warfarin maintenance plan:   3 mg (6 mg x 0.5) every day   Weekly warfarin total:   21 mg   Plan last modified:   DEBBI Shin (6/27/2018)   Next INR check:   7/9/2018   Target end date:   Indefinite    Indications    Deep vein thrombosis (DVT) of both lower extremities (HCC) [I82.403]  Chronic anticoagulation [Z79.01]             Anticoagulation Episode Summary     INR check location:       Preferred lab:       Send INR reminders to:       Comments:         Anticoagulation Care Providers     Provider Role Specialty Phone number    Yimi Martinez M.D. Referring Surgery [phone]    Cesar SchofieldD Responsible      [facility] Responsible  [phone]        Anticoagulation Patient Findings      HPI:  Tamara Flynn seen in clinic today for follow up on anticoagulation therapy in the presence of DVT. Denies any changes to current medical/health status since last appointment. Started a new antibiotic but she doesn't remember the name. She will let me know when she gets home. Continues to have a poor appetite. No current symptoms of bleeding or thrombosis reported.    A/P:   INR supratherapeutic. Will decrease regimen.     Follow up appointment in 1 week(s).    Next Appointment: Monday, July 9, 2018 at 10:45 am.      Re HUITRON

## 2018-06-28 NOTE — TELEPHONE ENCOUNTER
Pt started 7 day course of Linezolid on Tuesday for UTI. Appetite still decreased and INR supratherapeutic lately. Instructed to HOLD tonight's dose and Sunday's doses of warfarin but 3 mg all other days. Will f/u with pt on 7/9/18.    Re HUITRON

## 2018-07-09 NOTE — PROGRESS NOTES
Consult Note: Oncology    Date of consultation: 7/9/2018 3:51 PM    Referring provider: Maritza Esparza    Reason for consultation: Refractory Metastatic colon carcinoma. FLT3 mutated KRAS wild type ,HERNÁN      History of presenting illness:     Dear Maritza,  Thank you very much for allowing me to see  Tamara Flynn today . As you know she  is a 62 y.o. year old female with the following history of metastatic colon carcinoma     3/2016-Widely metastatic sigmoid colon cancer with invasion into the bladder resulting in colovesicle fistula and liver mets s/p bladder and sigmoid biopsies consistent with adenocarcinoma of the colon. CEA elevated    - emergent surgery  TUR 3/17/16 removal of large mass showed metastatic adenocarcinoma, [facility] could not differentiate bladder versus colon .  s/p Exploratory laparotomy with small bowel resection and sigmoid colectomy, cystectomy on 4/17/16 showed 5.0 cm lowgrade adenocarcinoma, margins uninvolved, lymphovascular invasion present, extensive, 11/13 lymph nodes positive, tumor directly invading the adjacent structures bladder and soft tissues, [id number]     8/15/16 - started FOLFOX chemotherapy under Dr Carvajal.  CT Scan on 12/20/16 showed interval enlargement of the peritoneal implant anterior to the gastric antrum. Enlarging left external iliac and the pelvic sidewall adenopathy. Improvement in hepatic metastasis.  PET/CT scan on 2/3/17 liver masses in segment 5 and 6 are identified measuring up to 1 cm and 4 cm with activity of 9.4. Left hypogastric and external iliac adenopathy is unchanged with an SUV 7.6 largest 3.3 cm . Ovalshaped soft tissue mass anterior to the antrum of the stomach was identified measuring 2.5×2.0 cm with an SUV of 4.5. Mildly enlarged lymph nodes in the mediastinum are noted .      CTguided peritoneal biopsy on 1/25/17 showed metastatic adenocarcinoma in the fibroadipose tissue  3/2017 FOLFIRI and Vectibix -required dose modification       CT scan on 5/24/17 showed response to treatment, also CEA declining   .  Foundation analysis-        CT scan 8/18/17 showed slight enlargement of the liver lesion and calcified mass in the urinary bladder also increased . CEA plateauted  CT scan on 12/3/17 showed disease progression.   Discussed with Dr. Tomas Partida at [facility]  No clinical trials available for this patient. -Started Regorafenib 160 mg   -CT scan on 4/9/18 showed disease progression Stopped Regorafenib on 4/16/18.  -Further discussion regarding Lonsurf vs hospice.  -4/16/18-hospice recommended.    -She never enrolled . Looked for trial options .Not eligible due to anemia.  -Continues to have recurrent UTIs ,seeing urology.  Wants to reconsider treatment options. Never received Lonsurf. . She has trouble with the stomal hernia.   -Denies significant abdominal pain. She has severe fatigue. Denies having any significant bleeding.      Past Medical History:    Past Medical History:   Diagnosis Date   • Blood clotting disorder (HCC) 4-    left leg   • Blood loss anemia 2016   • Bowel habit changes     has colostomy   • Cancer (HCC) 2016    liver, bladder, colon   • Essential hypertension    • Obesity    • Urinary bladder disorder     1/2 bladder due to cancer surgery       Past surgical history:    Past Surgical History:   Procedure Laterality Date   • CATH PLACEMENT Left 8/9/2016    Procedure: CATH PLACEMENT power port ;  Surgeon: Yimi Martinez M.D.;  Location: [facility];  Service:    • COLOSTOMY Left 4/19/2016    Procedure: COLOSTOMY;  Surgeon: Yimi Martinez M.D.;  Location: [facility];  Service:    • EXPLORATORY LAPAROTOMY  4/18/2016    Procedure: EXPLORATORY LAPAROTOMY;  Surgeon: Yimi Martinez M.D.;  Location: [facility];  Service:    • CYSTECTOMY  4/18/2016    Procedure: CYSTECTOMY;  Surgeon: Yunior Abdullahi M.D.;  Location: [facility];  Service:    • EXPLORATORY LAPAROTOMY   4/17/2016    Procedure: EXPLORATORY LAPAROTOMY-illeostomy creation ;  Surgeon: Yimi Martinez M.D.;  Location: SURGERY [facility];  Service:    • COLONOSCOPY - ENDO N/A 3/22/2016    Procedure: COLONOSCOPY - ENDO;  Surgeon: Yimi Martinez M.D.;  Location: ENDOSCOPY [facility];  Service:    • RECOVERY  3/21/2016    Procedure: CT-CT Guided liver biopsy-Dr.Michael Martinez;  Surgeon: Recoveryonly Surgery;  Location: SURGERY PRE-POST PROC UNIT [facility];  Service:    • CYSTOSCOPY  3/17/2016    Procedure: CYSTOSCOPY;  Surgeon: Yunior Abdullahi M.D.;  Location: SURGERY [facility];  Service:    • TRANS URETHRAL RESECTION BLADDER  3/17/2016    Procedure: TRANS URETHRAL RESECTION BLADDER Tumor;  Surgeon: Yunior Abdullahi M.D.;  Location: SURGERY [facility];  Service:    • PB EXCISION OF TONSIL TAGS         Allergies:  Patient has no known allergies.    Medications:    Current Outpatient Prescriptions   Medication Sig Dispense Refill   • ondansetron (ZOFRAN) 4 MG Tab tablet Take 1 Tab by mouth every four hours as needed for Nausea/Vomiting. 40 Tab 1   • oxycodone (OXY-IR) 15 MG immediate release tablet Take 1 Tab by mouth every 8 hours as needed for Severe Pain for up to 30 days. 30 Tab 0   • Nutritional Supplements (ENSURE HIGH PROTEIN) Liquid Take 1 Bottle by mouth 2 Times a Day. 60 Can 11   • ciprofloxacin (CIPRO) 500 MG Tab      • warfarin (COUMADIN) 6 MG Tab Take 0.5-1 Tabs by mouth every evening. 90 Tab 1   • ferrous sulfate 325 (65 Fe) MG EC tablet Take 1 Tab by mouth 2 Times a Day. 180 Tab 1   • losartan (COZAAR) 25 MG Tab Take 1 Tab by mouth every day. 90 Tab 0   • vitamin D, Ergocalciferol, (DRISDOL) 64282 units Cap capsule Take 50,000 Units by mouth.     • furosemide (LASIX) 20 MG Tab Take 0.5 Tabs by mouth 1 time daily as needed. 45 Tab 0   • vitamin D (CHOLECALCIFEROL) 1000 UNIT Tab Take 1,000 Units by mouth every day.     • acetaminophen (TYLENOL) 500 MG Tab Take 500-1,000 mg by mouth as needed.   "     No current facility-administered medications for this visit.        Social History:     Social History     Social History   • Marital status:      Spouse name: N/A   • Number of children: N/A   • Years of education: N/A     Occupational History   • Not on file.     Social History Main Topics   • Smoking status: Never Smoker   • Smokeless tobacco: Never Used   • Alcohol use No   • Drug use: No   • Sexual activity: Yes     Partners: Male     Other Topics Concern   • Not on file     Social History Narrative    Lives with .     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       Family History:     Family History   Problem Relation Age of Onset   • Diabetes Mother      prediabetes   • Heart Disease Mother    • Hypertension Mother    • Stroke Maternal Grandmother    • Cancer Neg Hx    • Hyperlipidemia Neg Hx    • Psychiatry Neg Hx    • Thyroid Neg Hx        Review of Systems:  All other review of systems are negative except what was mentioned above in the HPI.    Constitutional: No fever, chills, weight loss , positive malaise/fatigue.    HEENT: No new auditory or visual complaints. No sore throat and neck pain.     Respiratory:No new cough, sputum production, positive for exertional shortness of breath and wheezing.    Cardiovascular: No new chest pain, palpitations, orthopnea and leg swelling.    Gastrointestinal: No heartburn, nausea, vomiting , occasional abdominal pain, hematochezia or melena     Genitourinary: PTT 30 dysuria, hematuria    Musculoskeletal: No new arthralgias or myalgias   Skin: Negative for rash and itching.    Neurological: Negative for focal weakness or headaches.    Endo/Heme/Allergies: No abnormal bleed/bruise.    Psychiatric/Behavioral: No new depression/anxiety.    Physical Exam:  Vitals:   /64   Pulse (!) 110   Temp 37.2 °C (98.9 °F)   Resp 16   Ht 1.549 m (5' 1\")   Wt 99.8 kg (220 lb 0.3 oz)   SpO2 96%   BMI 41.57 kg/m²      General: Not in acute " distress, alert and oriented x 3  HEENT: No pallor, icterus. Oropharynx clear.   Neck: Supple, no palpable masses.  Lymph nodes: No palpable cervical, supraclavicular, axillary or inguinal lymphadenopathy.    CVS: regular rate and rhythm, no rubs or gallops  RESP: Clear to auscultate bilaterally, no wheezing or crackles.   ABD: Obese. Ostomy in place with some parasternal hernia  EXT: No edema or cyanosis  CNS: Alert and oriented x3, No focal deficits.  Skin- No rash      Labs:   No results for input(s): RBC, HEMOGLOBIN, HEMATOCRIT, PLATELETCT, PROTHROMBTM, APTT, INR, IRON, FERRITIN, TOTIRONBC in the last 72 hours.  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    NGS testing. Interestingly show FLT3 mutation which is not a very common mutation. There are isolated case reports of response to Sutent and sunitinib.    Hospice was recommended around 3 months ago. She wants to readdress it.. She never filled Lonsurf. . She is aware of the fact that this probably has disappointing efficacy.    Long discussion today reviewing her clinical course. Informed her that we will obtain a restaging CT scan to assess the disease burden. We will also repeat her tumor marker studies.  -Informed her that hospice is a very appropriate decision as realistically her chance of having treatment response is low.  -Not a candidate for Avastin-based treatment due to presence of fistula  -Not a candidate for immunotherapy as MSI-s  ,Given her age and willingness to try more treatment, may be worthwhile trying  Lonsurf with the understanding that most probably this will not be clinically beneficial.  -We will make arrangements for Lonsurf . We will dose her at 60 mg twice a day. Main adverse effects include neutropenia/febrile neutropenia. She has high risk of infection, especially given her colovesical fistula  -FLT3 mutation seen in the NGS testing is probably a passenger mutation, especially given that she did not have any response to Regorafenib , which has some component of FLT3 per inhibition.  -Overall, her prognosis is very poor and she understands that. 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  -Severe Iron deficiency anemia-unclear etiology. Unclear whether she is losing some blood from her ostomy.. We will recheck her CBC and arrange PRBC or venofer depending on the iron panel results. Thismay also help with quality of life.    Complex patient requiring complex decision making. I have extensively reviewed her prior medical records as part of establishing care with me and formulated the plan., More than 75 minutes spent today with the patient face-to-face reviewing all of the above and discussing treatment details.      She agreed and verbalized her agreement and understanding with the current plan.  I answered all questions and concerns she has at this time.              Thank you for allowing me to participate in her care.    Please note that this dictation was created using voice recognition software. I have made every reasonable attempt to correct obvious errors, but I expect that there are errors of grammar and possibly content that I did not discover before finalizing the note.      SIGNATURES:  Charles Pelaez    CC:  Maritza Esparza M.D.  Maritza Esparza, *

## 2018-07-12 NOTE — TELEPHONE ENCOUNTER
Patient called regarding an upcoming appointment for iron which she stated Dr. Pelaez ordered for her. She would like to request a phone call as soon as possible.

## 2018-07-12 NOTE — PROGRESS NOTES
Anticoagulation Summary  As of 7/12/2018    INR goal:   2.0-3.0   TTR:   45.4 % (2.1 y)   Today's INR:   2.7   Warfarin maintenance plan:   3 mg (6 mg x 0.5) every day   Weekly warfarin total:   21 mg   Plan last modified:   DEBBI Shin (6/27/2018)   Next INR check:   7/26/2018   Target end date:   Indefinite    Indications    Deep vein thrombosis (DVT) of both lower extremities (HCC) [I82.403]  Chronic anticoagulation [Z79.01]             Anticoagulation Episode Summary     INR check location:       Preferred lab:       Send INR reminders to:       Comments:         Anticoagulation Care Providers     Provider Role Specialty Phone number    Yimi Martinez M.D. Referring Surgery [phone]    Cesar SchofieldD Responsible      [facility] Responsible  [phone]        Anticoagulation Patient Findings      HPI:  Tamara Flynn seen in clinic today for follow up on anticoagulation therapy in the presence of DVT hx. H/H low. Saw Dr. Pelaez. Pt to get iron infusion and blood transfusion as outpatient. Denies any medication or diet changes. No current symptoms of bleeding or thrombosis reported.    A/P:   INR therapeutic. Continue current regimen. Pt knows to go to the ER for any significant weakness and to f/u closely with oncology.    Follow up appointment in 2 week(s).    Next Appointment: Wednesday, July 25, 2018 at 11:30 am.     Re HUITRON

## 2018-07-12 NOTE — TELEPHONE ENCOUNTER
Informed patient she must complete labs prior to her transfusion appointment scheduled for Saturday July 14. Patient agreed and is aware she may not be able to complete her transfusion without completing her labs prior.

## 2018-07-13 NOTE — TELEPHONE ENCOUNTER
Returned patients call, patient states she already spoke with KIMBERLY Rosales and all of her questions have been answered.

## 2018-07-14 NOTE — PROGRESS NOTES
Pt presented to infusion center for blood transfusion. Oriented to OPIC and POC discussed, including treatment time, pt agreeable. Left chest port in place, pt reports it hasn't been flushed in several months. She switched MD's recently, had been getting flushed at Dr. Carvajal's office previously. Lidocaine given at pt request. Port accessed in sterile fashion, flushed easily but no blood return despite repositioning. Called Dr. Stoner on call and rec'd order for alteplase. Alteplase instilled. CBC and COD drawn peripherally from LAC with 23G butterfly needle, gauze and coban drsg placed. CBC revealed hemoglobin of 7.6 (parameters for 7.5). Pt very symptomatic-short of breath, tachy, extremely fatigued. Charge RN Margarita spoke with Dr. Stoner who gave OK to give 1 unit. Blood return observed from port after 2 hours alteplase. Pre-meds given. 1 unit PRBC's transfused without issue. Port flushed, brisk blood return again observed, heparinized and de-accessed with needle intact, gauze drsg placed. Pt returns Wed for Venofer, printout of appts given, left on foot with  in good spirits.

## 2018-07-18 NOTE — PROGRESS NOTES
Patient presents ambulatory for day 1/5 venofer.  Patient reports feeling well and denies any s/s of infection at this time.  Port accessed using sterile technique and brisk blood return noted.  Venofer given slow IV push with no complications or adverse reactions.  Patient observed for 30 minutes post medication with no adverse reaction noted or expressed.  Patient to return 7/24/18, confirmed with patient.  Port flushed per protocol, brisk blood return noted, de accessed, gauze, and tape applied to site.  Patient left ambulatory in no distress.

## 2018-07-18 NOTE — TELEPHONE ENCOUNTER
Confirmed with Ms. Flynn that she did receive her Lonsurf and began therapy this AM. Reviewed rx (60 mg bid D1-5 and D8-15). Pt read back instructions and denies any questions or concerns. Pt will f/u with Dleano on 7/24 for a tox check and a 3 week with Dr. Pelaez on 8/1. Pt aware of labs being completed prior to visit.

## 2018-07-19 NOTE — PROGRESS NOTES
CHIEF COMPLAINT  Chief Complaint   Patient presents with   • Results     Lab results   Colon cancer, anemia    HPI  Patient is a 62 y.o. female patient who presents today for the following     Metastatic colon cancer (adenocarcinoma), anemia, dysuria  Sleep disorder  Dg: in 3/2016  -Metastatic sigmoid colon cancer with invasion into the bladder/colovesicle fistula and liver mets  -S/p bladder and sigmoid biopsies consistent with adenocarcinoma of the colon.   -CEA was elevated    - emergent surgery-TUR 3/17/16 removal of large mass showed metastatic adenocarcinoma, IHC could not differentiate bladder versus colon .    S/p Exploratory laparotomy with small bowel resection and sigmoid colectomy, cystectomy on 4/17/16 showed 5.0 cm low gradeadenocarcinoma, margins uninvolved, lymphovascular invasion present, extensive, 11/13 lymph nodes positive, tumor directly invading the adjacent structures bladder and soft tissues, [id number]      8/15/16 - started FOLFOX chemotherapy  12/20/16 -CT Scan showed interval enlargement of the peritoneal implant anterior to the gastric antrum. Enlarging left external iliac and the pelvic sidewall adenopathy. Improvement in hepatic metastasis.     2/3/17: PET/CT scan on  liver masses in segment 5 and 6 are identified measuring up to 1 cm and 4 cm with activity of 9.4. Left hypogastric and external iliac adenopathy is unchanged with an SUV 7.6 largest 3.3 cm . Ovalshaped soft tissue mass anterior to antrum of the stomach was identified measuring 2.5×2.0 cm with an SUV of 4.5. Mildly enlarged lymph nodes in the mediastinum are noted .        1/25/17:CTguided peritoneal biopsy showed metastatic adenocarcinoma in the fibroadipose tissue      3/2017: FOLFIRI and Vectibix -required dose modification    5/24/17: CT scan on showed response to treatment, also CEA declining.     8/18/17:CT scan  showed slight enlargement of the liver lesion and calcified mass in the urinary bladder also increased .    CEA plateauted  CT scan on 12/3/17 showed disease progression.   Discuss[facility] with Dr. Tomas Partida at [facility]  No clinical trials available for this patient. -Started Regorafenib 160 mg   -CT scan on 4/9/18 showed disease progression Stopped Regorafenib on 4/16/18.  -Further discussion regarding L[facility]urf vs hospice.  -4/16/18-hospice recommended.     -Looked for trial options. Not eligible due to anemia.  -Anemia: received I PRBC, IV iron for anemia on 7/14/  -Continues to have recurrent UTIs, urine culture wit recurrent difteroids ,seeing urology.  -Denies significant abdominal pain. She has severe fatigue. Denies having any significant bleeding.  dysuria/    Due to frequent urination, has difficulty to go and stay asleep, requested medication.     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 3 Each 1   • traZODone (DESYREL) 100 MG Tab Take 1 tab HS PO 90 Tab 0   • Trifluridine-Tipiracil (LONSURF) 20-8.19 MG Tab Take 60 mg by mouth 2 Times a Day. d1-5 and d 8-15 every 4 weeks 60 Tab 1   • ondansetron (ZOFRAN) 4 MG Tab tablet Take 1 Tab by mouth every four hours as needed for Nausea/Vomiting. 40 Tab 1   •  oxycodone (OXY-IR) 15 MG immediate release tablet Take 1 Tab by mouth every 8 hours as needed for Severe Pain for up to 30 days. 30 Tab 0   • ferrous sulfate 325 (65 Fe) MG EC tablet Take 1 Tab by mouth 2 Times a Day. 180 Tab 1   • Nutritional Supplements (ENSURE HIGH PROTEIN) Liquid Take 1 Bottle by mouth 2 Times a Day. 60 Can 11   • warfarin (COUMADIN) 6 MG Tab Take 0.5-1 Tabs by mouth every evening. 90 Tab 1   • losartan (COZAAR) 25 MG Tab Take 1 Tab by mouth every day. 90 Tab 0   • furosemide (LASIX) 20 MG Tab Take 0.5 Tabs by mouth 1 time daily as needed. 45 Tab 0   • vitamin D (CHOLECALCIFEROL) 1000 UNIT Tab Take 1,000 Units by mouth every day.     • acetaminophen (TYLENOL) 500 MG Tab Take 500-1,000 mg by mouth as needed.       No current facility-administered medications for this visit.      ALLERGIES  Allergies: Patient has no known allergies.  PAST MEDICAL HISTORY  Past Medical History:   Diagnosis Date   • Blood clotting disorder (HCC) 4-    left leg   • Cancer (HCC) 2016    liver, bladder, colon   • Blood loss anemia 2016   • Bowel habit changes     has colostomy   • Essential hypertension    • Obesity    • Urinary bladder disorder     1/2 bladder due to cancer surgery     SURGICAL HISTORY  She  has a past surgical history that includes pr excision of tonsil tags; cystoscopy (3/17/2016); trans urethral resection bladder (3/17/2016); colonoscopy - endo (N/A, 3/22/2016); recovery (3/21/2016); exploratory laparotomy (4/17/2016); exploratory laparotomy (4/18/2016); cystectomy (4/18/2016); colostomy (Left, 4/19/2016); and cath placement (Left, 8/9/2016).  SOCIAL HISTORY  Social History   Substance Use Topics   • Smoking status: Never Smoker   • Smokeless tobacco: Never Used   • Alcohol use No     Social History     Social History Narrative    Lives with .     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     FAMILY HISTORY  Family History   Problem Relation Age of Onset   • Diabetes Mother   "    prediabetes   • Heart Disease Mother    • Hypertension Mother    • Stroke Maternal Grandmother    • Cancer Neg Hx    • Hyperlipidemia Neg Hx    • Psychiatry Neg Hx    • Thyroid Neg Hx      Family Status   Relation Status   • Mother    • Maternal Grandmother    • Neg Hx        ROS   Constitutional: Negative for fever, chills.  HENT: Negative for congestion, sore throat.  Eyes: Negative for blurred vision.   Respiratory: Negative for cough, shortness of breath.  Cardiovascular: Negative for chest pain, palpitations.   Gastrointestinal: Negative for heartburn, nausea, abdominal pain.   Genitourinary: Negative for dysuria.  Musculoskeletal: Negative for significant myalgias, back pain and joint pain.   Skin: Negative for rash and itching.   Neuro: Negative for dizziness, weakness and headaches.   Endo/Heme/Allergies: Does not bruise/bleed easily.   Psychiatric/Behavioral: Negative for depression, anxiety    PHYSICAL EXAM   Blood pressure (!) 96/62, pulse (!) 119, temperature 37.2 °C (98.9 °F), height 1.499 m (4' 11\"), weight 98.7 kg (217 lb 9.5 oz), SpO2 99 %. Body mass index is 43.95 kg/m².  General:  NAD, well appearing  HEENT:   NC/AT, PERRLA, EOMI, TMs are clear. Oropharyngeal mucosa is pink,  without lesions;  no cervical / supraclavicular  lymphadenopathy, no thyromegaly.    Cardiovascular: RRR.   No m/r/g. No carotid bruits .      Lungs:   CTAB, no w/r/r, no respiratory distress.  Abdomen: Soft, NT/ND + BS; no suprapubic tenderness; no masses or hepatosplenomegaly.  Extremities:  2+ DP and radial pulses bilaterally.  No c/c/e.   Skin:  Warm, dry.  No erythema. No rash.   Neurologic: Alert & oriented x 3. CN II-XII grossly intact. Brachioradialis / knee DTR are 2/4, symmetric. Strength and sensation grossly intact.  No focal deficits.  Psychiatric:  Affect normal, mood normal, judgment normal.    LABS     Labs are reviewed and discussed with a patient  Lab Results   Component Value Date/Time    TRIGLYCERIDE " 146 04/20/2016 04:15 AM       Lab Results   Component Value Date/Time    SODIUM 135 07/16/2018 12:13 PM    POTASSIUM 4.4 07/16/2018 12:13 PM    CHLORIDE 101 07/16/2018 12:13 PM    CO2 21 07/16/2018 12:13 PM    GLUCOSE 114 (H) 07/16/2018 12:13 PM    BUN 17 07/16/2018 12:13 PM    CREATININE 0.58 07/16/2018 12:13 PM     Lab Results   Component Value Date/Time    ALKPHOSPHAT 338 (H) 07/16/2018 12:13 PM    ASTSGOT 37 07/16/2018 12:13 PM    ALTSGPT 19 07/16/2018 12:13 PM    TBILIRUBIN 0.6 07/16/2018 12:13 PM      Lab Results   Component Value Date/Time    HBA1C 6.1 08/24/2017 12:01 PM    HBA1C 6.0 05/18/2017    HBA1C 6.1 02/07/2017     No results found for: TSH  No results found for: FREET4    Lab Results   Component Value Date/Time    WBC 9.9 07/16/2018 12:13 PM    RBC 3.50 (L) 07/16/2018 12:13 PM    HEMOGLOBIN 9.0 (L) 07/16/2018 12:13 PM    HEMATOCRIT 31.1 (L) 07/16/2018 12:13 PM    MCV 88.9 07/16/2018 12:13 PM    MCH 25.7 (L) 07/16/2018 12:13 PM    MCHC 28.9 (L) 07/16/2018 12:13 PM    MPV 9.3 07/16/2018 12:13 PM    NEUTSPOLYS 78.00 (H) 07/16/2018 12:13 PM    LYMPHOCYTES 12.60 (L) 07/16/2018 12:13 PM    MONOCYTES 7.50 07/16/2018 12:13 PM    EOSINOPHILS 0.40 07/16/2018 12:13 PM    BASOPHILS 0.40 07/16/2018 12:13 PM    HYPOCHROMIA 1+ 07/16/2018 12:13 PM    ANISOCYTOSIS 2+ 07/16/2018 12:13 PM      UC, 6/26/18  Significant Indicator  (Positive)   POS (POS)    Source   UR    Site   Unknown    Urine Culture  (Abnormal)   Mixed skin lucy <10,000 cfu/mL     Urine Culture  (Abnormal)      Diphtheroids   >100,000 cfu/mL      IMAGING     None    ASSESMENT AND PLAN        1. Adenocarcinoma of colon metastatic to liver (HCC)  2. Anemia, unspecified type  Continue oncology follow-up and recommendations.    3. Dysuria  4. Acute cystitis with hematuria  - fosfomycin (MONUROL) 3 GM Pack; Take 3 g by mouth every 3 days.  Dispense: 3 Each; Refill: 1  5. Recurrent UTI  - fosfomycin (MONUROL) 3 GM Pack; Take 3 g by mouth every 3 days.   Dispense: 3 Each; Refill: 1  - URINALYSIS,CULTURE IF INDICATED; Future  Continue to have positive/abnormal urinalysis, with urine culture showing diphtheroids.   She is given the above antibiotic.     6. Sleep disorder  - traZODone (DESYREL) 100 MG Tab; Take 1 tab HS PO  Dispense: 90 Tab; Refill: 0  Sleep problem may improve with improvement of urinary tract infection.    Counseling:   - Smoking:  Nonsmoker    Followup: Return in about 4 weeks (around 8/16/2018) for Short.    All questions are answered.    Please note that this dictation was created using voice recognition software, and that there might be errors of ubtch and possibly content.

## 2018-07-20 NOTE — TELEPHONE ENCOUNTER
Attempted to reach patient to review PO chemo schedule and offer medication calendar. LVM to call Eda RN at [phone]

## 2018-07-24 NOTE — PROGRESS NOTES
Subjective:      Tamara Flynn is a 62 y.o. female who presents with Toxicity Check (Tox check-Oral chemo ([facility]anya)) for metastatic colon cancer.        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 she is accompanied by her  for today's visit.  Patient is currently on day #7 of cycle 1 of L[place]urf, and is here today for toxicity check.    Patient has done very well and tolerated her treatment very well.  She stated that she has not noticed any difference in her current clinical symptoms since starting the medication.  She does state that she has had chronic fatigue with her diagnosis, but denies any significant changes to her fatigue.  She does have significant shortness of breath, related to fatigue as well.  She denies any fevers, chills or weight loss.  Patient denies any coughing or wheezing.  She denies any chest pain, heart palpitations or swelling in her legs.  She does have persistent abdominal pain and a consistent underlying nausea but no vomiting.  She is having normal bowel movements via her ostomy.  All GI symptoms are very stable prior to starting medication.  Patient does have continued persistent back pain.  She denies any dizziness or headaches.    Patient does have a fistula present which is likely cause of patient's recurrent UTIs.  She does complain of persistent dysuria.  She is followed by urology and is due to be seen in the urology office next Wednesday, August 1.    Patient also anemic.  She did require 2 units of blood transfusion last week and has recently started Venofer.  She has received 1 dose of Venofer and due to complete a total of 5 doses.      No Known Allergies  Current Outpatient Prescriptions on File Prior to Visit   Medication Sig Dispense Refill   • ondansetron (ZOFRAN) 4 MG Tab tablet Take 1 Tab by mouth every four hours as needed for Nausea/Vomiting. 40 Tab 1   • Nutritional Supplements (ENSURE  "HIGH PROTEIN) Liquid Take 1 Bottle by mouth 2 Times a Day. 60 Can 11   • warfarin (COUMADIN) 6 MG Tab Take 0.5-1 Tabs by mouth every evening. 90 Tab 1   • ferrous sulfate 325 (65 Fe) MG EC tablet Take 1 Tab by mouth 2 Times a Day. 180 Tab 1   • losartan (COZAAR) 25 MG Tab Take 1 Tab by mouth every day. 90 Tab 0   • vitamin D (CHOLECALCIFEROL) 1000 UNIT Tab Take 1,000 Units by mouth every day.     • acetaminophen (TYLENOL) 500 MG Tab Take 500-1,000 mg by mouth as needed.     • fosfomycin (MONUROL) 3 GM Pack Take 3 g by mouth every 3 days. 3 Each 1   • traZODone (DESYREL) 100 MG Tab Take 1 tab HS PO 90 Tab 0   • Trifluridine-Tipiracil (LONSURF) 20-8.19 MG Tab Take 60 mg by mouth 2 Times a Day. d1-5 and d 8-15 every 4 weeks 60 Tab 1   • furosemide (LASIX) 20 MG Tab Take 0.5 Tabs by mouth 1 time daily as needed. 45 Tab 0     No current facility-administered medications on file prior to visit.        Review of Systems   Constitutional: Positive for malaise/fatigue. Negative for chills, fever and weight loss.   Respiratory: Positive for shortness of breath. Negative for cough and wheezing.    Cardiovascular: Negative for chest pain, palpitations and leg swelling.   Gastrointestinal: Positive for abdominal pain and nausea. Negative for constipation, diarrhea and vomiting.   Genitourinary: Positive for dysuria.   Musculoskeletal: Positive for back pain.   Neurological: Negative for dizziness and headaches.          Objective:     BP (!) 92/44   Pulse (!) 116   Temp 36.6 °C (97.9 °F)   Resp 18   Ht 1.499 m (4' 11.02\")   Wt 99.3 kg (218 lb 14.7 oz)   SpO2 95%   Breastfeeding? No   BMI 44.19 kg/m²      Physical Exam   Constitutional: She is oriented to person, place, and time. She appears well-developed and well-nourished. No distress.   HENT:   Head: Normocephalic and atraumatic.   Cardiovascular: Regular rhythm, normal heart sounds and intact distal pulses.  Exam reveals no friction rub.    No murmur " heard.  Tachycardic   Pulmonary/Chest: Effort normal. No respiratory distress. She has no wheezes.   Diminished in the bases   Abdominal: Soft. Bowel sounds are normal. She exhibits no distension. There is tenderness.   Musculoskeletal: She exhibits no edema or tenderness.   Slow to ambulate d/t severe fatigue   Neurological: She is alert and oriented to person, place, and time.   Skin: Skin is warm and dry. No rash noted. She is not diaphoretic. No erythema. No pallor.   Psychiatric: She has a normal mood and affect. Her behavior is normal.   Vitals reviewed.            Assessment/Plan:     1. Adenocarcinoma of colon metastatic to liver (HCC)     2. 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 she has done fairly well tolerating her first days of treatment.  She is scheduled to complete days 1 through 5, days 8 through 15.  Tomorrow start stay 8 and patient is prepared to start taking medication again.  Account has been provided to the patient today as well as her treatment will be consistently every 28 days.    2.  Patient is due to follow-up with urology next week.  She has had persistent UTIs.  She also has significant dysuria.  She is to follow-up with Dr. Abdullahi as planned.  Patient's dysuria and frequent UTIs has affected her quality of life.  She does experience pain with urination consistently.  She states she takes Tylenol which does help at times.  Will defer further management to urology.  She stated she will be seeing urology before she sees Dr. Pelaez next week.    3.  Patient is scheduled to be seen in the clinic on August 1, next week with Dr. Pelaez.  She will also have her second dose of Venofer the day prior now also asked him to draw labs at that time.  Patient verbalized understanding of the plan and she is to contact office sooner  if needed.

## 2018-07-24 NOTE — PROGRESS NOTES
Anticoagulation Summary  As of 7/24/2018    INR goal:   2.0-3.0   TTR:   46.0 % (2.1 y)   Today's INR:   1.9!   Warfarin maintenance plan:   3 mg (6 mg x 0.5) every day   Weekly warfarin total:   21 mg   Plan last modified:   DEBBI Shin (6/27/2018)   Next INR check:      Target end date:   Indefinite    Indications    Deep vein thrombosis (DVT) of both lower extremities (HCC) [I82.403]  Chronic anticoagulation [Z79.01]             Anticoagulation Episode Summary     INR check location:       Preferred lab:       Send INR reminders to:       Comments:         Anticoagulation Care Providers     Provider Role Specialty Phone number    Yimi Martinez M.D. Referring Surgery [phone]    Cesar SchofieldD Responsible      [facility] Responsible  [phone]        Anticoagulation Patient Findings      On [place] chemo treatment.started last week. On 5 days off 2 then on 8 days and off 2 weeks.     Tamara Flynn seen in clinic today, is on anticoagulation therapy with warfarin for lower DVT lt leg .   INR  sub-therapeutic.    Changes to current medical/health status since last appt: none.   Denies signs/symptoms of bleeding and/or thrombosis since the last appt.   Denies any interval changes to diet  Denies any interval changes to medications since last appt.   Denies any complications or cost restrictions with current therapy  Follow up appointment in 1 week(s).      Take 6 mg tonight then back to 3 mg daily test in 1 weeks.   The patient is on a high risk medication and is sub- therapeutic. This could lead to clot formation or risk of stroke. Therefore this patient requires close monitoring and follow up.        CHEST guidelines recommend frequent INR monitoring at regular intervals (a few days up to a max of 12 weeks) to ensure they are on the proper dose of warfarin and not having any complications from therapy.  INRs can dramatically change over a short time period due  to diet, medications, and medical conditions.   The patient will go to the ER for falls with a head injury,  blood in urine or stool or any bleeding that last longer than 20 min.      LEX Ospina.

## 2018-07-25 NOTE — PROGRESS NOTES
Patient here for 2/5 Venofer. Port accessed using sterile technique; brisk blood return noted. Venofer given as IVP over 5 minutes. No adverse reaction noted during the 30 minute post-Venofer administration. Heparin instilled prior to de-accessing port. Port de-accessed; gauze/coban applied over site. Next appointment scheduled. Discharged to self care; no apparent distress noted.

## 2018-07-31 NOTE — PROGRESS NOTES
Anticoagulation Summary  As of 7/31/2018    INR goal:   2.0-3.0   TTR:   45.6 % (2.2 y)   Today's INR:   2.0   Warfarin maintenance plan:   3 mg (6 mg x 0.5) every day   Weekly warfarin total:   21 mg   Plan last modified:   DEBBI Shin (6/27/2018)   Next INR check:   8/14/2018   Target end date:   Indefinite    Indications    Deep vein thrombosis (DVT) of both lower extremities (HCC) [I82.403]  Chronic anticoagulation [Z79.01]             Anticoagulation Episode Summary     INR check location:       Preferred lab:       Send INR reminders to:       Comments:         Anticoagulation Care Providers     Provider Role Specialty Phone number    Yimi Martinez M.D. Referring Surgery [phone]    Cesar SchofieldD Responsible      [facility] Responsible  [phone]        Anticoagulation Patient Findings    INR  therapeutic.   Denies signs/symptoms of bleeding and/or thrombosis.   Denies changes to diet or medications.   Follow up appointment in 2 week(s).    Continue weekly warfarin dose as noted      Bk Jimenez, PharmD

## 2018-07-31 NOTE — PROGRESS NOTES
Pt presents for venofer #3 today with no new complaints.  Port accessed using sterile technique and labs drawn as ordered.  Venofer given and tolerated well.  Pt observed for 30 minutes post-venofer.  No sign of adverse event.  Port flushed and de-accessed per protocol.  Pt stable and ambulatory at discharge.  Pt plan to see both her urologist and Dr Pelaez tomorrow then return here next week for venofer.

## 2018-07-31 NOTE — TELEPHONE ENCOUNTER
Followed up with [facility] per Dr. Pelaez regarding her lab results.  Informed her that her hgb was 7.9 today and in quired if she is having any active symptoms.  Pt states she is very tired and sometimes gets lightheaded/dizzy.  She denies SOB/chest pain.  Informed her RN will scheduled blood transfusion per Dr. Pelaez. Pt will follow up with Dr. Pelaez tomorrow at 10:30am.

## 2018-08-01 NOTE — TELEPHONE ENCOUNTER
Received verbal orders from Dr. Pelaez to schedule pt for 1 unit prbcs early next week. Spoke with [facility] and she states they do not have availability.  Placed pt on cancellation list. Plan to admit patient to CDU if pt unable to get appt with infusion services.

## 2018-08-01 NOTE — PROGRESS NOTES
Date of visit: 8/1/2018  10:54 AM      Chief Complaint- metastatic progressive colorectal carcinoma      Identification/Prior relevant history:   Per my note dated 7/9/18   Interim history  -7/9/18-established care with me  -She wanted to try Lonsurf. Dosed at 60 mg. She tolerated the first cycle well.  -7/12/18-CT scan-Interval progression of disease with worsening adenopathy and hepatic metastatic lesions.  2.  Large mass in the pelvis has increased in size in the interim and appears to invade the bladder. 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  3.  Right lower quadrant ostomy with a large parastomal hernia    -Unfortunately continues to have recurrent UTIs. Her urologist is planning on some type of intervention.  -She has severe anemia requiring transfusion. Currently on Venofer  -She has some chronic fatigue. Her most annoying problem continues to be the UTI related symptoms    Past Medical History:      Past Medical History:   Diagnosis Date   • Blood clotting disorder (HCC) 4-    left leg   • Blood loss anemia 2016   • Bowel habit changes     has colostomy   • Cancer (HCC) 2016    liver, bladder, colon   • Essential hypertension    • Obesity    • Urinary bladder disorder     1/2 bladder due to cancer surgery       Past surgical history:       Past Surgical History:   Procedure Laterality Date   • CATH PLACEMENT Left 8/9/2016    Procedure: CATH PLACEMENT power port ;  Surgeon: Yimi Martinez M.D.;  Location: [facility];  Service:    • COLOSTOMY Left 4/19/2016    Procedure: COLOSTOMY;  Surgeon: Yimi Martinez M.D.;  Location: [facility];  Service:    • EXPLORATORY LAPAROTOMY  4/18/2016    Procedure: EXPLORATORY LAPAROTOMY;  Surgeon: Yimi Martinez M.D.;  Location: [facility];  Service:    • CYSTECTOMY  4/18/2016    Procedure: CYSTECTOMY;  Surgeon: Yunior Abdullahi M.D.;  Location: [facility];  Service:    • EXPLORATORY LAPAROTOMY   4/17/2016    Procedure: EXPLORATORY LAPAROTOMY-illeostomy creation ;  Surgeon: Yimi Martinez M.D.;  Location: SURGERY [facility];  Service:    • COLONOSCOPY - ENDO N/A 3/22/2016    Procedure: COLONOSCOPY - ENDO;  Surgeon: Yimi Martinez M.D.;  Location: ENDOSCOPY [facility];  Service:    • RECOVERY  3/21/2016    Procedure: CT-CT Guided liver biopsy-Dr.Michael Martinez;  Surgeon: Recoveryon Surgery;  Location: SURGERY PRE-POST PROC UNIT [facility];  Service:    • CYSTOSCOPY  3/17/2016    Procedure: CYSTOSCOPY;  Surgeon: Yunior Abdullahi M.D.;  Location: SURGERY [facility];  Service:    • TRANS URETHRAL RESECTION BLADDER  3/17/2016    Procedure: TRANS URETHRAL RESECTION BLADDER Tumor;  Surgeon: Yunior Abdullahi M.D.;  Location: SURGERY [facility];  Service:    • PB EXCISION OF TONSIL TAGS         Allergies:       Patient has no known allergies.    Medications:         Current Outpatient Prescriptions   Medication Sig Dispense Refill   • Trifluridine-Tipiracil (LONSURF) 20-8.19 MG Tab Take 60 mg by mouth 2 Times a Day. d1-5 and d 8-15 every 4 weeks 60 Tab 1   • Nutritional Supplements (ENSURE HIGH PROTEIN) Liquid Take 1 Bottle by mouth 2 Times a Day. 60 Can 11   • warfarin (COUMADIN) 6 MG Tab Take 0.5-1 Tabs by mouth every evening. 90 Tab 1   • ferrous sulfate 325 (65 Fe) MG EC tablet Take 1 Tab by mouth 2 Times a Day. 180 Tab 1   • vitamin D (CHOLECALCIFEROL) 1000 UNIT Tab Take 1,000 Units by mouth every day.     • acetaminophen (TYLENOL) 500 MG Tab Take 500-1,000 mg by mouth as needed.     • fosfomycin (MONUROL) 3 GM Pack Take 3 g by mouth every 3 days. 3 Each 1   • traZODone (DESYREL) 100 MG Tab Take 1 tab HS PO 90 Tab 0   • ondansetron (ZOFRAN) 4 MG Tab tablet Take 1 Tab by mouth every four hours as needed for Nausea/Vomiting. 40 Tab 1   • losartan (COZAAR) 25 MG Tab Take 1 Tab by mouth every day. 90 Tab 0   • furosemide (LASIX) 20 MG Tab Take 0.5 Tabs by mouth 1 time daily as needed. 45 Tab 0  "    No current facility-administered medications for this visit.          Social History:     Social History     Social History   • Marital status:      Spouse name: N/A   • Number of children: N/A   • Years of education: N/A     Occupational History   • Not on file.     Social History Main Topics   • Smoking status: Never Smoker   • Smokeless tobacco: Never Used   • Alcohol use No   • Drug use: No   • Sexual activity: Yes     Partners: Male     Other Topics Concern   • Not on file     Social History Narrative    Lives with .     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       Family History:      Family History   Problem Relation Age of Onset   • Diabetes Mother         prediabetes   • Heart Disease Mother    • Hypertension Mother    • Stroke Maternal Grandmother    • Cancer Neg Hx    • Hyperlipidemia Neg Hx    • Psychiatry Neg Hx    • Thyroid Neg Hx        Review of Systems:  All other review of systems are negative except what was mentioned above in the HPI.    Constitutional: Negative for fever, chills, weight loss and malaise/fatigue.    HEENT: No new auditory or visual complaints. No sore throat and neck pain.     Respiratory: Negative for cough, sputum production, shortness of breath and wheezing.    Cardiovascular: Negative for chest pain, palpitations, orthopnea and leg swelling.    Gastrointestinal: Negative for heartburn, nausea, vomiting and abdominal pain.    Genitourinary: Positive for dysuria, hematuria    Musculoskeletal: No new arthralgias or myalgias   Skin: Negative for rash and itching.    Neurological: Negative for focal weakness and headaches.    Endo/Heme/Allergies: No abnormal bleed/bruise.    Psychiatric/Behavioral: No new depression/anxiety.    Physical Exam:  Vitals: /86   Pulse (!) 124   Temp 37 °C (98.6 °F)   Resp 14   Ht 1.499 m (4' 11\")   Wt 97.4 kg (214 lb 11.7 oz)   SpO2 97%   BMI 43.37 kg/m²     General: Not in acute distress, alert and oriented " x 3  HEENT: No pallor, icterus. Oropharynx clear.   Neck: Supple, no palpable masses.  Lymph nodes: No palpable cervical, supraclavicular, axillary or inguinal lymphadenopathy.    CVS: regular rate and rhythm, no rubs or gallops  RESP: Clear to auscultate bilaterally, no wheezing or crackles.   ABD: Soft, non tender, non distended, positive bowel sounds, no palpable organomegaly. Ostomy with herniation  EXT: No edema or cyanosis  CNS: Alert and oriented x3, No focal deficits.  Skin- No rash      Labs:   Anticoagulation Visit on 07/31/2018   Component Date Value Ref Range Status   • INR 07/31/2018 2.0   Final   Outpatient Infusion Services on 07/31/2018   Component Date Value Ref Range Status   • WBC 07/31/2018 4.5* 4.8 - 10.8 K/uL Final   • RBC 07/31/2018 2.90* 4.20 - 5.40 M/uL Final   • Hemoglobin 07/31/2018 7.9* 12.0 - 16.0 g/dL Final   • Hematocrit 07/31/2018 26.2* 37.0 - 47.0 % Final   • MCV 07/31/2018 90.3  81.4 - 97.8 fL Final   • MCH 07/31/2018 27.2  27.0 - 33.0 pg Final   • MCHC 07/31/2018 30.2* 33.6 - 35.0 g/dL Final   • RDW 07/31/2018 69.1* 35.9 - 50.0 fL Final   • Platelet Count 07/31/2018 378  164 - 446 K/uL Final   • MPV 07/31/2018 9.1  9.0 - 12.9 fL Final   • Nucleated RBC 07/31/2018 1.50  /100 WBC Final   • NRBC (Absolute) 07/31/2018 0.07  K/uL Final   • Neutrophils-Polys 07/31/2018 80.50* 44.00 - 72.00 % Final   • Lymphocytes 07/31/2018 12.10* 22.00 - 41.00 % Final   • Monocytes 07/31/2018 5.70  0.00 - 13.40 % Final   • Eosinophils 07/31/2018 0.40  0.00 - 6.90 % Final   • Basophils 07/31/2018 0.20  0.00 - 1.80 % Final   • Immature Granulocytes 07/31/2018 1.10* 0.00 - 0.90 % Final   • Neutrophils (Absolute) 07/31/2018 3.65  2.00 - 7.15 K/uL Final    Includes immature neutrophils, if present.   • Lymphs (Absolute) 07/31/2018 0.55* 1.00 - 4.80 K/uL Final   • Monos (Absolute) 07/31/2018 0.26  0.00 - 0.85 K/uL Final   • Eos (Absolute) 07/31/2018 0.02  0.00 - 0.51 K/uL Final   • Baso (Absolute) 07/31/2018  0.01  0.00 - 0.12 K/uL Final   • Immature Granulocytes (abs) 07/31/2018 0.05  0.00 - 0.11 K/uL Final   • Anisocytosis 07/31/2018 1+   Final   • Macrocytosis 07/31/2018 1+   Final   • Microcytosis 07/31/2018 1+   Final   • Sodium 07/31/2018 137  135 - 145 mmol/L Final   • Potassium 07/31/2018 4.0  3.6 - 5.5 mmol/L Final   • Chloride 07/31/2018 105  96 - 112 mmol/L Final   • Co2 07/31/2018 20  20 - 33 mmol/L Final   • Anion Gap 07/31/2018 12.0* 0.0 - 11.9 Final   • Glucose 07/31/2018 195* 65 - 99 mg/dL Final   • Bun 07/31/2018 21  8 - 22 mg/dL Final   • Creatinine 07/31/2018 0.73  0.50 - 1.40 mg/dL Final   • Calcium 07/31/2018 8.5  8.5 - 10.5 mg/dL Final   • AST(SGOT) 07/31/2018 36  12 - 45 U/L Final   • ALT(SGPT) 07/31/2018 18  2 - 50 U/L Final   • Alkaline Phosphatase 07/31/2018 444* 30 - 99 U/L Final   • Total Bilirubin 07/31/2018 0.6  0.1 - 1.5 mg/dL Final   • Albumin 07/31/2018 3.0* 3.2 - 4.9 g/dL Final   • Total Protein 07/31/2018 5.9* 6.0 - 8.2 g/dL Final   • Globulin 07/31/2018 2.9  1.9 - 3.5 g/dL Final   • A-G Ratio 07/31/2018 1.0  g/dL Final   • GFR If  07/31/2018 >60  >60 mL/min/1.73 m 2 Final   • GFR If Non  07/31/2018 >60  >60 mL/min/1.73 m 2 Final   • Peripheral Smear Review 07/31/2018 see below   Final    Comment: Due to instrument suspect flags, further review of peripheral smear is  indicated on this patient sample. This review may or may not result in  abnormal findings.     • Plt Estimation 07/31/2018 Normal   Final   • RBC Morphology 07/31/2018 Present   Final   • Poikilocytosis 07/31/2018 1+   Final   • Ovalocytes 07/31/2018 1+   Final   • Schistocytes 07/31/2018 1+   Final   • Comments-Diff 07/31/2018 see below   Final    Results have been verified by peripheral blood smear review.             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    NGS testing Interestingly show FLT3 mutation , however, there was no response to Dabrafenib and this is thought to be present mutation.  -7/2018-establish care with me  -Informed her that hospice is a very appropriate decision as realistically her chance of having treatment response is low.  -Not a candidate for Avastin-based treatment due to presence of fistula  -Not a candidate for immunotherapy as MSI-s  -Tolerated first cycle of alone once or Lonsurf dosed at 60 mg twice a day. No major neutropenia  -Overall, her prognosis is very poor and she understands that. We will move  to hospice if there is any intolerance or progression on Lonsurf.. She is amenable for a second opinion from [place] and I will make a referral.   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 She is requiring periodic antibiotics.  -Severe anemia.-No obvious bleeding. Unclear whether she is having some malabsorption and occult blood loss related to her fistula. She has some symptomatic anemia and will transfuse one unit of packed cells. In view of upcoming urology procedure  -Iron deficiency anemia.-Continue Venofer  She agreed and verbalized  agreement and understanding with the current plan.  I answered all questions and concerns at this time         Please note that this dictation was created using voice recognition software. I have made every reasonable attempt to correct obvious errors, but I expect that there are errors of grammar and possibly content that I did not discover before finalizing the note.      SIGNATURES:  Charles Pelaez    CC:  Maritza Esparza M.D.  No ref. provider found

## 2018-08-02 NOTE — TELEPHONE ENCOUNTER
Pt scheduled with infusion services on Friday 8/3 at 10am for blood transfusion.  Pt notified and in agreement with POC.

## 2018-08-03 NOTE — PROGRESS NOTES
Late entry for 1445:  Transfusion completed without incident.  Line flushed with normal saline.  Port flushed with normal saline and heparin per policy.  Port de-accessed; needle intact.  Pressure dressing applied.  Pt released to self care in no apparent distress after completion of treatment, ambulatory.  Pt to return for iron infusion; future appointments scheduled.

## 2018-08-03 NOTE — PROGRESS NOTES
Pt to infusion services ambulatory per self.  Pt here for scheduled blood transfusion per therapy plan.  Plan of care reviewed.  Pt verbalizes understanding.  Pt had labs done 07/31/18, Hgb = 7.9.  Per therapy plan, patient to receive 1 unit PRBC's today.  Pt with right chest port.  Port site cleansed and port accessed in sterile fashion.  Port flushes easily; + blood return verified.  Lab drawn for COD and sample sent to blood bank.  Pt resting in chair. Call light at hand.

## 2018-08-03 NOTE — PROGRESS NOTES
1 unit PRBC's received from blood bank, verified by RN's x 2 at patient chairside and transfusion started.  Blood transfusing at this time.  No adverse effects observed or expressed.  Pt resting in chair.  Call light hand.

## 2018-08-05 PROBLEM — N32.1 CVF (COLOVESICAL FISTULA): Status: ACTIVE | Noted: 2018-01-01

## 2018-08-07 NOTE — PROGRESS NOTES
On 8/7/2018 at 1430 this ONN met with patient in Infusion Services. Introduced self and explained role of nurse navigator. Patient states she has been in treatment for over and year and recently switched to Dr. Pelaez. Patient states she has a good knowledge of healthcare system. Patient rates distress at 2/10. Patient denies immediate concerns or needs. Patient states she has a supportive  who drives patient to and from all appointments. Provided patient with and reviewed with patient Support Group flyer, Healing Touch flyer, Advanced Directive workshops flyer, Music and Wellness flyer, Guided Imagery flyer. Provided patient with this ONN's business card. Patient's  Guero returned to Infusion Services, introduced self. Patient and patient's  deny questions or concerns at this time.

## 2018-08-08 NOTE — PROGRESS NOTES
Patient in for dose 4 of 5 venofer therapy, no new concerns voiced at this time. Patient reports improvement in energy levels/dyspnea since receiving blood transfusion. Venofer administered IVP, via left chest wall port, and well tolerated. 30 minute observation completed without adverse event noted. B/P stable. Appointment confirmed for 8/14/18 at 1400; discharged ambulatory with spouse, in no apparent distress.

## 2018-08-09 NOTE — DISCHARGE INSTRUCTIONS
ACTIVITY: Rest and take it easy for the first 24 hours.  A responsible adult is recommended to remain with you during that time.  It is normal to feel sleepy.  We encourage you to not do anything that requires balance, judgment or coordination.    MILD FLU-LIKE SYMPTOMS ARE NORMAL. YOU MAY EXPERIENCE GENERALIZED MUSCLE ACHES, THROAT IRRITATION, HEADACHE AND/OR SOME NAUSEA.    FOR 24 HOURS DO NOT:  Drive, operate machinery or run household appliances.  Drink beer or alcoholic beverages.   Make important decisions or sign legal documents.    SPECIAL INSTRUCTIONS:     Traore Catheter Care, Adult  A Traore catheter is a soft, flexible tube. This tube is placed into your bladder to drain pee (urine). If you go home with this catheter in place, follow the instructions below.  TAKING CARE OF THE CATHETER  1. Wash your hands with soap and water.  2. Put soap and water on a clean washcloth.  ¨ Clean the skin where the tube goes into your body.  § Clean away from the tube site.  § Never wipe toward the tube.  § Clean the area using a circular motion.  ¨ Remove all the soap. Pat the area dry with a clean towel. For males, reposition the skin that covers the end of the penis (foreskin).  3. Attach the tube to your leg with tape or a leg strap. Do not stretch the tube tight. If you are using tape, remove any stickiness left behind by past tape you used.  4. Keep the drainage bag below your hips. Keep it off the floor.  5. Check your tube during the day. Make sure it is working and draining. Make sure the tube does not curl, twist, or bend.  6. Do not pull on the tube or try to take it out.  TAKING CARE OF THE DRAINAGE BAGS  You will have a large overnight drainage bag and a small leg bag. You may wear the overnight bag any time. Never wear the small bag at night. Follow the directions below.  Emptying the Drainage Bag  Empty your drainage bag when it is  -½ full or at least 2-3 times a day.  1. Wash your hands with soap and  water.  2. Keep the drainage bag below your hips.  3. Hold the dirty bag over the toilet or clean container.  4. Open the pour spout at the bottom of the bag. Empty the pee into the toilet or container. Do not let the pour spout touch anything.  5. Clean the pour spout with a gauze pad or cotton ball that has rubbing alcohol on it.  6. Close the pour spout.  7. Attach the bag to your leg with tape or a leg strap.  8. Wash your hands well.  Changing the Drainage Bag  Change your bag once a month or sooner if it starts to smell or look dirty.   1. Wash your hands with soap and water.  2. Pinch the rubber tube so that pee does not spill out.  3. Disconnect the catheter tube from the drainage tube at the connection valve. Do not let the tubes touch anything.  4. Clean the end of the catheter tube with an alcohol wipe. Clean the end of a the drainage tube with a different alcohol wipe.  5. Connect the catheter tube to the drainage tube of the clean drainage bag.  6. Attach the new bag to the leg with tape or a leg strap. Avoid attaching the new bag too tightly.  7. Wash your hands well.  Cleaning the Drainage Bag  1. Wash your hands with soap and water.  2. Wash the bag in warm, soapy water.  3. Rinse the bag with warm water.  4. Fill the bag with a mixture of white vinegar and water (1 cup vinegar to 1 quart warm water [.2 liter vinegar to 1 liter warm water]). Close the bag and soak it for 30 minutes in the solution.  5. Rinse the bag with warm water.  6. Hang the bag to dry with the pour spout open and hanging downward.  7. Store the clean bag (once it is dry) in a clean plastic bag.  8. Wash your hands well.  PREVENT INFECTION  · Wash your hands before and after touching your tube.  · Take showers every day. Wash the skin where the tube enters your body. Do not take baths. Replace wet leg straps with dry ones, if this applies.  · Do not use powders, sprays, or lotions on the genital area. Only use creams, lotions, or  ointments as told by your doctor.  · For females, wipe from front to back after going to the bathroom.  · Drink enough fluids to keep your pee clear or pale yellow unless you are told not to have too much fluid (fluid restriction).  · Do not let the drainage bag or tubing touch or lie on the floor.  · Wear cotton underwear to keep the area dry.  GET HELP IF:  · Your pee is cloudy or smells unusually bad.  · Your tube becomes clogged.  · You are not draining pee into the bag or your bladder feels full.  · Your tube starts to leak.  GET HELP RIGHT AWAY IF:  · You have pain, puffiness (swelling), redness, or yellowish-white fluid (pus) where the tube enters the body.  · You have pain in the belly (abdomen), legs, lower back, or bladder.  · You have a fever.  · You see blood fill the tube, or your pee is pink or red.  · You feel sick to your stomach (nauseous), throw up (vomit), or have chills.  · Your tube gets pulled out.    DIET: To avoid nausea, slowly advance diet as tolerated, avoiding spicy or greasy foods for the first day.  Add more substantial food to your diet according to your physician's instructions. INCREASE FLUIDS AND FIBER TO AVOID CONSTIPATION.    SURGICAL DRESSING/BATHING:   Follow surgeon's instructions, no tub baths until MD approves    FOLLOW-UP APPOINTMENT:  A follow-up appointment should be arranged with your doctor in 8/13/2018; call to schedule.    You should CALL YOUR PHYSICIAN if you develop:  Fever greater than 101 degrees F.  Pain not relieved by medication, or persistent nausea or vomiting.  Excessive bleeding (blood soaking through dressing) or unexpected drainage from the wound.  Extreme redness or swelling around the incision site, drainage of pus or foul smelling drainage.  Inability to urinate or empty your bladder within 8 hours.  Problems with breathing or chest pain.    You should call 911 if you develop problems with breathing or chest pain.  If you are unable to contact your  doctor or surgical center, you should go to the nearest emergency room or urgent care center.  Physician's telephone #: [phone]    If any questions arise, call your doctor.  If your doctor is not available, please feel free to call the Surgical Center at [phone].  The Center is open Monday through Friday from 7AM to 7PM.  You can also call the HEALTH HOTLINE open 24 hours/day, 7 days/week and speak to a nurse at [phone], or toll free at [phone].    A registered nurse may call you a few days after your surgery to see how you are doing after your procedure.    MEDICATIONS: Resume taking daily medication.  Take prescribed pain medication with food.  If no medication is prescribed, you may take non-aspirin pain medication if needed.  PAIN MEDICATION CAN BE VERY CONSTIPATING.  Take a stool softener or laxative such as senokot, pericolace, or milk of magnesia if needed.    Prescription given for Percocet and Pyridium.  Last pain medication given at 11:54AM.    If your physician has prescribed pain medication that includes Acetaminophen (Tylenol), do not take additional Acetaminophen (Tylenol) while taking the prescribed medication.    Depression / Suicide Risk    As you are discharged from this [facility] facility, it is important to learn how to keep safe from harming yourself.    Recognize the warning signs:  · Abrupt changes in personality, positive or negative- including increase in energy   · Giving away possessions  · Change in eating patterns- significant weight changes-  positive or negative  · Change in sleeping patterns- unable to sleep or sleeping all the time   · Unwillingness or inability to communicate  · Depression  · Unusual sadness, discouragement and loneliness  · Talk of wanting to die  · Neglect of personal appearance   · Rebelliousness- reckless behavior  · Withdrawal from people/activities they love  · Confusion- inability to concentrate     If you or a loved one observes  any of these behaviors or has concerns about self-harm, here's what you can do:  · Talk about it- your feelings and reasons for harming yourself  · Remove any means that you might use to hurt yourself (examples: pills, rope, extension cords, firearm)  · Get professional help from the community (Mental Health, Substance Abuse, psychological counseling)  · Do not be alone:Call your Safe Contact- someone whom you trust who will be there for you.  · Call your local CRISIS HOTLINE 628-6389 or 550-008-5715  · Call your local Children's Mobile Crisis Response Team [place] (371) 491-4388 or www.EnglishUp  · Call the toll free National Suicide Prevention Hotlines   · National Suicide Prevention Lifeline 677-285-ORAI (4787)  · National Hope Line Network 800-SUICIDE (328-4915)

## 2018-08-09 NOTE — OR NURSING
Assumed care of patient from PACU, see flow sheets for vital signs. Patient alert and oriented times four. Assessment completed. Surgical incision assessed, dressing clean, dry and intact. Pain is controlled and tolerable for patient. Patient updated of plan of care for discharge. Family/friend at bedside with patient. Discharge instructions reviewed and all questions answered. All needs met, patient dressed and safe to discharge home.   Patient tolerating fluids and food with no nausea.

## 2018-08-09 NOTE — OR NURSING
Pt AA/Ox4. VSS. Pt reports 3-4/10 pain scale. Pain medications given. Pt denies numbness or tingling. No nausea or vomiting. Intact snow catheter with pale red urine output. Pt going home with snow catheter. Report given to COLLETTE Paul.    Pt via sg was transferred to PACU2 at 1255.

## 2018-08-09 NOTE — OR SURGEON
Immediate Post OP Note    PreOp Diagnosis: bladder cancer    PostOp Diagnosis: same    Procedure(s):  TRANS URETHRAL RESECTION BLADDER (Large >5cm)- Wound Class: Clean Contaminated    Surgeon(s):  Yunior Abdullahi M.D.    Anesthesiologist/Type of Anesthesia:  Anesthesiologist: Chuy Hernandez M.D./General    Surgical Staff:  Circulator: Kezia Tom R.N.  Relief Scrub: Hannah Frausto  Scrub Person: Carly Oro    Specimens removed if any:  Bladder tumor    Estimated Blood Loss: 50ml    Findings: huge tumor burden in bladder    Complications: none    Drain:  22Fr 3-way snow (no CBI now, though)    8/9/2018 11:43 AM Yunior Abdullahi M.D.

## 2018-08-09 NOTE — PROGRESS NOTES
The Medication Reconciliation process has been completed by interviewing the patient    Allergies have been reviewed  Antibiotic use in 30 days - none    Home Pharmacy:  [facility] [address]

## 2018-08-14 NOTE — PROGRESS NOTES
Patient arrived to clinic for #5/5 Venofer.  Denies any acute changes.  Port accessed using sterile technique, no blood return noted.  Multiple attempts tried with 2nd RN attempt.  Order for alteplase obtained.  Prior to instilling alteplase, port flushed again and brisk blood return noted.  Venofer given IV push over 5 minutes per order, pt tolerated well.  Pt monitored for 30 minutes post medication with no s/s reaction noted.  Port flushed, blood return noted, and heparin instilled per protocol.  Gauze/tape applied.  Confirmed next appointment port flush in September and pt ambulated out of clinic in no apparent distress.

## 2018-08-14 NOTE — PROGRESS NOTES
Anticoagulation Summary  As of 8/14/2018    INR goal:   2.0-3.0   TTR:   45.0 % (2.2 y)   Today's INR:   2.2   Warfarin maintenance plan:   3 mg (6 mg x 0.5) every day   Weekly warfarin total:   21 mg   Plan last modified:   DEBBI Shin (6/27/2018)   Next INR check:   8/21/2018   Target end date:   Indefinite    Indications    Deep vein thrombosis (DVT) of both lower extremities (HCC) [I82.403]  Chronic anticoagulation [Z79.01]             Anticoagulation Episode Summary     INR check location:       Preferred lab:       Send INR reminders to:       Comments:         Anticoagulation Care Providers     Provider Role Specialty Phone number    Yimi Martinez M.D. Referring Surgery [phone]    Jael Garcia, CesarD Responsible      [facility] Responsible  [phone]        Anticoagulation Patient Findings  Pt seen at [facility].  INR at goal. Pt is to continue with current warfarin dosing regimen.  Follow up in 5 days, to reduce risk of adverse events related to this high risk medication,  Warfarin.    Jael Garcia, PharmD

## 2018-08-21 NOTE — PROGRESS NOTES
CHIEF COMPLAINT  Chief Complaint   Patient presents with   • Follow-Up     1 mo with labs   Rec UTI    HPI  Patient is a 62 y.o. female patient who presents today for the following     Recurrent UTI  Adenocarcinoma of colon with metastases to liver, colovesical fistula, colostomy in place, met in rectum, DVT, chronic anticoagulation  The patient is 62-year-old, female, with advanced, metastatic adenocarcinoma of the colon, with a bowel complication and recurrent UTI.      Complains of dysuria and urinary frequency, suprapubic discomfort, chills, fatigue (probably multifactorial).   Urinalysis came back positive for resistant Klebsiella pneumonia.     She has not have fever.    She has had close follow-up by oncology department, iron infusions and PRBC transfusion for anemia.    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 10 Tab 0   • warfarin (COUMADIN) 1 MG Tab Take 1 to 2 tablets by mouth daily as directed by the coumadin clinic 60 Tab 1   • cefdinir (OMNICEF) 300 MG Cap      • losartan (COZAAR) 25 MG Tab Take 12.5 mg by mouth 1 time daily as needed  (>140 BP).     • phenazopyridine (PYRIDIUM) 200 MG Tab Take 1 Tab by mouth 3 times a day as needed for Moderate Pain. 12 Tab 0   • traZODone (DESYREL) 100 MG Tab Take 1 tab HS PO 90 Tab 0   • Trifluridine-Tipiracil (LONSURF) 20-8.19 MG Tab Take 60 mg by mouth 2 Times a Day. d1-5 and d 8-15 every 4 weeks (Patient taking differently: Take 60 mg by mouth 2 Times a Day. Takes days 1-5 and d 8-15 every 4 weeks) 60 Tab 1   • ondansetron (ZOFRAN) 4 MG Tab tablet Take 1 Tab by mouth every four hours as needed for Nausea/Vomiting. 40 Tab 1   • ferrous sulfate 325 (65 Fe) MG EC tablet Take 1 Tab by mouth 2 Times a Day. 180 Tab 1   • vitamin D (CHOLECALCIFEROL) 1000 UNIT Tab Take 1,000 Units by mouth every day.       No current facility-administered medications for this visit.      ALLERGIES  Allergies: Patient has no known allergies.  PAST MEDICAL HISTORY  Past Medical History:   Diagnosis Date   • CVF (colovesical fistula) 8/5/2018   • Blood clotting disorder (HCC) 4-    left leg   • Cancer (HCC) 2016    liver, bladder, colon   • Blood loss anemia 2016   • Bowel habit changes     has colostomy left sided   • Essential hypertension     been running so low, patientonly takes medication if over 140 now   • Obesity    • Urinary bladder disorder     1/2 bladder due to cancer surgery     SURGICAL HISTORY  She  has a past surgical history that includes pr excision of tonsil tags; cystoscopy (3/17/2016); trans urethral resection bladder (3/17/2016); colonoscopy - endo (N/A, 3/22/2016); recovery (3/21/2016); cystectomy (4/18/2016); colostomy (Left, 4/19/2016); cath placement (Left, 8/9/2016); exploratory laparotomy (4/17/2016); exploratory laparotomy (4/18/2016); and trans urethral resection bladder (8/9/2018).  SOCIAL HISTORY  Social History   Substance Use Topics   • Smoking status: Never Smoker   • Smokeless tobacco: Never Used   • Alcohol use No     Social History     Social History Narrative    Lives with .      "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     FAMILY HISTORY  Family History   Problem Relation Age of Onset   • Diabetes Mother         prediabetes   • Heart Disease Mother    • Hypertension Mother    • Stroke Maternal Grandmother    • Cancer Neg Hx    • Hyperlipidemia Neg Hx    • Psychiatry Neg Hx    • Thyroid Neg Hx      Family Status   Relation Status   • Mo (Not Specified)   • MGMo (Not Specified)   • Neg Hx (Not Specified)     ROS   Constitutional: Negative for fever; has chills.  HENT: Negative for congestion.  Eyes: Negative for blurred vision.   Respiratory: Negative for cough.  Cardiovascular: Negative for chest pain, palpitations.   Gastrointestinal: Negative for heartburn.  Genitourinary: As above.  Musculoskeletal: Negative for muscle pain. Has bone pain.  Skin: Negative for rash.   Neuro: Negative for dizziness headaches.   Endo/Heme/Allergies: Does not bruise/bleed easily.   Psychiatric/Behavioral: Negative for depression.    PHYSICAL EXAM   Blood pressure 112/68, pulse (!) 118, temperature (!) 35.7 °C (96.2 °F), height 1.515 m (4' 11.65\"), weight 96.3 kg (212 lb 4.9 oz), SpO2 97 %. Body mass index is 41.96 kg/m².  General:  NAD, well appearing  HEENT:   NC/AT, PERRLA, EOMI, TMs are clear. Oropharyngeal mucosa is pink,  without lesions;  no cervical / supraclavicular  lymphadenopathy, no thyromegaly.    Cardiovascular: RRR.   No m/r/g. No carotid bruits .      Lungs:   CTAB, no w/r/r, no respiratory distress.  Abdomen: Soft, NT/ND + BS; firm.  Extremities:  2+ DP and radial pulses bilaterally.  No c/c/e.   Skin:  Warm, dry.  No erythema. No rash.   Neurologic: Alert & oriented x 3.  No focal deficits.  Psychiatric:  Affect normal, mood normal, judgment normal.    LABS     Labs are reviewed and discussed with a patient  Lab Results   Component Value Date/Time    TRIGLYCERIDE 146 04/20/2016 04:15 AM       Lab Results   Component Value Date/Time    SODIUM 137 07/31/2018 02:20 PM    POTASSIUM " 4.0 07/31/2018 02:20 PM    CHLORIDE 105 07/31/2018 02:20 PM    CO2 20 07/31/2018 02:20 PM    GLUCOSE 195 (H) 07/31/2018 02:20 PM    BUN 21 07/31/2018 02:20 PM    CREATININE 0.73 07/31/2018 02:20 PM     Lab Results   Component Value Date/Time    ALKPHOSPHAT 444 (H) 07/31/2018 02:20 PM    ASTSGOT 36 07/31/2018 02:20 PM    ALTSGPT 18 07/31/2018 02:20 PM    TBILIRUBIN 0.6 07/31/2018 02:20 PM      Lab Results   Component Value Date/Time    HBA1C 6.1 08/24/2017 12:01 PM    HBA1C 6.0 05/18/2017    HBA1C 6.1 02/07/2017       Lab Results   Component Value Date/Time    WBC 4.0 (L) 08/09/2018 09:05 AM    RBC 3.29 (L) 08/09/2018 09:05 AM    HEMOGLOBIN 9.7 (L) 08/09/2018 09:05 AM    HEMATOCRIT 30.8 (L) 08/09/2018 09:05 AM    MCV 93.6 08/09/2018 09:05 AM    MCH 29.5 08/09/2018 09:05 AM    MCHC 31.5 (L) 08/09/2018 09:05 AM    MPV 9.2 08/09/2018 09:05 AM    NEUTSPOLYS 80.50 (H) 07/31/2018 02:20 PM    LYMPHOCYTES 12.10 (L) 07/31/2018 02:20 PM    MONOCYTES 5.70 07/31/2018 02:20 PM    EOSINOPHILS 0.40 07/31/2018 02:20 PM    BASOPHILS 0.20 07/31/2018 02:20 PM    HYPOCHROMIA 1+ 07/16/2018 12:13 PM    ANISOCYTOSIS 1+ 07/31/2018 02:20 PM      UA   Ref. Range 8/17/2018 13:03   Color Unknown Red   Character Unknown Cloudy (A)   Specific Gravity Latest Ref Range: <1.035  1.025   Ph Latest Ref Range: 5.0 - 8.0  6.5   Glucose Latest Ref Range: Negative mg/dL Negative   Ketones Latest Ref Range: Negative mg/dL Trace (A)   Occult Blood Latest Ref Range: Negative  Large (A)   Protein Latest Ref Range: Negative mg/dL >=300 (A)   Nitrite Latest Ref Range: Negative  Positive (A)   Leukocyte Esterase Latest Ref Range: Negative  Large (A)   Micro Urine Req Unknown Microscopic   WBC Latest Units: /hpf Packed (A)   RBC Latest Units: /hpf 20-50 (A)   Epithelial Cells Latest Units: /hpf Few   Bacteria Latest Ref Range: None /hpf Many (A)   Amorphous Crystal Latest Units: /hpf Present     Klebsiella pneumoniae ESBL   >100,000 cfu/mL   Extended Spectrum  Beta-lactamase (ESBL) isolated.   ESBL's may be clinically resistant to therapy with   Penicillins,Cephalosporins or Aztreonam despite   apparent in vitro susceptibility to some of these agents.   The patient requires contact isolation.   Please contact pharmacy or an Infectious Disease Specialist   if you have any questions about appropriate therapy.     KLEBSIELLA PNEUMONIAE ESBL   Antibiotic Sensitivity Microscan Unit Status   Ampicillin Resistant >16 mcg/mL Final   Cefepime Resistant >16 mcg/mL Final   Cefotaxime Extended Spectrum Beta Lactamase >32 mcg/mL Final   Cefotetan Sensitive <=16 mcg/mL Final   Ceftazidime Extended Spectrum Beta Lactamase 16 mcg/mL Final   Ceftriaxone Extended Spectrum Beta Lactamase >32 mcg/mL Final   Cefuroxime Resistant >16 mcg/mL Final   Cephalothin Resistant >16 mcg/mL Final   Ciprofloxacin Resistant >2 mcg/mL Final   Gentamicin Resistant >8 mcg/mL Final   Levofloxacin Sensitive <=2 mcg/mL Final   Nitrofurantoin Intermediate 64 mcg/mL Final   Pip/Tazobactam Sensitive <=16 mcg/mL Final   Piperacillin Resistant >64 mcg/mL Final   Tigecycline Sensitive <=2 mcg/mL Final   Tobramycin Resistant >8 mcg/mL Final   Trimeth/Sulfa Resistant >2/38 mcg/mL Final     IMAGING     None    ASSESMENT AND PLAN        1. Recurrent UTI  Advised to have good fluid intake  - levoFLOXacin (LEVAQUIN) 500 MG tablet; Take 1 Tab by mouth every day.  Dispense: 10 Tab; Refill: 0    2. Adenocarcinoma of colon metastatic to liver (HCC)  3. CVF (colovesical fistula)  4. Colostomy in place (HCC)  5. 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 Chronic deep vein thrombosis (DVT) of distal vein of both lower extremities (HCC)  Continue oncology follow-up.    Counseling:   - Smoking:  Nonsmoker    Followup: Return in about 2 months (around 10/21/2018) for Short.    All questions are answered.    Please note that this dictation was created using voice recognition software, and that there might be  errors of butch and possibly content.

## 2018-08-21 NOTE — PROGRESS NOTES
OP Anticoagulation Service Note    Date: 8/21/2018  There were no vitals filed for this visit.   pt declined vitals, seeing Dr. Gaytan after visit today    Anticoagulation Summary  As of 8/21/2018    INR goal:   2.0-3.0   TTR:   45.0 % (2.2 y)   Today's INR:   >8.0!   Warfarin maintenance plan:   1 mg (1 mg x 1) every day   Weekly warfarin total:   7 mg   Plan last modified:   Cesar ColinD (8/21/2018)   Next INR check:   8/27/2018   Target end date:   Indefinite    Indications    Deep vein thrombosis (DVT) of both lower extremities (HCC) [I82.403]  Chronic anticoagulation [Z79.01]             Anticoagulation Episode Summary     INR check location:       Preferred lab:       Send INR reminders to:       Comments:         Anticoagulation Care Providers     Provider Role Specialty Phone number    Yimi Martinze M.D. Referring Surgery [phone]    Cesar SchofieldD Responsible      [facility] Responsible  [phone]        Anticoagulation Patient Findings      HPI:   Tamara Flynn seen in clinic today, on anticoagulation therapy with warfarin (a high risk medication) for hx of DVT     Pt is here today to evaluate anticoagulation therapy    Previous INR was  2.2 on 8-    Pt was instructed to Continue current warfarin regimen       Confirmed warfarin dosing regimen, denies missed or extra doses of coumadin.   Diet has been consistent with foods rich in vitamin K: NO poor appetite since starting new chemo treatment Lonsurf  Changes in ETOH:  No  Changes in smoking status: No  Changes in medication: Yes, pt started Lonsurf for treatment of cancer which has severely suppressed appetite  Cost restriction: No  S/s of bleeding:  No  Signs/symptoms  thrombosis since the last appt: No    A/P   INR  SUPRA-therapeutic today, will require dose adjust ment today to prevent bleeding complications and closer follow up.   NO warfarin x 4 days then begin new regimen of 1 mg daily.   Sent in new rx for warfarin 1 mg tablets for pt to , she is to stop using 6 mg tablets     Pt would be good candidate for Eliquis, asked her to discuss at her upcoming appt with Latanya on 8/29/2018 6/2019 check referral    Pt educated to contact our clinic with any changes in medications or s/s of bleeding or thrombosis. Pt is aware to seek immediate medical attention for falls, head injury or deep cuts    Follow up appointment in 6(s) as soonest pt is willing to return and to reduce risk of adverse events from warfarin   Zari Villarreal, PharmD

## 2018-08-27 NOTE — PROGRESS NOTES
Anticoagulation Summary  As of 8/27/2018    INR goal:   2.0-3.0   TTR:   --   Today's INR:   1.3!   Warfarin maintenance plan:   1 mg (1 mg x 1) every day   Weekly warfarin total:   7 mg   Plan last modified:   Zari Villarreal, PharmD (8/21/2018)   Next INR check:   9/4/2018   Target end date:   Indefinite    Indications    Deep vein thrombosis (DVT) of both lower extremities (HCC) [I82.403]  Chronic anticoagulation [Z79.01]             Anticoagulation Episode Summary     INR check location:       Preferred lab:       Send INR reminders to:       Comments:         Anticoagulation Care Providers     Provider Role Specialty Phone number    Yimi Martinez M.D. Referring Surgery [phone]    Cesar SchofieldD Responsible      [facility] Responsible  [phone]        Anticoagulation Patient Findings    HPI:  Tamara presents for anticoagulation management. She takes warfarin for a history of DVT. She has been holding her warfarin since last Wednesday due to supratherapeutic INR and a new course of antibiotics. No current signs of bleeding or thrombosis. No interval diet changes.     A:  Subtherapeutic INR    P:  Start warfarin 1 mg daily. Seeing Dr. Pelaez on Wednesday. May be switching to Eliquis. Follow up INR in one week if still taking warfarin.    Tad Perkins, PharmD

## 2018-08-29 PROBLEM — A49.9 ESBL (EXTENDED SPECTRUM BETA-LACTAMASE) PRODUCING BACTERIA INFECTION: Status: ACTIVE | Noted: 2018-01-01

## 2018-08-29 PROBLEM — D63.8 ANEMIA OF CHRONIC DISEASE: Status: ACTIVE | Noted: 2018-01-01

## 2018-08-29 PROBLEM — R74.8 ALKALINE PHOSPHATASE ELEVATION: Status: ACTIVE | Noted: 2018-01-01

## 2018-08-29 PROBLEM — Z16.12 ESBL (EXTENDED SPECTRUM BETA-LACTAMASE) PRODUCING BACTERIA INFECTION: Status: ACTIVE | Noted: 2018-01-01

## 2018-08-29 PROBLEM — E87.1 HYPONATREMIA: Status: ACTIVE | Noted: 2018-01-01

## 2018-08-29 NOTE — H&P
[facility] Medicine History & Physical Note    Date of Service  8/29/2018    Primary Care Physician  Maritza Esparza M.D.    Consultants  Infectious diseases    Code Status  DNR    Chief Complaint  Abdominal pain    History of Presenting Illness  62 y.o. female who presented 8/29/2018 with metastatic colon cancer that's metastatic to the liver as well as invasion locally into the bladder. The patient in 2016 had a colostomy in the vascular port placed by Dr. Martinez. The patient since then has been under treatment of Dr. Pelaez for oncological treatment. As of late the patient is noticed that she has more generalized weakness and thus the care physician's office referred her to the oncologist who did a urinalysis and a urine culture. The patient's urine cultures come back with ESBL UTI. The patient will be at this point admitted for IV ertapenem. We will arrange for outpatient IV ertapenem starting on Friday to home health services. I'm going to have infectious diseases see the patient. Anticipation is that the patient will be on IV ertapenem for a 2 week course.    Review of Systems  Review of Systems   Constitutional: Positive for weight loss. Negative for chills, diaphoresis and fever.   HENT: Negative.    Eyes: Negative.  Negative for double vision.   Respiratory: Negative.  Negative for cough, hemoptysis and wheezing.    Cardiovascular: Negative.  Negative for chest pain, palpitations and leg swelling.   Gastrointestinal: Positive for abdominal pain. Negative for blood in stool, constipation, diarrhea, heartburn, nausea and vomiting.   Genitourinary: Negative.  Negative for frequency, hematuria and urgency.   Musculoskeletal: Positive for back pain. Negative for joint pain.   Skin: Negative.  Negative for itching and rash.   Neurological: Positive for weakness. Negative for dizziness, focal weakness, seizures, loss of consciousness and headaches.   Endo/Heme/Allergies: Negative.  Does not bruise/bleed  easily.   Psychiatric/Behavioral: Negative.  Negative for suicidal ideas. The patient is not nervous/anxious.    All other systems reviewed and are negative.      Past Medical History   has a past medical history of Blood clotting disorder (HCC) (4-); Blood loss anemia (2016); Bowel habit changes; Cancer (HCC) (2016); CVF (colovesical fistula) (8/5/2018); Essential hypertension; Obesity; and Urinary bladder disorder.    Surgical History   has a past surgical history that includes pr excision of tonsil tags; cystoscopy (3/17/2016); trans urethral resection bladder (3/17/2016); colonoscopy - endo (N/A, 3/22/2016); recovery (3/21/2016); cystectomy (4/18/2016); colostomy (Left, 4/19/2016); cath placement (Left, 8/9/2016); exploratory laparotomy (4/17/2016); exploratory laparotomy (4/18/2016); and trans urethral resection bladder (8/9/2018).     Family History  family history includes Diabetes in her mother; Heart Disease in her mother; Hypertension in her mother; Stroke in her maternal grandmother.     Social History   reports that she has never smoked. She has never used smokeless tobacco. She reports that she does not drink alcohol or use drugs.    Allergies  No Known Allergies    Medications  Prior to Admission Medications   Prescriptions Last Dose Informant Patient Reported? Taking?   Trifluridine-Tipiracil (LONSURF) 20-8.19 MG Tab 8/29/2018 Patient No No   Sig: Take 60 mg by mouth 2 Times a Day. d1-5 and d 8-15 every 4 weeks   Patient taking differently: Take 60 mg by mouth 2 Times a Day. Takes days 1-5 and d 8-15 every 4 weeks   cefdinir (OMNICEF) 300 MG Cap 8/29/2018  Yes No   ferrous sulfate 325 (65 Fe) MG EC tablet 8/29/2018 at pm Patient No No   Sig: Take 1 Tab by mouth 2 Times a Day.   levoFLOXacin (LEVAQUIN) 500 MG tablet 8/29/2018  No No   Sig: Take 1 Tab by mouth every day.   losartan (COZAAR) 25 MG Tab 8/29/2018 at unk Patient Yes No   Sig: Take 12.5 mg by mouth 1 time daily as needed (>140 BP).    ondansetron (ZOFRAN) 4 MG Tab tablet 8/29/2018 at unk Patient No No   Sig: Take 1 Tab by mouth every four hours as needed for Nausea/Vomiting.   phenazopyridine (PYRIDIUM) 200 MG Tab 8/29/2018  No No   Sig: Take 1 Tab by mouth 3 times a day as needed for Moderate Pain.   traZODone (DESYREL) 100 MG Tab 8/29/2018 at pm Patient No No   Sig: Take 1 tab HS PO   vitamin D (CHOLECALCIFEROL) 1000 UNIT Tab 8/29/2018 at am Patient Yes No   Sig: Take 1,000 Units by mouth every day.   warfarin (COUMADIN) 1 MG Tab 8/29/2018  No No   Sig: Take 1 to 2 tablets by mouth daily as directed by the coumadin clinic      Facility-Administered Medications: None       Physical Exam  Blood Pressure: 123/71   Temperature: 36.4 °C (97.5 °F)   Pulse: (!) 109   Respiration: 18         Physical Exam   Constitutional: She is oriented to person, place, and time. She appears well-developed and well-nourished.   HENT:   Head: Normocephalic and atraumatic.   Right Ear: External ear normal.   Left Ear: External ear normal.   Nose: Nose normal.   Mouth/Throat: Oropharynx is clear and moist.   Eyes: Pupils are equal, round, and reactive to light. Conjunctivae and EOM are normal.   Neck: Normal range of motion. Neck supple. No JVD present. No thyromegaly present.   Cardiovascular: Normal rate and regular rhythm.    No murmur heard.  Pulmonary/Chest: Effort normal and breath sounds normal. She has no wheezes. She has no rales. She exhibits no tenderness.   Abdominal: She exhibits no distension and no mass. There is tenderness in the suprapubic area. There is no rebound and no guarding.       Musculoskeletal: Normal range of motion. She exhibits no edema or tenderness.   Lymphadenopathy:     She has no cervical adenopathy.   Neurological: She is alert and oriented to person, place, and time. She has normal reflexes. No cranial nerve deficit.   Skin: Skin is warm and dry. No rash noted. No erythema.   Psychiatric: She has a normal mood and affect. Her  behavior is normal. Judgment and thought content normal.   Nursing note and vitals reviewed.      Laboratory:  Recent Labs      08/27/18   1507   WBC  4.6*   RBC  2.88*   HEMOGLOBIN  8.8*   HEMATOCRIT  27.8*   MCV  96.5   MCH  30.6   MCHC  31.7*   RDW  94.0*   PLATELETCT  255   MPV  9.9     Recent Labs      08/27/18   1507   SODIUM  131*   POTASSIUM  3.8   CHLORIDE  101   CO2  16*   GLUCOSE  152*   BUN  18   CREATININE  0.59   CALCIUM  7.8*     Recent Labs      08/27/18   1507   ALTSGPT  34   ASTSGOT  91*   ALKPHOSPHAT  917*   TBILIRUBIN  1.1   GLUCOSE  152*     Recent Labs      08/27/18   1528   INR  1.3             No results found for: TROPONINI    Urinalysis:    No results found     Imaging:  No orders to display         Assessment/Plan:  I anticipate this patient is appropriate for observation status at this time.    * ESBL (extended spectrum beta-lactamase) producing bacteria infection   Assessment & Plan    Patient has a urinary tract infection with E. coli that is ESBL. The patient will need 14 days of IV ertapenem. The patient does have a port in place in the 1st dose of IV ertapenem will be given today. Plan is then to discharge the patient after infectious disease the patient to the home setting with outpatient infusion services.        Adenocarcinoma of colon metastatic to liver (HCC)- (present on admission)   Assessment & Plan    Patient currently is under care of         Alkaline phosphatase elevation   Assessment & Plan    Secondary to the liver metastatic is alkaline phosphatase is elevated.        Colostomy in place (HCC)- (present on admission)   Assessment & Plan    Colostomy at this point is functioning well. She does have a parastomal hernia.        Hyponatremia   Assessment & Plan    Secondary to SIADH paraneoplastic process.        Anemia of chronic disease   Assessment & Plan    Monitor H&H of drops below 7 or 21 or if the patient becomes unstable transfuse.        IFG (impaired  fasting glucose)- (present on admission)   Assessment & Plan    Monitor at this point blood sugars.        Deep vein thrombosis (DVT) of both lower extremities (HCC)- (present on admission)   Assessment & Plan    Continue with Coumadin per pharmacy protocol        Morbid obesity with BMI of 40.0-44.9, adult (HCC)- (present on admission)   Assessment & Plan    The patient at this point needs outpatient weight loss management program.            VTE prophylaxis: Heparin

## 2018-08-29 NOTE — PROGRESS NOTES
Patient up on the floor. Patient have Port not accessed. Waiting for MD to see the patient. Call light with in reach.

## 2018-08-29 NOTE — PROGRESS NOTES
Date of visit: 8/29/2018  10:03 AM      Chief Complaint- Refractory Metastatic colon carcinoma. FLT3 mutated KRAS wild type ,HERNÁN       Identification/Prior relevant history:   3/2016-Widely metastatic sigmoid colon cancer with invasion into the bladder resulting in colovesicle fistula and liver mets s/p bladder and sigmoid biopsies consistent with adenocarcinoma of the colon. CEA elevated    - emergent surgery  TUR 3/17/16 removal of large mass showed metastatic adenocarcinoma, [facility] could not differentiate bladder versus colon .  s/p Exploratory laparotomy with small bowel resection and sigmoid colectomy, cystectomy on 4/17/16 showed 5.0 cm lowgrade adenocarcinoma, margins uninvolved, lymphovascular invasion present, extensive, 11/13 lymph nodes positive, tumor directly invading the adjacent structures bladder and soft tissues, [id number]      8/15/16 - started FOLFOX chemotherapy under Dr Carvajal.  CT Scan on 12/20/16 showed interval enlargement of the peritoneal implant anterior to the gastric antrum. Enlarging left external iliac and the pelvic sidewall adenopathy. Improvement in hepatic metastasis.  PET/CT scan on 2/3/17 liver masses in segment 5 and 6 are identified measuring up to 1 cm and 4 cm with activity of 9.4. Left hypogastric and external iliac adenopathy is unchanged with an SUV 7.6 largest 3.3 cm . Ovalshaped soft tissue mass anterior to the antrum of the stomach was identified measuring 2.5×2.0 cm with an SUV of 4.5. Mildly enlarged lymph nodes in the mediastinum are noted .       CTguided peritoneal biopsy on 1/25/17 showed metastatic adenocarcinoma in the fibroadipose tissue  3/2017 FOLFIRI and Vectibix -required dose modification    CT scan on 5/24/17 showed response to treatment, also CEA declining   .  Foundation analysis-         CT scan 8/18/17 showed slight enlargement of the liver lesion and calcified mass in the urinary bladder also increased . CEA plateauted   CT scan on 12/3/17 showed disease progression.   Discussed with Dr. Tomas Partida at [facility]  No clinical trials available for this patient. -Started Regorafenib 160 mg   -CT scan on 4/9/18 showed disease progression Stopped Regorafenib on 4/16/18.  -Further discussion regarding Lonsurf vs hospice.  -4/16/18-hospice recommended.    Interim history-  7/9/18-established care with me  -She wanted to try Lonsurf. Dosed at 60 mg. She tolerated the first cycle well.  -7/12/18-CT scan-Interval progression of disease with worsening adenopathy and hepatic metastatic lesions.  2.  Large mass in the pelvis has increased in size in the interim and appears to invade the bladder. 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  3.  Right lower quadrant ostomy with a large parastomal hernia     -Unfortunately continues to have recurrent UTIs  -Received PRBCs and venofer for symptomatic anemia.  -8/9/18-status post TURBT . Significant amount of tumor burden involving the bladder. Not completely excised due to risk of bleeding and perforation.  -Unfortunately continues to have recurrent UTI symptoms. Last culture grew ESBL  . She is having significant dysuria and passing tumor type tissue. She feels weak and dizzy and lightheaded.  Finished C#2 of Lonsurf, so far good tolerance      Past Medical History:      Past Medical History:   Diagnosis Date   • Blood clotting disorder (HCC) 4-    left leg   • Blood loss anemia 2016   • Bowel habit changes     has colostomy left sided   • Cancer (HCC) 2016    liver, bladder, colon   • CVF (colovesical fistula) 8/5/2018   • Essential hypertension     been running so low, patientonly takes medication if over 140 now   • Obesity    • Urinary bladder disorder     1/2 bladder due to cancer surgery       Past surgical history:       Past Surgical History:   Procedure Laterality Date   • TRANS URETHRAL RESECTION BLADDER  8/9/2018    Procedure: TRANS URETHRAL RESECTION BLADDER;  Surgeon:  Yunior Abdullahi M.D.;  Location: SURGERY [facility];  Service: Urology   • CATH PLACEMENT Left 8/9/2016    Procedure: CATH PLACEMENT power port ;  Surgeon: Yimi Martinez M.D.;  Location: SURGERY [facility];  Service:    • COLOSTOMY Left 4/19/2016    Procedure: COLOSTOMY;  Surgeon: Yimi Martinez M.D.;  Location: SURGERY [facility];  Service:    • CYSTECTOMY  4/18/2016    Procedure: CYSTECTOMY;  Surgeon: Yunior Abdullahi M.D.;  Location: SURGERY [facility];  Service:    • EXPLORATORY LAPAROTOMY  4/18/2016    Procedure: EXPLORATORY LAPAROTOMY;  Surgeon: Yimi Martinez M.D.;  Location: SURGERY [facility];  Service:    • EXPLORATORY LAPAROTOMY  4/17/2016    Procedure: EXPLORATORY LAPAROTOMY-illeostomy creation ;  Surgeon: Yimi Martinez M.D.;  Location: SURGERY [facility];  Service:    • COLONOSCOPY - ENDO N/A 3/22/2016    Procedure: COLONOSCOPY - ENDO;  Surgeon: Yimi Martinez M.D.;  Location: ENDOSCOPY [facility];  Service:    • RECOVERY  3/21/2016    Procedure: CT-CT Guided liver biopsy-Dr.Michael Martinez;  Surgeon: Recoveryonly Surgery;  [place] [facility] [place]  [place]:    • CYSTOSCOPY  3/17/2016    Procedure: CYSTOSCOPY;  Surgeon: Yunior Abdullahi M.D.;  Location: SURGERY [facility];  Service:    • TRANS URETHRAL RESECTION BLADDER  3/17/2016    Procedure: TRANS URETHRAL RESECTION BLADDER Tumor;  Surgeon: Yunior Abdullahi M.D.;  Location: SURGERY [facility];  Service:    • PB EXCISION OF TONSIL TAGS         Allergies:       Patient has no known allergies.    Medications:         Current Outpatient Prescriptions   Medication Sig Dispense Refill   • warfarin (COUMADIN) 1 MG Tab Take 1 to 2 tablets by mouth daily as directed by the coumadin clinic 60 Tab 1   • levoFLOXacin (LEVAQUIN) 500 MG tablet Take 1 Tab by mouth every day. 10 Tab 0   • cefdinir (OMNICEF) 300 MG Cap      • losartan (COZAAR) 25 MG Tab Take 12.5 mg by mouth 1 time daily as  needed (>140 BP).     • phenazopyridine (PYRIDIUM) 200 MG Tab Take 1 Tab by mouth 3 times a day as needed for Moderate Pain. 12 Tab 0   • traZODone (DESYREL) 100 MG Tab Take 1 tab HS PO 90 Tab 0   • Trifluridine-Tipiracil (LONSURF) 20-8.19 MG Tab Take 60 mg by mouth 2 Times a Day. d1-5 and d 8-15 every 4 weeks (Patient taking differently: Take 60 mg by mouth 2 Times a Day. Takes days 1-5 and d 8-15 every 4 weeks) 60 Tab 1   • ondansetron (ZOFRAN) 4 MG Tab tablet Take 1 Tab by mouth every four hours as needed for Nausea/Vomiting. 40 Tab 1   • ferrous sulfate 325 (65 Fe) MG EC tablet Take 1 Tab by mouth 2 Times a Day. 180 Tab 1   • vitamin D (CHOLECALCIFEROL) 1000 UNIT Tab Take 1,000 Units by mouth every day.       No current facility-administered medications for this visit.          Social History:     Social History     Social History   • Marital status:      Spouse name: N/A   • Number of children: N/A   • Years of education: N/A     Occupational History   • Not on file.     Social History Main Topics   • Smoking status: Never Smoker   • Smokeless tobacco: Never Used   • Alcohol use No   • Drug use: No   • Sexual activity: Yes     Partners: Male     Other Topics Concern   • Not on file     Social History Narrative    Lives with .     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       Family History:      Family History   Problem Relation Age of Onset   • Diabetes Mother         prediabetes   • Heart Disease Mother    • Hypertension Mother    • Stroke Maternal Grandmother    • Cancer Neg Hx    • Hyperlipidemia Neg Hx    • Psychiatry Neg Hx    • Thyroid Neg Hx        Review of Systems:  All other review of systems are negative except what was mentioned above in the HPI.    Constitutional: Negative for fever, chills, weight loss and positive for dizziness and malaise/fatigue.    HEENT: No new auditory or visual complaints. No sore throat and neck pain.     Respiratory: Negative for cough, sputum  "production, shortness of breath and wheezing.    Cardiovascular: Negative for chest pain, palpitations, orthopnea and leg swelling.    Gastrointestinal: Negative for heartburn, nausea, vomiting and periodic abdominal pain.    Genitourinary: Positive for significant dysuria, hematuria    Musculoskeletal: No new arthralgias or myalgias   Skin: Negative for rash and itching.    Neurological: Negative for focal weakness and headaches.    Endo/Heme/Allergies: No abnormal bleed/bruise.    Psychiatric/Behavioral: No new depression/anxiety.    Physical Exam:  Vitals: BP (!) 92/74   Pulse (!) 106   Temp 36.4 °C (97.6 °F)   Resp 18   Ht 1.499 m (4' 11\")   Wt 94.9 kg (209 lb 3.5 oz)   SpO2 98%   BMI 42.26 kg/m²     General: Not in acute distress, alert and oriented x 3  HEENT: No pallor, icterus. Oropharynx clear.   Neck: Supple, no palpable masses.  Lymph nodes: No palpable cervical, supraclavicular, axillary or inguinal lymphadenopathy.    CVS: regular rate and rhythm, no rubs or gallops  RESP: Clear to auscultate bilaterally, no wheezing or crackles.   ABD: Soft, non tender, non distended, positive bowel sounds, right upper quadrant fullness from hepatomegaly. Ostomy in place  EXT: No edema or cyanosis  CNS: Alert and oriented x3, No focal deficits.  Skin- No rash      Labs:   Hospital Outpatient Visit on 08/27/2018   Component Date Value Ref Range Status   • WBC 08/27/2018 4.6* 4.8 - 10.8 K/uL Final   • RBC 08/27/2018 2.88* 4.20 - 5.40 M/uL Final   • Hemoglobin 08/27/2018 8.8* 12.0 - 16.0 g/dL Final   • Hematocrit 08/27/2018 27.8* 37.0 - 47.0 % Final   • MCV 08/27/2018 96.5  81.4 - 97.8 fL Final   • MCH 08/27/2018 30.6  27.0 - 33.0 pg Final   • MCHC 08/27/2018 31.7* 33.6 - 35.0 g/dL Final   • RDW 08/27/2018 94.0* 35.9 - 50.0 fL Final   • Platelet Count 08/27/2018 255  164 - 446 K/uL Final   • MPV 08/27/2018 9.9  9.0 - 12.9 fL Final   • Nucleated RBC 08/27/2018 1.10  /100 WBC Final   • NRBC (Absolute) 08/27/2018 0.05 "  K/uL Final   • Neutrophils-Polys 08/27/2018 94.60* 44.00 - 72.00 % Final   • Lymphocytes 08/27/2018 3.60* 22.00 - 41.00 % Final   • Monocytes 08/27/2018 1.80  0.00 - 13.40 % Final   • Eosinophils 08/27/2018 0.00  0.00 - 6.90 % Final   • Basophils 08/27/2018 0.00  0.00 - 1.80 % Final   • Neutrophils (Absolute) 08/27/2018 4.35  2.00 - 7.15 K/uL Final    Includes immature neutrophils, if present.   • Lymphs (Absolute) 08/27/2018 0.17* 1.00 - 4.80 K/uL Final   • Monos (Absolute) 08/27/2018 0.08  0.00 - 0.85 K/uL Final   • Eos (Absolute) 08/27/2018 0.00  0.00 - 0.51 K/uL Final   • Baso (Absolute) 08/27/2018 0.00  0.00 - 0.12 K/uL Final   • Hypochromia 08/27/2018 1+   Final   • Anisocytosis 08/27/2018 2+   Final   • Macrocytosis 08/27/2018 2+   Final   • Microcytosis 08/27/2018 1+   Final   • Sodium 08/27/2018 131* 135 - 145 mmol/L Final   • Potassium 08/27/2018 3.8  3.6 - 5.5 mmol/L Final   • Chloride 08/27/2018 101  96 - 112 mmol/L Final   • Co2 08/27/2018 16* 20 - 33 mmol/L Final   • Anion Gap 08/27/2018 14.0* 0.0 - 11.9 Final   • Glucose 08/27/2018 152* 65 - 99 mg/dL Final   • Bun 08/27/2018 18  8 - 22 mg/dL Final   • Creatinine 08/27/2018 0.59  0.50 - 1.40 mg/dL Final   • Calcium 08/27/2018 7.8* 8.5 - 10.5 mg/dL Final   • AST(SGOT) 08/27/2018 91* 12 - 45 U/L Final   • ALT(SGPT) 08/27/2018 34  2 - 50 U/L Final   • Alkaline Phosphatase 08/27/2018 917* 30 - 99 U/L Final   • Total Bilirubin 08/27/2018 1.1  0.1 - 1.5 mg/dL Final   • Albumin 08/27/2018 2.6* 3.2 - 4.9 g/dL Final   • Total Protein 08/27/2018 5.3* 6.0 - 8.2 g/dL Final   • Globulin 08/27/2018 2.7  1.9 - 3.5 g/dL Final   • A-G Ratio 08/27/2018 1.0  g/dL Final   • GFR If  08/27/2018 >60  >60 mL/min/1.73 m 2 Final   • GFR If Non  08/27/2018 >60  >60 mL/min/1.73 m 2 Final   • Manual Diff Status 08/27/2018 PERFORMED   Final   • Peripheral Smear Review 08/27/2018 see below   Final    Comment: Due to instrument suspect flags, further  review of peripheral smear is  indicated on this patient sample. This review may or may not result in  abnormal findings.     • Plt Estimation 08/27/2018 Normal   Final   • RBC Morphology 08/27/2018 Present   Final    WBC: Hypersegmented Neutrophils seen on slide review   • Hypersegmented Poly 08/27/2018 Few   Final   Anticoagulation Visit on 08/27/2018   Component Date Value Ref Range Status   • INR 08/27/2018 1.3   Final    Lot: [id number] Exp: 9/30/19 IC Valid             Assessment and Plan:  Refractory Metastatic colon carcinoma. FLT3 mutated KRAS wild type ,HERNÁN- she had aggressive disease and progressed to through FOLFOX->  FOLFIRI + Vectibix->Regorafenib.    NGS testing Interestingly show FLT3 mutation , however, there was no response to Dabrafenib and this is thought to be present mutation.  -7/2018-establish care with me  -Informed her that hospice is a very appropriate decision as realistically her chance of having treatment response is low.  -Not a candidate for Avastin-based treatment due to presence of fistula  -Not a candidate for immunotherapy as MSI-s  -Tolerated first cycle of alone once or Lonsurf dosed at 60 mg twice a day. No major neutropenia  -Overall, her prognosis is very poor and she understands that, however, she wanted to try any kind of therapeutic options available. She finished cycle #2 of Lonsurf with  reasonably good tolerance. She will be off of treatment for 2 weeks and start cycle #3 in 2 weeks .Plan to obtain restaging studies after cycle #3. Hospice enrollment if no response.    Recurrent UTIs secondary to colon carcinoma invading the bladder causing infections- s/p TURBT. Pathology consistent with adenocarcinoma. Unfortunately, she is having recurrent symptomatic ESBL. . She will be admitted to the hospital for IV antibiotics. Since she is on oral chemotherapy with a mild immunosuppression, she may need extended course of IV antibiotics. Recommend an ID opinion about the duration  of antibiotics.  . She still has significant residual tumor mass in her bladder.    Symptomatic iron deficiency anemia-status post venofer and PRBCs. Suspect that she is having some occult blood loss related to her UTI. 2. No gross evidence of GI bleed.      Complex patient requiring complex decision making. Reviewed the laboratory data and images with the patient. Antineoplastic therapy requiring close monitoring and decision-making. Time spent for her care today  70 minute including coordinating and hospital admission.    She agreed and verbalized  agreement and understanding with the current plan.  I answered all questions and concerns at this time         Please note that this dictation was created using voice recognition software. I have made every reasonable attempt to correct obvious errors, but I expect that there are errors of grammar and possibly content that I did not discover before finalizing the note.      SIGNATURES:  Charles Pelaez    CC:  Maritza Esparza M.D.  No ref. provider found

## 2018-08-29 NOTE — DISCHARGE PLANNING
Dr Leroy advised pt will need IV A/B's for 2 weeks as out patient. Met with pt to provide choice form, pt said that she wanted to attend out patient infusion and chose [facility] where she has been before. Faxed choice form to [facility] and left message for [facility] that Dr Leroy will write orders. Requested [facility] to send referral today.

## 2018-08-29 NOTE — DISCHARGE PLANNING
[facility] advised they cannot see pt until Monday 9/3. Informed pt and she agreed to have home health and give IV Ertapenem 1gm via port. Provided pt with choice form and she chose [facility].    Telephoned [facility] [phone] and spoke with Beverly who confirmed they can provide the drug and nursing care to teach pt how to give IV A/B. Pt agreed to administer IV Ertapenem with teaching and support from nurses via port. Dr Leroy requested to write order for IV A/B's and confirm who will follow pt.

## 2018-08-29 NOTE — FACE TO FACE
Face to Face Supporting Documentation - Home Health    The encounter with this patient was in whole or in part the primary reason for home health admission.    Date of encounter:   Patient:                    MRN:                       YOB: 2019  Tamara Flynn  [id number]  1955     Home health to see patient for:  Skilled Nursing care for assessment, interventions & education    Skilled need for:  New Onset Medical Diagnosis ESBL UTI needing antibiotic management    Skilled nursing interventions to include:  Home IV infusion therapy    Homebound status evidenced by:  Need the aid of supportive devices such as crutches, canes, wheelchairs or walkers. Leaving home requires a considerable and taxing effort. There is a normal inability to leave the home.    Community Physician to provide follow up care: Maritza Esparza M.D.     Optional Interventions? No      I certify the face to face encounter for this home health care referral meets the CMS requirements and the encounter/clinical assessment with the patient was, in whole, or in part, for the medical condition(s) listed above, which is the primary reason for home health care. Based on my clinical findings: the service(s) are medically necessary, support the need for home health care, and the homebound criteria are met.  I certify that this patient has had a face to face encounter by myself.  Black Leroy M.D. - NPI: [id number]

## 2018-08-29 NOTE — PROGRESS NOTES
Direct admit from MD office. Accepted by Dr. Leroy for ESBL UTI.  ADT signed & held, needs to be released upon pt arrival.  No written orders received.  Pt coming by private car.

## 2018-08-29 NOTE — ASSESSMENT & PLAN NOTE
Patient has a urinary tract infection with E. coli that is ESBL. The patient will need 14 days of IV ertapenem. The patient does have a port in place in the 1st dose of IV ertapenem will be given today. Plan is then to discharge the patient after infectious disease the patient to the home setting with outpatient infusion services.

## 2018-08-29 NOTE — DISCHARGE PLANNING
"Care Transition Team Assessment    Met with pt at bedside. According to pt she lives with her , has been independent with showering and dressing although pt reports it \"takes a lot of energy\". Pt said that she does not use assistive devices but does have a walker if she needs to use it. Independent for medication management. Pt said her  will provide transport at discharge. No needs identified at this time.    Information Source  Orientation : Oriented x 4  Information Given By: Patient    Readmission Evaluation  Is this a readmission?: No    Interdisciplinary Discharge Planning  Does Admitting Nurse Feel This Could be a Complex Discharge?: No  Primary Care Physician: Maritza Esparza  Lives with - Patient's Self Care Capacity: Spouse  Support Systems: Spouse / Significant Other  Do You Take your Prescribed Medications Regularly: Yes  Able to Return to Previous ADL's: Yes  Mobility Issues: No  Patient Expects to be Discharged to:: Home  Assistance Needed: No  Durable Medical Equipment: Not Applicable    Discharge Preparedness  What are your discharge supports?: Spouse  Prior Functional Level: Independent with Activities of Daily Living, Independent with Medication Management, Ambulatory  Difficulity with ADLs: None  Difficulity with IADLs: None    Functional Assesment  Prior Functional Level: Independent with Activities of Daily Living, Independent with Medication Management, Ambulatory    Finances  Prescription Coverage: Yes    Vision / Hearing Impairment  Vision Impairment : Yes  Right Eye Vision: Wears Glasses  Left Eye Vision: Wears Glasses  Hearing Impairment : No    Discharge Risks or Barriers  Discharge risks or barriers?: No    Anticipated Discharge Information  Anticipated discharge disposition: Home  Discharge Address: [facility] [address]  Discharge Contact Phone Number: Patient [phone]        "

## 2018-08-30 NOTE — PROGRESS NOTES
Discharge instructions, medications and follow-up reviewed with pt, pt verbalized understanding and denies questions. Discharge paperwork and prescriptions given to pt. Port deaccessed, TeleBox removed, armband removed. Pt transported off unit via wheelchair with hospital escort.

## 2018-08-30 NOTE — PROGRESS NOTES
Pt tachycardic, hr= 130's- 150's denies pain,dizziness, feelingsome palpitations ,on call paged, Dr. Goel, notified,pt placed on the monitor, new orders received, per MD to give metoprolol 2.5 mg iv x1 if pt continue to feel some palpitation.

## 2018-08-30 NOTE — THERAPY
"Occupational Therapy Evaluation completed.   Functional Status:  Pt presents to skilled OT services w/ c/o abdominal pain and generalized weakness admitted with dx fo ESBL UTI, h/o metastatic colon cancer, colostomy in place, chronic anemia. Pt was able to perform basic self care tasks with supervision/sba, intermittent rest breaks needed due to baseline decreased activity tolerance due to underlying medical conditions. Pt was educated and provided handout on energy conservation techniques, ADL modifications and AE to maximize pt's endurance. Pt has support from spouse with ADLs/IADLs as needed.   Plan of Care: Patient with no further skilled OT needs in the acute care setting at this time  Discharge Recommendations:  Equipment: No Equipment Needed. Post-acute therapy Currently anticipate no further skilled therapy needs once patient is discharged from the inpatient setting.    See \"Rehab Therapy-Acute\" Patient Summary Report for complete documentation.    "

## 2018-08-30 NOTE — DISCHARGE INSTRUCTIONS
Discharge Instructions    Discharged to home by car with relative. Discharged via wheelchair, hospital escort: Yes.  Special equipment needed: Not Applicable    Be sure to schedule a follow-up appointment with your primary care doctor or any specialists as instructed.     Discharge Plan:   Diet Plan: Discussed  Activity Level: Discussed  Confirmed Follow up Appointment: Patient to Call and Schedule Appointment  Confirmed Symptoms Management: Discussed  Medication Reconciliation Updated: Yes  Influenza Vaccine Indication: Not indicated: Previously immunized this influenza season and > 8 years of age    I understand that a diet low in cholesterol, fat, and sodium is recommended for good health. Unless I have been given specific instructions below for another diet, I accept this instruction as my diet prescription.   Other diet: Regular    Special Instructions: None    · Is patient discharged on Warfarin / Coumadin?   No     Depression / Suicide Risk    As you are discharged from this [facility] facility, it is important to learn how to keep safe from harming yourself.    Recognize the warning signs:  · Abrupt changes in personality, positive or negative- including increase in energy   · Giving away possessions  · Change in eating patterns- significant weight changes-  positive or negative  · Change in sleeping patterns- unable to sleep or sleeping all the time   · Unwillingness or inability to communicate  · Depression  · Unusual sadness, discouragement and loneliness  · Talk of wanting to die  · Neglect of personal appearance   · Rebelliousness- reckless behavior  · Withdrawal from people/activities they love  · Confusion- inability to concentrate     If you or a loved one observes any of these behaviors or has concerns about self-harm, here's what you can do:  · Talk about it- your feelings and reasons for harming yourself  · Remove any means that you might use to hurt yourself (examples: pills, rope, extension  cords, firearm)  · Get professional help from the community (Mental Health, Substance Abuse, psychological counseling)  · Do not be alone:Call your Safe Contact- someone whom you trust who will be there for you.  · Call your local CRISIS HOTLINE 462-9338 or 034-841-2948  · Call your local Children's Mobile Crisis Response Team [place] (050) 763-5934 or www.[employer]  · Call the toll free National Suicide Prevention Hotlines   · National Suicide Prevention Lifeline 150-893-JHHT (1910)  · National Hope Line Network 800-SUICIDE (977-7269)

## 2018-08-30 NOTE — PROGRESS NOTES
Assessment completed,poc discussed,verbalized understanding, left chest port de-access and re- accessed, pt tolerated well, still unable to draw blood,pt ambulatory, gait steady,medicated for pain per MAR, call button within reach,will continue to monitor.

## 2018-08-30 NOTE — CONSULTS
INFECTIOUS DISEASES INPATIENT CONSULT NOTE     Date of Service: 8/30/2018    Consult Requested By: Black Leroy M.D.    Reason for Consultation: ESBL Klebsiella urinary tract infection    History of Present Illness:   Tamara Flynn is a 62 y.o. woman with a history of metastatic colon cancer with involvement of the liver and bladder status post colostomy and partial bladder surgery on chemotherapy admitted 8/29/2018 for dysuria, hematuria and malaise.  Patient states she has been having these symptoms since May.  She initially attributed her symptoms to bladder involvement of her cancer.  She underwent scraping of her bladder lining by her urologist with some improvement of her symptoms.  However patient continued to experience dysuria and hematuria associated with chills and malaise.  Prior urine cultures from 8/1 grew E. coli.  She has been treated with multiple courses of oral antibiotics as well as antibiotic bladder irrigation by her urologist without improvement of her symptoms.  Patient denies any abdominal pain, nausea, vomiting or diarrhea.  Given persistent dysuria and hematuria, repeat urine cultures were obtained.  Recent urine cultures were positive for ESBL Klebsiella pneumonia.  She was instructed by her primary care physician to go to the ED for IV antibiotics.  On presentation, she was afebrile with a leukopenia of 3.1.  She was started on ertapenem on presentation.  No blood cultures no nor imaging were done.  Infectious disease service consulted for further antibiotic recommendations and management.    Review Of Systems:  All other ROS were reviewed and are negative except as noted above in the HPI.    PMH:   Past Medical History:   Diagnosis Date   • Blood clotting disorder (HCC) 4-    left leg   • Blood loss anemia 2016   • Bowel habit changes     has colostomy left sided   • Cancer (HCC) 2016    liver, bladder, colon   • CVF (colovesical fistula) 8/5/2018   • Essential  hypertension     been running so low, patientonly takes medication if over 140 now   • Obesity    • Urinary bladder disorder     1/2 bladder due to cancer surgery       PSH:  Past Surgical History:   Procedure Laterality Date   • TRANS URETHRAL RESECTION BLADDER  8/9/2018    Procedure: TRANS URETHRAL RESECTION BLADDER;  Surgeon: Yunior Abdullahi M.D.;  Location: SURGERY [facility];  Service: Urology   • CATH PLACEMENT Left 8/9/2016    Procedure: CATH PLACEMENT power port ;  Surgeon: Yimi Martinez M.D.;  Location: SURGERY [facility];  Service:    • COLOSTOMY Left 4/19/2016    Procedure: COLOSTOMY;  Surgeon: Yimi Martinez M.D.;  Location: SURGERY [facility];  Service:    • CYSTECTOMY  4/18/2016    Procedure: CYSTECTOMY;  Surgeon: Yunior Abdullahi M.D.;  Location: SURGERY [facility];  Service:    • EXPLORATORY LAPAROTOMY  4/18/2016    Procedure: EXPLORATORY LAPAROTOMY;  Surgeon: Yimi Martinez M.D.;  Location: SURGERY [facility];  Service:    • EXPLORATORY LAPAROTOMY  4/17/2016    Procedure: EXPLORATORY LAPAROTOMY-illeostomy creation ;  Surgeon: Yimi Martinez M.D.;  Location: SURGERY [facility];  Service:    • COLONOSCOPY - ENDO N/A 3/22/2016    Procedure: COLONOSCOPY - ENDO;  Surgeon: Yimi Martinez M.D.;  Location: ENDOSCOPY [facility];  Service:    • RECOVERY  3/21/2016    Procedure: CT-CT Guided liver biopsy-Dr.Michael Martinez;  Surgeon: [facility];  Location: SURGERY [facility] PROC UNIT [facility];  Service:    • CYSTOSCOPY  3/17/2016    Procedure: CYSTOSCOPY;  Surgeon: Yunior Abdullahi M.D.;  Location: SURGERY [facility];  Service:    • TRANS URETHRAL RESECTION BLADDER  3/17/2016    Procedure: TRANS URETHRAL RESECTION BLADDER Tumor;  Surgeon: Yunior Abdullahi M.D.;  Location: SURGERY [facility];  Service:    • PB EXCISION OF TONSIL TAGS         FAMILY HX:  Family History   Problem Relation Age of Onset   • Diabetes Mother         prediabetes   • Heart  Disease Mother    • Hypertension Mother    • Stroke Maternal Grandmother    • Cancer Neg Hx    • Hyperlipidemia Neg Hx    • Psychiatry Neg Hx    • Thyroid Neg Hx        SOCIAL HX:  Social History     Social History   • Marital status:      Spouse name: N/A   • Number of children: N/A   • Years of education: N/A     Occupational History   • Not on file.     Social History Main Topics   • Smoking status: Never Smoker   • Smokeless tobacco: Never Used   • Alcohol use No   • Drug use: No   • Sexual activity: Yes     Partners: Male     Other Topics Concern   • Not on file     Social History Narrative    Lives with .     Children: no    Work:  [employer]     History   Smoking Status   • Never Smoker   Smokeless Tobacco   • Never Used     History   Alcohol Use No       Allergies/Intolerances:  No Known Allergies    History reviewed with the patient     Other Current Medications:    Current Facility-Administered Medications:   •  Metoprolol Tartrate (LOPRESSOR) injection 2.5 mg, 2.5 mg, Intravenous, Once PRN, Nely Goel M.D.  •  ferrous sulfate tablet 325 mg, 325 mg, Oral, BID WITH MEALS, Black Leroy M.D., 325 mg at 08/29/18 1643  •  losartan (COZAAR) tablet 12.5 mg, 12.5 mg, Oral, QDAY PRN, Black Leroy M.D.  •  traZODone (DESYREL) tablet 100 mg, 100 mg, Oral, QHS, Black Leroy M.D., 100 mg at 08/29/18 2248  •  vitamin D (cholecalciferol) tablet 1,000 Units, 1,000 Units, Oral, DAILY, Black Leroy M.D.  •  senna-docusate (PERICOLACE or SENOKOT S) 8.6-50 MG per tablet 2 Tab, 2 Tab, Oral, BID **AND** polyethylene glycol/lytes (MIRALAX) PACKET 1 Packet, 1 Packet, Oral, QDAY PRN **AND** magnesium hydroxide (MILK OF MAGNESIA) suspension 30 mL, 30 mL, Oral, QDAY PRN **AND** bisacodyl (DULCOLAX) suppository 10 mg, 10 mg, Rectal, QDAY PRN, Black Leroy M.D.  •  labetalol (NORMODYNE,TRANDATE) injection 10 mg, 10 mg, Intravenous, Q4HRS PRN, Black Leroy M.D.  •  ondansetron (ZOFRAN)  syringe/vial injection 4 mg, 4 mg, Intravenous, Q4HRS PRN, Black Leroy M.D.  •  ondansetron (ZOFRAN ODT) dispertab 4 mg, 4 mg, Oral, Q4HRS PRN, Black Leroy M.D.  •  promethazine (PHENERGAN) tablet 12.5-25 mg, 12.5-25 mg, Oral, Q4HRS PRN, Black Leroy M.D.  •  promethazine (PHENERGAN) suppository 12.5-25 mg, 12.5-25 mg, Rectal, Q4HRS PRN, Black Leroy M.D.  •  prochlorperazine (COMPAZINE) injection 5-10 mg, 5-10 mg, Intravenous, Q4HRS PRN, Black Leroy M.D.  •  acetaminophen (TYLENOL) tablet 650 mg, 650 mg, Oral, Q6HRS PRN, Black Leroy M.D., 650 mg at 18 041  •  insulin regular (HUMULIN R) injection 1-6 Units, 1-6 Units, Subcutaneous, 4X/DAY ACHS, Stopped at 18 1700 **AND** Accu-Chek ACHS, , , Q AC AND BEDTIME(S) **AND** NOTIFY MD and PharmD, , , Once **AND** glucose 4 g chewable tablet 16 g, 16 g, Oral, Q15 MIN PRN **AND** dextrose 50% (D50W) injection 25 mL, 25 mL, Intravenous, Q15 MIN PRN, Black Leroy M.D.  •  oxyCODONE immediate-release (ROXICODONE) tablet 2.5 mg, 2.5 mg, Oral, Q4HRS PRN, Black Leroy M.D., 2.5 mg at 18 0410  •  fentaNYL (SUBLIMAZE) injection 25-50 mcg, 25-50 mcg, Intravenous, Q HOUR PRN, Black Leroy M.D.  •  heparin pf injection 300-500 Units, 300-500 Units, Intracatheter, PRN, Black Leroy M.D., 500 Units at 18 1830  •  meropenem (MERREM) 500 mg in  mL IVPB, 500 mg, Intravenous, Q6HRS, Leyla Scott M.D., Stopped at 18 0641  •  MD ALERT... warfarin (COUMADIN) per pharmacy protocol, , Other, pharmacy to dose, Black Leroy M.D.  •  warfarin (COUMADIN) tablet 1 mg, 1 mg, Oral, COUMADIN-DAILY, Black Leroy M.D., 1 mg at 18 1830  [unfilled]    Most Recent Vital Signs:  /67   Pulse (!) 102   Temp 36.1 °C (97 °F)   Resp 16   Ht 1.524 m (5')   Wt 94.8 kg (208 lb 15.9 oz)   SpO2 97%   Breastfeeding? No   BMI 40.82 kg/m²   Temp  Av.4 °C (97.5 °F)  Min: 36.1 °C (97 °F)  Max: 36.7 °C (98 °F)    Physical  Exam:  General: Obese, well-appearing, no acute distress, nontoxic  HEENT: sclera anicteric, PERRL, EOMI, MMM, no oral lesions  Neck: supple, no lymphadenopathy  Chest: CTAB, no r/r/w, normal work of breathing.  Left upper chest port nontender and without any surrounding erythema  Cardiac: RRR, normal S1 S2, no m/r/g   Abdomen: + bowel sounds, soft, non-tender, non-distended, left lower quadrant ostomy bag, multiple abdominal surgical scars clean  Extremities: WWP, no edema, 2+ pulses  Skin: no rashes or worrisome lesions  Neuro: Alert and oriented times 3, non-focal exam, speech fluent, moves all extremities    Pertinent Lab Results:  Recent Labs      08/27/18   1507  08/29/18   1611   WBC  4.6*  3.1*      Recent Labs      08/27/18   1507  08/29/18   1611   HEMOGLOBIN  8.8*  7.7*   HEMATOCRIT  27.8*  23.3*   MCV  96.5  94.3   MCH  30.6  31.2   MACROCYTOSIS  2+   --    ANISOCYTOSIS  2+   --    PLATELETCT  255  137*         Recent Labs      08/27/18   1507  08/29/18   1611   SODIUM  131*  134*   POTASSIUM  3.8  3.7   CHLORIDE  101  102   CO2  16*  20   CREATININE  0.59  0.55        Recent Labs      08/27/18   1507   ALBUMIN  2.6*        Pertinent Micro:  Results     ** No results found for the last 168 hours. **        Blood Culture   Date Value Ref Range Status   06/02/2018   Final    No growth after 5 days of incubation.  Blood culture testing and Gram stain, if indicated, are  performed at [facility], [address] [place] [address].  Positive blood cultures are  sent to [facility], [address] [place] [address], for organism identification and  susceptibility testing.          Studies:  No results found.    IMPRESSION:   1.  ESBL Klebsiella complicated urinary tract infection   2.  Leukopenia  3.  Metastatic colon cancer with liver and bladder involvement      PLAN:   Tamara Flynn is a 62 y.o. woman with a history of metastatic colon cancer with liver and  bladder involvement on chemotherapy, status post colostomy and partial bladder surgery admitted for persistent dysuria, hematuria and malaise.  Patient recently found to have a urine culture positive for ESBL Klebsiella.  Prior cultures on 8/1 also grew E. coli.  She has failed multiple courses of oral antibiotics in addition to antibiotic bladder irrigation.  Agree with carbapenem for treatment and anticipate a 14 day total course of antibiotics.  She has a port and thus can do home IV antibiotics with ertapenem 1 g daily.  Estimated stop date of 9/12/18.  IV antibiotic orders via [facility] care done and given to case management today.  Patient has already received a dose of ertapenem and is tolerated it well.  Okay to discharge patient from ID standpoint once home antibiotics have been arranged.  Will follow patient in the ID clinic post discharge.      Plan of care discussed with HUMBLE Leroy M.D. and case management. Will continue to follow    Leyla Scott M.D.

## 2018-08-30 NOTE — DISCHARGE PLANNING
Re [place] CTS for [facility] came to see pt and advised she will return at 1:30 as pt was with Palliative Care RN. Pt informed. Re confirmed port will be de- accessed on discharge and re-accessed tomorrow by RN.

## 2018-08-30 NOTE — PROGRESS NOTES
Inpatient Anticoagulation Service Note    Date: 2018  Reason for Anticoagulation: Deep Vein Thrombosis        Hemoglobin Value: (!) 7.7  Hematocrit Value: (!) 23.3  Lab Platelet Value: (!) 137  Target INR: 2.0 to 3.0    INR from last 7 days     Date/Time INR Value    18 1611 (!)  1.29        Dose from last 7 days     Date/Time Dose (mg)    18 1800  1        Average Dose (mg):  (Per pt: 1 mg every day )  Significant Interactions: Antibiotics  Bridge Therapy: No     Comments:   Patient admitted for UTI after being seen by urologist for c/o weakness; urine culture positive for ESBL and thus patient was referred to ED. INR subtherapeutic on admission. Per review of recent anticoagulation clinic notes, patient's dose was recently reduced due to INR >8 in clinic.     INR is subtherapeutic today. Per patient, last dose  PM. New drug interaction with Merrem. Hgb/hct are low, but no sx of active bleeding noted or discussed. Regular diet ordered.     Plan:  1 mg tonight and check INR in AM.     Education Material Provided?: No (warfarin PTA)    Pharmacist suggested discharge dosin mg daily, with outpatient INR follow up within 72 hours of discharge      Riddhi Perez, PharmD

## 2018-08-30 NOTE — DISCHARGE SUMMARY
Discharge Summary    CHIEF COMPLAINT ON ADMISSION  No chief complaint on file.      Reason for Admission  ESBL UTI     Admission Date  8/29/2018    CODE STATUS  Full Code    HPI & HOSPITAL COURSE  This is a 62 y.o. female here with recurrent urinary tract infections. The patient previously had multiple gram-negative kenney infections including E. coli. The patient was not feeling quite well so she had evaluations with her primary care physician who then sent her to her oncologist. They did do an outpatient urinalysis with a culture. The culture came back as extended spectrum beta-lactamase inhibitor E. coli. The patient was then sent from Dr. and because office directly for admission to the clinical decision unit here. I accepted the patient and at that point I evaluated the patient. The patient does have a port in place for chemotherapy as the patient has metastatic colon cancer to the bladder and the liver. The patient is undergoing currently chemotherapy. With the patient's port in place and the eye organism identified we have set her up for outpatient IV ertapenem 1 g daily. The patient was then backed up by infectious diseases. At this point the patient will be going home with home health who will then delivered antibiotics for her for next 12 days. The patient otherwise is back to her baseline. She was asymptomatic with most of it except for generalized weakness and loss of appetite. Patient will need outpatient follow-ups with her primary care physician as well as infectious diseases.       Therefore, she is discharged in good and stable condition to home with close outpatient follow-up.    Discharge Date  8/30/2018    FOLLOW UP ITEMS POST DISCHARGE  Follow-up with primary care physician 7-10 days.  Follow-up with Dr. Pelaez as scheduled.  Follow up with infectious diseases as scheduled    DISCHARGE DIAGNOSES  Principal Problem:    ESBL (extended spectrum beta-lactamase) producing bacteria infection POA:  Unknown  Active Problems:    Adenocarcinoma of colon metastatic to liver (HCC) POA: Yes      Overview: She has had 12 + 12 chemotherapies, the last was on 8/10/17    Colostomy in place (HCC) POA: Yes    Alkaline phosphatase elevation POA: Unknown    Morbid obesity with BMI of 40.0-44.9, adult (HCC) POA: Yes    Deep vein thrombosis (DVT) of both lower extremities (HCC) POA: Yes      Overview: Diagnois update 10/1/2016    IFG (impaired fasting glucose) POA: Yes    Anemia of chronic disease POA: Unknown    Hyponatremia POA: Unknown  Resolved Problems:    * No resolved hospital problems. *      FOLLOW UP  Future Appointments  Date Time Provider Department Center   [facility]   9/4/2018 11:00 AM [facility] EXAM 5 [facility] None   9/6/2018 2:40 PM [facility]     No follow-up provider specified.    MEDICATIONS ON DISCHARGE     Medication List      START taking these medications      Instructions   ertapenem 1 g Solr injection  Commonly known as:  INVANZ   1,000 mg by Intravenous route every day for 12 doses.  Dose:  1 g        CHANGE how you take these medications      Instructions   Trifluridine-Tipiracil 20-8.19 MG Tabs  What changed:  additional instructions  Commonly known as:  LONSURF   Take 60 mg by mouth 2 Times a Day. d1-5 and d 8-15 every 4 weeks  Dose:  60 mg        CONTINUE taking these medications      Instructions   ferrous sulfate 325 (65 Fe) MG EC tablet   Take 1 Tab by mouth 2 Times a Day.  Dose:  325 mg     losartan 25 MG Tabs  Commonly known as:  COZAAR   Take 12.5 mg by mouth 1 time daily as needed (>140 BP).  Dose:  12.5 mg     ondansetron 4 MG Tabs tablet  Commonly known as:  ZOFRAN   Take 1 Tab by mouth every four hours as needed for Nausea/Vomiting.  Dose:  4 mg     phenazopyridine 200 MG Tabs  Commonly known as:  PYRIDIUM   Take 1 Tab by mouth 3 times a day as needed for Moderate Pain.  Dose:  200 mg     traZODone 100 MG  Tabs  Commonly known as:  DESYREL   Take 1 tab HS PO     vitamin D 1000 UNIT Tabs  Commonly known as:  cholecalciferol   Take 1,000 Units by mouth every day.  Dose:  1000 Units     warfarin 1 MG Tabs  Commonly known as:  COUMADIN   Take 1 to 2 tablets by mouth daily as directed by the coumadin clinic        STOP taking these medications    cefdinir 300 MG Caps  Commonly known as:  OMNICEF     levoFLOXacin 500 MG tablet  Commonly known as:  LEVAQUIN            Allergies  No Known Allergies    DIET  Orders Placed This Encounter   Procedures   • Diet Order Regular     Standing Status:   Standing     Number of Occurrences:   1     Order Specific Question:   Diet:     Answer:   Regular [1]       ACTIVITY  As tolerated.  Weight bearing as tolerated    CONSULTATIONS  Infectious diseases    PROCEDURES  None    LABORATORY  Lab Results   Component Value Date    SODIUM 134 (L) 08/29/2018    POTASSIUM 3.7 08/29/2018    CHLORIDE 102 08/29/2018    CO2 20 08/29/2018    GLUCOSE 162 (H) 08/29/2018    BUN 20 08/29/2018    CREATININE 0.55 08/29/2018        Lab Results   Component Value Date    WBC 3.1 (L) 08/29/2018    HEMOGLOBIN 7.7 (L) 08/29/2018    HEMATOCRIT 23.3 (L) 08/29/2018    PLATELETCT 137 (L) 08/29/2018        Total time of the discharge process exceeds 39 minutes.

## 2018-08-30 NOTE — DISCHARGE PLANNING
Telephoned [facility] and left voice message for Beverly Martínez for [facility]. Requested call back to confirm if IV Ertapenem and nursing for home Infusion. Contact number for this writer provided.

## 2018-08-30 NOTE — DISCHARGE PLANNING
Pt seen Re Camarena RN from [facility] who taught pt at bedside how to administer IV A/B's. Re confirmed pt will be seen tomorrow by RN and IV Ertapenem will be delivered to pt's home. Pt will arrange for her  to transport her home. Re confirmed pt has been provided with contact numbers for [facility].

## 2018-08-30 NOTE — DISCHARGE PLANNING
Dr Scott wrote order for IV Ertapenem IGm Q 24, Dr Scott confirmed she will follow the patient at discharge. Faxed the IV A/B order to [facility] and called [facility] to send referral to [facility] as soon as possible as plan is to discharge pt today.     Telephoned [facility] and spoke with Margareth who said they can provide IV Ertapenem for infusion to start tomorrow. Margareth also confirmed they will be able to provide the nursing to teach pt how to administer IV A/B's via port. Dr Leroy informed. Margareth said he will call back when he has received the order and when medication and nursing can start. Provided Margareth with this writers direct contact number.

## 2018-08-30 NOTE — CONSULTS
"Reason for PC Consult: Advance Care Planning    Consulted by: Black Leroy MD    Assessment:  General: 63yo lady, 8/29 OBS in CDU with ESBL UTI for IV abx to be continued at home with [facility].  PMH: colon cancer mets to bladder/liver s/p colostomy in 2016 and chemorad, pt of Dr. Pelaez, HTN, obesity, multi UTIs    Dyspnea: No-    Last BM:  -    Pain: No-    Depression: Mood appropriate for situation-      Spiritual:  Is Holiness or spirituality important for coping with this illness? Yes-    Has a  or spiritual provider visit been requested? No    Palliative Performance Scale: 70    Advanced Directive: None- pt stated that she has AD with  and at home which names her  DPOA-HC.  Pt verbalized understanding that she could bring a copy to any of her [facility]s and it can be scanned into the system.  DPOA:  -    POLST: Completed at this encounter for DNR - pt wanted to think further on interventions section (see mechanical ventilation discussion below), pt also wanted to think about temporary TF.  POLST signed by pt and Dr. 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  Pt's two sons and eight grandchildren live in [place] and [place] but are emotionally supportive.  Pt has a sister in [place] and neighbor who are also supportive. Pt enjoys doing crafts and being with her poodle who misses her when she's hospitalized.    Outcome:  Introduced self and role of Palliative Care.  Assessed pt's understanding of her current medical status, overall health picture, and options for future care.  Pt stated that four months ago, she was given three months to live by her " "oncologist so she switched to Dr. Pelaez and verbalized feeling \"better\" with transfusions and looking at a possible new chemo agent.      Explored pt's values, beliefs, and preferences in order to identify GOC.  Pt stated that she did \"well\" with previous chemo and was likely to pursue any options offered.  She acknowledged that her cancer is life-limiting and has spoken with [facility] about using their services at a later time.  Pt's mother was on hospice in her sister's home and thus, pt aware of support for herself and ongoing for her .      Pt stated her greatest concern -- \"I'm never going to feel better... I'm going to feel like this or worse until I die.\"  Validated and normalized these thoughts and feelings.  Broadly discussed pain management including opioids, narcotics, benzos and edibles.  Pt stated that she is open to interventions but \"I don't feel I need much more now.\"    Active listening, reflection, reminiscing, and empathic support utilized throughout this encounter.  All questions answered.  PC contact information given.     Discussed with/Updated: Dr. Leroy    Plan: home with infusion services, continue f/u with onc    Recommendations:  I do not recommend an ethics or hospice consult at this time because per pt, Dr. Pelaez may have new chemo agent for her to try and she is willing to.    Thank you for allowing Palliative Care to participate in this patient's care. Please feel free to call [phone] with any questions or concerns.  "

## 2018-08-30 NOTE — DISCHARGE PLANNING
Spoke with Beverly from [facility] who confirmed they will provide the Ertapenum, delivery time has not yet  Been established. CTS from [facility] will teach pt at bedside how to infuse IV A/B and Nurse will follow up with pt tomorrow as well as accessing port. Anticipate port will be de accessed today on discharge. Dr Leroy, pt's COLLETTE Wheeler and pt informed of plan.

## 2018-09-10 NOTE — PROGRESS NOTES
Anticoagulation Summary  As of 9/10/2018    INR goal:   2.0-3.0   TTR:   43.9 % (2.3 y)   Today's INR:   1.3!   Warfarin maintenance plan:   1 mg (1 mg x 1) on Sun, Tue, Thu; 2 mg (1 mg x 2) all other days   Weekly warfarin total:   11 mg   Plan last modified:   Re Briones A.PINES (9/10/2018)   Next INR check:   9/17/2018   Target end date:   Indefinite    Indications    Deep vein thrombosis (DVT) of both lower extremities (HCC) [I82.403]  Chronic anticoagulation [Z79.01]             Anticoagulation Episode Summary     INR check location:       Preferred lab:       Send INR reminders to:       Comments:         Anticoagulation Care Providers     Provider Role Specialty Phone number    Yimi Martinez M.D. Referring Surgery [phone]    Cesar SchofieldD Responsible      [facility] Responsible  [phone]        Anticoagulation Patient Findings      HPI:  Tamara Flynn seen in clinic today for follow up on anticoagulation therapy in the presence of DVT hx. Recently admitted for ertapenum administration for UTI. She is getting daily infusions - 2 more days then stop. Continues to have poor appetite. No current symptoms of bleeding or thrombosis reported.     A/P:   INR subtherapeutic. No recent VTEs. Will increase regimen. BP recorded in vitals.    Follow up appointment in 1 week(s).    Next Appointment: Monday, September 17, 2018 at 3:00 pm @ [facility]

## 2018-09-10 NOTE — PROGRESS NOTES
"Infectious Disease Clinic    Subjective:     Chief Complaint   Patient presents with   • Hospital Follow-up     UTI ESBL     This is my first time meeting Ms. Flynn.  Accompanied by her .    Interval History: 62 y.o. woman with a history of metastatic colon cancer with involvement of the liver and bladder status post colostomy and partial bladder surgery on chemotherapy.  Hospitalized from 8/29-8/30/18, admitted for dysuria, hematuria and malaise.  Stated she had been having these symptoms since May 2018.  She initially attributed her symptoms to bladder involvement of her cancer.  She underwent scraping of her bladder lining by her urologist with some improvement of her symptoms.  However patient continued to experience dysuria and hematuria associated with chills and malaise.  Prior urine cultures from 8/1/18 grew E. coli.  She had been treated with multiple courses of oral antibiotics as well as antibiotic bladder irrigation by her urologist without improvement of her symptoms.  Urine culture on 8/17 +ESBL Klebsiella pneumonia.  She was instructed by her primary care physician to go to the ED for IV antibiotics.  On presentation, she was afebrile with a leukopenia of 3.1.  She was started on IV Ertapenem on presentation.  No blood cultures, nor imaging were done.  Discharged home on IV Ertapenem through 9/12/18, to be given to her via her port at home.      Hospital records reviewed    Today, 9/10/2018: Patient reports feeling fatigued.  She has been tolerating the IV ertapenem without issue.  Denies feeling generally ill, fevers/chills, general malaise, headache, n/v/d, abdominal pain, chest pain or shortness of breath.  She states that she continues to have pain when she is urinating, in addition to hesitancy and frequency.  She states that the color of her urine has returned to normal, it is no longer a \"pink tinged\" color.  She denies any flank pain or urgency.  Patient and  both becoming " "frustrated that she is not improving any drastic way.  Patient notes that she is on a chemotherapy regimen at this time, she goes 2 weeks on and then 2 weeks off in 2 weeks on again.  She is followed by Dr. Lawrence for oncology and Dr. Abdullahi for urology.  She has a follow-up with Dr. Adbullahi later this month.    ROS  As documented above in my HPI.    Past Medical History:   Diagnosis Date   • Blood clotting disorder (HCC) 4-    left leg   • Blood loss anemia 2016   • Bowel habit changes     has colostomy left sided   • Cancer (HCC) 2016    liver, bladder, colon   • CVF (colovesical fistula) 8/5/2018   • Essential hypertension     been running so low, patientonly takes medication if over 140 now   • Obesity    • Urinary bladder disorder     1/2 bladder due to cancer surgery       Social History   Substance Use Topics   • Smoking status: Never Smoker   • Smokeless tobacco: Never Used   • Alcohol use No       Allergies: Patient has no known allergies.    Pt's medication and problem list reviewed.     Objective:     PE:  /70   Pulse (!) 120   Temp 36.3 °C (97.4 °F)   Ht 1.499 m (4' 11\")   Wt 92.1 kg (203 lb)   SpO2 94%   BMI 41.00 kg/m²     Vital signs reviewed    Constitutional: Appears well-developed and well-nourished. No acute distress.  Speech fluent.  Morbidly obese.  Eyes: Conjunctivae normal and EOM are normal. Pupils are equal, round, and reactive to light.   Neck: Trachea midline. Normal range of motion. Neck supple.  No JVD.  Cardiovascular: Tachycardic, regular rhythm, normal heart sounds. No murmur, gallop, or friction rub. No edema.  Respiratory/chest: No respiratory distress, unlabored respiratory effort.  Lungs clear to auscultation bilaterally. No wheezes or rales.   Left upper chest, port- CDI, nontender, no erythema.  Abdomen: Soft, non tender, non-distended. BS + x 4. No masses.  Left lower quadrant colostomy.  : No CVA tenderness.  Musculoskeletal: Steady gait.  Normal range of " motion.  No joint or bone tenderness, swelling, erythema or deformity.    Skin: Warm and dry.  No visible rashes or lesions.  Neurological: No cranial nerve deficit. Coordination normal.    Psychiatric: Alert and oriented to person, place, and time. Normal mood, calm affect.  Normal behavior and judgment.     Labs:  WBC   Date/Time Value Ref Range Status   09/07/2018 03:00 PM 1.9 (LL) 4.8 - 10.8 K/uL Final     Comment:     Results confirmed by repeat testing.     RBC   Date/Time Value Ref Range Status   09/07/2018 03:00 PM 2.39 (L) 4.20 - 5.40 M/uL Final     Hemoglobin   Date/Time Value Ref Range Status   09/07/2018 03:00 PM 7.5 (L) 12.0 - 16.0 g/dL Final     Hematocrit   Date/Time Value Ref Range Status   09/07/2018 03:00 PM 24.7 (L) 37.0 - 47.0 % Final     MCV   Date/Time Value Ref Range Status   09/07/2018 03:00 .3 (H) 81.4 - 97.8 fL Final     MCH   Date/Time Value Ref Range Status   09/07/2018 03:00 PM 31.4 27.0 - 33.0 pg Final     MCHC   Date/Time Value Ref Range Status   09/07/2018 03:00 PM 30.4 (L) 33.6 - 35.0 g/dL Final     MPV   Date/Time Value Ref Range Status   09/07/2018 03:00 PM 11.6 9.0 - 12.9 fL Final         Assessment and Plan:   The following treatment plan was discussed with patient at length:    1. Urinary tract infection with hematuria, site unspecified      -Finish IV Ertapenem on 9/12.  No additional abx to follow.  Monitor for s/sx of worsening off abx: urinary symptoms- hesitancy, frequency, urgency, dysuria, hematuria, flank pain, fevers, chills, general malaise, etc.  Notify ID or go to ER should these s/sx occur.  -If worsening does occur, then will plan to check UA and have Fosfomycin added to sensitivities.    2. ESBL (extended spectrum beta-lactamase) producing bacteria infection  CBC WITH DIFFERENTIAL    COMP METABOLIC PANEL    As above.    3. Leukopenia, unspecified type      Repeat CBC this Wednesday before port is de-accessed.  Then repeat once more in 5-7 days.  Ertapenem  may be cause of worsening leukopenia.     Follow up: PRN, RTC sooner if needed. FU with PCP for ongoing chronic medical conditions.     STEVO Bergman.    Case discussed with Dr. Scott.       Please note that this dictation was created using voice recognition software. I have  worked with technical experts from [facility] to optimize the interface.  I have made every reasonable attempt to correct obvious errors, but there may be errors of grammar and possibly content that I did not discover before finalizing the note.

## 2018-09-11 NOTE — TELEPHONE ENCOUNTER
I left a message for patient to return my call in regards to her following up after being discharged. Patient is scheduled on 9/12/18 at 10:40 am to see Dr. Pelaez. Please give patient this information once she has returned our call.

## 2018-09-12 NOTE — PROGRESS NOTES
Date of visit: 9/12/2018  10:55 AM      Chief Complaint- metastatic colon carcinoma, recurrent UTIs      Identification/Prior relevant history: Placement of a note from 8/29/18  Interim history- she was admitted to the hospital with the ESBL UTI and has been on IV Ertapenem.  She also developed neutropenia. She has been off of Lonsurf for 2 weeks.   She continues to feel poorly. She was prerenal and very fatigued. Still have dysuria symptoms    Past Medical History:      Past Medical History:   Diagnosis Date   • Blood clotting disorder (HCC) 4-    left leg   • Blood loss anemia 2016   • Bowel habit changes     has colostomy left sided   • Cancer (HCC) 2016    liver, bladder, colon   • CVF (colovesical fistula) 8/5/2018   • Essential hypertension     been running so low, patientonly takes medication if over 140 now   • Obesity    • Urinary bladder disorder     1/2 bladder due to cancer surgery       Past surgical history:       Past Surgical History:   Procedure Laterality Date   • TRANS URETHRAL RESECTION BLADDER  8/9/2018    Procedure: TRANS URETHRAL RESECTION BLADDER;  Surgeon: Yunior Abdullahi M.D.;  Location: [facility];  Service: Urology   • CATH PLACEMENT Left 8/9/2016    Procedure: CATH PLACEMENT power port ;  Surgeon: Yimi Martinez M.D.;  Location: [facility];  Service:    • COLOSTOMY Left 4/19/2016    Procedure: COLOSTOMY;  Surgeon: Yimi Martinez M.D.;  Location: [facility];  Service:    • CYSTECTOMY  4/18/2016    Procedure: CYSTECTOMY;  Surgeon: Yunior Abdullahi M.D.;  Location: [facility];  Service:    • EXPLORATORY LAPAROTOMY  4/18/2016    Procedure: EXPLORATORY LAPAROTOMY;  Surgeon: Yimi Martinez M.D.;  Location: [facility];  Service:    • EXPLORATORY LAPAROTOMY  4/17/2016    Procedure: EXPLORATORY LAPAROTOMY-illeostomy creation ;  Surgeon: Yimi Martinez M.D.;  Location: [facility];  Service:    •  COLONOSCOPY - ENDO N/A 3/22/2016    Procedure: COLONOSCOPY - ENDO;  Surgeon: Yimi Martinez M.D.;  Location: ENDOSCOPY [facility];  Service:    • RECOVERY  3/21/2016    Procedure: CT-CT Guided liver biopsy-Dr.Michael Martinez;  Surgeon: [facility];  [place] [facility]  Service:    • CYSTOSCOPY  3/17/2016    Procedure: CYSTOSCOPY;  Surgeon: Yunior Abdullahi M.D.;  Location: SURGERY [facility];  Service:    • TRANS URETHRAL RESECTION BLADDER  3/17/2016    Procedure: TRANS URETHRAL RESECTION BLADDER Tumor;  Surgeon: Yunior Abdullahi M.D.;  Location: SURGERY [facility];  Service:    • PB EXCISION OF TONSIL TAGS         Allergies:       Patient has no known allergies.    Medications:         No current facility-administered medications for this visit.      No current outpatient prescriptions on file.     Facility-Administered Medications Ordered in Other Visits   Medication Dose Route Frequency Provider Last Rate Last Dose   • warfarin (COUMADIN) tablet 3 mg  3 mg Oral COUMADIN-ONCE Gurwinder De La Torre M.D.       • [START ON 9/14/2018] warfarin (COUMADIN) tablet 2 mg  2 mg Oral Once per day on Mon Wed Fri Gurwinder De La Torre M.D.       • [START ON 9/14/2018] warfarin (COUMADIN) tablet 1 mg  1 mg Oral Once per day on Sun Tue Thu Sat Gurwinder De La Torre M.D.       • NS infusion 2,832 mL  30 mL/kg Intravenous Once PRN Gurwinder De La Torre M.D.       • NS (BOLUS) infusion 1,000 mL  1,000 mL Intravenous Once PRN Gurwinder De La Torre M.D.       • meropenem (MERREM) 500 mg in  mL IVPB  500 mg Intravenous Q6HRS Gurwinder De La Torre M.D.   Stopped at 09/13/18 0725   • senna-docusate (PERICOLACE or SENOKOT S) 8.6-50 MG per tablet 2 Tab  2 Tab Oral BID Gurwinder De La Torre M.D.   Stopped at 09/12/18 2100    And   • polyethylene glycol/lytes (MIRALAX) PACKET 1 Packet  1 Packet Oral QDAY PRN Mohammad Y Hanif, M.D.        And   • magnesium hydroxide (MILK OF MAGNESIA) suspension 30 mL  30 mL Oral  QDAY PRN Gurwinder De La Torre M.D.        And   • bisacodyl (DULCOLAX) suppository 10 mg  10 mg Rectal QDAY PRN Gurwinder De La Torre M.D.       • ondansetron (ZOFRAN) syringe/vial injection 4 mg  4 mg Intravenous Q4HRS PRN Gurwinder De La Torre M.D.       • ondansetron (ZOFRAN ODT) dispertab 4 mg  4 mg Oral Q4HRS PRN Gurwinder De La Torre M.D.       • promethazine (PHENERGAN) tablet 12.5-25 mg  12.5-25 mg Oral Q4HRS PRN Gurwinder De La Torre M.D.       • promethazine (PHENERGAN) suppository 12.5-25 mg  12.5-25 mg Rectal Q4HRS PRN Gurwinder De La Torre M.D.       • prochlorperazine (COMPAZINE) injection 5-10 mg  5-10 mg Intravenous Q4HRS PRN Gurwinder De La Torre M.D.       • Pharmacy Consult Request ...Pain Management Review   Other PRN Gurwinder De La Torre M.D.        And   • oxyCODONE immediate-release (ROXICODONE) tablet 2.5 mg  2.5 mg Oral Q3HRS PRN Gurwinder De La Torre M.D.   2.5 mg at 09/13/18 0812    And   • oxyCODONE immediate-release (ROXICODONE) tablet 5 mg  5 mg Oral Q3HRS PRN Gurwinder De La Torre M.D.        And   • HYDROmorphone (DILAUDID) injection 0.25 mg  0.25 mg Intravenous Q3HRS PRN Gurwinder De La Torre M.D.       • MD Alert...Warfarin per Pharmacy   Other pharmacy to dose Gurwinder De La Torre M.D.       • sodium bicarbonate 8.4 % 50 mEq in D5W 1,000 mL infusion   Intravenous Continuous Yimi Burch M.D. 100 mL/hr at 09/13/18 0019     • mirtazapine (REMERON) tablet 30 mg  30 mg Oral HS PRN Yimi Burch M.D.       • ALPRAZolam (XANAX) tablet 0.25 mg  0.25 mg Oral Q6HRS PRN Yimi Burch M.D.       • ferrous sulfate tablet 325 mg  325 mg Oral TID WITH MEALS Yimi Burch M.D.   325 mg at 09/13/18 0804   • dronabinol (MARINOL) capsule 5 mg  5 mg Oral 4X/DAY Yimi Burch M.D.   5 mg at 09/13/18 0804         Social History:     Social History     Social History   • Marital status:      Spouse name: N/A   • Number of children: N/A   • Years of education: N/A     Occupational History   • Not on file.     Social  "History Main Topics   • Smoking status: Never Smoker   • Smokeless tobacco: Never Used   • Alcohol use No   • Drug use: No   • Sexual activity: Yes     Partners: Male     Other Topics Concern   • Not on file     Social History Narrative    Lives with .     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       Family History:      Family History   Problem Relation Age of Onset   • Diabetes Mother         prediabetes   • Heart Disease Mother    • Hypertension Mother    • Stroke Maternal Grandmother    • Cancer Neg Hx    • Hyperlipidemia Neg Hx    • Psychiatry Neg Hx    • Thyroid Neg Hx        Review of Systems:  All other review of systems are negative except what was mentioned above in the HPI.    Constitutional: Negative for fever, chills, positive for dizziness, failure to thrive weight loss and malaise/fatigue.    HEENT: No new auditory or visual complaints. No sore throat and neck pain.     Respiratory: Negative for cough, sputum production, shortness of breath and wheezing.    Cardiovascular: Negative for chest pain, palpitations, orthopnea and leg swelling.    Gastrointestinal: Negative for heartburn, positive for chronic nausea, vomiting and abdominal pain.    Genitourinary: Positive for dysuria, hematuria    Musculoskeletal: No new arthralgias or myalgias   Skin: Negative for rash and itching.    Neurological: Negative for focal weakness and headaches.    Endo/Heme/Allergies: No abnormal bleed/bruise.    Psychiatric/Behavioral: No new depression/anxiety.    Physical Exam:  Vitals: /85   Pulse (!) 128   Temp 36.1 °C (97 °F)   Ht 1.499 m (4' 11\")   Wt 94 kg (207 lb 5.5 oz)   SpO2 97%   BMI 41.88 kg/m²     General: Not in acute distress, alert and oriented x 3  HEENT: No pallor, icterus. Oropharynx clear.   Neck: Supple, no palpable masses.  Lymph nodes: No palpable cervical, supraclavicular, axillary or inguinal lymphadenopathy.    CVS: regular rate and rhythm, no rubs or gallops  RESP: " Clear to auscultate bilaterally, no wheezing or crackles.   ABD: Soft, non tender, non distended, positive bowel sounds, right upper quadrant fullness from hepatomegaly. Ostomy in place  EXT: No edema or cyanosis  CNS: Alert and oriented x3, No focal deficits.  Skin- No rash      Labs:   Anticoagulation Visit on 09/10/2018   Component Date Value Ref Range Status   • INR 09/10/2018 1.3   Final   • POC INR 09/10/2018 1.3* 0.9 - 1.2 Final    Comment: INR - Non-therapeutic Reference Range: 0.9-1.2  INR - Therapeutic Reference Range: 2.0-4.0     Hospital Outpatient Visit on 09/07/2018   Component Date Value Ref Range Status   • Stat C-Reactive Protein 09/07/2018 6.47* 0.00 - 0.75 mg/dL Final   • Nucleated RBC 09/07/2018 0.00  /100 WBC Final   • NRBC (Absolute) 09/07/2018 0.00  K/uL Final   • WBC 09/07/2018 1.9* 4.8 - 10.8 K/uL Final    Results confirmed by repeat testing.   • RBC 09/07/2018 2.39* 4.20 - 5.40 M/uL Final   • Hemoglobin 09/07/2018 7.5* 12.0 - 16.0 g/dL Final   • Hematocrit 09/07/2018 24.7* 37.0 - 47.0 % Final   • MCV 09/07/2018 103.3* 81.4 - 97.8 fL Final   • MCH 09/07/2018 31.4  27.0 - 33.0 pg Final   • MCHC 09/07/2018 30.4* 33.6 - 35.0 g/dL Final   • RDW 09/07/2018 110.9* 35.9 - 50.0 fL Final   • Platelet Count 09/07/2018 97* 164 - 446 K/uL Final   • MPV 09/07/2018 11.6  9.0 - 12.9 fL Final   • Neutrophils-Polys 09/07/2018 37.80* 44.00 - 72.00 % Final   • Lymphocytes 09/07/2018 41.50* 22.00 - 41.00 % Final   • Monocytes 09/07/2018 17.10* 0.00 - 13.40 % Final   • Eosinophils 09/07/2018 0.90  0.00 - 6.90 % Final   • Basophils 09/07/2018 0.00  0.00 - 1.80 % Final   • Neutrophils (Absolute) 09/07/2018 0.74* 2.00 - 7.15 K/uL Final    Includes immature neutrophils, if present.   • Lymphs (Absolute) 09/07/2018 0.79* 1.00 - 4.80 K/uL Final   • Monos (Absolute) 09/07/2018 0.32  0.00 - 0.85 K/uL Final   • Eos (Absolute) 09/07/2018 0.02  0.00 - 0.51 K/uL Final   • Baso (Absolute) 09/07/2018 0.00  0.00 - 0.12 K/uL  Final   • Hypochromia 09/07/2018 1+   Final   • Anisocytosis 09/07/2018 2+   Final   • Macrocytosis 09/07/2018 1+   Final   • Microcytosis 09/07/2018 1+   Final   • Bands-Stabs 09/07/2018 0.90  0.00 - 10.00 % Final   • Myelocytes 09/07/2018 0.90  % Final   • Manual Diff Status 09/07/2018 PERFORMED   Final   • Peripheral Smear Review 09/07/2018 see below   Final    Comment: Due to instrument suspect flags, further review of peripheral smear is  indicated on this patient sample. This review may or may not result in  abnormal findings.     • Plt Estimation 09/07/2018 Decreased   Final   • RBC Morphology 09/07/2018 Present   Final   • Polychromia 09/07/2018 2+   Final   • Poikilocytosis 09/07/2018 2+   Final   • Ovalocytes 09/07/2018 1+   Final   • Schistocytes 09/07/2018 1+   Final   • Sed Rate Westergren 09/07/2018 48* 0 - 30 mm/hour Final             Assessment and Plan:    Refractory Metastatic colon carcinoma. FLT3 mutated KRAS wild type ,HERNÁN- she had aggressive disease and progressed to through FOLFOX->  FOLFIRI + Vectibix->Regorafenib.    NGS testing Interestingly show FLT3 mutation , however, there was no response to Dabrafenib and this is thought to be present mutation.  -7/2018-establish care with me  -Informed her that hospice is a very appropriate decision as realistically her chance of having treatment response is low.  -Not a candidate for Avastin-based treatment due to presence of fistula  -Not a candidate for immunotherapy as MSI-s  -Tolerated 2 cycles Lonsurf dosed at 60 mg twice a day. No major neutropenia, with the first cycle. However, the second cycle. She is developing delayed neutropenia. Care is complicated by local progression of the tumor into the urinary bladder causing recurrent ESBL UTI despite undergoing TURBT.  -Overall, her prognosis is very poor and she understands that, however, she wanted to try any kind of therapeutic options available. She finished cycle #2 of Lonsurf with   reasonably good tolerance. She has been off of treatment for 2 weeks after her second cycle. Not a good candidate for resuming treatment. I will obtain restaging CT scan in 2 weeks and discuss hospice again  .     Recurrent UTIs secondary to colon carcinoma invading the bladder causing infections- s/p TURBT. Pathology consistent with adenocarcinoma. Unfortunately, she is having recurrent symptomatic ESBL. Recently finished a course of IV antibiotics.. She continues to have dysuria symptoms and instructed her to discuss this with urology and ID She still has significant residual tumor mass in her bladder.     Symptomatic iron deficiency anemia-status post venofer and PRBCs. Suspect that she is having some occult blood loss related to her UTI. Discussed the option of stopping the Coumadin and maybe switch her to low-dose Eliquis to prevent DVTs as she still has malignancy associated hypercoagulability.    Neutropenia secondary to chemotherapy.-Will arrange Neupogen.  Anemia secondary to possible blood loss/chemotherapy effect.  She went to packed red blood cells.    Overall her prognosis is poor and if she continues to have declining performance status/worsening symptoms, hospice would be the recommendation.      She agreed and verbalized  agreement and understanding with the current plan.  I answered all questions and concerns at this time         Please note that this dictation was created using voice recognition software. I have made every reasonable attempt to correct obvious errors, but I expect that there are errors of grammar and possibly content that I did not discover before finalizing the note.      SIGNATURES:  Charles Pelaez    CC:  Maritza Esparza M.D.  No ref. provider found

## 2018-09-12 NOTE — ED TRIAGE NOTES
.  Chief Complaint   Patient presents with   • Sent by MD     Oncology     ./75   Pulse (!) 121   Temp 36.1 °C (96.9 °F) (Temporal)   Resp 16   Ht 1.524 m (5')   Wt 94.4 kg (208 lb 1.8 oz)   SpO2 95%   BMI 40.64 kg/m²     Ambulatory to triage from infusion center to r/o sepsis, patient resented to infusion center today for possible Neupogen and blood transfusion and was N/V on arrival per RN report, given 8 mg IV Zofran at infusion center and sent to ED, placed in [facility] with mask in place, recent hx of UTI, completed course of abx yesterday.

## 2018-09-12 NOTE — PROGRESS NOTES
"Pt ambulated into department for CBC/possible neupogen/possible blood. Before RN went to greet patient, Ms. Flynn started to vomit. RN went to chairside to assist patient, but Pt was unable to verbalize what cause her to feel sick due to uncontrolled vomiting. RN called Dr. Pelaez and explained to MD that Pt started to vomit before nurse was able to talk to her, and has not been able to stop. Dr. Pelaez told nurse that he saw this patient earlier in the day and was concerned that she may be septic because she still has UTI symptoms and most likely is neutropenic. Dr. Pelaez ordered for patient to get 8 mg of Zofran IV, and then wanted patient to go to the ED for further evaluation. Medication given as prescribed, POC explained to patient, who was tearful about situation. Patient stated \"I just got out of the hospital,\" but was understanding to plan. Report given to charge nurse \"Shireen\" prior to transferring to ED. Patient transferred to ED by SKINNY Frazier in a wheelchair, appeared medically stable when leaving [facility].   "

## 2018-09-13 PROBLEM — E87.20 LACTIC ACIDOSIS: Status: ACTIVE | Noted: 2018-01-01

## 2018-09-13 PROBLEM — R79.89 ABNORMAL LFTS: Status: ACTIVE | Noted: 2018-01-01

## 2018-09-13 PROBLEM — E88.09 HYPOALBUMINEMIA: Status: ACTIVE | Noted: 2018-01-01

## 2018-09-13 PROBLEM — A41.9 SEPSIS DUE TO URINARY TRACT INFECTION (HCC): Status: ACTIVE | Noted: 2018-01-01

## 2018-09-13 PROBLEM — D70.9 NEUTROPENIA (HCC): Status: ACTIVE | Noted: 2018-01-01

## 2018-09-13 PROBLEM — R79.9 ELEVATED BUN: Status: ACTIVE | Noted: 2018-01-01

## 2018-09-13 PROBLEM — N39.0 SEPSIS DUE TO URINARY TRACT INFECTION (HCC): Status: ACTIVE | Noted: 2018-01-01

## 2018-09-13 NOTE — PROGRESS NOTES
Monitor Summary:   Sinus tachy  -110  .16/.08/.36    12 hour chart check complete.  2 RN skin check complete. Wound consult placed for wounds/dermatitis.

## 2018-09-13 NOTE — PROGRESS NOTES
Pt transported to T631 on monitor by 2 RNs. Pt able to transfer herself from [facility] to ICU bed. Pt resting comfortably, no pain. Personal belongings within reach of pt. All questions answered at this time.

## 2018-09-13 NOTE — PROGRESS NOTES
"2 RN skin check. Red rash with excoriation noted under panis in bilateral groin area. Pt states \"itchiness.\" Pictures uploaded in EPIC. Noted redness under bilat breasts. Colostomy LLQ with hernia. Irritated skin under colostomy appliance.   "

## 2018-09-13 NOTE — CARE PLAN
Problem: Safety  Goal: Will remain free from falls  Outcome: PROGRESSING AS EXPECTED    Intervention: Assess risk factors for falls  Jenn stephen fall risk assessment complete.   Intervention: Implement fall precautions  Bed locked and in lowest position. Bed alarm on. Pt's belongings and call light within reach. Pt educated on importance of pressing call light PRN.         Problem: Infection  Goal: Will remain free from infection  Outcome: PROGRESSING AS EXPECTED    Intervention: Assess signs and symptoms of infection  Oncology pt, low WBC   Intervention: Implement standard precautions and perform hand washing before and after patient contact  Neutropenic precautions   Intervention: Give CDC handouts for infection prevention (infection prevention/hand washing, disease specific, and device specific) and document in Education  Education provided.   Intervention: Assess for removal of potential routes of infection, such as IV, central line, intra-arterial or urinary catheters  Assessed. Port in left chest.

## 2018-09-13 NOTE — WOUND TEAM
Patient seen for pannus redness.  Patient has satelite lesions to pannus area as well as bilateral breast indicating yeast infection.  Placed interdry to both areas, gave patient option of JENNIFER with miconazole or nystatin powder, patient prefers cream.  Ordered in [facility].      Patient has colostomy for last 2.5 years.  Wearing convex coloplast appliance now with brava strips.  Patient states she has plenty of supplies and it does not need to be changed.  Was recently discharged from OP wound clinic after den-stomal wound resolved.  No issues at this time, preforms self care.      No other needs at this time, please re-consult if any needs come up.

## 2018-09-13 NOTE — ASSESSMENT & PLAN NOTE
CT shows progression with extensive Metastatic disease.   Hx of chemo per Dr. Christy - Need to contact him regarding future treatment options . She may consider hospice if no additional treatment.

## 2018-09-13 NOTE — ASSESSMENT & PLAN NOTE
From Recent cultures. Has had repeat urine + non Esbl E coli  IV merrem .  Discussed with Dr. Tyler bal thru 9-19.  Added pyridium    9/19: completed miguelito

## 2018-09-13 NOTE — ED NOTES
Lab called with critical result of Lactic acid of 7.6 at 1952. Critical lab result read back to Lab.   Dr. Caro notified of critical lab result at 1953.  Critical lab result read back by Dr. Caro.

## 2018-09-13 NOTE — ED PROVIDER NOTES
ED Provider Note    Scribed for Kezia Caro M.D. by Benigno Bojorquez. 9/12/2018, 6:57 PM.    Primary care provider: Maritza Esparza M.D.  Means of arrival: Walk In  History obtained from: Patient  History limited by: None     CHIEF COMPLAINT  Chief Complaint   Patient presents with   • Sent by MD     Oncology     HPI  Tamara Flynn is a 62 y.o. female who presents to the Emergency Department for dysuria.   The patient, who has a history of recurrent urinary tract infections, recently finished a 2 week regime of IV antibiotics today. She complains of constant symptoms of dysuria and urinary frequency over the last few weeks. She denies any improvement with IV antibiotics in the last two weeks.   The patient also reports two episodes of vomiting this afternoon and complains of a decreased appetite today.      The patient was seen at her oncologist office, Dr. Pelaez, this morning, who sent patient to infusion [place] for blood transfusion. Upon presentation to infusion center, the patient was then sent to this ED for concern for sepsis.  The patient has been followed by Dr. Abdullahi, Urology, for her recurrent UTI's over the last three years.      The patient denies any fever, cough, sore throat, hematuria, chest pain, shortness of breath, abdominal pain.       REVIEW OF SYSTEMS  Review of Systems   Constitutional: Negative for fever.   HENT: Negative for sore throat.    Respiratory: Negative for cough and shortness of breath.    Cardiovascular: Negative for chest pain.   Gastrointestinal: Positive for vomiting. Negative for abdominal pain.   Genitourinary: Positive for dysuria and urgency.   All other systems reviewed and are negative.    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    SURGICAL HISTORY   has a past surgical history that includes excision of tonsil tags; cystoscopy (3/17/2016); trans urethral resection bladder (3/17/2016); colonoscopy - endo (N/A, 3/22/2016); recovery (3/21/2016); cystectomy (4/18/2016); colostomy (Left, 4/19/2016); cath placement (Left, 8/9/2016); exploratory laparotomy (4/17/2016); exploratory laparotomy (4/18/2016); and trans urethral resection bladder (8/9/2018).    SOCIAL HISTORY  Social History   Substance Use Topics   • Smoking status: Never Smoker   • Smokeless tobacco: Never Used   • Alcohol use No      History   Drug Use No     FAMILY HISTORY  Family History   Problem Relation Age of Onset   • Diabetes Mother         prediabetes   • Heart Disease Mother    • Hypertension Mother    • Stroke Maternal Grandmother    • Cancer Neg Hx    • Hyperlipidemia Neg Hx    • Psychiatry Neg Hx    • Thyroid Neg Hx      CURRENT MEDICATIONS  Reviewed.  See Encounter Summary.     ALLERGIES  No Known Allergies    PHYSICAL EXAM  VITAL SIGNS: /63   Pulse (!) 112   Temp 36.7 °C (98.1 °F)   Resp 18   Ht 1.524 m (5')   Wt 94.4 kg (208 lb 1.8 oz)   SpO2 95%   BMI 40.64 kg/m²   Constitutional: Alert in no apparent distress.  HENT: No signs of trauma, Bilateral external ears normal, Nose normal.   Eyes: Pupils are equal and reactive, Conjunctiva normal, Non-icteric.   Neck: Normal range of motion, No tenderness, Supple, No stridor.   Lymphatic: No lymphadenopathy noted.   Cardiovascular: Tachycardic rate, no murmurs.   Thorax & Lungs: Normal breath sounds, No respiratory distress, No wheezing, No chest tenderness.   Abdomen: Soft, tender to palpation diffusely, multiple well-healed abdominal scars, colostomy bag present in left lower quadrant  Skin: Warm, Dry, No erythema, No rash.   Back: No bony tenderness, No CVA tenderness.   Extremities: Intact distal pulses, bilateral lower extremity edema 2+, No tenderness, No cyanosis  Musculoskeletal: Good range of  motion in all major joints. No tenderness to palpation or major deformities noted.   Neurologic: Alert , Normal motor function, Normal sensory function, No focal deficits noted.   Psychiatric: Affect normal, Judgment normal, Mood normal.       DIAGNOSTIC STUDIES / PROCEDURES     LABS  Results for orders placed or performed during the hospital encounter of 09/12/18   CBC WITH DIFFERENTIAL   Result Value Ref Range    WBC 4.2 (L) 4.8 - 10.8 K/uL    RBC 2.61 (L) 4.20 - 5.40 M/uL    Hemoglobin 8.6 (L) 12.0 - 16.0 g/dL    Hematocrit 27.9 (L) 37.0 - 47.0 %    .9 (H) 81.4 - 97.8 fL    MCH 33.0 27.0 - 33.0 pg    MCHC 30.8 (L) 33.6 - 35.0 g/dL    .0 (H) 35.9 - 50.0 fL    Platelet Count 236 164 - 446 K/uL    MPV 10.6 9.0 - 12.9 fL    Nucleated RBC 1.40 /100 WBC    NRBC (Absolute) 0.06 K/uL    Neutrophils-Polys 29.60 (L) 44.00 - 72.00 %    Lymphocytes 62.50 (H) 22.00 - 41.00 %    Monocytes 4.60 0.00 - 13.40 %    Eosinophils 0.00 0.00 - 6.90 %    Basophils 0.00 0.00 - 1.80 %    Neutrophils (Absolute) 1.29 (L) 2.00 - 7.15 K/uL    Lymphs (Absolute) 2.63 1.00 - 4.80 K/uL    Monos (Absolute) 0.19 0.00 - 0.85 K/uL    Eos (Absolute) 0.00 0.00 - 0.51 K/uL    Baso (Absolute) 0.00 0.00 - 0.12 K/uL    Anisocytosis 1+     Macrocytosis 1+     Microcytosis 1+    COMP METABOLIC PANEL   Result Value Ref Range    Sodium 136 135 - 145 mmol/L    Potassium 4.8 3.6 - 5.5 mmol/L    Chloride 104 96 - 112 mmol/L    Co2 13 (L) 20 - 33 mmol/L    Anion Gap 19.0 (H) 0.0 - 11.9    Glucose 90 65 - 99 mg/dL    Bun 23 (H) 8 - 22 mg/dL    Creatinine 1.06 0.50 - 1.40 mg/dL    Calcium 8.3 (L) 8.5 - 10.5 mg/dL    AST(SGOT) 129 (H) 12 - 45 U/L    ALT(SGPT) 36 2 - 50 U/L    Alkaline Phosphatase 1211 (H) 30 - 99 U/L    Total Bilirubin 1.4 0.1 - 1.5 mg/dL    Albumin 2.4 (L) 3.2 - 4.9 g/dL    Total Protein 5.7 (L) 6.0 - 8.2 g/dL    Globulin 3.3 1.9 - 3.5 g/dL    A-G Ratio 0.7 g/dL   LIPASE   Result Value Ref Range    Lipase 25 11 - 82 U/L    URINALYSIS,CULTURE IF INDICATED   Result Value Ref Range    Micro Urine Req Microscopic     Color Brown     Character Turbid (A)     Specific Gravity 1.029 <1.035    Ph 5.5 5.0 - 8.0    Glucose Negative Negative mg/dL    Ketones Trace (A) Negative mg/dL    Protein 300 (A) Negative mg/dL    Bilirubin Small (A) Negative    Urobilinogen, Urine 1.0 Negative    Nitrite Positive (A) Negative    Leukocyte Esterase Large (A) Negative    Occult Blood Large (A) Negative   CRP QUANTITIVE (NON-CARDIAC)   Result Value Ref Range    Stat C-Reactive Protein 7.60 (H) 0.00 - 0.75 mg/dL   LACTIC ACID   Result Value Ref Range    Lactic Acid 7.6 (HH) 0.5 - 2.0 mmol/L   LACTIC ACID   Result Value Ref Range    Lactic Acid 7.3 (HH) 0.5 - 2.0 mmol/L   TROPONIN   Result Value Ref Range    Troponin I <0.01 0.00 - 0.04 ng/mL   URINE MICROSCOPIC (W/UA)   Result Value Ref Range    WBC Packed (A) /hpf    RBC >150 (A) /hpf    Epithelial Cells Few /hpf    Trans Epithelial Cells Few /hpf   ESTIMATED GFR   Result Value Ref Range    GFR If African American >60 >60 mL/min/1.73 m 2    GFR If Non African American 52 (A) >60 mL/min/1.73 m 2   DIFFERENTIAL MANUAL   Result Value Ref Range    Bands-Stabs 1.10 0.00 - 10.00 %    Myelocytes 1.10 %    Progranulocytes 1.10 %    Manual Diff Status PERFORMED    PERIPHERAL SMEAR REVIEW   Result Value Ref Range    Peripheral Smear Review see below    PLATELET ESTIMATE   Result Value Ref Range    Plt Estimation Normal    MORPHOLOGY   Result Value Ref Range    RBC Morphology Present     Giant Platelets 2+     Polychromia 1+    EKG   Result Value Ref Range    Report       [facility] Emergency Dept.    Test Date:  2018  Pt Name:    EFRAIN AUGUSTINE               Department: ER  MRN:        [id number]                      Room:        07  Gender:     Female                       Technician: [id number]  :        1955                   Requested By:BETTIE DOVE  Order #:    [id number]                     Reading MD:    Measurements  Intervals                                Axis  Rate:       105                          P:          0  AL:         125                          QRS:        35  QRSD:       68                           T:          13  QT:         312  QTc:        413    Interpretive Statements  SINUS TACHYCARDIA  Compared to ECG 08/09/2018 08:27:22  No significant changes        All labs were reviewed by me.    EKG Time completed  6:46 PM  12 Lead EKG interpreted by me to show:  Sinus tachycardia   Rate 105  Axis: Normal  Normal Intervals:   No ST elevations or depressions.   Downgoing P waves in Lead III and AvF  Clinical Impression: Sinus tachycardia, no evidence of ischemia or infarction.    COURSE & MEDICAL DECISION MAKING  Pertinent Labs & Imaging studies reviewed. (See chart for details)    6:27 PM - Patient seen and examined at bedside. Patient will be treated with Maxipime IV. Ordered lactic acid, troponin, CRP quant, GFR, CBC, CMP, lipase, urinalysis, EKG to evaluate her symptoms.   Differential diagnosis includes but is not limited to: sepsis, UTI, pyelonephritis, dehydration, CURRY, resistant organism, anemia.     8:21 PM Paged hospitalist.      8:32 PM Discussed patient's condition with Dr. De La Torre, Hospitalist, who is agreeable for admission.         Decision Making:  This is a 62 y.o. year old female who presents with evidence of sepsis in the setting of known malignancy.  Patient has had multiple issues with urinary tract infections, and has been on and off of antibiotics since May 2018.  Patient recently finished a course of ertapenem for urinary tract infection and a multiresistant organism.  Given that the patient presented with tachycardia and concern for sepsis, peripheral IV access was obtained in the patient's port was accessed.  Blood cultures were drawn as well as basic laboratory studies.  Antibiotics were ordered including cefepime and vancomycin given unknown source of  infection at this time.  Labs were significant for an elevated lactate at 7.5.  Patient had leukopenia.  She had packed white blood cells on her urinalysis consistent with pyelonephritis.  Patient was also noted to be anemic with a hemoglobin of 8.6, though this does not meet transfusion protocols.    HYDRATION: Based on the patient's presentation of tachycardia the patient was given IV fluids.  Upon recheck following hydration, the patient still tachycardic, will continue resuscitation with IV fluids.     Given the patient's urosepsis, elected to admit to the hospitalist service.  Patient was hemodynamically stable despite tachycardia her blood pressure was never severely hypotensive.            Disposition:  Patient will be admitted to the hospital under the care of Dr. De La Torre (Hospitalist) in guarded condition.  Patient was in agreement with this plan of care.    FINAL IMPRESSION  1. Adenocarcinoma of colon metastatic to liver (HCC)    2. Sepsis, due to unspecified organism (HCC)    3. Pyelonephritis          Benigno MCLEOD (Scribe), am scribing for, and in the presence of, Kezia Caro M.D..    Electronically signed by: Benigno Bojorquez (Scribe), 9/12/2018    Kezia MCLEOD M.D. personally performed the services described in this documentation, as scribed by Benigno Bojorquez in my presence, and it is both accurate and complete.    The note accurately reflects work and decisions made by me.  Kezia Caro  9/13/2018  12:13 AM

## 2018-09-13 NOTE — ASSESSMENT & PLAN NOTE
Septic shock due to E coli UTI.    Elevated lactic acid that went up to 7.6 post Fluid resuscitation with  metabolic acidosis resolved.   Immunocompromised patient due to chemotherapy  Recurrent UTIs secondary to colon carcinoma invading the bladder causing infections.  Has hx of ESBL infection and is currently being treated with IV Merrem.  The organ system failure associated with this disease process is the cardiovascular system as is evidenced by the severe lactic acidosis consistent with organ-system ischemia.

## 2018-09-13 NOTE — CARE PLAN
Problem: Safety  Goal: Will remain free from injury  Outcome: PROGRESSING AS EXPECTED    Intervention: Provide assistance with mobility  Patient needs help getting out of bed, otherwise she is steady and standby assist.   Intervention: Collaborate with Interdisciplinary Team for safe transfer and mobilization techniques  Steady gait, only needs assistance getting out of bed. Calls appropriately for assistance.  Intervention: Educate patient and significant other/support system about adaptive mobility strategies and safe transfers  Education provided, patient understands safe mobility strategies and to call for assistance.       Problem: Skin Integrity  Goal: Risk for impaired skin integrity will decrease  Outcome: PROGRESSING AS EXPECTED    Intervention: Assess risk factors for impaired skin integrity and/or pressure ulcers  Patient is at risk for impaired skin integrity.   Intervention: Assess and monitor skin integrity, appearance and/or temperature  Patient has redness and excoriation under panis and has redness under breasts.

## 2018-09-13 NOTE — PROGRESS NOTES
[facility] Hospitalist Progress Note    Date of Service: 2018    Chief Complaint  62 y.o. female admitted 2018 with dysuria    Interval Problem Update  Ms. Flynn has a history of colon cancer with mets to bladder and recent ESBL UTI that presented to the ER with dysuria. She recently was treated with IV antibiotics and her symptoms have continued. In the ER she was found to have septic shock and has been admitted to the ICU for IV fluids and IV antibiotics.   Ms. Flynn is amenable to a CT abd/pelvis to [facility] for a fistual  Consultants/Specialty  Critical Care. I discussed her condition with Dr. Barroso on ICU Hot Rounds.   Infectious disease.   Disposition  ICU        Review of Systems   Constitutional: Negative for chills and fever.   Genitourinary: Positive for dysuria.   Neurological: Positive for weakness.   All other systems reviewed and are negative.     Physical Exam  Laboratory/Imaging   Hemodynamics  Temp (24hrs), Av.3 °C (83 °F), Min:3.6 °C (38.5 °F), Max:36.9 °C (98.4 °F)   Temperature: (!) 3.6 °C (38.5 °F)  Pulse  Av.6  Min: 99  Max: 128 Heart Rate (Monitored): (!) 103  Blood Pressure: 112/63, NIBP: 118/59      Respiratory      Respiration: (!) 26, Pulse Oximetry: 96 %        RUL Breath Sounds: Clear, RML Breath Sounds: Clear;Diminished, RLL Breath Sounds: Diminished, ROLF Breath Sounds: Clear, LLL Breath Sounds: Diminished    Fluids    Intake/Output Summary (Last 24 hours) at 18 0834  Last data filed at 18 0600   Gross per 24 hour   Intake              500 ml   Output              275 ml   Net              225 ml       Nutrition  No orders of the defined types were placed in this encounter.    Physical Exam   Constitutional: She is oriented to person, place, and time. She appears well-nourished. No distress.   HENT:   Head: Normocephalic and atraumatic.   Neck: Normal range of motion. Neck supple.   Cardiovascular:   Sinus tachycardia  Left sternal border murmur    Pulmonary/Chest:   Left chest port.   Abdominal:   Ostomy bag. No umbilicus   Musculoskeletal: She exhibits no edema or tenderness.   Neurological: She is alert and oriented to person, place, and time.   Skin: Skin is warm and dry. There is pallor.   Psychiatric: She has a normal mood and affect. Her behavior is normal.   Nursing note and vitals reviewed.      Recent Labs      09/12/18 1816 09/13/18   0350   WBC  4.2*  4.8   RBC  2.61*  2.25*   HEMOGLOBIN  8.6*  7.6*   HEMATOCRIT  27.9*  23.6*   MCV  106.9*  104.9*   MCH  33.0  33.8*   MCHC  30.8*  32.2*   RDW  123.0*  116.1*   PLATELETCT  236  187   MPV  10.6  10.3     Recent Labs      09/12/18 1816 09/13/18   0315   SODIUM  136  133*   POTASSIUM  4.8  4.6   CHLORIDE  104  107   CO2  13*  13*   GLUCOSE  90  96   BUN  23*  23*   CREATININE  1.06  0.94   CALCIUM  8.3*  7.2*     Recent Labs      09/13/18   0055   APTT  33.0   INR  1.44*         Recent Labs      09/13/18   0315   TRIGLYCERIDE  219*   HDL  11*   LDL  155*          Assessment/Plan     * Sepsis due to urinary tract infection (HCC)   Assessment & Plan    Septic shock due to UTI with admission to the ICU with IV fluids and IV antibiotics.  Elevated lactic acid that went up to 7.6  Recurrent UTIs secondary to colon carcinoma invading the bladder causing infections  She has a history of ESBL therefore IV meropenem   Will continue to follow cultures  The organ system failure associated with this disease process is the cardiovascular system as is evidenced by the severe lactic acidosis consistent with organ-system ischemia.          ESBL (extended spectrum beta-lactamase) producing bacteria infection- (present on admission)   Assessment & Plan    Hx of recently finished course of abx of ertapenem   ID consult        Adenocarcinoma of colon metastatic to liver (HCC)- (present on admission)   Assessment & Plan    Follows with Dr. Hull   Current chemo        Colostomy in place (HCC)- (present on  admission)   Assessment & Plan    Ostomy care        Abnormal LFTs   Assessment & Plan    Due to metastatic disease?  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 Pelaez          Adenocarcinoma of sigmoid colon (HCC)   Assessment & Plan    Follows with Dr. Pelaez current treatment        Deep vein thrombosis (DVT) of both lower extremities (HCC)- (present on admission)   Assessment & Plan    Resume home coumadin   Goal INR 2-3        Morbid obesity with BMI of 40.0-44.9, adult (HCC)- (present on admission)   Assessment & Plan    Encouraged weight loss          Quality-Core Measures   Reviewed items::  Labs reviewed and Medications reviewed  Traore catheter::  No Traore  DVT prophylaxis pharmacological::  Warfarin (Coumadin)

## 2018-09-13 NOTE — H&P
Hospital Medicine History & Physical Note    Date of Service  9/12/2018    Primary Care Physician  Maritza Esparza M.D.    Consultants  PMA    Code Status  full    Chief Complaint  Dysuria, abnormal labs    History of Presenting Illness  62 y.o. female who presented 9/12/2018 with dysuria.  Patient has history of recurrent urinary tract infections just recently finished 2 week regimen of IV antibiotics today.  She also has a history of colon cancer with metastasis to the bladder.  She follows with  and Dr Abdullahi urology.  She was seen by Dr. Pelaez this morning to come to the emergency department for further evaluation.  He is noted to have positive nitrites and leukocyte Estrace positive with packed WBCs large amount of occult blood as well.  She is also neutropenic she does have 1+ bands.  She is severely tachycardic in the emergency department as well.  There is concern for possible sepsis secondary urinary tract infection as she is symptomatic will be admitted to the ICU for further management.  She has no known alleviating or exacerbating factors to her symptoms.    Review of Systems  Review of Systems   Constitutional: Negative for chills and fever.   HENT: Negative for congestion, hearing loss and tinnitus.    Eyes: Negative for blurred vision, double vision and discharge.   Respiratory: Negative for cough, hemoptysis and shortness of breath.    Cardiovascular: Negative for chest pain, palpitations and leg swelling.   Gastrointestinal: Positive for abdominal pain. Negative for heartburn, nausea and vomiting.   Genitourinary: Positive for dysuria and flank pain.   Musculoskeletal: Negative for joint pain and myalgias.   Skin: Negative for rash.   Neurological: Negative for dizziness, sensory change, speech change, focal weakness and weakness.   Endo/Heme/Allergies: Negative for environmental allergies. Does not bruise/bleed easily.   Psychiatric/Behavioral: Negative for depression, hallucinations  and substance abuse.       Past Medical History   has a past medical history of Blood clotting disorder (HCC) (4-); Blood loss anemia (2016); Bowel habit changes; Cancer (HCC) (2016); CVF (colovesical fistula) (8/5/2018); Essential hypertension; Obesity; and Urinary bladder disorder.    Surgical History   has a past surgical history that includes pr excision of tonsil tags; cystoscopy (3/17/2016); trans urethral resection bladder (3/17/2016); colonoscopy - endo (N/A, 3/22/2016); recovery (3/21/2016); cystectomy (4/18/2016); colostomy (Left, 4/19/2016); cath placement (Left, 8/9/2016); exploratory laparotomy (4/17/2016); exploratory laparotomy (4/18/2016); and trans urethral resection bladder (8/9/2018).     Family History  family history includes Diabetes in her mother; Heart Disease in her mother; Hypertension in her mother; Stroke in her maternal grandmother.     Social History   reports that she has never smoked. She has never used smokeless tobacco. She reports that she does not drink alcohol or use drugs.    Allergies  No Known Allergies    Medications  Prior to Admission Medications   Prescriptions Last Dose Informant Patient Reported? Taking?   ferrous sulfate 325 (65 Fe) MG EC tablet 9/12/2018 at 0800 Patient No No   Sig: Take 1 Tab by mouth 2 Times a Day.   furosemide (LASIX) 20 MG Tab 9/9/2018 at 0800  Yes Yes   Sig: Take 20 mg by mouth 1 time daily as needed.   vitamin D (CHOLECALCIFEROL) 1000 UNIT Tab 9/12/2018 at 0800 Patient Yes No   Sig: Take 1,000 Units by mouth every day.   warfarin (COUMADIN) 1 MG Tab 9/11/2018 at 1930 Patient Yes Yes   Sig: Take 1-2 mg by mouth every evening. 1 mg on Sunday, Tuesday, Thursday, Saturday  2 mg on Monday, Wednesday, Friday      Facility-Administered Medications: None       Physical Exam  Blood Pressure: 112/63   Temperature: 36.7 °C (98.1 °F)   Pulse: (!) 106   Respiration: 18   Pulse Oximetry: 96 %     Physical Exam   Constitutional: She is oriented to  person, place, and time. She appears well-developed and well-nourished. No distress.   HENT:   Head: Normocephalic and atraumatic.   Eyes: Pupils are equal, round, and reactive to light. Conjunctivae and EOM are normal.   Neck: Normal range of motion. Neck supple. No JVD present.   Cardiovascular: Regular rhythm, normal heart sounds and intact distal pulses.    No murmur heard.  tachycardic   Pulmonary/Chest: Effort normal and breath sounds normal. No respiratory distress. She has no wheezes.   Abdominal: Soft. Bowel sounds are normal. She exhibits no distension. There is tenderness.   suprapubic   Musculoskeletal: Normal range of motion. She exhibits no edema.   Neurological: She is alert and oriented to person, place, and time. She exhibits normal muscle tone.   Skin: Skin is warm and dry.   Psychiatric: She has a normal mood and affect. Her behavior is normal. Judgment and thought content normal.   Nursing note and vitals reviewed.      Laboratory:  Recent Labs      09/12/18   1816   WBC  4.2*   RBC  2.61*   HEMOGLOBIN  8.6*   HEMATOCRIT  27.9*   MCV  106.9*   MCH  33.0   MCHC  30.8*   RDW  123.0*   PLATELETCT  236   MPV  10.6     Recent Labs      09/12/18   1816   SODIUM  136   POTASSIUM  4.8   CHLORIDE  104   CO2  13*   GLUCOSE  90   BUN  23*   CREATININE  1.06   CALCIUM  8.3*     Recent Labs      09/12/18   1816   ALTSGPT  36   ASTSGOT  129*   ALKPHOSPHAT  1211*   TBILIRUBIN  1.4   LIPASE  25   GLUCOSE  90     Recent Labs     09/10/18   INR  1.3             Lab Results   Component Value Date    TROPONINI <0.01 09/12/2018       Urinalysis:    Recent Labs      09/12/18   1721   SPECGRAVITY  1.029   GLUCOSEUR  Negative   KETONES  Trace*   NITRITE  Positive*   LEUKESTERAS  Large*   WBCURINE  Packed*   RBCURINE  >150*   EPITHELCELL  Few        Imaging:  No orders to display         Assessment/Plan:  I anticipate this patient will require at least two midnights for appropriate medical management, necessitating  inpatient admission.    * Sepsis due to urinary tract infection (HCC)   Assessment & Plan    Severe sepsis   Elevated lactic, continue to trend   Recurrent UTIs secondary to colon carcinoma invading the bladder causing infections  Consider consult with Dr. Abdullahi urology and Infectious disease has hx of ESBL   Will place on meropenem   Will continue to follow cultures  descilate therapy as appropriate         ESBL (extended spectrum beta-lactamase) producing bacteria infection- (present on admission)   Assessment & Plan    Hx of recently finished course of abx of ertapenem today        Adenocarcinoma of colon metastatic to liver (HCC)- (present on admission)   Assessment & Plan    Follows with Dr. Hull   Current chemo        Colostomy in place (HCC)- (present on admission)   Assessment & Plan    Ostomy care        Abnormal LFTs   Assessment & Plan    Due to metastatic disease?  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 Hull          Adenocarcinoma of sigmoid colon (HCC)- (present on admission)   Assessment & Plan    Follows with Dr. Hull current treatment        Deep vein thrombosis (DVT) of both lower extremities (HCC)- (present on admission)   Assessment & Plan    Resume home coumadin         Morbid obesity with BMI of 40.0-44.9, adult (HCC)- (present on admission)   Assessment & Plan    Encouraged weight loss            VTE prophylaxis: coumadin

## 2018-09-13 NOTE — PROGRESS NOTES
Pulmonary Critical Care Progress Note        DOA:  9/12/2018    Chief Complaint:  fatigue    History of Present Illness:   Tamara Flynn is a 62 y.o. female with a past medical history of metastatic colon cancer to liver and urinary bladder, after recent chemotherapy and recurrent urinary tract infections, just completed 2 weeks of antibiotic treatment for UTI.  Presents to the hospital for dysuria and frequent urinations, profound malaise, chills.  Denies fevers, nausea, vomiting, abdominal pain, chest pain, shortness of breath, cough.    Review of Systems   Constitutional: Positive for chills and malaise/fatigue. Negative for diaphoresis, fever and weight loss.   HENT: Negative for congestion and sinus pain.    Eyes: Negative for blurred vision.   Respiratory: Negative for cough, sputum production and shortness of breath.    Cardiovascular: Negative for chest pain, palpitations, orthopnea, leg swelling and PND.   Gastrointestinal: Positive for abdominal pain and nausea. Negative for vomiting.   Genitourinary: Positive for dysuria, frequency and urgency. Negative for flank pain and hematuria.   Musculoskeletal: Negative for back pain.   Neurological: Negative for tremors, sensory change, speech change, focal weakness and headaches.   Endo/Heme/Allergies: Does not bruise/bleed easily.   Psychiatric/Behavioral: Negative for depression. The patient is not nervous/anxious and does not have insomnia.        Interval Events:  24 hour interval history reviewed     A&O x4  SR  SBp Tm 98.6  UO adequate  Colostomy bag LLQ  Mobilizes to BR  L anterior chest port  No snow  Room air  Warfarin - INR 1.44  Merrem    PFSH:  No change.    Physical Exam   Constitutional: She is oriented to person, place, and time. No distress.   HENT:   Head: Atraumatic.   Eyes: Pupils are equal, round, and reactive to light. No scleral icterus.   Neck: No JVD present.   Pulmonary/Chest: No accessory muscle usage. No respiratory distress. She  has decreased breath sounds in the right lower field and the left lower field. She has no wheezes. She has no rhonchi. She has no rales.   Abdominal: Soft. Bowel sounds are normal. She exhibits no distension and no mass. There is tenderness in the suprapubic area. There is no rigidity, no rebound, no guarding, no CVA tenderness and negative Sim's sign.   Neurological: She is alert and oriented to person, place, and time. She has normal strength. No cranial nerve deficit or sensory deficit. GCS eye subscore is 4. GCS verbal subscore is 5. GCS motor subscore is 6.   Skin: Skin is warm and dry. Capillary refill takes less than 2 seconds. No rash noted. She is not diaphoretic. No cyanosis. Nails show no clubbing.   Psychiatric: She has a normal mood and affect. Her speech is normal and behavior is normal. Thought content normal.       Respiratory:     Pulse Oximetry: 97 %          ImagingCXR  I have personally reviewed the chest x-ray my impression is  noted above and films are reviewed with Team on rounds           Invalid input(s): [id number]    HemoDynamics:  Pulse: (!) 109, Heart Rate (Monitored): (!) 103  Blood Pressure: 112/63, NIBP: 122/58       Imaging: Available data reviewed     Recent Labs      09/12/18   1940   TROPONINI  <0.01       Neuro:  GCS  15     Imaging: Available data reviewed    Fluids:  Intake/Output       09/11/18 0700 - 09/12/18 0659 (Not Admitted) 09/12/18 0700 - 09/13/18 0659 09/13/18 0700 - 09/14/18 0659      [id number] 5359-2522 Total 1089-1543 9647-4046 Total 3883-6238 3901-7184 Total       Intake    P.O.  --  -- --  --  500 500  --  -- --    P.O. -- -- -- -- 500 500 -- -- --    Total Intake -- -- -- -- 500 500 -- -- --       Output    Urine  --  -- --  --  150 150  --  -- --    Number of Times Voided -- -- -- -- 3 x 3 x -- -- --    Urine Void (mL) (non-catheter) -- -- -- -- 150 150 -- -- --    Total Output -- -- -- -- 150 150 -- -- --       Net I/O     -- -- -- -- 350 350 -- -- --         Weight: 94.4 kg (208 lb 1.8 oz)  Recent Labs      18   1816  18   0315   SODIUM  136  133*   POTASSIUM  4.8  4.6   CHLORIDE  104  107   CO2  13*  13*   BUN  23*  23*   CREATININE  1.06  0.94   CALCIUM  8.3*  7.2*       GI/Nutrition:    Imaging: Available data reviewed  taking PO     Liver Function  Recent Labs      18   18118   0315   ALTSGPT  36  28   ASTSGOT  129*  108*   ALKPHOSPHAT  1211*  1062*   TBILIRUBIN  1.4  1.4   LIPASE  25   --    PREALBUMIN   --   7.0*   GLUCOSE  90  96       Heme:  Recent Labs      18   0055   RBC  2.61*   --    HEMOGLOBIN  8.6*   --    HEMATOCRIT  27.9*   --    PLATELETCT  236   --    PROTHROMBTM   --   17.2*   APTT   --   33.0   INR   --   1.44*       Infectious Disease:  Temp  Av.3 °C (83 °F)  Min: 3.6 °C (38.5 °F)  Max: 36.9 °C (98.4 °F)  Micro: antibiotics reviewed and cultures pending  Recent Labs      18   WBC  4.2*   --    NEUTSPOLYS  29.60*   --    LYMPHOCYTES  62.50*   --    MONOCYTES  4.60   --    EOSINOPHILS  0.00   --    BASOPHILS  0.00   --    ASTSGOT  129*  108*   ALTSGPT  36  28   ALKPHOSPHAT  1211*  1062*   TBILIRUBIN  1.4  1.4     Current Facility-Administered Medications   Medication Dose Frequency Provider Last Rate Last Dose   • NS infusion 2,832 mL  30 mL/kg Once PRN Gurwinder De La Torre M.D.       • NS (BOLUS) infusion 1,000 mL  1,000 mL Once PRN Gurwinder De La Torre M.D.       • meropenem (MERREM) 500 mg in  mL IVPB  500 mg Q6HRS Gurwinder De La Torre M.D. 200 mL/hr at 18 0555 500 mg at 18 0555   • senna-docusate (PERICOLACE or SENOKOT S) 8.6-50 MG per tablet 2 Tab  2 Tab BID Gurwinder De La Torre M.D.   Stopped at 18 2100    And   • polyethylene glycol/lytes (MIRALAX) PACKET 1 Packet  1 Packet QDAY PRWAN De La Torre M.D.        And   • magnesium hydroxide (MILK OF MAGNESIA) suspension 30 mL  30 mL QDAY PRWAN De La Torre M.D.        And   • bisacodyl  (DULCOLAX) suppository 10 mg  10 mg QDAY PRN Gurwinder De La Torre M.D.       • ondansetron (ZOFRAN) syringe/vial injection 4 mg  4 mg Q4HRS PRN Gurwinder De La Torre M.D.       • ondansetron (ZOFRAN ODT) dispertab 4 mg  4 mg Q4HRS PRN Gurwinder De La Torre M.D.       • promethazine (PHENERGAN) tablet 12.5-25 mg  12.5-25 mg Q4HRS PRN Gurwinder De La Torre M.D.       • promethazine (PHENERGAN) suppository 12.5-25 mg  12.5-25 mg Q4HRS PRN Gurwinder De La Torre M.D.       • prochlorperazine (COMPAZINE) injection 5-10 mg  5-10 mg Q4HRS PRN Gurwinder De La Torre M.D.       • Pharmacy Consult Request ...Pain Management Review   PRN Gurwinder De La Torre M.D.        And   • oxyCODONE immediate-release (ROXICODONE) tablet 2.5 mg  2.5 mg Q3HRS PRN Gurwinder De La Torre M.D.   2.5 mg at 09/13/18 0019    And   • oxyCODONE immediate-release (ROXICODONE) tablet 5 mg  5 mg Q3HRS PRN Gurwinder De La Torre M.D.        And   • HYDROmorphone (DILAUDID) injection 0.25 mg  0.25 mg Q3HRS PRN Gurwinder De La Torre M.D.       • MD Alert...Warfarin per Pharmacy   pharmacy to dose Gurwinder De La Torre M.D.       • sodium bicarbonate 8.4 % 50 mEq in D5W 1,000 mL infusion   Continuous Yimi Burch M.D. 100 mL/hr at 09/13/18 0019     • mirtazapine (REMERON) tablet 30 mg  30 mg HS PRN Yimi Burch M.D.       • ALPRAZolam (XANAX) tablet 0.25 mg  0.25 mg Q6HRS PRN Yimi Burch M.D.       • ferrous sulfate tablet 325 mg  325 mg TID WITH MEALS Yimi Burch M.D.       • dronabinol (MARINOL) capsule 5 mg  5 mg 4X/DAY Yimi Burch M.D.         Last reviewed on 9/12/2018  7:15 PM by Ramiro Li    Quality  Measures:  Core Measures & Quality Metrics      Assessment / Plan    Recurrent urinary tract infection with sepsis  -History of UTI with klebsiella ESBL/E coli in 8/2018 Ucxs  -Broad-spectrum antibiotics with vancomycin and meropenem  -Urine cultures and blood cultures pending still  -Monitoring lactic acid trend - better  -CT ABD/pelvis with contrast  IV/oral  -reassess tumor and eval for abcess/fistula -> surgery IR Rx?  -ID f/u re MDR Klebsiella    Metabolic acidosis/Lactic acidosis  -Monitoring anion gap and lactic acid trend  -Bicarb drip - up to 150mEq/L - adjust rate as needed  -Rx primary issue - sepsis     Metastatic colon cancer to the liver and urinary bladder  -Status post chemotherapy 2 weeks ago - HOLD chemo for now  -Follows oncologist outpatient  -Follows urology as outpatient  -Inpatient palliative care consult     Anhedonia   -Started on Marinol  -Advance diet as tolerated     Mild neutropenia  -Status post recent chemotherapy  -Reverse isolation  -Monitoring daily WBCs     Anemia  -Iron deficiency and bone marrow suppression  -Iron supplementation  -Transfuse PRBC for hemoglobin less than 7      ICU prophylaxis  -History of bilateral lower extremities DVT, continue with Coumadin target therapeutic INR 2-3  -Mobilize  -IS    The ROS, Physical Exam and Plan were updated today (9/13/18).  Compared with yesterday's note, there are no new changes except as documented above.      No Bridge with full dose Lovenox for now - may need IR?  Bicarb drip - 3 amps/L, follow BMP  Advance diet as toerated  Image ABD/pelvis - IR?  ID eval    Keep in ICU another day    Discussed patient condition and risk of morbidity and/or mortality with RN, RT, Pharmacy, Charge nurse / hot rounds, Patient and hospitalist.

## 2018-09-13 NOTE — CONSULTS
"Reason for PC Consult: Advance Care Planning    Consulted by: Dr. Yimi Burch    Assessment:  General: 62 YOF admitted for sepsis due to urinary infection. Pt complaining of dysuria and urinary frequency x 2 weeks. Pt just completed abx for UTI on 9/12/18. Pt was sent to the ER by her oncologist, Dr. Pelaez, for concerns of sepsis. PMH: blood clotting disorder, anemia, bowel habit changes, cancer (metastatic colon ca with mets to liver and bladder), CVF, htn, obesity, and urinary bladder disorder.     Dyspnea: No-  (RA. Resp. 20. 96%. )  Last BM: 09/13/18 (colostomy bag in place.)-    Pain: No-  (Per RN Assessment)  Depression: Mood appropriate for situation-    Dementia: No;       Spiritual:  Is Mandaeism or spirituality important for coping with this illness? Yes-  (Spiritual support offered, but pt declined at this time. )  Has a  or spiritual provider visit been requested? No    Palliative Performance Scale: 60%    Advance Directive: None- Pt reports that she has an advance directive at home. PC RN requested that pt's spouse, Guero, bring in a copy of AD.    DPOA: No- NOK, spouse, Guero Flynn [phone].   POLST: Yes-  DNR.     Code Status: Full-      Outcome:  PC RN met with pt at pt's bedside. PC RN introduced self and the role of palliative care. PC RN discussed code status, advance care planning, and GOC with pt. Pt is a full code and wishes to remain a full code. Pt reports having an advance directive at home. PC RN requested that pt's spouse, Guero, bring a copy of the AD when he comes to the hospital. Pt has a POLST on file - DNR. Pt desires to go back home and continue treatment with Dr. Pelaez. Pt aware of her terminal medical condition and states, \"I just want to get stronger.\" PC RN provided emotional support, active listening, and validation of feeling during consult. All questions were answered. PC RN provided pt with PC business card. Pt to call PC RN when spouse, Guero, brings in a copy " of AD.     Updated: TONY RN, Palliative    Plan: Pt to dc home with     Recommendations: I do not recommend an ethics or hospice consult at this time because pt is pursuing aggressive treatment. .    Thank you for allowing Palliative Care to participate in this patient's care. Please feel free to call [phone] with any questions or concerns.

## 2018-09-13 NOTE — CONSULTS
DATE OF SERVICE:  09/13/2018    INFECTIOUS DISEASE CONSULTATION    REASON FOR CONSULT:  UTI and history of ESBL.    CONSULTING PHYSICIAN:  Dr. Tong.    HISTORY OF PRESENT ILLNESS:  This very pleasant 62-year-old white female was   initially admitted to the hospital yesterday with dysuria after completing   around of 2 weeks of IV antibiotics.  Review of medical records showed she   received ertapenem 1 g IV daily, which she tolerated well.  She denies any   associated fever, chills, nausea, vomiting or diarrhea that was new.  She says   she does have intermittent episodes sometimes when she will have nausea, dry   heaves, and she feels fine.  She will occasionally get sharp flank pain, but   she does not have any right now.  There is concern on admission that she was   developing neutropenia.  Her urinalysis was abnormal, so she was admitted to   the hospital for further evaluation and management.  Since admission, she has   been afebrile.  She remains mildly tachycardic.  Her tachypnea has resolved.    Her blood pressures remained stable.  She is currently receiving meropenem and   Infectious Disease is consulted for antibiotic recommendations and   management.    REVIEW OF SYSTEMS:  All other systems reviewed and negative.    ALLERGIES:  She has no known drug allergies.    PAST MEDICAL HISTORY:  Metastatic colon cancer including mets to the bladder.    She has had known history of a colovesical fistula.    FAMILY HISTORY:  Diabetes, heart disease, hypertension, and stroke.    SOCIAL HISTORY:  She does not smoke, drink or use illicit drugs.    PHYSICAL EXAMINATION:  GENERAL:  Elderly white female in no acute distress, pleasant and cooperative.  VITAL SIGNS:  She has been afebrile since admission, temperature 98.6, blood   pressure 103/60, pulse 110, respiratory rate 20, and oxygen saturation 95-96%   on room air.  She weighs 94 kilos.  HEENT:  Normocephalic, atraumatic.  Pupils are equal, round, reactive to    light.  Extraocular movements intact.  Oropharynx clear.  NECK:  Supple.  CARDIOVASCULAR:  Tachycardic.  Regular rate and rhythm.  CHEST:  Clear to auscultation bilaterally, unlabored.  ABDOMEN:  Obese, soft, and nontender.  Colostomy is in place.  She has   palpable scar tissue inferior to her surgical site.  She has a port in her   left upper chest that is nontender.  There is no erythema.  EXTREMITIES:  Show no cyanosis, clubbing, or edema.  NEUROLOGIC:  She is awake, alert, oriented.  Speech is fluent without   dysarthria.  Cranial nerves are intact.    LABORATORY DATA:  Current labs show white blood cell count of 4.2 on   admission, 4.8 now.  Her absolute neutrophil count was 1290 and it is now over   2000.  H and H of 8.6 and 27, platelets 187.  Sodium 133, potassium 4.6,   chloride 107, bicarbonate 13, glucose 93, BUN 23, creatinine 0.94.  ,   ALT 28, alkaline phosphatase 1062, albumin of 1.9.  Lactic acid was elevated   and remains elevated.  On 08/09, she had a bladder biopsy, which showed   moderately to poorly differentiated adenocarcinoma with necrosis.  Blood   cultures x2 are negative.  Her last urine culture was on 08/28/2018, which   grew E. coli resistant to ampicillin, ciprofloxacin, gentamicin, levofloxacin,   piperacillin, tobramycin and Bactrim.  Her last PET scan was in 2/2017, which   showed liver mets, adenopathy.    ASSESSMENT AND PLAN:  A 62-year-old white female with metastatic colon cancer   including liver and bladder with prior colovesical fistula just completed IV   antibiotics for a non-ESBL Escherichia coli urinary tract infection with   recurrence of her prior dysuria.  She does not meet sepsis criteria; however,   she does have significant lactic acidosis and her initial tachypnea has   resolved.  She remains tachycardic.  Her blood pressure is stable.  She had   mild leukopenia, not neutropenia, on admission, so discontinue neutropenic   precautions.  Because she does  have a known history of ESBL Klebsiella, do   agree with continuing meropenem for the time being, pending urine and blood   culture results.  For metastatic cancer, she is receiving a new form of   chemotherapy.  She states it is not supposed to cause neutropenia, but would   continue to monitor white blood cell count closely her.  She does have a   markedly elevated alkaline phosphatase consistent with her known metastatic   disease.  Further recommendations per culture results and clinical course.    Thank you and we will follow with you.       ____________________________________     MD QUINCY WESTFALL / ASH    DD:  09/13/2018 11:36:22  DT:  09/13/2018 13:15:50    D#:  [id number]  Job#:  [id number]

## 2018-09-13 NOTE — PROGRESS NOTES
Inpatient Anticoagulation Service Note    Date: 9/13/2018  Reason for Anticoagulation: Deep Vein Thrombosis  Hemoglobin Value: (!) 7.6  Hematocrit Value: (!) 23.6  Lab Platelet Value: 187  Target INR: 2.0 to 3.0  INR from last 7 days     Date/Time INR Value    09/13/18 0055 (!)  1.44        Dose from last 7 days     Date/Time Dose (mg)    09/13/18 0700  3        Average Dose (mg):  (Home regimen = 2 mg MWF, 1 mg all other days)  Significant Interactions: Antibiotics  Bridge Therapy: No       Comments: Therapy with warfarin continued from home in patient with history of DVT. Baseline INR subtherapeutic. Home dosing regimen outlined above.     Plan:  Will give larger dose of 3 mg tonight in order to mobilize INR and resume home dosing thereafter. Pharmacy will continue to follow INR trend for dosing adjustments as appropriate.     Education Material Provided?: No (Chronic warfarin therapy)    Pharmacist suggested discharge dosing: TBD, likely continue home regimen of 2 mg MWF, 1 mg all other days    Messi GuerreroD, BCPS

## 2018-09-13 NOTE — CONSULTS
Critical Care/Pulmonary Consultation    Date of service: 9/12/2018    Consulting Physician: No att. providers found    Chief Complaint: Dysuria.  Generalized fatigue.    History of Present Illness: Tamara Flynn is a 62 y.o. female with a past medical history of metastatic colon cancer to liver and urinary bladder, after recent chemotherapy and recurrent urinary tract infections, just completed 2 weeks of antibiotic treatment for UTI.  Presents to the hospital for dysuria and frequent urinations, profound malaise, chills.  Denies fevers, nausea, vomiting, abdominal pain, chest pain, shortness of breath, cough.    ROS as per HPI, otherwise all systems been reviewed and were negative    No current facility-administered medications on file prior to encounter.      Current Outpatient Prescriptions on File Prior to Encounter   Medication Sig Dispense Refill   • ferrous sulfate 325 (65 Fe) MG EC tablet Take 1 Tab by mouth 2 Times a Day. 180 Tab 1   • vitamin D (CHOLECALCIFEROL) 1000 UNIT Tab Take 1,000 Units by mouth every day.         Social History   Substance Use Topics   • Smoking status: Never Smoker   • Smokeless tobacco: Never Used   • Alcohol use No        Past Medical History:   Diagnosis Date   • Blood clotting disorder (HCC) 4-    left leg   • Blood loss anemia 2016   • Bowel habit changes     has colostomy left sided   • Cancer (HCC) 2016    liver, bladder, colon   • CVF (colovesical fistula) 8/5/2018   • Essential hypertension     been running so low, patientonly takes medication if over 140 now   • Obesity    • Urinary bladder disorder     1/2 bladder due to cancer surgery       Past Surgical History:   Procedure Laterality Date   • TRANS URETHRAL RESECTION BLADDER  8/9/2018    Procedure: TRANS URETHRAL RESECTION BLADDER;  Surgeon: Yunior Abdullahi M.D.;  Location: SURGERY [facility];  Service: Urology   • CATH PLACEMENT Left 8/9/2016    Procedure: CATH PLACEMENT power port ;  Surgeon:  Yimi Martinez M.D.;  Location: SURGERY [facility];  Service:    • COLOSTOMY Left 4/19/2016    Procedure: COLOSTOMY;  Surgeon: Yimi Martinez M.D.;  Location: SURGERY [facility];  Service:    • CYSTECTOMY  4/18/2016    Procedure: CYSTECTOMY;  Surgeon: Yunior Abdullahi M.D.;  Location: SURGERY [facility];  Service:    • EXPLORATORY LAPAROTOMY  4/18/2016    Procedure: EXPLORATORY LAPAROTOMY;  Surgeon: Yimi Martinez M.D.;  Location: SURGERY [facility];  Service:    • EXPLORATORY LAPAROTOMY  4/17/2016    Procedure: EXPLORATORY LAPAROTOMY-illeostomy creation ;  Surgeon: Yimi Martinez M.D.;  Location: SURGERY [facility];  Service:    • COLONOSCOPY - ENDO N/A 3/22/2016    Procedure: COLONOSCOPY - ENDO;  Surgeon: Yimi Martinez M.D.;  Location: ENDOSCOPY [facility];  Service:    • RECOVERY  3/21/2016    Procedure: CT-CT Guided liver biopsy-Dr.Michael Martinez;  Surgeon: Recoveryonly Surgery;  [place] [facility]  Service:    • CYSTOSCOPY  3/17/2016    Procedure: CYSTOSCOPY;  Surgeon: Yunior Abdullahi M.D.;  Location: SURGERY [facility];  Service:    • TRANS URETHRAL RESECTION BLADDER  3/17/2016    Procedure: TRANS URETHRAL RESECTION BLADDER Tumor;  Surgeon: Yunior Abdullahi M.D.;  Location: SURGERY [facility];  Service:    • PB EXCISION OF TONSIL TAGS         Allergies: Patient has no known allergies.    Family History   Problem Relation Age of Onset   • Diabetes Mother         prediabetes   • Heart Disease Mother    • Hypertension Mother    • Stroke Maternal Grandmother    • Cancer Neg Hx    • Hyperlipidemia Neg Hx    • Psychiatry Neg Hx    • Thyroid Neg Hx        Vitals:    09/12/18 1440 09/12/18 1454 09/12/18 1647 09/12/18 1830   Height: 1.524 m (5')      Weight:  94.4 kg (208 lb 1.8 oz)     Weight % change since last entry.:  0 %     BP: 119/75  112/63    Pulse: (!) 121  (!) 114 (!) 112   Resp: 16 19 18   Temp: 36.1 °C (96.9 °F)  36.7 °C (98.1  °F)    TempSrc: Temporal       09/12/18 1930 09/12/18 2000 09/12/18 2030 09/12/18 2100   Height:       Weight:       Weight % change since last entry.:       BP:       Pulse: (!) 128 (!) 106 (!) 105 (!) 109   Resp:       Temp:       TempSrc:        09/12/18 2130 09/12/18 2200 09/12/18 2230   Height:      Weight:      Weight % change since last entry.:      BP:      Pulse: (!) 102 (!) 111 (!) 109   Resp:   18   Temp:      TempSrc:          Physical Examination  General: Obese  Neuro/Psych: CN II-XII grossly within normal limits, no focal neurological deficits, anxious, alert and oriented ×4  HEENT: Head is normocephalic, atraumatic, PERRLA, EOMI  CVS: S1-S2 within normal limits, regular rate, no murmurs rubs or gallops, 4+ bilateral lower extremities edema  Respiratory: Clear breath sounds bilateral anterior lateral chest, trach is midline, symmetric expansion of the chest with respirations  Abdomen/: Soft, nontender, nondistended abdomen, unremarkable midline abdominal scar present and left lower quadrant colostomy, no suprapubic pain  Extremities: No bony deformities, joint swelling, joint erythema  Skin: No skin rashes, bruises, jaundice    Recent Labs      09/12/18   1816   WBC  4.2*   NEUTSPOLYS  29.60*   LYMPHOCYTES  62.50*   MONOCYTES  4.60   EOSINOPHILS  0.00   BASOPHILS  0.00   ASTSGOT  129*   ALTSGPT  36   ALKPHOSPHAT  1211*   TBILIRUBIN  1.4     Recent Labs      09/12/18   1816   SODIUM  136   POTASSIUM  4.8   CHLORIDE  104   CO2  13*   BUN  23*   CREATININE  1.06   CALCIUM  8.3*     Recent Labs      09/12/18   1816   ALTSGPT  36   ASTSGOT  129*   ALKPHOSPHAT  1211*   TBILIRUBIN  1.4   LIPASE  25   GLUCOSE  90           Invalid input(s): [id number]  No orders to display       Assessment and Plan:    1.  Recurrent urinary tract infection with sepsis  -History of UTI with klebsiella ESBL  -Broad-spectrum antibiotics with vancomycin and meropenem  -Urine cultures and blood cultures pending  -Monitoring  lactic acid trend    2.  Metastatic colon cancer to the liver and urinary bladder  -Status post chemotherapy 2 weeks ago  -Follows oncologist outpatient  -Follows urology as outpatient  -Inpatient palliative care consult    3.  Anhedonia   -Started on Marinol  -Advance diet as tolerated    4.  Mild neutropenia  -Status post recent chemotherapy  -Reverse isolation  -Monitoring daily WBCs    5.  Anemia  -Iron deficiency and bone marrow suppression  -Iron supplementation  -Transfuse PRBC for hemoglobin less than 7    6.  ICU prophylaxis  -History of bilateral lower extremities DVT, continue with Coumadin target therapeutic INR 2-3    7.  Lactic acidosis  -Gentle bicarbonate drip  -Monitoring anion gap and lactic acid trend    Family meeting: Not available at bedside    Assessment and plan were discussed with the patient and ICU resident.

## 2018-09-14 PROBLEM — D64.9 ANEMIA: Status: ACTIVE | Noted: 2018-01-01

## 2018-09-14 PROBLEM — E88.09 HYPOALBUMINEMIA: Status: RESOLVED | Noted: 2018-01-01 | Resolved: 2018-01-01

## 2018-09-14 PROBLEM — E43 PROTEIN-CALORIE MALNUTRITION, SEVERE (HCC): Status: ACTIVE | Noted: 2018-01-01

## 2018-09-14 NOTE — PROGRESS NOTES
Pulmonary Critical Care Progress Note        DOA:  9/12/2018    Chief Complaint:  fatigue    History of Present Illness:   Tamara Flynn is a 62 y.o. female with a past medical history of metastatic colon cancer to liver and urinary bladder, after recent chemotherapy and recurrent urinary tract infections, just completed 2 weeks of antibiotic treatment for UTI.  Presents to the hospital for dysuria and frequent urinations, profound malaise, chills.  Denies fevers, nausea, vomiting, abdominal pain, chest pain, shortness of breath, cough.    Review of Systems   Constitutional: Positive for chills and malaise/fatigue. Negative for diaphoresis, fever and weight loss.   HENT: Negative for congestion, sinus pain and sore throat.    Eyes: Negative for blurred vision.   Respiratory: Negative for cough, sputum production and shortness of breath.    Cardiovascular: Negative for chest pain, palpitations, orthopnea, leg swelling and PND.   Gastrointestinal: Positive for abdominal pain (Primarily over bladder and left lower quadrant) and nausea. Negative for blood in stool, constipation, diarrhea and vomiting.   Genitourinary: Positive for dysuria (Improved), frequency (Better), hematuria and urgency (Improved). Negative for flank pain.   Musculoskeletal: Negative for back pain.   Skin: Negative for rash.   Neurological: Negative for tremors, sensory change, speech change, focal weakness, weakness and headaches.   Endo/Heme/Allergies: Does not bruise/bleed easily.   Psychiatric/Behavioral: Negative for depression. The patient is not nervous/anxious and does not have insomnia.        Interval Events:  24 hour interval history reviewed     CT ABD/pelvis - worse metastatic Dz, no abcess/fluid collection or fistula  AFEBRILE  A&O x4  Hematuria no change, fairly bright urine but clots not evident  Hemodynamics reviewed  Bicarb drip 100cc/hr - 3 amps/L  No BMP  Power port L chest  LA 3.4  Tm 37  L colostomy  Reg diet - taking PO  ok  UO adequate - no snow  Warfarin/Lovenox  Merrem  Hgb 6.5, needs transfusion     YESTERDAY  A&O x4  SR/ST  SBp Tm 98.6  UO adequate  Colostomy bag LLQ  Mobilizes to BR  L anterior chest port  No snow  Room air  Warfarin - INR 1.44  Merrem    PFSH:  No change.    Physical Exam   Constitutional: She is oriented to person, place, and time. She is cooperative. No distress.   More chronic than acutely ill now   HENT:   Head: Normocephalic and atraumatic.   Mouth/Throat: Oropharynx is clear and moist.   Eyes: Pupils are equal, round, and reactive to light. No scleral icterus.   Neck: Neck supple. No JVD present. No edema present.   Cardiovascular: Normal rate, regular rhythm and intact distal pulses.   No extrasystoles are present. Exam reveals no gallop and no friction rub.    No murmur heard.  Pulmonary/Chest: Effort normal. No accessory muscle usage. No respiratory distress. She has decreased breath sounds (No change) in the right lower field and the left lower field. She has no wheezes. She has no rhonchi. She has no rales.   Abdominal: Soft. Bowel sounds are normal. She exhibits no distension and no mass. There is tenderness (No change) in the suprapubic area. There is no rigidity, no rebound, no guarding, no CVA tenderness and negative Sim's sign.   Neurological: She is alert and oriented to person, place, and time. She has normal strength. No cranial nerve deficit or sensory deficit. GCS eye subscore is 4. GCS verbal subscore is 5. GCS motor subscore is 6.   Skin: Skin is warm and dry. Capillary refill takes less than 2 seconds. No rash noted. She is not diaphoretic. No cyanosis. Nails show no clubbing.   Psychiatric: She has a normal mood and affect. Her speech is normal and behavior is normal. Thought content normal. Cognition and memory are normal.       Respiratory:     Pulse Oximetry: 95 %          ImagingCXR  I have personally reviewed the chest x-ray my impression is  noted above and films are  reviewed with Team on rounds           Invalid input(s): [id number]    HemoDynamics:  Pulse: (!) 120, Heart Rate (Monitored): 98  NIBP: 119/57       Imaging: Available data reviewed     Recent Labs      09/12/18   1940   TROPONINI  <0.01       Neuro:  GCS  15     Imaging: Available data reviewed    Fluids:  Intake/Output       09/12/18 0700 - 09/13/18 0659 09/13/18 0700 - 09/14/18 0659 09/14/18 0700 - 09/15/18 0659      0700-1859 1900-0659 Total 0700-1859 1900-0659 Total 0700-1859 1900-0659 Total       Intake    P.O.  --  500 500  550  550 1100  --  -- --    P.O. -- 500 500  -- -- --    I.V.  --  -- --  --  3200 3200  --  -- --    IV Piggyback Volume (IV Piggyback) -- -- -- -- 200 200 -- -- --    IV Volume (Bicarb) -- -- -- -- 1100 1100 -- -- --    IV Volume (Lactated Ringer) -- -- -- -- 1500 1500 -- -- --    IV Volume (Albumin) -- -- -- -- 400 400 -- -- --    Total Intake -- 500  4300 -- -- --       Output    Urine  --  175 175  200  475 675  --  -- --    Number of Times Voided -- 4 x 4 x 4 x 6 x 10 x -- -- --    Urine Void (mL) (non-catheter) -- 175 175 200 475 675 -- -- --    Stool  --  100 100  --  -- --  --  -- --    Measurable Stool (mL) -- 100 100 -- -- -- -- -- --    Total Output -- 275 275 200 475 675 -- -- --       Net I/O     -- 225  3625 -- -- --           Recent Labs      09/12/18   1816  09/13/18   0315   SODIUM  136  133*   POTASSIUM  4.8  4.6   CHLORIDE  104  107   CO2  13*  13*   BUN  23*  23*   CREATININE  1.06  0.94   CALCIUM  8.3*  7.2*       GI/Nutrition:    Imaging: Available data reviewed   CT abdomen/pelvis 9/13  1.  Interval worsening in metastatic disease. There is marked worsening in hepatic metastases as well as worsening retroperitoneal, periportal, portacaval, mesenteric, and pelvic adenopathy and soft tissue masses.  2.  Atelectasis within the lung bases bilaterally with small bilateral pleural effusions.  3.  Left lower quadrant ostomy again  seen with a large peristomal hernia.  4.  Small amount of ascites.    taking PO     Liver Function  Recent Labs      18   0315  18   0709   ALTSGPT  36  28   --    ASTSGOT  129*  108*   --    ALKPHOSPHAT  1211*  1062*   --    TBILIRUBIN  1.4  1.4   --    LIPASE  25   --    --    PREALBUMIN   --   7.0*  6.0*   GLUCOSE  90  96   --        Heme:  Recent Labs      18   0055  18   0350  18   0406   RBC  2.61*   --   2.25*   --    HEMOGLOBIN  8.6*   --   7.6*   --    HEMATOCRIT  27.9*   --   23.6*   --    PLATELETCT  236   --   187   --    PROTHROMBTM   --   17.2*   --   17.1*   APTT   --   33.0   --    --    INR   --   1.44*   --   1.43*       Infectious Disease:  Temp  Av.8 °C (98.2 °F)  Min: 36.4 °C (97.6 °F)  Max: 37 °C (98.6 °F)     Micro: antibiotics reviewed and cultures pending    urine growing Candida albicans only so far  Merrem    Recent Labs      185  18   0350   WBC  4.2*   --   4.8   NEUTSPOLYS  29.60*   --   42.60*   LYMPHOCYTES  62.50*   --   40.00   MONOCYTES  4.60   --   14.50*   EOSINOPHILS  0.00   --   0.20   BASOPHILS  0.00   --   0.20   ASTSGOT  129*  108*   --    ALTSGPT  36  28   --    ALKPHOSPHAT  1211*  1062*   --    TBILIRUBIN  1.4  1.4   --      Current Facility-Administered Medications   Medication Dose Frequency Provider Last Rate Last Dose   • warfarin (COUMADIN) tablet 2 mg  2 mg Once per day on  Gurwinder De La Torre M.D.       • warfarin (COUMADIN) tablet 1 mg  1 mg Once per day on Sun Tue Thu Sat Gurwinder De La Torre M.D.       • sodium bicarbonate 8.4 % 150 mEq in D5W 1,000 mL infusion   Continuous August Barroso M.D. 100 mL/hr at 18 0238     • enoxaparin (LOVENOX) inj 30 mg  30 mg Q12HRS Anibal Tong M.D.   30 mg at 18 0514   • miconazole 2%-zinc oxide (JENNIFER) topical cream   Q6HR Gurwinder De La Torre M.D.       • NS infusion 2,832 mL  30 mL/kg Once PRN Gurwinder  COURT De La Torre M.D.       • NS (BOLUS) infusion 1,000 mL  1,000 mL Once PRN Gurwinder De La Torre M.D.       • meropenem (MERREM) 500 mg in  mL IVPB  500 mg Q6HRS Gurwinder De La Torre M.D.   Stopped at 09/14/18 0229   • senna-docusate (PERICOLACE or SENOKOT S) 8.6-50 MG per tablet 2 Tab  2 Tab BID Gurwinder De La Torre M.D.   Stopped at 09/12/18 2100    And   • polyethylene glycol/lytes (MIRALAX) PACKET 1 Packet  1 Packet QDAY PRN Gurwinder De La Torre M.D.        And   • magnesium hydroxide (MILK OF MAGNESIA) suspension 30 mL  30 mL QDAY PRN Gurwinder De La Torre M.D.        And   • bisacodyl (DULCOLAX) suppository 10 mg  10 mg QDAY PRN Gurwinder De La Torre M.D.       • ondansetron (ZOFRAN) syringe/vial injection 4 mg  4 mg Q4HRS PRWAN De La Torre M.D.   4 mg at 09/13/18 1517   • ondansetron (ZOFRAN ODT) dispertab 4 mg  4 mg Q4HRS PRN Gurwinder De La Torre M.D.       • promethazine (PHENERGAN) tablet 12.5-25 mg  12.5-25 mg Q4HRS PRN Gurwinder De La Torre M.D.       • promethazine (PHENERGAN) suppository 12.5-25 mg  12.5-25 mg Q4HRS PRN Gurwinder De La Torre M.D.       • prochlorperazine (COMPAZINE) injection 5-10 mg  5-10 mg Q4HRS PRN Gurwinder De La Torre M.D.       • Pharmacy Consult Request ...Pain Management Review   PRN Gurwinder De La Torre M.D.        And   • oxyCODONE immediate-release (ROXICODONE) tablet 2.5 mg  2.5 mg Q3HRS PRN Gurwinder De La Torre M.D.   2.5 mg at 09/13/18 2226    And   • oxyCODONE immediate-release (ROXICODONE) tablet 5 mg  5 mg Q3HRS PRN Gurwinder De La Torre M.D.        And   • HYDROmorphone (DILAUDID) injection 0.25 mg  0.25 mg Q3HRS PRN Gurwinder De La Torre M.D.       • MD Alert...Warfarin per Pharmacy   pharmacy to dose Gurwinder De La Torre M.D.       • mirtazapine (REMERON) tablet 30 mg  30 mg HS PRN Yimi Burch M.D.       • ALPRAZolam (XANAX) tablet 0.25 mg  0.25 mg Q6HRS PRN Yimi Burch M.D.       • ferrous sulfate tablet 325 mg  325 mg TID WITH MEALS Yimi Burch M.D.   325 mg at 09/13/18 1742   • dronabinol  (MARINOL) capsule 5 mg  5 mg 4X/DAY Yimi Burch M.D.   5 mg at 09/13/18 2029     Last reviewed on 9/12/2018  7:15 PM by Ramiro Li    Quality  Measures:  Core Measures & Quality Metrics      Assessment / Plan    Recurrent urinary tract infection with sepsis  -History of UTI with klebsiella ESBL/E coli in 8/2018 [facility]  -Broad-spectrum antibiotics with vancomycin and meropenem  -Urine cultures growing Candida albicans only and blood cultures negative so far  -Monitoring lactic acid trend - better  -CT ABD/pelvis with contrast IV/oral does not reveal obvious abscess/fluid collection or fistula, tumor burden significantly worse  -reassess tumor and eval for abcess/fistula -> surgery IR Rx?  -ID f/u re MDR Klebsiella  -If hematuria worsens consult urology, okay for now  -If hematuria worsens consider CBI, no clots for now or evidence of bladder obstruction    Metabolic acidosis/Lactic acidosis, improved!  -Monitoring anion gap and lactic acid trends improving -   -Bicarb drip - up to 150mEq/L - adjust rate as needed  -Rx primary issue - sepsis     Metastatic colon cancer to the liver and urinary bladder  -Status post chemotherapy 2 weeks ago - HOLD chemo for now  -Follows oncologist outpatient  -Follows urology as outpatient  Tumor burden significantly worse by CT, hepatic metastases increased O, peritoneal studding increased, adenopathy increased  -Inpatient palliative care consult     Anhedonia   -Started on Marinol  -Advance diet as tolerated     Mild neutropenia, no change  -Status post recent chemotherapy  -Reverse isolation  -Monitoring daily WBCs    Thrombocytopenia  -Slight worse, presumably related to chemotherapy, in part related to tumor/nutritional state  -Monitor CBC daily  -Usual transfusion criteria    Anemia  -Iron deficiency and bone marrow suppression  -Iron supplementation  -Transfuse PRBC for hemoglobin less than 7      ICU prophylaxis  -History of bilateral lower extremities DVT, continue  with Coumadin target therapeutic INR 2-3  -Mobilize  -IS  -Nutritional supplements    The ROS, Physical Exam and Plan were updated today (9/14/18).  Compared with yesterday's note, there are no new changes except as documented above.      Hold Lovenox/warfarin for now still having significant hematuria  Continue bicarb drip, check can panel/mag/Alonso in a.m.  Advance diet as toerated  Hematology/oncology eval?  Therapy ineffective?  CT clearly worse unfortunately    Okay to transfer out of ICU    Discussed patient condition and risk of morbidity and/or mortality with RN, RT, Pharmacy, Charge nurse / hot rounds, Patient and hospitalist.      Hospitalist contacting hematology oncology for consultation/advice    Palliative care or hospice eval seems appropriate

## 2018-09-14 NOTE — PROGRESS NOTES
2 hour chart check    2 RN skin assessment completed.  -LLQ colostomy intact  -Rash under breast; interdry in place  -Rash under pannus/groin area; interdry in place

## 2018-09-14 NOTE — CARE PLAN
Problem: Bowel/Gastric:  Goal: Normal bowel function is maintained or improved  Outcome: PROGRESSING AS EXPECTED    Intervention: Educate patient and significant other/support system about diet, fluid intake, medications and activity to promote bowel function  Pt educated. Colostomy in place. Bag checked and replaced PRN.   Intervention: Educate patient and significant other/support system about signs and symptoms of constipation and interventions to implement  Education provided. All pt's questions answered at this time.   Intervention: Collaborate with Interdisciplinary Team for optimal positioning for bowel evacuation  Plan of care discussed with tx team.

## 2018-09-14 NOTE — PROGRESS NOTES
Monitor Summary:     Sinus rhythm/tachy  HR   .12/.06/.32    12 hour chart check complete.  2 RN skin check complete.

## 2018-09-14 NOTE — PROGRESS NOTES
[facility] Hospitalist Progress Note    Date of Service: 2018    Chief Complaint  62 y.o. female admitted 2018 with dysuria    Interval Problem Update  Ms. Flynn has a history of colon cancer with mets to bladder and recent ESBL UTI that presented to the ER with dysuria. She recently was treated with IV antibiotics and her symptoms have continued. In the ER she was found to have septic shock and has been admitted to the ICU for IV fluids and IV antibiotics.   Her lactic acid is 3.4 this morning. RN notes that she is tolerating a diet though no output in ostomy over night. She has had hematuria and a drop in her Hb to 6.5.   Consultants/Specialty  Critical Care. I discussed her condition with Dr. Barroso on ICU Hot Rounds.   Infectious disease.   Disposition  ICU        Review of Systems   Constitutional: Positive for malaise/fatigue. Negative for chills and fever.   Cardiovascular: Negative for chest pain.   Gastrointestinal:        Moderate appetite   Genitourinary: Positive for dysuria.   Neurological: Positive for weakness.   All other systems reviewed and are negative.     Physical Exam  Laboratory/Imaging   Hemodynamics  Temp (24hrs), Av.8 °C (98.2 °F), Min:36.4 °C (97.6 °F), Max:37 °C (98.6 °F)   Temperature: 36.4 °C (97.6 °F)  Pulse  Av.3  Min: 99  Max: 128 Heart Rate (Monitored): 98  NIBP: 114/63      Respiratory      Respiration: 20, Pulse Oximetry: 96 %        RUL Breath Sounds: Clear, RML Breath Sounds: Clear;Diminished, RLL Breath Sounds: Diminished, ROLF Breath Sounds: Clear, LLL Breath Sounds: Diminished    Fluids    Intake/Output Summary (Last 24 hours) at 18 0824  Last data filed at 18 0600   Gross per 24 hour   Intake             4200 ml   Output              625 ml   Net             3575 ml       Nutrition  Orders Placed This Encounter   Procedures   • Diet Order Regular     Standing Status:   Standing     Number of Occurrences:   1     Order Specific Question:   Diet:      Answer:   Regular [1]     Physical Exam   Constitutional: She is oriented to person, place, and time. She appears well-developed. No distress.   HENT:   Head: Normocephalic and atraumatic.   Neck: Normal range of motion. Neck supple.   Cardiovascular:   Sinus tachycardia  Left sternal border murmur   Pulmonary/Chest:   Left chest port.   Abdominal: Soft. She exhibits no distension. There is no tenderness.   Ostomy bag. No umbilicus   Musculoskeletal: She exhibits no edema or tenderness.   Neurological: She is alert and oriented to person, place, and time.   Skin: Skin is warm and dry. She is not diaphoretic. There is pallor.   Psychiatric: She has a normal mood and affect. Her behavior is normal.   Nursing note and vitals reviewed.      Recent Labs      09/12/18 1816 09/13/18   0350   WBC  4.2*  4.8   RBC  2.61*  2.25*   HEMOGLOBIN  8.6*  7.6*   HEMATOCRIT  27.9*  23.6*   MCV  106.9*  104.9*   MCH  33.0  33.8*   MCHC  30.8*  32.2*   RDW  123.0*  116.1*   PLATELETCT  236  187   MPV  10.6  10.3     Recent Labs      09/12/18   1816  09/13/18   0315   SODIUM  136  133*   POTASSIUM  4.8  4.6   CHLORIDE  104  107   CO2  13*  13*   GLUCOSE  90  96   BUN  23*  23*   CREATININE  1.06  0.94   CALCIUM  8.3*  7.2*     Recent Labs      09/13/18   0055  09/14/18   0406   APTT  33.0   --    INR  1.44*  1.43*         Recent Labs      09/13/18   0315   TRIGLYCERIDE  219*   HDL  11*   LDL  155*          Assessment/Plan     * Sepsis due to urinary tract infection (HCC)   Assessment & Plan    Septic shock due to UTI with admission to the ICU with IV fluids and IV antibiotics.  Elevated lactic acid that went up to 7.6  Immunocompromised patient due to chemotherapy  Recurrent UTIs secondary to colon carcinoma invading the bladder causing infections  She has a history of ESBL therefore IV meropenem empirically.   Urine cultures remain pending  The organ system failure associated with this disease process is the cardiovascular  system as is evidenced by the severe lactic acidosis consistent with organ-system ischemia.    CT abd/pelvis with contrast:  1.  Interval worsening in metastatic disease. There is marked worsening in hepatic metastases as well as worsening retroperitoneal, periportal, portacaval, mesenteric, and pelvic adenopathy and soft tissue masses.    2.  Atelectasis within the lung bases bilaterally with small bilateral pleural effusions.    3.  Left lower quadrant ostomy again seen with a large peristomal hernia.    4.  Small amount of ascites.        ESBL (extended spectrum beta-lactamase) producing bacteria infection- (present on admission)   Assessment & Plan    Hx of recently finished course of abx of ertapenem   ID consult        Adenocarcinoma of colon metastatic to liver (HCC)- (present on admission)   Assessment & Plan    Follows with Dr. Pelaez   Currently undergoing chemotherapy  She is amenable to meeting with Hospice which has been ordered as well as Palliative Care.         Colostomy in place (HCC)- (present on admission)   Assessment & Plan    Ostomy care        Protein-calorie malnutrition, severe (HCC)- (present on admission)   Assessment & Plan    This is a clinical diagnosis in the setting of advanced, metastatic cancer and cancer-induced cachexia.  Albumin is severely low at 1.9        Anemia- (present on admission)   Assessment & Plan    Hb dropped to 6.5   She will be given one unit of RBCs.  May be a component of chronic disease and secondary to chemotherapy now with acute blood loss anemia due to hematuria.   Stop anticoagulation.   CBC ordered for the morning.         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 Latanya Hale, oncology, consulted.           Adenocarcinoma of sigmoid colon (HCC)   Assessment & Plan    Follows with Dr. Pelaez current treatment        Deep vein thrombosis (DVT) of both lower extremities (HCC)- (present on admission)   Assessment & Plan    Hold anticoagulation given severe anemia          Morbid obesity with BMI of 40.0-44.9, adult (HCC)- (present on admission)   Assessment & Plan    BMI 40          Quality-Core Measures   Reviewed items::  Labs reviewed and Medications reviewed  Traore catheter::  No Traore  DVT prophylaxis pharmacological::  Contraindicated - Anemia requiring blood transfusion

## 2018-09-14 NOTE — CARE PLAN
Problem: Infection  Goal: Will remain free from infection    Intervention: Assess signs and symptoms of infection  No s/s of infection at this time. Afebrile. Lactic acid trending q 4.   Intervention: Implement standard precautions and perform hand washing before and after patient contact  Complete.  Intervention: Give CDC handouts for infection prevention (infection prevention/hand washing, disease specific, and device specific) and document in Education  Education provided  Intervention: Assess for removal of potential routes of infection, such as IV, central line, intra-arterial or urinary catheters  N/A. Power port needed for oncology tx.

## 2018-09-14 NOTE — PROGRESS NOTES
Infectious Disease Progress Note    Author: Georgie Conley M.D. Date & Time of service: 2018  3:27 PM    Chief Complaint:  UTI and history of ESBL    Interval History:  62-year-old WF with metastatic colon cancer including liver and bladder with prior colovesical fistula just completed IV antibiotics for a non-ESBL Escherichia coli urinary tract infection with recurrence of her prior dysuria   AF WBC 4.2 still feels very weak    Labs Reviewed, Medications Reviewed and Radiology Reviewed.    Review of Systems:  Review of Systems   Constitutional: Positive for malaise/fatigue. Negative for chills and fever.   Gastrointestinal: Negative for nausea and vomiting.   Genitourinary: Positive for dysuria.   Musculoskeletal: Positive for joint pain and myalgias.   Neurological: Positive for weakness.   All other systems reviewed and are negative.      Hemodynamics:  Temp (24hrs), Av °C (98.6 °F), Min:36.4 °C (97.6 °F), Max:37.3 °C (99.2 °F)  Temperature: 37.3 °C (99.2 °F)  Pulse  Av.1  Min: 99  Max: 128Heart Rate (Monitored): 98  Blood Pressure: 118/58, NIBP: (!) 98/55       Physical Exam:  Physical Exam   Constitutional: She is oriented to person, place, and time. She appears well-developed.   HENT:   Head: Normocephalic and atraumatic.   Eyes: Pupils are equal, round, and reactive to light. EOM are normal.   Neck: Neck supple.   Cardiovascular:   tachycardic   Pulmonary/Chest: Effort normal. No respiratory distress.   Abdominal: Soft. She exhibits no distension.   ostomy   Musculoskeletal: She exhibits edema.   Neurological: She is alert and oriented to person, place, and time.   Skin: Skin is warm. She is not diaphoretic.   Nursing note and vitals reviewed.      Meds:    Current Facility-Administered Medications:   •  NS  •  sodium bicarbonate in D5W infusion  •  miconazole 2%-zinc oxide  •  NS  •  NS  •  meropenem (MERREM) IV  •  senna-docusate **AND** polyethylene glycol/lytes **AND** magnesium  hydroxide **AND** bisacodyl  •  ondansetron  •  ondansetron  •  promethazine  •  promethazine  •  prochlorperazine  •  Notify provider if pain remains uncontrolled **AND** Use the numeric rating scale (NRS-11) on regular floors and Critical-Care Pain Observation Tool (CPOT) on ICUs/Trauma to assess pain **AND** Pulse Ox (Oximetry) **AND** Pharmacy Consult Request **AND** If patient difficult to arouse and/or has respiratory depression, stop any opiates that are currently infusing and call a Rapid Response. **AND** oxyCODONE immediate-release **AND** oxyCODONE immediate-release **AND** HYDROmorphone  •  mirtazapine  •  ALPRAZolam  •  ferrous sulfate  •  dronabinol    Labs:  Recent Labs      09/12/18 1816 09/13/18   0350  09/14/18   0406   WBC  4.2*  4.8  4.2*   RBC  2.61*  2.25*  1.95*   HEMOGLOBIN  8.6*  7.6*  6.5*   HEMATOCRIT  27.9*  23.6*  20.7*   MCV  106.9*  104.9*  106.2*   MCH  33.0  33.8*  33.3*   RDW  123.0*  116.1*  118.5*   PLATELETCT  236  187  151*   MPV  10.6  10.3  11.6   NEUTSPOLYS  29.60*  42.60*  38.70*   LYMPHOCYTES  62.50*  40.00  38.70   MONOCYTES  4.60  14.50*  15.70*   EOSINOPHILS  0.00  0.20  0.50   BASOPHILS  0.00  0.20  0.20   RBCMORPHOLO  Present   --   Present     Recent Labs      09/12/18 1816 09/13/18   0315   SODIUM  136  133*   POTASSIUM  4.8  4.6   CHLORIDE  104  107   CO2  13*  13*   GLUCOSE  90  96   BUN  23*  23*     Recent Labs      09/12/18 1816 09/13/18   0315   ALBUMIN  2.4*  1.9*   TBILIRUBIN  1.4  1.4   ALKPHOSPHAT  1211*  1062*   TOTPROTEIN  5.7*  4.7*   ALTSGPT  36  28   ASTSGOT  129*  108*   CREATININE  1.06  0.94       Imaging:  Ct-abdomen-pelvis With    Result Date: 9/13/2018 9/13/2018 3:48 PM HISTORY/REASON FOR EXAM: Diffuse abdominal pain. Prior surgery for colon cancer. TECHNIQUE/EXAM DESCRIPTION: CT scan of the abdomen and pelvis with contrast. Contrast-enhanced helical scanning was obtained from the diaphragmatic domes through the pubic symphysis  following the bolus administration of 100 mL of Omnipaque 350 nonionic contrast without complication. Low dose optimization technique was utilized for this CT exam including automated exposure control and adjustment of the mA and/or kV according to patient size. COMPARISON: 7/12/2018 FINDINGS: CT Abdomen: There is atelectasis within the lung bases and there are small bilateral pleural effusions. There is extensive diffuse metastatic disease involving the entire liver. As is worsened from comparison. There is extensive retroperitoneal, periportal, portacaval mass is adenopathy. Retroperitoneal lymph nodes measure up to 2.5 cm in short axis dimension. This is all worsened from comparison. There are some calcified lymph nodes seen within the portacaval and periportal regions. The spleen is normal. The kidneys are normal. The adrenal glands are normal. The pancreas is normal. There is atherosclerotic change of aorta. No evidence of aneurysm. There is a small amount of ascites within the upper abdomen. There is anasarca. There is mesenteric adenopathy. There is a soft tissue mass again seen adjacent to the transverse colon which measures 3 x 2.6 cm. Mesenteric masses within the abdomen measure up to 6 x 3 cm in size. This is worsened from comparison. CT Pelvis: There is a left lower quadrant ostomy site with a peristomal hernia. Ascites extends into the hernia sac. There is a large pelvic soft tissue mass which deforms the urinary bladder and measures 12 x 10 cm in size. This is worsened from comparison. Pelvic  sidewall adenopathy is again seen, left greater than right. Lymph nodes in the left measure up to 2.3 cm in short axis dimension. There is a small amount of ascites dependently within the pelvis.     1.  Interval worsening in metastatic disease. There is marked worsening in hepatic metastases as well as worsening retroperitoneal, periportal, portacaval, mesenteric, and pelvic adenopathy and soft tissue masses. 2.  " Atelectasis within the lung bases bilaterally with small bilateral pleural effusions. 3.  Left lower quadrant ostomy again seen with a large peristomal hernia. 4.  Small amount of ascites.      Micro:  Results     Procedure Component Value Units Date/Time    URINE CULTURE(NEW) [[id number]]  (Abnormal) Collected:  09/12/18 1721    Order Status:  Completed Specimen:  Urine Updated:  09/13/18 1629     Significant Indicator POS (POS)     Source UR     Site --     Urine Culture Mixed skin lucy 10-50,000 cfu/mL (A)      Yeast  10-50,000 cfu/mL   (A)    BLOOD CULTURE [[id number]] Collected:  09/12/18 1816    Order Status:  Completed Specimen:  Blood from Peripheral Updated:  09/13/18 0809     Significant Indicator NEG     Source BLD     Site PERIPHERAL     Blood Culture No Growth    Note: Blood cultures are incubated for 5 days and  are monitored continuously.Positive blood cultures  are called to the RN and reported as soon as  they are identified.      Narrative:       Protective  Per Hospital Policy: Only change Specimen Src: to \"Line\" if  specified by physician order.    BLOOD CULTURE [[id number]] Collected:  09/12/18 1942    Order Status:  Completed Specimen:  Blood from Peripheral Updated:  09/13/18 0809     Significant Indicator NEG     Source BLD     Site PERIPHERAL     Blood Culture No Growth    Note: Blood cultures are incubated for 5 days and  are monitored continuously.Positive blood cultures  are called to the RN and reported as soon as  they are identified.      Narrative:       Protective  Per Hospital Policy: Only change Specimen Src: to \"Line\" if  specified by physician order.    Blood Culture [[id number]]     Order Status:  Sent Specimen:  Blood from Peripheral     Blood Culture [[id number]]     Order Status:  Sent Specimen:  Blood from Peripheral     Urinalysis [[id number]]     Order Status:  Sent Specimen:  Urine     Culture Respiratory W/ GRM STN [[id number]]     Order Status:  Completed Specimen:  " Respirate from Sputum     URINALYSIS,CULTURE IF INDICATED [[id number]]  (Abnormal) Collected:  09/12/18 1721    Order Status:  Completed Specimen:  Urine Updated:  09/12/18 1914     Micro Urine Req Microscopic     Color Brown     Character Turbid (A)     Specific Gravity 1.029     Ph 5.5     Glucose Negative mg/dL      Ketones Trace (A) mg/dL      Protein 300 (A) mg/dL      Bilirubin Small (A)     Urobilinogen, Urine 1.0     Nitrite Positive (A)     Leukocyte Esterase Large (A)     Occult Blood Large (A)          Assessment:  Active Hospital Problems    Diagnosis   • *Sepsis due to urinary tract infection (HCC) [A41.9, N39.0]   • ESBL (extended spectrum beta-lactamase) producing bacteria infection [A49.9, Z16.12]   • Adenocarcinoma of colon metastatic to liver (HCC) [C18.9, C78.7]   • Colostomy in place (HCC) [Z93.3]   • Deep vein thrombosis (DVT) of both lower extremities (HCC) [I82.403]   • Morbid obesity with BMI of 40.0-44.9, adult (HCC) [E66.01, Z68.41]   • Anemia [D64.9]   • Protein-calorie malnutrition, severe (HCC) [E43]   • Lactic acidosis [E87.2]   • Neutropenia (HCC) [D70.9]   • Elevated BUN [R79.9]   • Abnormal LFTs [R94.5]   • 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

## 2018-09-14 NOTE — PROGRESS NOTES
Inpatient Anticoagulation Service Note    Date: 9/14/2018  Reason for Anticoagulation: Deep Vein Thrombosis  Hemoglobin Value: (!) 6.5  Hematocrit Value: (!) 20.7  Lab Platelet Value: (!) 151 (Results confirmed by repeat testing.)  Target INR: 2.0 to 3.0  INR from last 7 days     Date/Time INR Value    09/14/18 0406 (!)  1.43    09/13/18 0055 (!)  1.44        Dose from last 7 days     Date/Time Dose (mg)    09/14/18 1000  2    09/13/18 0700  3        Average Dose (mg):  (Home regimen = 2 mg MWF, 1 mg all other days)  Significant Interactions: Antibiotics, Azoles  Bridge Therapy: No (Lovenox 30 mg BID for DVT prophylaxis)   Reversal Agent Administered: Not Applicable    A/P:   Sub-therapeutic INR after giving 3 mg dose last night.  Remains on Lovenox for DVT prophylaxis until INR is therapeutic.  Fluconazole started by ID today - potential for increase in INR due to this DDI.  Patient's home dosing per admission med rec (completed with patient) indicates patient taking 2 mg every MWF and 1 mg AOD.  Per most recent anticoagulation clinic note (9/10/18), patient instructed to take 1 mg on S/Tu/Th and 2 mg AOD.  Discussed dosing with patient yesterday and she reports she is not sure of her warfarin dosing.  Will continue with dosing reported by patient when med rec completed for now.  INR tomorrow.     Education Material Provided?: No (chronic warfarin therapy )  Pharmacist suggested discharge dosing: TBD pending INR trends, perhaps warfarin 1-2 mg PO daily.  Recommend a follow-up PT/INR within 48-72 hours of discharge.      Crystal Velázquez, PharmD, BCPS, BCCCP

## 2018-09-14 NOTE — ASSESSMENT & PLAN NOTE
Acute on chronic - due to dilution, hematuria - Hb dropped to 6.5 and required tx.  Continues to improve.  No AC  Fu CBC

## 2018-09-14 NOTE — CARE PLAN
Problem: Safety  Goal: Will remain free from falls  Outcome: PROGRESSING AS EXPECTED  Bed in low and locked position. IV pole on same side as bathroom. Call light within reach. Patient educated to call for assistance before ambulating.     Problem: Skin Integrity  Goal: Risk for impaired skin integrity will decrease  Outcome: PROGRESSING AS EXPECTED  Patient self repositions. Mobilizes to bathroom about Q2hrs with staff present. No new skin breakdown noted.

## 2018-09-14 NOTE — ASSESSMENT & PLAN NOTE
This is a clinical diagnosis in the setting of advanced, metastatic cancer and cancer-induced cachexia.  Albumin is severely low at 1.9

## 2018-09-15 PROBLEM — Z66 DNR (DO NOT RESUSCITATE): Status: ACTIVE | Noted: 2018-01-01

## 2018-09-15 NOTE — ASSESSMENT & PLAN NOTE
Updated in Epic  She would like to further discuss with her oncologist before decision regarding hospice.

## 2018-09-15 NOTE — CARE PLAN
Problem: Safety  Goal: Will remain free from injury  Outcome: PROGRESSING AS EXPECTED    Intervention: Provide assistance with mobility  Standby assist, hand held, calls appropriately for assistance, fall precautions in place        Problem: Pain Management  Goal: Pain level will decrease to patient's comfort goal  Outcome: PROGRESSING AS EXPECTED  Pain has decreased with urination and has been at a tolerable level throughout the shift.

## 2018-09-15 NOTE — PROGRESS NOTES
Pulmonary Critical Care Progress Note        DOA:  9/12/2018    Chief Complaint:  fatigue    History of Present Illness:   Tamara Flynn is a 62 y.o. female with a past medical history of metastatic colon cancer to liver and urinary bladder, after recent chemotherapy and recurrent urinary tract infections, just completed 2 weeks of antibiotic treatment for UTI.  Presents to the hospital for dysuria and frequent urinations, profound malaise, chills.  Denies fevers, nausea, vomiting, abdominal pain, chest pain, shortness of breath, cough.    Review of Systems   Constitutional: Positive for malaise/fatigue (Improved). Negative for chills (Resolved), diaphoresis, fever and weight loss.   HENT: Negative for congestion, sinus pain and sore throat.    Eyes: Negative for blurred vision.   Respiratory: Negative for cough, sputum production and shortness of breath.    Cardiovascular: Negative for chest pain, palpitations, orthopnea, leg swelling and PND.   Gastrointestinal: Positive for abdominal pain (Primarily over bladder and left lower quadrant, no change). Negative for blood in stool, constipation, diarrhea, nausea and vomiting.   Genitourinary: Positive for dysuria (Almost resolved), frequency (Improved again), hematuria (Perhaps slightly improved) and urgency (Improved again). Negative for flank pain.   Musculoskeletal: Negative for back pain.   Skin: Negative for rash.   Neurological: Negative for tremors, sensory change, speech change, focal weakness, weakness and headaches.   Endo/Heme/Allergies: Does not bruise/bleed easily.   Psychiatric/Behavioral: Negative for depression. The patient is not nervous/anxious and does not have insomnia.        Interval Events:  24 hour interval history reviewed       S/p one unit pRBCs yesterday  A&O x4  Hemodynamics  SR/ST  SBp  HCO3 27  Renal function normal  LFTs increased   Bicarb drip  Mg 1.6  PORT site ok  Hematuria  UO adequate  I/Os  Ucx  Tm  99.6  WBC  Merrem  Fluconazole  Second opinion Heme/Onc - no further chemo    IS  D/c Bicarb drip  2 grams Mg  CXR  RT protocols  Neb  CBC  Hospice eval  RCC to S/o       YESTERDAY  CT ABD/pelvis - worse metastatic Dz, no abcess/fluid collection or fistula  AFEBRILE  A&O x4  Hematuria no change, fairly bright urine but clots not evident  Hemodynamics reviewed  Bicarb drip 100cc/hr - 3 amps/L  No BMP  Power port L chest  LA 3.4  Tm 37  L colostomy  Reg diet - taking PO ok  UO adequate - no snow  Warfarin/Lovenox  Merrem  Hgb 6.5, needs transfusion    PFSH:  No change.    Physical Exam   Constitutional: She is oriented to person, place, and time. She is cooperative. No distress.   More chronic than acutely ill now   HENT:   Head: Normocephalic and atraumatic.   Mouth/Throat: Oropharynx is clear and moist.   Eyes: Pupils are equal, round, and reactive to light. No scleral icterus.   Neck: Neck supple. No JVD present. No edema present.   Cardiovascular: Normal rate, regular rhythm and intact distal pulses.   No extrasystoles are present. Exam reveals no gallop and no friction rub.    No murmur heard.  Pulmonary/Chest: Effort normal. No accessory muscle usage. No respiratory distress. She has decreased breath sounds (No change) in the right lower field and the left lower field. She has no wheezes. She has no rhonchi. She has no rales.   Abdominal: Soft. Bowel sounds are normal. She exhibits no distension and no mass. There is tenderness (No change) in the suprapubic area. There is no rigidity, no rebound, no guarding, no CVA tenderness and negative Sim's sign.   Neurological: She is alert and oriented to person, place, and time. She has normal strength. No cranial nerve deficit or sensory deficit. GCS eye subscore is 4. GCS verbal subscore is 5. GCS motor subscore is 6.   Skin: Skin is warm and dry. Capillary refill takes less than 2 seconds. No rash noted. She is not diaphoretic. No cyanosis. Nails show no clubbing.    Psychiatric: She has a normal mood and affect. Her speech is normal and behavior is normal. Thought content normal. Cognition and memory are normal.       Respiratory:     Pulse Oximetry: 99 %          ImagingCXR  I have personally reviewed the chest x-ray my impression is  noted above and films are reviewed with Team on rounds           Invalid input(s): [id number]    HemoDynamics:  Pulse: (!) 109, Heart Rate (Monitored): (!) 105  Blood Pressure: 115/55, NIBP: 124/70       Imaging: Available data reviewed     Recent Labs      09/12/18   1940   TROPONINI  <0.01       Neuro:  GCS  15     Imaging: Available data reviewed    Fluids:  Intake/Output       09/13/18 0700 - 09/14/18 0659 09/14/18 0700 - 09/15/18 0659 09/15/18 0700 - 09/16/18 0659      8468-9424 9085-4716 Total 9965-3195 1826-1717 Total 0314-3873 5184-7117 Total       Intake    P.O.  550  550 1100  1110  250 1360  --  -- --    P.O.  1783 355 1508 -- -- --    I.V.  --  3200 3200  800  1400 2200  --  -- --    IV Piggyback Volume (IV Piggyback) -- 200 200 100 200 300 -- -- --    IV Volume (Bicarb) -- 1100 3246 926 9325 1800 -- -- --    IV Volume (Lactated Ringer) -- 1500 1500 -- -- -- -- -- --    IV Volume (Albumin) -- 400 400 100 -- 100 -- -- --    Blood  --  -- --  248  -- 248  --  -- --    Volume (RELEASE RED BLOOD CELLS) -- -- -- 248 -- 248 -- -- --    Total Intake 550 3750 4300 2158 1650 3808 -- -- --       Output    Urine  200  475 675  350  550 900  --  -- --    Number of Times Voided 4 x 6 x 10 x -- 7 x 7 x -- -- --    Urine Void (mL) (non-catheter) 200 475 675 350 550 900 -- -- --    Total Output 200 475 675 350 550 900 -- -- --       Net I/O     350 3275 3625 1808 1100 2908 -- -- --           Recent Labs      09/12/18   1816  09/13/18   0315  09/15/18   0456   SODIUM  136  133*  135   POTASSIUM  4.8  4.6  3.9   CHLORIDE  104  107  100   CO2  13*  13*  27   BUN  23*  23*  22   CREATININE  1.06  0.94  0.75   MAGNESIUM   --    --   1.6    PHOSPHORUS   --    --   2.3*   CALCIUM  8.3*  7.2*  7.7*       GI/Nutrition:    Imaging: Available data reviewed   CT abdomen/pelvis   1.  Interval worsening in metastatic disease. There is marked worsening in hepatic metastases as well as worsening retroperitoneal, periportal, portacaval, mesenteric, and pelvic adenopathy and soft tissue masses.  2.  Atelectasis within the lung bases bilaterally with small bilateral pleural effusions.  3.  Left lower quadrant ostomy again seen with a large peristomal hernia.  4.  Small amount of ascites.    taking PO     Liver Function  Recent Labs      18   0315  18   0709  09/15/18   0456   ALTSGPT  36  28   --   28   ASTSGOT  129*  108*   --   143*   ALKPHOSPHAT  1211*  1062*   --   905*   TBILIRUBIN  1.4  1.4   --   2.1*   LIPASE  25   --    --    --    PREALBUMIN   --   7.0*  6.0*   --    GLUCOSE  90  96   --   108*       Heme:  Recent Labs      18   0055  18   0350  18   0406  09/15/18   0456   RBC  2.61*   --   2.25*  1.95*   --    HEMOGLOBIN  8.6*   --   7.6*  6.5*   --    HEMATOCRIT  27.9*   --   23.6*  20.7*   --    PLATELETCT  236   --   187  151*   --    PROTHROMBTM   --   17.2*   --   17.1*  17.2*   APTT   --   33.0   --    --    --    INR   --   1.44*   --   1.43*  1.44*       Infectious Disease:  Temp  Av.2 °C (99 °F)  Min: 37.1 °C (98.8 °F)  Max: 37.6 °C (99.6 °F)     Micro: antibiotics reviewed and cultures pending    urine growing Candida albicans only so far  Merrem    Recent Labs      18   0315  18   0350  18   0406  09/15/18   0456   WBC  4.2*   --   4.8  4.2*   --    NEUTSPOLYS  29.60*   --   42.60*  38.70*   --    LYMPHOCYTES  62.50*   --   40.00  38.70   --    MONOCYTES  4.60   --   14.50*  15.70*   --    EOSINOPHILS  0.00   --   0.20  0.50   --    BASOPHILS  0.00   --   0.20  0.20   --    ASTSGOT  129*  108*   --    --   143*   ALTSGPT  36  28    --    --   28   ALKPHOSPHAT  1211*  1062*   --    --   905*   TBILIRUBIN  1.4  1.4   --    --   2.1*     Current Facility-Administered Medications   Medication Dose Frequency Provider Last Rate Last Dose   • sodium bicarbonate 8.4 % 150 mEq in D5W 1,000 mL infusion   Continuous August Barroso M.D. 100 mL/hr at 09/14/18 0238     • miconazole 2%-zinc oxide (JENNIFER) topical cream   Q6HR Gurwinder De La Torre M.D.       • NS infusion 2,832 mL  30 mL/kg Once PRN Gurwinder De La Torre M.D.       • NS (BOLUS) infusion 1,000 mL  1,000 mL Once PRN Gurwinder De La Torre M.D.       • meropenem (MERREM) 500 mg in  mL IVPB  500 mg Q6HRS August Barroso M.D. 200 mL/hr at 09/15/18 0529 500 mg at 09/15/18 0529   • senna-docusate (PERICOLACE or SENOKOT S) 8.6-50 MG per tablet 2 Tab  2 Tab BID Gurwinder De La Torre M.D.   2 Tab at 09/14/18 1642    And   • polyethylene glycol/lytes (MIRALAX) PACKET 1 Packet  1 Packet QDAY PRN Gurwinder De La Torre M.D.        And   • magnesium hydroxide (MILK OF MAGNESIA) suspension 30 mL  30 mL QDAY PRN Gurwinder De La Torre M.D.        And   • bisacodyl (DULCOLAX) suppository 10 mg  10 mg QDAY PRN Gurwinder De La Torre M.D.       • ondansetron (ZOFRAN) syringe/vial injection 4 mg  4 mg Q4HRS PRWAN De La Torre M.D.   4 mg at 09/13/18 1517   • ondansetron (ZOFRAN ODT) dispertab 4 mg  4 mg Q4HRS PRWAN De La Torre M.D.       • promethazine (PHENERGAN) tablet 12.5-25 mg  12.5-25 mg Q4HRS PRWAN De La Torre M.D.       • promethazine (PHENERGAN) suppository 12.5-25 mg  12.5-25 mg Q4HRS PRN Gurwinder De La Torre M.D.       • prochlorperazine (COMPAZINE) injection 5-10 mg  5-10 mg Q4HRS PRN Gurwinder De La Torre M.D.       • Pharmacy Consult Request ...Pain Management Review   PRN Gurwinder De La Torre M.D.        And   • oxyCODONE immediate-release (ROXICODONE) tablet 2.5 mg  2.5 mg Q3HRS PRN Gurwinder De La Torre M.D.   2.5 mg at 09/15/18 0315    And   • oxyCODONE immediate-release (ROXICODONE) tablet 5 mg  5 mg Q3HRS PRN  Gurwinder De La Torre M.D.        And   • HYDROmorphone (DILAUDID) injection 0.25 mg  0.25 mg Q3HRS PRN Gurwinder De La Torre M.D.       • mirtazapine (REMERON) tablet 30 mg  30 mg HS PRN Yimi Burch M.D.       • ALPRAZolam (XANAX) tablet 0.25 mg  0.25 mg Q6HRS PRN Yimi Burch M.D.       • ferrous sulfate tablet 325 mg  325 mg TID WITH MEALS Yimi Burch M.D.   325 mg at 09/14/18 1643   • dronabinol (MARINOL) capsule 5 mg  5 mg 4X/DAY Yimi Burch M.D.   5 mg at 09/14/18 2025     Last reviewed on 9/12/2018  7:15 PM by Ioana Preciado SIRISHA    Quality  Measures:  Core Measures & Quality Metrics      Assessment / Plan    Dyspnea/wheezing/hypoxemia  -Unclear etiology, no history of asthma/COPD  -Check chest x-ray, none performed this admission so far, rule out pneumonia, edema, malignant effusion -chest x-ray back, grossly normal except for some bibasilar atelectasis 9/15  -RT protocols  -DuoNeb treatment administered which seemed to help the patient!    Recurrent urinary tract infection with sepsis  -History of UTI with klebsiella ESBL/E coli in 8/2018 Ucxs  -Broad-spectrum antibiotics with vancomycin and meropenem  -Urine cultures growing Candida albicans only and blood cultures negative so far  -Monitoring lactic acid trend - better  -CT ABD/pelvis with contrast IV/oral does not reveal obvious abscess/fluid collection or fistula, tumor burden significantly worse  -reassess tumor and eval for abcess/fistula -> surgery IR Rx?  -ID f/u re MDR Klebsiella  -If hematuria worsens consult urology, okay for now  -If hematuria worsens consider CBI, no clots for now or evidence of bladder obstruction    Metabolic acidosis/Lactic acidosis, resolved  -Monitoring anion gap and lactic acid trends improving -   -Bicarb drip - up to 150mEq/L -discontinue 9/15, bicarb now normal  -Rx primary issue - sepsis, improved     Metastatic colon cancer to the liver and urinary bladder  -Status post chemotherapy 2 weeks ago - HOLD  chemo for now  -Follows oncologist outpatient  -Follows urology as outpatient  -Tumor burden significantly worse by CT, hepatic metastases increased O, peritoneal studding increased, adenopathy increased  -Inpatient palliative care consult  -Hematology oncology reviewed case and suggested no additional therapy prudent or available at this time, recommend hospice     Anhedonia -unchanged  -Started on Marinol  -Advance diet as tolerated     Mild neutropenia, no change  -Status post recent chemotherapy  -Reverse isolation  -Monitoring daily WBCs    Thrombocytopenia, slight worse again  -presumably related to chemotherapy, in part related to tumor/nutritional state  -Monitor CBC daily  -Usual transfusion criteria    Hypomagnesemia  -2 g mag sulfate IV, follow-up electrolytes including magnesium, phosphorus, calcium and potassium in a.m.    Anemia  -Iron deficiency and bone marrow suppression  -Iron supplementation  -Transfuse PRBC for hemoglobin less than 7  -1 unit transfusion 9/14  -Follow-up CBC ordered      ICU prophylaxis  -History of bilateral lower extremities DVT, holding Coumadin and prophylactic Lovenox secondary to hematuria  -Mobilize  -IS  -Nutritional supplements  -Advance diet as toerated    Okay to transfer out of ICU  Hospice eval pending  Consider changing CODE STATUS  RCC will sign off    The ROS, Physical Exam and Plan were updated today (9/15/18).  Compared with yesterday's note, there are no new changes except as documented above.      Discussed patient condition and risk of morbidity and/or mortality with RN, RT, Pharmacy, Charge nurse / hot rounds, Patient and hospitalist.

## 2018-09-15 NOTE — PROGRESS NOTES
[facility] Hospitalist Progress Note    Date of Service: 9/15/2018    Chief Complaint  62 y.o. female admitted 2018 with dysuria    Interval Problem Update  Ms. Flynn has a history of colon cancer with mets to bladder and recent ESBL UTI that presented to the ER with dysuria. She recently was treated with IV antibiotics and her symptoms have continued. In the ER she was found to have septic shock and has been admitted to the ICU for IV fluids and IV antibiotics.   Her lactic acid is 3.4 this morning.she has had poor appetite and not eating much and no output in ostomy over night. She has had hematuria and a drop in her Hb to 6.5 and was transfused one unit of RBCs on     We discussed Hospice and she is open to meeting with them.    She continues to experience urinary frequency and dysuria though less than before. We discussed code status and she wants to be DNR. We discussed getting out of bed and ambulating with nursing assistance.  Consultants/Specialty  Critical Care. I discussed her condition with Dr. Barroso on ICU Hot Rounds.   Infectious disease.   Disposition  tele        Review of Systems   Constitutional: Positive for malaise/fatigue. Negative for chills and fever.   Cardiovascular: Positive for leg swelling. Negative for chest pain.   Gastrointestinal: Negative for vomiting.        Moderate appetite   Genitourinary: Positive for dysuria and frequency.   Neurological: Negative for weakness.   All other systems reviewed and are negative.     Physical Exam  Laboratory/Imaging   Hemodynamics  Temp (24hrs), Av.2 °C (99 °F), Min:37 °C (98.6 °F), Max:37.6 °C (99.6 °F)   Temperature: 37 °C (98.6 °F)  Pulse  Av.8  Min: 88  Max: 128 Heart Rate (Monitored): 90  Blood Pressure: 115/55, NIBP: 104/59      Respiratory      Respiration: 18, Pulse Oximetry: 100 %, O2 Daily Delivery Respiratory : Silicone Nasal Cannula     Given By:: Mask, Work Of Breathing / Effort: Mild  RUL Breath Sounds: Expiratory  Wheezes, RML Breath Sounds: Clear, RLL Breath Sounds: Diminished, ROLF Breath Sounds: Clear, LLL Breath Sounds: Diminished    Fluids    Intake/Output Summary (Last 24 hours) at 09/15/18 1039  Last data filed at 09/15/18 0800   Gross per 24 hour   Intake             3158 ml   Output              850 ml   Net             2308 ml       Nutrition  Orders Placed This Encounter   Procedures   • Diet Order Regular     Standing Status:   Standing     Number of Occurrences:   1     Order Specific Question:   Diet:     Answer:   Regular [1]     Physical Exam   Constitutional: She is oriented to person, place, and time. She appears well-developed. No distress.   HENT:   Head: Atraumatic.   Neck: Neck supple.   Cardiovascular:   No murmur heard.  Sinus tachycardia  Left sternal border murmur   Pulmonary/Chest:   Left chest port.   Abdominal: Soft. She exhibits distension. There is no tenderness.   Ostomy bag. No umbilicus   Musculoskeletal: She exhibits edema. She exhibits no tenderness.   Neurological: She is alert and oriented to person, place, and time.   Skin: Skin is warm and dry. She is not diaphoretic. There is pallor.   Psychiatric: She has a normal mood and affect. Her behavior is normal.   She is in good spirits   Nursing note and vitals reviewed.      Recent Labs      09/12/18   1816  09/13/18   0350  09/14/18   0406   WBC  4.2*  4.8  4.2*   RBC  2.61*  2.25*  1.95*   HEMOGLOBIN  8.6*  7.6*  6.5*   HEMATOCRIT  27.9*  23.6*  20.7*   MCV  106.9*  104.9*  106.2*   MCH  33.0  33.8*  33.3*   MCHC  30.8*  32.2*  31.4*   RDW  123.0*  116.1*  118.5*   PLATELETCT  236  187  151*   MPV  10.6  10.3  11.6     Recent Labs      09/12/18   1816  09/13/18   0315  09/15/18   0456   SODIUM  136  133*  135   POTASSIUM  4.8  4.6  3.9   CHLORIDE  104  107  100   CO2  13*  13*  27   GLUCOSE  90  96  108*   BUN  23*  23*  22   CREATININE  1.06  0.94  0.75   CALCIUM  8.3*  7.2*  7.7*     Recent Labs      09/13/18   0055  09/14/18   0406   09/15/18   0456   APTT  33.0   --    --    INR  1.44*  1.43*  1.44*         Recent Labs      09/13/18   0315   TRIGLYCERIDE  219*   HDL  11*   LDL  155*          Assessment/Plan     * Sepsis due to urinary tract infection (HCC)   Assessment & Plan    Septic shock due to UTI with admission to the ICU with IV fluids and IV antibiotics.  Elevated lactic acid that went up to 7.6  Immunocompromised patient due to chemotherapy  Recurrent UTIs secondary to colon carcinoma invading the bladder causing infections  She has a history of ESBL therefore IV meropenem empirically and infectious disease consulted.   The organ system failure associated with this disease process is the cardiovascular system as is evidenced by the severe lactic acidosis consistent with organ-system ischemia.    CT abd/pelvis with contrast:  1.  Interval worsening in metastatic disease. There is marked worsening in hepatic metastases as well as worsening retroperitoneal, periportal, portacaval, mesenteric, and pelvic adenopathy and soft tissue masses.    2.  Atelectasis within the lung bases bilaterally with small bilateral pleural effusions.    3.  Left lower quadrant ostomy again seen with a large peristomal hernia.    4.  Small amount of ascites.        ESBL (extended spectrum beta-lactamase) producing bacteria infection- (present on admission)   Assessment & Plan    Hx of recently finished course of abx of ertapenem   ID consult        Adenocarcinoma of colon metastatic to liver (HCC)- (present on admission)   Assessment & Plan    Follows with Dr. Pelaez and has been on chemotherapy  She is amenable to meeting with Hospice which has been ordered as well as Palliative Care.         Colostomy in place (HCC)- (present on admission)   Assessment & Plan    Ostomy care        DNR (do not resuscitate)- (present on admission)   Assessment & Plan    Per patient's wishes.  Orders written.  Hospice consulted.         Protein-calorie malnutrition, severe  (HCC)- (present on admission)   Assessment & Plan    This is a clinical diagnosis in the setting of advanced, metastatic cancer and cancer-induced cachexia.  Albumin is severely low at 1.9        Anemia- (present on admission)   Assessment & Plan    Hb dropped to 6.5   She will be given one unit of RBCs.  May be a component of chronic disease and secondary to chemotherapy now with acute blood loss anemia due to hematuria.   Stop anticoagulation.   CBC ordered for the morning.         Abnormal LFTs- (present on admission)   Assessment & Plan    Likely due to metastatic disease to liver.          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 Latanya Hale, oncology, consulted.           Adenocarcinoma of sigmoid colon (HCC)   Assessment & Plan    Follows with Dr. Pelaez current treatment        Deep vein thrombosis (DVT) of both lower extremities (HCC)- (present on admission)   Assessment & Plan    Hold anticoagulation given severe anemia          Morbid obesity with BMI of 40.0-44.9, adult (HCC)- (present on admission)   Assessment & Plan    BMI 40          Quality-Core Measures   Reviewed items::  Labs reviewed and Medications reviewed  Traore catheter::  No Traore  DVT prophylaxis pharmacological::  Contraindicated - Anemia requiring blood transfusion

## 2018-09-15 NOTE — CARE PLAN
Problem: Safety  Goal: Will remain free from falls  Outcome: PROGRESSING AS EXPECTED  patient educated regarding use of call light, bed alarm on, bed locked, non-skid shoes in use, IV pole on same side of bed as bathroom.     Problem: Urinary Elimination:  Goal: Ability to reestablish a normal urinary elimination pattern will improve  Outcome: PROGRESSING AS EXPECTED  Pt up to bathroom every 2 hours to void. Patient still experiencing hematuria and frequency. Adequate urine output for duration of shift

## 2018-09-15 NOTE — PROGRESS NOTES
Infectious Disease Progress Note    Author: Georgie Conley M.D. Date & Time of service: 9/15/2018  12:03 PM    Chief Complaint:  UTI and history of ESBL    Interval History:  62-year-old WF with metastatic colon cancer including liver and bladder with prior colovesical fistula just completed IV antibiotics for a non-ESBL Escherichia coli urinary tract infection with recurrence of her prior dysuria   AF WBC 4.2 still feels very weak  9/15 AF WBC 4.2 somnolent  Labs Reviewed, Medications Reviewed and Radiology Reviewed.    Review of Systems:  Review of Systems   Unable to perform ROS: Other   Constitutional: Negative for fever.       Hemodynamics:  Temp (24hrs), Av.2 °C (99 °F), Min:37 °C (98.6 °F), Max:37.6 °C (99.6 °F)  Temperature: 37 °C (98.6 °F)  Pulse  Av.8  Min: 88  Max: 128Heart Rate (Monitored): 90  Blood Pressure: 115/55, NIBP: 104/59       Physical Exam:  Physical Exam   Constitutional: She appears well-developed. No distress.   HENT:   Head: Normocephalic and atraumatic.   Neck: Neck supple.   Cardiovascular:   tachycardic   Pulmonary/Chest: Effort normal. No respiratory distress. She has no wheezes. She has no rales.   Abdominal: Soft. There is no tenderness. There is no rebound and no guarding.   ostomy   Musculoskeletal: She exhibits edema.   Neurological:   somnolent   Skin: Skin is warm.   Nursing note and vitals reviewed.      Meds:    Current Facility-Administered Medications:   •  Respiratory Care per Protocol  •  magnesium sulfate  •  miconazole 2%-zinc oxide  •  NS  •  NS  •  meropenem (MERREM) IV  •  senna-docusate **AND** polyethylene glycol/lytes **AND** magnesium hydroxide **AND** bisacodyl  •  ondansetron  •  ondansetron  •  promethazine  •  promethazine  •  prochlorperazine  •  Notify provider if pain remains uncontrolled **AND** Use the numeric rating scale (NRS-11) on regular floors and Critical-Care Pain Observation Tool (CPOT) on ICUs/Trauma to assess pain **AND**  Pulse Ox (Oximetry) **AND** Pharmacy Consult Request **AND** If patient difficult to arouse and/or has respiratory depression, stop any opiates that are currently infusing and call a Rapid Response. **AND** oxyCODONE immediate-release **AND** oxyCODONE immediate-release **AND** HYDROmorphone  •  ALPRAZolam  •  ferrous sulfate  •  dronabinol    Labs:  Recent Labs      09/12/18 1816 09/13/18   0350  09/14/18   0406   WBC  4.2*  4.8  4.2*   RBC  2.61*  2.25*  1.95*   HEMOGLOBIN  8.6*  7.6*  6.5*   HEMATOCRIT  27.9*  23.6*  20.7*   MCV  106.9*  104.9*  106.2*   MCH  33.0  33.8*  33.3*   RDW  123.0*  116.1*  118.5*   PLATELETCT  236  187  151*   MPV  10.6  10.3  11.6   NEUTSPOLYS  29.60*  42.60*  38.70*   LYMPHOCYTES  62.50*  40.00  38.70   MONOCYTES  4.60  14.50*  15.70*   EOSINOPHILS  0.00  0.20  0.50   BASOPHILS  0.00  0.20  0.20   RBCMORPHOLO  Present   --   Present     Recent Labs      09/12/18 1816 09/13/18   0315  09/15/18   0456   SODIUM  136  133*  135   POTASSIUM  4.8  4.6  3.9   CHLORIDE  104  107  100   CO2  13*  13*  27   GLUCOSE  90  96  108*   BUN  23*  23*  22     Recent Labs      09/12/18 1816 09/13/18 0315  09/15/18   0456   ALBUMIN  2.4*  1.9*  2.7*   TBILIRUBIN  1.4  1.4  2.1*   ALKPHOSPHAT  1211*  1062*  905*   TOTPROTEIN  5.7*  4.7*  5.0*   ALTSGPT  36  28  28   ASTSGOT  129*  108*  143*   CREATININE  1.06  0.94  0.75       Imaging:  Ct-abdomen-pelvis With    Result Date: 9/13/2018 9/13/2018 3:48 PM HISTORY/REASON FOR EXAM: Diffuse abdominal pain. Prior surgery for colon cancer. TECHNIQUE/EXAM DESCRIPTION: CT scan of the abdomen and pelvis with contrast. Contrast-enhanced helical scanning was obtained from the diaphragmatic domes through the pubic symphysis following the bolus administration of 100 mL of Omnipaque 350 nonionic contrast without complication. Low dose optimization technique was utilized for this CT exam including automated exposure control and adjustment of the mA and/or  kV according to patient size. COMPARISON: 7/12/2018 FINDINGS: CT Abdomen: There is atelectasis within the lung bases and there are small bilateral pleural effusions. There is extensive diffuse metastatic disease involving the entire liver. As is worsened from comparison. There is extensive retroperitoneal, periportal, portacaval mass is adenopathy. Retroperitoneal lymph nodes measure up to 2.5 cm in short axis dimension. This is all worsened from comparison. There are some calcified lymph nodes seen within the portacaval and periportal regions. The spleen is normal. The kidneys are normal. The adrenal glands are normal. The pancreas is normal. There is atherosclerotic change of aorta. No evidence of aneurysm. There is a small amount of ascites within the upper abdomen. There is anasarca. There is mesenteric adenopathy. There is a soft tissue mass again seen adjacent to the transverse colon which measures 3 x 2.6 cm. Mesenteric masses within the abdomen measure up to 6 x 3 cm in size. This is worsened from comparison. CT Pelvis: There is a left lower quadrant ostomy site with a peristomal hernia. Ascites extends into the hernia sac. There is a large pelvic soft tissue mass which deforms the urinary bladder and measures 12 x 10 cm in size. This is worsened from comparison. Pelvic  sidewall adenopathy is again seen, left greater than right. Lymph nodes in the left measure up to 2.3 cm in short axis dimension. There is a small amount of ascites dependently within the pelvis.     1.  Interval worsening in metastatic disease. There is marked worsening in hepatic metastases as well as worsening retroperitoneal, periportal, portacaval, mesenteric, and pelvic adenopathy and soft tissue masses. 2.  Atelectasis within the lung bases bilaterally with small bilateral pleural effusions. 3.  Left lower quadrant ostomy again seen with a large peristomal hernia. 4.  Small amount of ascites.      Micro:  Results     Procedure  "Component Value Units Date/Time    URINE CULTURE(NEW) [[id number]]  (Abnormal) Collected:  09/12/18 1721    Order Status:  Completed Specimen:  Urine Updated:  09/14/18 1607     Significant Indicator POS (POS)     Source UR     Site --     Urine Culture Mixed skin lucy 10-50,000 cfu/mL (A)      Candida albicans  10-50,000 cfu/mL   (A)    BLOOD CULTURE [[id number]] Collected:  09/12/18 1816    Order Status:  Completed Specimen:  Blood from Peripheral Updated:  09/13/18 0809     Significant Indicator NEG     Source BLD     Site PERIPHERAL     Blood Culture No Growth    Note: Blood cultures are incubated for 5 days and  are monitored continuously.Positive blood cultures  are called to the RN and reported as soon as  they are identified.      Narrative:       Protective  Per Hospital Policy: Only change Specimen Src: to \"Line\" if  specified by physician order.    BLOOD CULTURE [[id number]] Collected:  09/12/18 1942    Order Status:  Completed Specimen:  Blood from Peripheral Updated:  09/13/18 0809     Significant Indicator NEG     Source BLD     Site PERIPHERAL     Blood Culture No Growth    Note: Blood cultures are incubated for 5 days and  are monitored continuously.Positive blood cultures  are called to the RN and reported as soon as  they are identified.      Narrative:       Protective  Per Hospital Policy: Only change Specimen Src: to \"Line\" if  specified by physician order.    Blood Culture [[id number]]     Order Status:  Sent Specimen:  Blood from Peripheral     Blood Culture [[id number]]     Order Status:  Sent Specimen:  Blood from Peripheral     Urinalysis [[id number]]     Order Status:  Sent Specimen:  Urine     Culture Respiratory W/ GRM STN [[id number]]     Order Status:  Completed Specimen:  Respirate from Sputum     URINALYSIS,CULTURE IF INDICATED [[id number]]  (Abnormal) Collected:  09/12/18 1721    Order Status:  Completed Specimen:  Urine Updated:  09/12/18 1914     Micro Urine Req Microscopic     Color " Brown     Character Turbid (A)     Specific Gravity 1.029     Ph 5.5     Glucose Negative mg/dL      Ketones Trace (A) mg/dL      Protein 300 (A) mg/dL      Bilirubin Small (A)     Urobilinogen, Urine 1.0     Nitrite Positive (A)     Leukocyte Esterase Large (A)     Occult Blood Large (A)          Assessment:  Active Hospital Problems    Diagnosis   • *Sepsis due to urinary tract infection (HCC) [A41.9, N39.0]   • ESBL (extended spectrum beta-lactamase) producing bacteria infection [A49.9, Z16.12]   • Adenocarcinoma of colon metastatic to liver (HCC) [C18.9, C78.7]   • Colostomy in place (HCC) [Z93.3]   • Deep vein thrombosis (DVT) of both lower extremities (HCC) [I82.403]   • Morbid obesity with BMI of 40.0-44.9, adult (HCC) [E66.01, Z68.41]   • Anemia [D64.9]   • Protein-calorie malnutrition, severe (HCC) [E43]   • Lactic acidosis [E87.2]   • Neutropenia (HCC) [D70.9]   • Elevated BUN [R79.9]   • Abnormal LFTs [R94.5]   • 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

## 2018-09-15 NOTE — PROGRESS NOTES
12 hour chart check performed at bedside    2 RN skin assessment complete     Monitor Summary  Sinus, Sinus Tach  HR   0.12/0.10/0.30    HR up to 120s with ambulation

## 2018-09-16 NOTE — PROGRESS NOTES
Assumed care of patient, bedside report received from Kemar. Updated on POC, call light within reach and fall precautions in place. Bed locked and in lowest position. Patient instructed to call for assistance before getting out of bed. All questions answered, no other needs at this time.

## 2018-09-16 NOTE — PALLIATIVE CARE
"Palliative Care follow-up  Met with the pt at the bedside. Asked pt is she was aware that her  had brought in their AD. Pt stated that they did not have one that was filled out but that her  had a blank copy at home. Let the pt know that we can go over and complete the AD packet here with them both. Pt very interested and stated that she would like to have this completed once he arrives. Provided the pt with card and contact number, agreed to have her call when ready to complete AD packet.   Spoke about interest in Hospice. Pt states that she has spoken with a liaison from [facility] several times but is not ready at this time to sign on with hospice. Pt states that she has an appt with Dr Pelaez next week to discuss if there are any further treatment options. Pt states \"I think that there isn't anything left to try but I just want to make sure before I make that decision. I don't to just give in and I feel like maybe I'm not bad enough, or not hurting enough\". Discussed that choosing comfort over further treatment would not be giving in, that many people go onto hospice services before they are in extreme pain or discomfort and that the idea would be to prevent her ever being that type of position. Provided support for pt and spoke about her disease progression. Pt would like to hold off on signing for Hospice at this time until she has been able to speak with her Oncologist, Dr Pelaez.     Updated: BS RN    Plan: Will meet with pt to complete AD packet and POLST when her spouse is at the bedside, contact number provided to pt.     Thank you for allowing Palliative Care to participate in this patient's care. Please feel free to call [phone] with any questions or concerns.  "

## 2018-09-16 NOTE — CARE PLAN
Problem: Safety  Goal: Will remain free from injury  Outcome: PROGRESSING AS EXPECTED  Bed locked and in lowest position.  Bed alarm on.  Treaded socks.  Call light and belongings with in reach.  Pt educated to call for assistance. Pt verbalized understanding.  Hourly rounding in place.     Problem: Skin Integrity  Goal: Risk for impaired skin integrity will decrease  Outcome: PROGRESSING AS EXPECTED  Skin assessment completed, Q2 turns in place.  Pillows in use for positions and to float heels, silicone oxygen tubing in use.  Frequent linen changes to ensure patient stays dry.  Hourly rounding in place.

## 2018-09-16 NOTE — PROGRESS NOTES
Infectious Disease Progress Note    Author: Georgie Conley M.D. Date & Time of service: 2018  2:13 PM    Chief Complaint:  UTI and history of ESBL    Interval History:  62-year-old WF with metastatic colon cancer including liver and bladder with prior colovesical fistula just completed IV antibiotics for a non-ESBL Escherichia coli urinary tract infection with recurrence of her prior dysuria   AF WBC 4.2 still feels very weak  9/15 AF WBC not done somnolent   AF (99.3) WBC 9 transferred out of ICU-tired. Denies SE abx Depressed  Labs Reviewed, Medications Reviewed and Radiology Reviewed.    Review of Systems:  Review of Systems   Unable to perform ROS: Other   Constitutional: Positive for malaise/fatigue. Negative for fever.   Gastrointestinal: Positive for abdominal pain. Negative for diarrhea, nausea and vomiting.   Skin: Negative for itching and rash.   Neurological: Positive for weakness.       Hemodynamics:  Temp (24hrs), Av.2 °C (98.9 °F), Min:36.7 °C (98.1 °F), Max:37.4 °C (99.3 °F)  Temperature: 36.7 °C (98.1 °F)  Pulse  Av.2  Min: 88  Max: 128   Blood Pressure: 119/71       Physical Exam:  Physical Exam   Constitutional: She appears well-developed. No distress.   HENT:   Head: Normocephalic and atraumatic.   Eyes: Pupils are equal, round, and reactive to light. EOM are normal. No scleral icterus.   Neck: Neck supple.   Cardiovascular:   tachycardic   Pulmonary/Chest: Effort normal. No respiratory distress. She has no wheezes. She has no rales.   Port nontender   Abdominal: Soft. She exhibits no distension. There is no tenderness. There is no rebound and no guarding.   ostomy   Musculoskeletal: She exhibits edema.   Neurological: She is alert.   Skin: Skin is warm.   Nursing note and vitals reviewed.      Meds:    Current Facility-Administered Medications:   •  Respiratory Care per Protocol  •  miconazole 2%-zinc oxide  •  meropenem (MERREM) IV  •  senna-docusate **AND**  polyethylene glycol/lytes **AND** magnesium hydroxide **AND** bisacodyl  •  ondansetron  •  ondansetron  •  promethazine  •  promethazine  •  prochlorperazine  •  Notify provider if pain remains uncontrolled **AND** Use the numeric rating scale (NRS-11) on regular floors and Critical-Care Pain Observation Tool (CPOT) on ICUs/Trauma to assess pain **AND** Pulse Ox (Oximetry) **AND** Pharmacy Consult Request **AND** If patient difficult to arouse and/or has respiratory depression, stop any opiates that are currently infusing and call a Rapid Response. **AND** oxyCODONE immediate-release **AND** oxyCODONE immediate-release **AND** HYDROmorphone  •  ALPRAZolam  •  ferrous sulfate  •  dronabinol    Labs:  Recent Labs      09/14/18   0406  09/15/18   2240  09/16/18   0344   WBC  4.2*  8.0  9.2   RBC  1.95*  2.47*  2.34*   HEMOGLOBIN  6.5*  8.0*  7.9*   HEMATOCRIT  20.7*  25.4*  23.9*   MCV  106.2*  102.8*  102.1*   MCH  33.3*  32.4  33.8*   RDW  118.5*  104.7*  103.8*   PLATELETCT  151*  184  187   MPV  11.6  10.8  10.6   NEUTSPOLYS  38.70*  60.80  58.90   LYMPHOCYTES  38.70  21.20*  23.50   MONOCYTES  15.70*  13.70*  13.60*   EOSINOPHILS  0.50  0.10  0.10   BASOPHILS  0.20  0.40  0.20   RBCMORPHOLO  Present   --    --      Recent Labs      09/15/18   0456  09/16/18   0344   SODIUM  135  133*   POTASSIUM  3.9  4.3   CHLORIDE  100  97   CO2  27  28   GLUCOSE  108*  104*   BUN  22  22     Recent Labs      09/15/18   0456  09/16/18   0344   ALBUMIN  2.7*   --    TBILIRUBIN  2.1*   --    ALKPHOSPHAT  905*   --    TOTPROTEIN  5.0*   --    ALTSGPT  28   --    ASTSGOT  143*   --    CREATININE  0.75  0.70       Imaging:  Ct-abdomen-pelvis With    Result Date: 9/13/2018 9/13/2018 3:48 PM HISTORY/REASON FOR EXAM: Diffuse abdominal pain. Prior surgery for colon cancer. TECHNIQUE/EXAM DESCRIPTION: CT scan of the abdomen and pelvis with contrast. Contrast-enhanced helical scanning was obtained from the diaphragmatic domes through the  pubic symphysis following the bolus administration of 100 mL of Omnipaque 350 nonionic contrast without complication. Low dose optimization technique was utilized for this CT exam including automated exposure control and adjustment of the mA and/or kV according to patient size. COMPARISON: 7/12/2018 FINDINGS: CT Abdomen: There is atelectasis within the lung bases and there are small bilateral pleural effusions. There is extensive diffuse metastatic disease involving the entire liver. As is worsened from comparison. There is extensive retroperitoneal, periportal, portacaval mass is adenopathy. Retroperitoneal lymph nodes measure up to 2.5 cm in short axis dimension. This is all worsened from comparison. There are some calcified lymph nodes seen within the portacaval and periportal regions. The spleen is normal. The kidneys are normal. The adrenal glands are normal. The pancreas is normal. There is atherosclerotic change of aorta. No evidence of aneurysm. There is a small amount of ascites within the upper abdomen. There is anasarca. There is mesenteric adenopathy. There is a soft tissue mass again seen adjacent to the transverse colon which measures 3 x 2.6 cm. Mesenteric masses within the abdomen measure up to 6 x 3 cm in size. This is worsened from comparison. CT Pelvis: There is a left lower quadrant ostomy site with a peristomal hernia. Ascites extends into the hernia sac. There is a large pelvic soft tissue mass which deforms the urinary bladder and measures 12 x 10 cm in size. This is worsened from comparison. Pelvic  sidewall adenopathy is again seen, left greater than right. Lymph nodes in the left measure up to 2.3 cm in short axis dimension. There is a small amount of ascites dependently within the pelvis.     1.  Interval worsening in metastatic disease. There is marked worsening in hepatic metastases as well as worsening retroperitoneal, periportal, portacaval, mesenteric, and pelvic adenopathy and soft  "tissue masses. 2.  Atelectasis within the lung bases bilaterally with small bilateral pleural effusions. 3.  Left lower quadrant ostomy again seen with a large peristomal hernia. 4.  Small amount of ascites.      Micro:  Results     Procedure Component Value Units Date/Time    URINE CULTURE(NEW) [[id number]]  (Abnormal) Collected:  09/12/18 1721    Order Status:  Completed Specimen:  Urine Updated:  09/14/18 1607     Significant Indicator POS (POS)     Source UR     Site --     Urine Culture Mixed skin lucy 10-50,000 cfu/mL (A)      Candida albicans  10-50,000 cfu/mL   (A)    BLOOD CULTURE [[id number]] Collected:  09/12/18 1816    Order Status:  Completed Specimen:  Blood from Peripheral Updated:  09/13/18 0809     Significant Indicator NEG     Source BLD     Site PERIPHERAL     Blood Culture No Growth    Note: Blood cultures are incubated for 5 days and  are monitored continuously.Positive blood cultures  are called to the RN and reported as soon as  they are identified.      Narrative:       Protective  Per Hospital Policy: Only change Specimen Src: to \"Line\" if  specified by physician order.    BLOOD CULTURE [[id number]] Collected:  09/12/18 1942    Order Status:  Completed Specimen:  Blood from Peripheral Updated:  09/13/18 0809     Significant Indicator NEG     Source BLD     Site PERIPHERAL     Blood Culture No Growth    Note: Blood cultures are incubated for 5 days and  are monitored continuously.Positive blood cultures  are called to the RN and reported as soon as  they are identified.      Narrative:       Protective  Per Hospital Policy: Only change Specimen Src: to \"Line\" if  specified by physician order.    URINALYSIS,CULTURE IF INDICATED [[id number]]  (Abnormal) Collected:  09/12/18 1721    Order Status:  Completed Specimen:  Urine Updated:  09/12/18 1914     Micro Urine Req Microscopic     Color Brown     Character Turbid (A)     Specific Gravity 1.029     Ph 5.5     Glucose Negative mg/dL      Ketones " Trace (A) mg/dL      Protein 300 (A) mg/dL      Bilirubin Small (A)     Urobilinogen, Urine 1.0     Nitrite Positive (A)     Leukocyte Esterase Large (A)     Occult Blood Large (A)    Blood Culture [[id number]] Collected:  09/12/18 0000    Order Status:  Canceled Specimen:  Other from Peripheral     Blood Culture [[id number]] Collected:  09/12/18 0000    Order Status:  Canceled Specimen:  Other from Peripheral     Urinalysis [[id number]] Collected:  09/12/18 0000    Order Status:  Canceled Specimen:  Urine     Culture Respiratory W/ GRM STN [[id number]] Collected:  09/12/18 0000    Order Status:  Canceled Specimen:  Sputum from Sputum           Assessment:  Active Hospital Problems    Diagnosis   • *Sepsis due to urinary tract infection (HCC) [A41.9, N39.0]   • ESBL (extended spectrum beta-lactamase) producing bacteria infection [A49.9, Z16.12]   • Adenocarcinoma of colon metastatic to liver (HCC) [C18.9, C78.7]   • Colostomy in place (HCC) [Z93.3]   • Deep vein thrombosis (DVT) of both lower extremities (HCC) [I82.403]   • Morbid obesity with BMI of 40.0-44.9, adult (HCC) [E66.01, Z68.41]   • Anemia [D64.9]   • Protein-calorie malnutrition, severe (HCC) [E43]   • Lactic acidosis [E87.2]   • Neutropenia (HCC) [D70.9]   • Elevated BUN [R79.9]   • Abnormal LFTs [R94.5]   • 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

## 2018-09-16 NOTE — PROGRESS NOTES
Assumed care of patient, report received from Kylee from [facility]. Updated on POC, call light within reach and fall precautions in place. Bed locked and in lowest position. Patient instructed to call for assistance before getting out of bed. All questions answered, no other needs at this time.

## 2018-09-16 NOTE — PROGRESS NOTES
2-RN Skin Check with Hetal RN:  Elbows, stomach and sacrum are all clean, dry and intact with no signs of skin breakdown. Pt has moisture under panus, santiago cream is applied. BL heals are dry, callused and cracked but show no signs of skin breakdown. Ears are pink and blanching, soft oxygen tubing being used.

## 2018-09-16 NOTE — CARE PLAN
Problem: Communication  Goal: The ability to communicate needs accurately and effectively will improve  Outcome: PROGRESSING AS EXPECTED      Problem: Safety  Goal: Will remain free from falls  Outcome: PROGRESSING AS EXPECTED  Fall precautions in place. Patient calls appropriately     Problem: Knowledge Deficit  Goal: Knowledge of the prescribed therapeutic regimen will improve  Outcome: PROGRESSING AS EXPECTED      Problem: Skin Integrity  Goal: Risk for impaired skin integrity will decrease  Outcome: PROGRESSING AS EXPECTED  Skin preventative measures in place see orders and MAR.

## 2018-09-17 NOTE — PROGRESS NOTES
Infectious Disease Progress Note    Author: ALEX Bergman Date & Time of service: 2018  11:48 AM    Chief Complaint:  UTI and history of ESBL    Interval History:  62-year-old WF with metastatic colon cancer including liver and bladder with prior colovesical fistula just completed IV antibiotics for a non-ESBL Escherichia coli urinary tract infection with recurrence of her prior dysuria   AF WBC 4.2 still feels very weak  9/15 AF WBC not done somnolent   AF (99.3) WBC 9 transferred out of ICU-tired. Denies SE abx Depressed  - AF, WBC 13.1, resting comfortably in bed, tolerating abx.   Labs Reviewed, Medications Reviewed and Radiology Reviewed.    Review of Systems:  Review of Systems   Constitutional: Positive for malaise/fatigue. Negative for chills and fever.   HENT: Negative for hearing loss.    Respiratory: Negative for shortness of breath.    Cardiovascular: Positive for leg swelling. Negative for chest pain.   Gastrointestinal: Positive for abdominal pain. Negative for diarrhea, nausea and vomiting.   Genitourinary: Positive for dysuria and frequency. Negative for flank pain.        Frequency improving   Musculoskeletal: Negative for joint pain and myalgias.   Skin: Negative for rash.   Neurological: Positive for sensory change. Negative for weakness and headaches.        BLE tingling       Hemodynamics:  Temp (24hrs), Av.9 °C (98.4 °F), Min:36.5 °C (97.7 °F), Max:37.1 °C (98.7 °F)  Temperature: 36.9 °C (98.5 °F)  Pulse  Av.9  Min: 64  Max: 128   Blood Pressure: 116/69       Physical Exam:  Physical Exam   Constitutional: She is oriented to person, place, and time. She appears well-developed and well-nourished. No distress.   Morbidly obese   HENT:   Head: Normocephalic and atraumatic.   Eyes: Pupils are equal, round, and reactive to light. Conjunctivae and EOM are normal.   Neck: Normal range of motion. Neck supple. No tracheal deviation present.   Cardiovascular:  Normal heart sounds and intact distal pulses.    No murmur heard.  Tachycardic   Pulmonary/Chest: Effort normal and breath sounds normal. No respiratory distress. She has no wheezes.   Left upper chest port- CDI, no erythema, nontender   Abdominal: Soft. Bowel sounds are normal. She exhibits no distension. There is no tenderness.   LQ- colostostomy   Musculoskeletal: She exhibits edema. She exhibits no tenderness.   +2-3 BLE edema   Neurological: She is alert and oriented to person, place, and time.   Skin: Skin is warm. No rash noted. No erythema.   Psychiatric: She has a normal mood and affect. Thought content normal.   Nursing note and vitals reviewed.      Meds:    Current Facility-Administered Medications:   •  ipratropium-albuterol  •  albuterol  •  Respiratory Care per Protocol  •  miconazole 2%-zinc oxide  •  meropenem (MERREM) IV  •  senna-docusate **AND** polyethylene glycol/lytes **AND** magnesium hydroxide **AND** bisacodyl  •  ondansetron  •  ondansetron  •  promethazine  •  promethazine  •  prochlorperazine  •  Notify provider if pain remains uncontrolled **AND** Use the numeric rating scale (NRS-11) on regular floors and Critical-Care Pain Observation Tool (CPOT) on ICUs/Trauma to assess pain **AND** Pulse Ox (Oximetry) **AND** Pharmacy Consult Request **AND** If patient difficult to arouse and/or has respiratory depression, stop any opiates that are currently infusing and call a Rapid Response. **AND** oxyCODONE immediate-release **AND** oxyCODONE immediate-release **AND** HYDROmorphone  •  ALPRAZolam  •  ferrous sulfate  •  dronabinol    Labs:  Recent Labs      09/15/18   2240  09/16/18   0344  09/17/18   0317   WBC  8.0  9.2  13.1*   RBC  2.47*  2.34*  2.53*   HEMOGLOBIN  8.0*  7.9*  8.6*   HEMATOCRIT  25.4*  23.9*  26.5*   MCV  102.8*  102.1*  104.7*   MCH  32.4  33.8*  34.0*   RDW  104.7*  103.8*  114.2*   PLATELETCT  184  187  240   MPV  10.8  10.6  10.7   NEUTSPOLYS  60.80  58.90  61.60  "  LYMPHOCYTES  21.20*  23.50  22.40   MONOCYTES  13.70*  13.60*  11.00   EOSINOPHILS  0.10  0.10  0.10   BASOPHILS  0.40  0.20  0.30     Recent Labs      09/15/18   0456  09/16/18 0344 09/17/18 0317   SODIUM  135  133*  133*   POTASSIUM  3.9  4.3  4.5   CHLORIDE  100  97  99   CO2  27  28  24   GLUCOSE  108*  104*  92   BUN  22  22  19     Recent Labs      09/15/18   0456  09/16/18   0344  09/17/18   0317   ALBUMIN  2.7*   --    --    TBILIRUBIN  2.1*   --    --    ALKPHOSPHAT  905*   --    --    TOTPROTEIN  5.0*   --    --    ALTSGPT  28   --    --    ASTSGOT  143*   --    --    CREATININE  0.75  0.70  0.66       Imaging:  No recent imaging.      Micro:  Results     Procedure Component Value Units Date/Time    URINE CULTURE(NEW) [[id number]]  (Abnormal) Collected:  09/12/18 1721    Order Status:  Completed Specimen:  Urine Updated:  09/14/18 1607     Significant Indicator POS (POS)     Source UR     Site --     Urine Culture Mixed skin lucy 10-50,000 cfu/mL (A)      Candida albicans  10-50,000 cfu/mL   (A)    BLOOD CULTURE [[id number]] Collected:  09/12/18 1816    Order Status:  Completed Specimen:  Blood from Peripheral Updated:  09/13/18 0809     Significant Indicator NEG     Source BLD     Site PERIPHERAL     Blood Culture No Growth    Note: Blood cultures are incubated for 5 days and  are monitored continuously.Positive blood cultures  are called to the RN and reported as soon as  they are identified.      Narrative:       Protective  Per Hospital Policy: Only change Specimen Src: to \"Line\" if  specified by physician order.    BLOOD CULTURE [[id number]] Collected:  09/12/18 1942    Order Status:  Completed Specimen:  Blood from Peripheral Updated:  09/13/18 0809     Significant Indicator NEG     Source BLD     Site PERIPHERAL     Blood Culture No Growth    Note: Blood cultures are incubated for 5 days and  are monitored continuously.Positive blood cultures  are called to the RN and reported as soon as  they " "are identified.      Narrative:       Protective  Per Hospital Policy: Only change Specimen Src: to \"Line\" if  specified by physician order.    URINALYSIS,CULTURE IF INDICATED [[id number]]  (Abnormal) Collected:  09/12/18 1721    Order Status:  Completed Specimen:  Urine Updated:  09/12/18 1914     Micro Urine Req Microscopic     Color Brown     Character Turbid (A)     Specific Gravity 1.029     Ph 5.5     Glucose Negative mg/dL      Ketones Trace (A) mg/dL      Protein 300 (A) mg/dL      Bilirubin Small (A)     Urobilinogen, Urine 1.0     Nitrite Positive (A)     Leukocyte Esterase Large (A)     Occult Blood Large (A)    Blood Culture [[id number]] Collected:  09/12/18 0000    Order Status:  Canceled Specimen:  Other from Peripheral     Blood Culture [[id number]] Collected:  09/12/18 0000    Order Status:  Canceled Specimen:  Other from Peripheral     Urinalysis [[id number]] Collected:  09/12/18 0000    Order Status:  Canceled Specimen:  Urine     Culture Respiratory W/ GRM STN [[id number]] Collected:  09/12/18 0000    Order Status:  Canceled Specimen:  Sputum from Sputum           Assessment:  Active Hospital Problems    Diagnosis   • *Sepsis due to urinary tract infection (HCC) [A41.9, N39.0]   • ESBL (extended spectrum beta-lactamase) producing bacteria infection [A49.9, Z16.12]   • Adenocarcinoma of colon metastatic to liver (HCC) [C18.9, C78.7]   • Lactic acidosis [E87.2]   • Neutropenia (HCC) [D70.9]   • Abnormal LFTs [R94.5]   • 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  Check CBC tomorrow, ordered  Bcxs on 9/12 negative  Ucx on 9/12 +Candida albicans- given dose of Fluconazole on 9/14  Remains symptomatic with dysuria and hematuria; however, has had for several months-known prior fistula  Initial lactic acidosis resolved  Continue IV Meropenem for 7 days (9/19) then reassess    Leukopenia, " resolved  Not neutropenic  Now with leukocytosis  Monitor    Metastatic cancer  Defer chemotherapy if feasible  Elevated alk phos  CT 9/13 shows worsening metastatic disease-likely contributing to admitting sxs  Was evaluated by Palliative Care, pt not wanting Hospice at this time      Discussed with SYMONE Queen.

## 2018-09-17 NOTE — THERAPY
"Occupational Therapy Evaluation completed.   Functional Status:  SBA toileting and standing grooming, SPV UB dressing, SBA functional mobility w/ FWW  Plan of Care: Will benefit from Occupational Therapy 2 times per week  Discharge Recommendations:  Equipment: Will Continue to Assess for Equipment Needs. Post-acute therapy Discharge to home with outpatient or home health for additional skilled therapy services.    See \"Rehab Therapy-Acute\" Patient Summary Report for complete documentation.    Pt is a 61 y/o female who presents to acute 2/2 dysuria. PMH includes UTI, colon cancer w/ metastasis to the bladder. Pt reports she lives in spouse who works during the day. Pt demo'd decreased activity tolerance, functional mobility and balance impacting her ability to perform BADLs independently and safely. Will follow for Acute OT services while in house.     "

## 2018-09-17 NOTE — PROGRESS NOTES
Assumed care of patient, bedside report received from Mana. Updated on POC, call light within reach and fall precautions in place. Bed locked and in lowest position. Patient instructed to call for assistance before getting out of bed. All questions answered, no other needs at this time.

## 2018-09-17 NOTE — PALLIATIVE CARE
Palliative Care follow-up  Received message that ps spouse at the bedside. Arrived at the bedside but spouse had already left due to work schedule. Pt currently asleep. Spoke with BS RN who stated that she spoke with pt and spouse and let them know to update Palliative before arrival as spouse usually only ha a half hour.    Plan: Will continue to attempt to meet with pt and spouse to complete AD.     Thank you for allowing Palliative Care to participate in this patient's care. Please feel free to call [phone] with any questions or concerns.

## 2018-09-17 NOTE — CARE PLAN
Problem: Safety  Goal: Will remain free from falls  Outcome: PROGRESSING AS EXPECTED      Problem: Venous Thromboembolism (VTW)/Deep Vein Thrombosis (DVT) Prevention:  Goal: Patient will participate in Venous Thrombosis (VTE)/Deep Vein Thrombosis (DVT)Prevention Measures  Outcome: PROGRESSING AS EXPECTED      Problem: Knowledge Deficit  Goal: Knowledge of the prescribed therapeutic regimen will improve  Outcome: PROGRESSING AS EXPECTED      Problem: Pain Management  Goal: Pain level will decrease to patient's comfort goal  Outcome: PROGRESSING AS EXPECTED

## 2018-09-17 NOTE — CARE PLAN
Problem: Nutritional:  Goal: Achieve adequate nutritional intake  Patient will consume 50% of meals  Outcome: PROGRESSING SLOWER THAN EXPECTED  PO variable, but ~ 25% of meals.

## 2018-09-17 NOTE — DIETARY
Nutrition Services:  Brief Update    · RD following for adequate PO intake.  · Pt is currently on a Regular diet with minimal PO intake.   · Spoke with pt at bedside. Pt reports a low appetite.  · Discussed snacks and supplements with pt.  · RD will add high protein milkshakes BID in chocolate, per pt preference.  · Marinol added to stimulate appetite.      Recommendations/Plan:  1. Encourage intake of meals and snacks.  2. Document intake of all meals as % taken in ADLs to provide interdisciplinary communication across all shifts.   3. Monitor weight.  4. Nutrition rep will continue to see patient for ongoing meal and snack preferences.

## 2018-09-17 NOTE — THERAPY
"Physical Therapy Evaluation completed.   Bed Mobility:  Supine to Sit: Stand by Assist (on R side of bed, log roll)  Transfers: Sit to Stand: Stand by Assist  Gait: Level Of Assist: Contact Guard Assist with Front-Wheel Walker       Plan of Care: Will benefit from Physical Therapy 3 times per week  Discharge Recommendations: Equipment: Will Continue to Assess for Equipment Needs.     Ms. Flynn is a 63 y/o female who presents to acute secondary to ESBL UTI and sepsis. PMH of colon cancer. She presents with mild lower extremity weakness, gross motor coordination deficits, and dynamic balance impairments that negatively impact her ability to perform gait at her prior level of function. Balance impairments remedied by use of FWW. Slightly impulsive with FWW but listened to cues well. Recommend regulary 2-3x daily mobilization with nursing staff to decrease bed bound time. Anticipate with this increase in mobility and 1-2 more physical therapy sessions she will achieve a functional mobility level adequate for home health therapy management. She would benefit from acute physical therapy services to improve her mobility and decrease risk for falls.     See \"Rehab Therapy-Acute\" Patient Summary Report for complete documentation.     "

## 2018-09-18 PROBLEM — K46.9 PERISTOMAL HERNIA: Status: ACTIVE | Noted: 2018-01-01

## 2018-09-18 NOTE — PROGRESS NOTES
[facility] Hospitalist Progress Note    Date of Service: 2018    Chief Complaint  62 y.o. female admitted 2018 with hx of advanced colon Ca w mets to bladder, ESBL Ecoli UTI, Hypertension , anemia  admitted 2018 with dysuria .  Dx with Septic shock- resuscitated in ICU with IVFs, antibiotics. Had worsened anemia with hematuria requiring prbc tx.     Interval Problem Update    : patient with known metastatic colon cancer to liver, bladder. onc recommending hospice. Per id merropenem stopping tomorrow, added pyridium. Overall stable, reporting slight improvement in activity tolerance. Lives with her . Bothered by gas pains. Also mild peristomal hernia appreciated, low stool output per rn. Asked to administer mg citrate today. Advised patient to avoid straining. Need to optimize bowel regimen    Consultants/Specialty  ID (), Onc (), pulm ()    Disposition  Monitor bowel regimen, may need better fitting appliance given hernia. Also a hernia belt to prevent worsening. Anticipating home with hospice. Complete merro tomorrow        Review of Systems   Constitutional: Negative for chills and fever.   HENT: Negative for hearing loss.    Eyes: Negative for blurred vision and double vision.   Respiratory: Negative for cough and shortness of breath.    Cardiovascular: Negative for chest pain.   Gastrointestinal: Positive for abdominal pain and constipation. Negative for blood in stool, heartburn and nausea.   Genitourinary: Negative for dysuria and urgency.   Musculoskeletal: Negative for myalgias.   Skin: Negative for rash.   Neurological: Negative for dizziness.   Psychiatric/Behavioral: Negative for depression.      Physical Exam  Laboratory/Imaging   Hemodynamics  Temp (24hrs), Av.8 °C (98.2 °F), Min:36.4 °C (97.6 °F), Max:37.1 °C (98.7 °F)   Temperature: 37.1 °C (98.7 °F)  Pulse  Av.7  Min: 64  Max: 128    Blood Pressure: 120/74      Respiratory      Respiration:  18, Pulse Oximetry: 91 %     Work Of Breathing / Effort: Mild  RUL Breath Sounds: Clear, RML Breath Sounds: Clear, RLL Breath Sounds: Diminished, ROLF Breath Sounds: Clear, LLL Breath Sounds: Diminished    Fluids    Intake/Output Summary (Last 24 hours) at 09/18/18 1708  Last data filed at 09/18/18 1552   Gross per 24 hour   Intake                0 ml   Output              150 ml   Net             -150 ml       Nutrition  Orders Placed This Encounter   Procedures   • Diet Order Regular     Standing Status:   Standing     Number of Occurrences:   1     Order Specific Question:   Diet:     Answer:   Regular [1]     Physical Exam   Constitutional: She is oriented to person, place, and time. No distress.   Eyes: Left eye exhibits no discharge. Scleral icterus is present.   Neck: Neck supple.   Cardiovascular: Normal rate, regular rhythm and normal heart sounds.    Abdominal: Soft. Bowel sounds are normal. There is tenderness.   (+) ostomy with peristomal hernia--mild, stoma pink, no obvious leak   Musculoskeletal: She exhibits no edema or tenderness.   Neurological: She is alert and oriented to person, place, and time.   Skin: Skin is warm and dry.       Recent Labs      09/16/18   0344  09/17/18   0317  09/18/18   0001   WBC  9.2  13.1*  10.5   RBC  2.34*  2.53*  2.53*   HEMOGLOBIN  7.9*  8.6*  8.6*   HEMATOCRIT  23.9*  26.5*  26.5*   MCV  102.1*  104.7*  104.7*   MCH  33.8*  34.0*  34.0*   MCHC  33.1*  32.5*  32.5*   RDW  103.8*  114.2*  114.0*   PLATELETCT  187  240  241   MPV  10.6  10.7  11.1     Recent Labs      09/16/18   0344  09/17/18   0317   SODIUM  133*  133*   POTASSIUM  4.3  4.5   CHLORIDE  97  99   CO2  28  24   GLUCOSE  104*  92   BUN  22  19   CREATININE  0.70  0.66   CALCIUM  7.8*  7.7*                      Assessment/Plan     * ESBL (extended spectrum beta-lactamase) producing bacteria infection- (present on admission)   Assessment & Plan    From Recent cultures. Has had repeat urine + non Esbl E  coli  IV merrem .  Discussed with Dr. Scott - plan thru 9-19.  Added pyridium        Sepsis due to urinary tract infection (HCC)   Assessment & Plan    Septic shock due to E coli UTI.    Elevated lactic acid that went up to 7.6 post Fluid resuscitation with  metabolic acidosis resolved.   Immunocompromised patient due to chemotherapy  Recurrent UTIs secondary to colon carcinoma invading the bladder causing infections.  Has hx of ESBL infection and is currently being treated with IV Merrem.  The organ system failure associated with this disease process is the cardiovascular system as is evidenced by the severe lactic acidosis consistent with organ-system ischemia.            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   Hx of chemo per Dr. Christy - Need to contact him regarding future treatment options . She may consider hospice if no additional treatment.           Adenocarcinoma of colon metastatic to liver (HCC)- (present on admission)   Assessment & Plan    Jaundiced  Hospice expected        Colostomy in place (HCC)- (present on admission)   Assessment & Plan    Ostomy care        Peristomal hernia   Assessment & Plan    Strict bowel regimen  Monitor for ostomy leak, may need better fitting appliance        DNR (do not resuscitate)- (present on admission)   Assessment & Plan    Updated in Epic  She would like to further discuss with her oncologist before decision regarding hospice.         Protein-calorie malnutrition, severe (HCC)- (present on admission)   Assessment & Plan    This is a clinical diagnosis in the setting of advanced, metastatic cancer and cancer-induced cachexia.  Albumin is severely low at 1.9        Anemia- (present on admission)   Assessment & Plan    Acute on chronic - due to dilution, hematuria - Hb dropped to 6.5 and required tx.  Continues to improve.  No AC  Fu CBC         Abnormal LFTs- (present on admission)   Assessment & Plan    Likely due to metastatic disease to liver. CT shows extension of dz.          Neutropenia (HCC)- (present on admission)   Assessment & Plan    WBC 1.9 on admit -- now resolved.         Lactic acidosis- (present on admission)   Assessment & Plan    Improved - AGMA resolved        Deep vein thrombosis (DVT) of both lower extremities (HCC)- (present on admission)   Assessment & Plan    Hold anticoagulation given severe anemia          Morbid obesity with BMI of 40.0-44.9, adult (HCC)- (present on admission)   Assessment & Plan    BMI 40          Quality-Core Measures   Reviewed items::  Labs reviewed and Medications reviewed  Traore catheter::  No Traore

## 2018-09-18 NOTE — CONSULTS
Consult Note: Oncology    Date of consultation: 9/18/2018 10:03 AM    Referring provider: Dr Beal    Reason for consultation: Refractory Metastatic colon carcinoma. FLT3 mutated KRAS wild type ,HERNÁN   Septic shock    History of presenting illness:     3/2016-Widely metastatic sigmoid colon cancer with invasion into the bladder resulting in colovesicle fistula and liver mets s/p bladder and sigmoid biopsies consistent with adenocarcinoma of the colon. CEA elevated    - emergent surgery  TUR 3/17/16 removal of large mass showed metastatic adenocarcinoma, [facility] could not differentiate bladder versus colon .  s/p Exploratory laparotomy with small bowel resection and sigmoid colectomy, cystectomy on 4/17/16 showed 5.0 cm lowgrade adenocarcinoma, margins uninvolved, lymphovascular invasion present, extensive, 11/13 lymph nodes positive, tumor directly invading the adjacent structures bladder and soft tissues, [id number]   Been through varous chemo regimens per my note from 8/1/18.    Hospice was recommended before ,but she wanted to try systemic treatment . S/p 2 cycles of Lonsurf.  Care complicated by recurrent ESBL ,currently admitted with septic shock .  CT abd pelvis- sig progression     Past Medical History:    Past Medical History:   Diagnosis Date   • Blood clotting disorder (HCC) 4-    left leg   • Blood loss anemia 2016   • Bowel habit changes     has colostomy left sided   • Cancer (HCC) 2016    liver, bladder, colon   • CVF (colovesical fistula) 8/5/2018   • Essential hypertension     been running so low, patientonly takes medication if over 140 now   • Obesity    • Urinary bladder disorder     1/2 bladder due to cancer surgery       Past surgical history:    Past Surgical History:   Procedure Laterality Date   • TRANS URETHRAL RESECTION BLADDER  8/9/2018    Procedure: TRANS URETHRAL RESECTION BLADDER;  Surgeon: Yunior Abdullahi M.D.;  Location: SURGERY [facility];  Service: Urology   • CATH  PLACEMENT Left 8/9/2016    Procedure: CATH PLACEMENT power port ;  Surgeon: Yimi Martinez M.D.;  Location: SURGERY [facility];  Service:    • COLOSTOMY Left 4/19/2016    Procedure: COLOSTOMY;  Surgeon: Yimi Martinez M.D.;  Location: SURGERY [facility];  Service:    • CYSTECTOMY  4/18/2016    Procedure: CYSTECTOMY;  Surgeon: Yunior Abdullahi M.D.;  Location: SURGERY [facility];  Service:    • EXPLORATORY LAPAROTOMY  4/18/2016    Procedure: EXPLORATORY LAPAROTOMY;  Surgeon: Yimi Martinez M.D.;  Location: SURGERY [facility];  Service:    • EXPLORATORY LAPAROTOMY  4/17/2016    Procedure: EXPLORATORY LAPAROTOMY-illeostomy creation ;  Surgeon: Yimi Martinez M.D.;  Location: SURGERY [facility];  Service:    • COLONOSCOPY - ENDO N/A 3/22/2016    Procedure: COLONOSCOPY - ENDO;  Surgeon: Yimi Martinez M.D.;  Location: ENDOSCOPY [facility];  Service:    • RECOVERY  3/21/2016    Procedure: CT-CT Guided liver biopsy-Dr.Michael Martinez;  Surgeon: [facility];  Location: SURGERY PRE-POST PROC UNIT [facility];  Service:    • CYSTOSCOPY  3/17/2016    Procedure: CYSTOSCOPY;  Surgeon: Yunior Abdullahi M.D.;  Location: SURGERY [facility];  Service:    • TRANS URETHRAL RESECTION BLADDER  3/17/2016    Procedure: TRANS URETHRAL RESECTION BLADDER Tumor;  Surgeon: Yunior Abdullahi M.D.;  Location: SURGERY [facility];  Service:    • PB EXCISION OF TONSIL TAGS         Allergies:  Patient has no known allergies.    Medications:    Current Facility-Administered Medications   Medication Dose Route Frequency Provider Last Rate Last Dose   • ipratropium-albuterol (DUONEB) nebulizer solution  3 mL Nebulization Q4H PRN (RT) Ector Aguiar M.D.   3 mL at 09/17/18 0024   • albuterol inhaler 2 Puff  2 Puff Inhalation Q4HRS PRN Ector Aguiar M.D.       • Respiratory Care per Protocol   Nebulization Continuous RT August Barroso M.D.       • miconazole 2%-zinc oxide (JENNIFER) topical cream    Topical Q6HR Gurwinder De La Torre M.D.       • meropenem (MERREM) 500 mg in  mL IVPB  500 mg Intravenous Q6HRS Leyla Scott M.D.   Stopped at 09/18/18 0602   • senna-docusate (PERICOLACE or SENOKOT S) 8.6-50 MG per tablet 2 Tab  2 Tab Oral BID Gurwinder De La Torre M.D.   2 Tab at 09/17/18 1741    And   • polyethylene glycol/lytes (MIRALAX) PACKET 1 Packet  1 Packet Oral QDAY PRN Gurwinder De La Torre M.D.        And   • magnesium hydroxide (MILK OF MAGNESIA) suspension 30 mL  30 mL Oral QDAY PRWAN De La Torre M.D.        And   • bisacodyl (DULCOLAX) suppository 10 mg  10 mg Rectal QDAY PRN Gurwinder De La Torre M.D.       • ondansetron (ZOFRAN) syringe/vial injection 4 mg  4 mg Intravenous Q4HRS PRWAN De La Torre M.D.   4 mg at 09/16/18 0856   • ondansetron (ZOFRAN ODT) dispertab 4 mg  4 mg Oral Q4HRS PRWAN De La Torre M.D.       • promethazine (PHENERGAN) tablet 12.5-25 mg  12.5-25 mg Oral Q4HRS PRWAN De La Torre M.D.       • promethazine (PHENERGAN) suppository 12.5-25 mg  12.5-25 mg Rectal Q4HRS PRWAN De La Torre M.D.       • prochlorperazine (COMPAZINE) injection 5-10 mg  5-10 mg Intravenous Q4HRS PRWAN De La Torre M.D.       • Pharmacy Consult Request ...Pain Management Review   Other PRWAN De La Torre M.D.        And   • oxyCODONE immediate-release (ROXICODONE) tablet 2.5 mg  2.5 mg Oral Q3HRS PRWAN De La Torre M.D.   2.5 mg at 09/18/18 0007    And   • oxyCODONE immediate-release (ROXICODONE) tablet 5 mg  5 mg Oral Q3HRS PRN Gurwinder De La Torre M.D.        And   • HYDROmorphone (DILAUDID) injection 0.25 mg  0.25 mg Intravenous Q3HRS PRN Gurwinder De La Torre M.D.       • ALPRAZolam (XANAX) tablet 0.25 mg  0.25 mg Oral Q6HRS PRN Yimi Burch M.D.       • ferrous sulfate tablet 325 mg  325 mg Oral TID WITH MEALS Yimi Burch M.D.   325 mg at 09/18/18 0812   • dronabinol (MARINOL) capsule 5 mg  5 mg Oral 4X/DAY Yimi Burch M.D.   5 mg at 09/18/18 0812       Social History:      Social History     Social History   • Marital status:      Spouse name: N/A   • Number of children: N/A   • Years of education: N/A     Occupational History   • Not on file.     Social History Main Topics   • Smoking status: Never Smoker   • Smokeless tobacco: Never Used   • Alcohol use No   • Drug use: No   • Sexual activity: Yes     Partners: Male     Other Topics Concern   • Not on file     Social History Narrative    Lives with .     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       Family History:     Family History   Problem Relation Age of Onset   • Diabetes Mother         prediabetes   • Heart Disease Mother    • Hypertension Mother    • Stroke Maternal Grandmother    • Cancer Neg Hx    • Hyperlipidemia Neg Hx    • Psychiatry Neg Hx    • Thyroid Neg Hx        Review of Systems:  All other review of systems are negative except what was mentioned above in the HPI.    Constitutional: No fever, chills, positive weight loss ,malaise/fatigue.    HEENT: No new auditory or visual complaints. No sore throat and neck pain.     Respiratory:No new cough, sputum production, shortness of breath and wheezing.    Cardiovascular: No new chest pain, palpitations, orthopnea and leg swelling.    Gastrointestinal: No heartburn, nausea, vomiting ,abdominal pain, hematochezia or melena     Genitourinary: imporoved  dysuria, hematuria    Musculoskeletal: No new arthralgias or myalgias   Skin: Negative for rash and itching.    Neurological: Negative for focal weakness or headaches.    Endo/Heme/Allergies: No abnormal bleed/bruise.    Psychiatric/Behavioral: No new depression/anxiety.    Physical Exam:  Vitals:   /68   Pulse 95   Temp 36.9 °C (98.4 °F)   Resp 18   Ht 1.524 m (5')   Wt 104.6 kg (230 lb 9.6 oz)   SpO2 92%   Breastfeeding? No   BMI 45.04 kg/m²     General: Not in acute distress, alert and oriented x 3  HEENT: No pallor, icterus. Oropharynx clear.   Neck: Supple, no palpable  masses.  Lymph nodes: No palpable cervical, supraclavicular, axillary or inguinal lymphadenopathy.    CVS: regular rate and rhythm, no rubs or gallops  RESP: Clear to auscultate bilaterally, no wheezing or crackles.   ABD: Soft, non tender, non distended, positive bowel sounds, no palpable organomegaly ostomy in place   EXT: No edema or cyanosis  CNS: Alert and oriented x3, No focal deficits.  Skin- No rash      Labs:   Recent Labs      09/16/18   0344  09/17/18   0317  09/18/18   0001   RBC  2.34*  2.53*  2.53*   HEMOGLOBIN  7.9*  8.6*  8.6*   HEMATOCRIT  23.9*  26.5*  26.5*   PLATELETCT  187  240  241     Lab Results   Component Value Date/Time    SODIUM 133 (L) 09/17/2018 03:17 AM    POTASSIUM 4.5 09/17/2018 03:17 AM    CHLORIDE 99 09/17/2018 03:17 AM    CO2 24 09/17/2018 03:17 AM    GLUCOSE 92 09/17/2018 03:17 AM    BUN 19 09/17/2018 03:17 AM    CREATININE 0.66 09/17/2018 03:17 AM        Assessment and Plan:    Refractory Metastatic colon carcinoma. FLT3 mutated KRAS wild type ,HERNÁN- she had aggressive disease and progressed to through FOLFOX->  FOLFIRI + Vectibix->Regorafenib.    NGS testing Interestingly show FLT3 mutation , however, there was no response to Dabrafenib and this is thought to be present mutation.  -7/2018-establish care with me  -Informed her that hospice is a very appropriate decision as realistically her chance of having treatment response is low  - s/p 2 cycles of Lonsurf with furthe rprogression.  -discussed CT scan results.  Strongly recommend hospice. She is agreeable.  Reinforced that further chemo will cause more immunosuppression/sepsis which can be life threatening .  She will have recurrent UTI due to the colon mass invading the bladder .  D/w ID abt any prophylactic strategies    Will sign off.Further care through hospice   She agreed and verbalized her agreement and understanding with the current plan.  I answered all questions and concerns she has at this time.              Thank  you for allowing me to participate in her care.    Please note that this dictation was created using voice recognition software. I have made every reasonable attempt to correct obvious errors, but I expect that there are errors of grammar and possibly content that I did not discover before finalizing the note.      SIGNATURES:  Charles Pelaez    CC:  Maritza Esparza M.D.  No ref. provider found

## 2018-09-18 NOTE — PROGRESS NOTES
Received report from night shift RN, assumed care of patient at 0700. AOx4. x1 assist w/ FWW. Denies pain or nausea at this time. Port with positive blood return. Pt with 3+ edema in lower extremities.  Call light within reach, bed in low and locked position. No other needs at this time.

## 2018-09-18 NOTE — PROGRESS NOTES
2 RN skin check completed upon patients arrival to unit. There is a healing skin tear to the right chest that the patient reports is from an electrode sticker. There is redness under the breasts and pannus; JENNIFER cream being applied per MAR. The heels are dry and cracking. Sacrum is red and blanching. The LUE has some blue and purple bruising.    Retinal detachment warnings given.

## 2018-09-18 NOTE — PROGRESS NOTES
Patient arrived to R306 around 2015. Oriented patient to her room, bathroom, and call light. Patient agrees to call for assistance to get up, and refuses a bed alarm at this time. Port previously accessed without biopatch; reaccessed port with brisk blood return and it is hooked up to TKO fluids. Patient A&O x4, up x1 assist with FWW. Colostomy in place to LLQ. Patient denies pain or nausea at this time.Call light within reach, safety precautions in place.

## 2018-09-18 NOTE — PROGRESS NOTES
"Infectious Disease Progress Note    Author: ALEX Bergman Date & Time of service: 2018  11:45 AM    Chief Complaint:  UTI and history of ESBL    Interval History:  62-year-old WF with metastatic colon cancer including liver and bladder with prior colovesical fistula just completed IV antibiotics for a non-ESBL Escherichia coli urinary tract infection with recurrence of her prior dysuria   AF WBC 4.2 still feels very weak  9/15 AF WBC not done somnolent   AF (99.3) WBC 9 transferred out of ICU-tired. Denies SE abx Depressed  - AF, WBC 13.1, resting comfortably in bed, tolerating abx.   - AF, WBC 10.5, denies any pain, no issue with abx, ok with finishing IV abx inpt.  Labs Reviewed, Medications Reviewed and Radiology Reviewed.    Review of Systems:  Review of Systems   Constitutional: Negative for chills, fever and malaise/fatigue.   HENT: Negative for sore throat.    Respiratory: Negative for cough and shortness of breath.    Cardiovascular: Positive for leg swelling. Negative for chest pain and palpitations.   Gastrointestinal: Positive for abdominal pain. Negative for diarrhea, nausea and vomiting.        LUQ- mild pain, feels like a \"gas pocket.\"   Genitourinary: Positive for dysuria, frequency and hematuria. Negative for flank pain.        Frequency improving.    Chronic dysuria and hematuria.   Musculoskeletal: Negative for joint pain and myalgias.   Skin: Negative for rash.   Neurological: Positive for sensory change. Negative for headaches.        BLE tingling       Hemodynamics:  Temp (24hrs), Av.6 °C (97.8 °F), Min:35.9 °C (96.7 °F), Max:36.9 °C (98.4 °F)  Temperature: 36.9 °C (98.4 °F)  Pulse  Av.6  Min: 64  Max: 128   Blood Pressure: 122/68       Physical Exam:  Physical Exam   Constitutional: She is oriented to person, place, and time. She appears well-developed. No distress.   Morbidly obese   HENT:   Head: Normocephalic and atraumatic.   Eyes: Pupils are " equal, round, and reactive to light. EOM are normal. No scleral icterus.   Neck: Normal range of motion. Neck supple.   Cardiovascular: Normal heart sounds.  Exam reveals no friction rub.    No murmur heard.  Tachycardic   Pulmonary/Chest: Effort normal and breath sounds normal. No respiratory distress. She has no rales.   Left upper chest port- CDI, no erythema, nontender   Abdominal: Soft. Bowel sounds are normal. She exhibits no distension. There is tenderness. There is no rebound.   LLQ- colostostomy  Slightly tender to LUQ   Musculoskeletal: She exhibits edema.   +2-3 BLE edema   Neurological: She is alert and oriented to person, place, and time.   Skin: Skin is warm. She is not diaphoretic. No erythema.   Psychiatric: She has a normal mood and affect. Her behavior is normal.   Nursing note and vitals reviewed.      Meds:    Current Facility-Administered Medications:   •  phenazopyridine  •  ipratropium-albuterol  •  albuterol  •  Respiratory Care per Protocol  •  miconazole 2%-zinc oxide  •  meropenem (MERREM) IV  •  senna-docusate **AND** polyethylene glycol/lytes **AND** magnesium hydroxide **AND** bisacodyl  •  ondansetron  •  ondansetron  •  promethazine  •  promethazine  •  prochlorperazine  •  Notify provider if pain remains uncontrolled **AND** Use the numeric rating scale (NRS-11) on regular floors and Critical-Care Pain Observation Tool (CPOT) on ICUs/Trauma to assess pain **AND** Pulse Ox (Oximetry) **AND** Pharmacy Consult Request **AND** If patient difficult to arouse and/or has respiratory depression, stop any opiates that are currently infusing and call a Rapid Response. **AND** oxyCODONE immediate-release **AND** oxyCODONE immediate-release **AND** HYDROmorphone  •  ALPRAZolam  •  ferrous sulfate  •  dronabinol    Labs:  Recent Labs      09/15/18   2240  09/16/18   0344  09/17/18   0317  09/18/18   0001   WBC  8.0  9.2  13.1*  10.5   RBC  2.47*  2.34*  2.53*  2.53*   HEMOGLOBIN  8.0*  7.9*  8.6*  " 8.6*   HEMATOCRIT  25.4*  23.9*  26.5*  26.5*   MCV  102.8*  102.1*  104.7*  104.7*   MCH  32.4  33.8*  34.0*  34.0*   RDW  104.7*  103.8*  114.2*  114.0*   PLATELETCT  184  187  240  241   MPV  10.8  10.6  10.7  11.1   NEUTSPOLYS  60.80  58.90  61.60   --    LYMPHOCYTES  21.20*  23.50  22.40   --    MONOCYTES  13.70*  13.60*  11.00   --    EOSINOPHILS  0.10  0.10  0.10   --    BASOPHILS  0.40  0.20  0.30   --      Recent Labs      09/16/18   0344  09/17/18   0317   SODIUM  133*  133*   POTASSIUM  4.3  4.5   CHLORIDE  97  99   CO2  28  24   GLUCOSE  104*  92   BUN  22  19     Recent Labs      09/16/18   0344  09/17/18   0317   CREATININE  0.70  0.66       Imaging:  No recent imaging.      Micro:  Results     Procedure Component Value Units Date/Time    BLOOD CULTURE [[id number]] Collected:  09/12/18 1816    Order Status:  Completed Specimen:  Blood from Peripheral Updated:  09/17/18 2300     Significant Indicator NEG     Source BLD     Site PERIPHERAL     Blood Culture No growth after 5 days of incubation.    Narrative:       Protective  Per Hospital Policy: Only change Specimen Src: to \"Line\" if  specified by physician order.    BLOOD CULTURE [[id number]] Collected:  09/12/18 1942    Order Status:  Completed Specimen:  Blood from Peripheral Updated:  09/17/18 2300     Significant Indicator NEG     Source BLD     Site PERIPHERAL     Blood Culture No growth after 5 days of incubation.    Narrative:       Protective  Per Hospital Policy: Only change Specimen Src: to \"Line\" if  specified by physician order.    URINE CULTURE(NEW) [[id number]]  (Abnormal) Collected:  09/12/18 1721    Order Status:  Completed Specimen:  Urine Updated:  09/14/18 1607     Significant Indicator POS (POS)     Source UR     Site --     Urine Culture Mixed skin lucy 10-50,000 cfu/mL (A)      Candida albicans  10-50,000 cfu/mL   (A)    URINALYSIS,CULTURE IF INDICATED [[id number]]  (Abnormal) Collected:  09/12/18 1721    Order Status:  Completed " Specimen:  Urine Updated:  09/12/18 1914     Micro Urine Req Microscopic     Color Brown     Character Turbid (A)     Specific Gravity 1.029     Ph 5.5     Glucose Negative mg/dL      Ketones Trace (A) mg/dL      Protein 300 (A) mg/dL      Bilirubin Small (A)     Urobilinogen, Urine 1.0     Nitrite Positive (A)     Leukocyte Esterase Large (A)     Occult Blood Large (A)    Blood Culture [[id number]] Collected:  09/12/18 0000    Order Status:  Canceled Specimen:  Other from Peripheral     Blood Culture [[id number]] Collected:  09/12/18 0000    Order Status:  Canceled Specimen:  Other from Peripheral     Urinalysis [[id number]] Collected:  09/12/18 0000    Order Status:  Canceled Specimen:  Urine     Culture Respiratory W/ GRM STN [[id number]] Collected:  09/12/18 0000    Order Status:  Canceled Specimen:  Sputum from Sputum           Assessment:  Active Hospital Problems    Diagnosis   • *Sepsis due to urinary tract infection (HCC) [A41.9, N39.0]   • ESBL (extended spectrum beta-lactamase) producing bacteria infection [A49.9, Z16.12]   • Adenocarcinoma of colon metastatic to liver (HCC) [C18.9, C78.7]   • Lactic acidosis [E87.2]   • Neutropenia (HCC) [D70.9]   • Abnormal LFTs [R94.5]   • 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  Initial lactic acidosis resolved  Continue IV Meropenem for 7 days, end date 9/19  At risk for recurrent UTIs d/t colon mass invading the bladder.    Leukopenia, resolved  Not neutropenic  Now with leukocytosis  Monitor    Metastatic cancer  CT 9/13 shows worsening metastatic disease-likely contributing to admitting sxs  Chemotherapy being stopped per Dr. Pelaez- will cause more  immunosuppression/sepsis which can be life threatening.  Now agreeable to hospice- Palliative Care working with pt.

## 2018-09-18 NOTE — CARE PLAN
Problem: Safety  Goal: Will remain free from falls  Outcome: PROGRESSING AS EXPECTED  Safety education provided, treaded slipper socks put on, bed in low and locked position, reinforced use of the call light.    Problem: Urinary Elimination:  Goal: Ability to reestablish a normal urinary elimination pattern will improve  Outcome: PROGRESSING SLOWER THAN EXPECTED  Patient c/o urinary frequency and dysuria. PRN oxycodone in use and IV antibiotics being given per MAR.

## 2018-09-18 NOTE — PROGRESS NOTES
[facility]ist Progress Note    Date of Service: 2018    Chief Complaint  62 y.o. Female hx of advanced colon Ca w mets to bladder, ESBL Ecoli UTI, Hypertension , anemia  admitted 2018 with dysuria .  Dx with Septic shock- resuscitated in ICU with IVFs, antibiotics. Had worsened anemia with hematuria requiring prbc tx.     Interval Problem Update    Afebrile on IV antibiotics for E coli Uti. Generalized debility and weakness. She states she may be interested in hospice but would like to discuss with her oncologist treatment options first.     Consultants/Specialty  Dr. Conley, ID    Disposition  Plan PT/OT eval.  Continue IV antibiotics for ESBL infection.         Review of Systems   Constitutional: Negative for chills, diaphoresis and fever.   Respiratory: Negative for cough and shortness of breath.    Cardiovascular: Positive for leg swelling. Negative for chest pain.   Gastrointestinal: Negative for abdominal pain, diarrhea and vomiting.   Genitourinary: Positive for dysuria and hematuria (resolved. ). Negative for flank pain.   Musculoskeletal: Negative for back pain, joint pain and neck pain.   Neurological: Positive for weakness. Negative for sensory change, speech change and focal weakness.      Physical Exam  Laboratory/Imaging   Hemodynamics  Temp (24hrs), Av.7 °C (98 °F), Min:35.9 °C (96.7 °F), Max:37.1 °C (98.7 °F)   Temperature: 36.8 °C (98.3 °F)  Pulse  Av.7  Min: 64  Max: 128    Blood Pressure: 113/73      Respiratory      Respiration: 18, Pulse Oximetry: 95 %, O2 Daily Delivery Respiratory : Room Air with O2 Available     Given By:: Mouthpiece, Work Of Breathing / Effort: Mild  RUL Breath Sounds: Clear, RML Breath Sounds: Expiratory Wheezes, RLL Breath Sounds: Expiratory Wheezes, ROLF Breath Sounds: Clear, LLL Breath Sounds: Expiratory Wheezes    Fluids    Intake/Output Summary (Last 24 hours) at 18 1233  Last data filed at 18 1500   Gross per 24 hour   Intake               250 ml   Output              325 ml   Net              -75 ml       Nutrition  Orders Placed This Encounter   Procedures   • Diet Order Regular     Standing Status:   Standing     Number of Occurrences:   1     Order Specific Question:   Diet:     Answer:   Regular [1]     Physical Exam   Constitutional: She is oriented to person, place, and time. No distress.   HENT:   Head: Normocephalic and atraumatic.   Right Ear: External ear normal.   Left Ear: External ear normal.   Nose: Nose normal.   Eyes: EOM are normal. Right eye exhibits no discharge. Left eye exhibits no discharge.   Cardiovascular: Normal rate and regular rhythm.    No murmur heard.  Pulmonary/Chest: Effort normal. She has no wheezes. She has no rales.   Left chest shaun cath   Abdominal: Soft. Bowel sounds are normal. She exhibits no distension. There is no tenderness.   Ostomy present left mid  abdomen.    Musculoskeletal: She exhibits edema (2+ lower ext. ). She exhibits no tenderness.   Generalized 4/5 strength.    Neurological: She is alert and oriented to person, place, and time. No cranial nerve deficit.   Skin: Skin is warm and dry. She is not diaphoretic. No pallor.   Vitals reviewed.      Recent Labs      09/15/18   2240  09/16/18   0344  09/17/18   0317   WBC  8.0  9.2  13.1*   RBC  2.47*  2.34*  2.53*   HEMOGLOBIN  8.0*  7.9*  8.6*   HEMATOCRIT  25.4*  23.9*  26.5*   MCV  102.8*  102.1*  104.7*   MCH  32.4  33.8*  34.0*   MCHC  31.5*  33.1*  32.5*   RDW  104.7*  103.8*  114.2*   PLATELETCT  184  187  240   MPV  10.8  10.6  10.7     Recent Labs      09/15/18   0456  09/16/18   0344  09/17/18   0317   SODIUM  135  133*  133*   POTASSIUM  3.9  4.3  4.5   CHLORIDE  100  97  99   CO2  27  28  24   GLUCOSE  108*  104*  92   BUN  22  22  19   CREATININE  0.75  0.70  0.66   CALCIUM  7.7*  7.8*  7.7*     Recent Labs      09/15/18   0456   INR  1.44*                  Assessment/Plan     * ESBL (extended spectrum beta-lactamase) producing  bacteria infection- (present on admission)   Assessment & Plan    From Recent cultures. Has had repeat urine + non Esbl E coli  IV merrem .  Discussed with Dr. Scott - plan thru 9-19.        Sepsis due to urinary tract infection (HCC)   Assessment & Plan    Septic shock due to E coli UTI.    Elevated lactic acid that went up to 7.6 post Fluid resuscitation with  metabolic acidosis resolved.   Immunocompromised patient due to chemotherapy  Recurrent UTIs secondary to colon carcinoma invading the bladder causing infections.  Has hx of ESBL infection and is currently being treated with IV Merrem.  The organ system failure associated with this disease process is the cardiovascular system as is evidenced by the severe lactic acidosis consistent with organ-system ischemia.            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   Hx of chemo per Dr. Christy - Need to contact him regarding future treatment options . She may consider hospice if no additional treatment.           Colostomy in place (HCC)- (present on admission)   Assessment & Plan    Ostomy care        DNR (do not resuscitate)- (present on admission)   Assessment & Plan    Updated in Epic  She would like to further discuss with her oncologist before decision regarding hospice.         Protein-calorie malnutrition, severe (HCC)- (present on admission)   Assessment & Plan    This is a clinical diagnosis in the setting of advanced, metastatic cancer and cancer-induced cachexia.  Albumin is severely low at 1.9        Anemia- (present on admission)   Assessment & Plan    Acute on chronic - due to dilution, hematuria - Hb dropped to 6.5 and required tx.  Continues to improve.  No AC  Fu CBC        Abnormal LFTs- (present on admission)   Assessment & Plan    Likely due to metastatic disease to liver. CT shows extension of dz.           Neutropenia (HCC)- (present on admission)   Assessment & Plan    WBC 1.9 on admit -- now resolved.         Lactic acidosis- (present on admission)   Assessment & Plan    Improved - AGMA resolved        Deep vein thrombosis (DVT) of both lower extremities (HCC)- (present on admission)   Assessment & Plan    Hold anticoagulation given severe anemia          Morbid obesity with BMI of 40.0-44.9, adult (HCC)- (present on admission)   Assessment & Plan    BMI 40          Quality-Core Measures   Reviewed items::  Radiology images reviewed, Medications reviewed and Labs reviewed  Traore catheter::  No Traore  DVT prophylaxis - mechanical:  SCDs  Antibiotics:  Treating active infection/contamination beyond 24 hours perioperative coverage      Discussed with multi disciplinary team plan of care.

## 2018-09-18 NOTE — CARE PLAN
Problem: Communication  Goal: The ability to communicate needs accurately and effectively will improve  Outcome: PROGRESSING AS EXPECTED  Patient has been informed on the plan of care for the shift, all questions answered.     Problem: Safety  Goal: Will remain free from injury  Outcome: PROGRESSING AS EXPECTED  Pt assessed for fall risks. Educated on use of call light, encouraged pt to call for assistance.

## 2018-09-19 NOTE — PROGRESS NOTES
"Pt is A&O.  Up to BR to do colostomy care.  Soft loose brown stool o/p.  Voiding.  Pt inquiring about hospice and all the services they provide.  This RN begun education regarding EOL care.  Antifungal cream applied by pt to areas under R breast and under pannus.  Medicated for abd and \"bladder\" pain with oxycodone.  Afebrile.  Port site benign.    "

## 2018-09-19 NOTE — CARE PLAN
Problem: Safety  Goal: Will remain free from falls  Outcome: PROGRESSING AS EXPECTED  Patient uses call light appropriately for assistance, safety precautions in place    Problem: Skin Integrity  Goal: Risk for impaired skin integrity will decrease  Outcome: PROGRESSING AS EXPECTED  Skin assessed; rash under breasts and pannus with antifugal cream being applied per MAR, patient turns self, colostomy to LLQ

## 2018-09-19 NOTE — PROGRESS NOTES
Patient A&O x4, up x1 with FWW with steady gait. Patient denies pain at this time. Port patent with positive blood return infusing TKO with IV abx. Patient calls appropriately for help, all needs met at this time.

## 2018-09-19 NOTE — DISCHARGE PLANNING
Received Choice form at [phone] from Deanna(RNCM)  Agency/Facility Name: [facility]  Referral sent per Choice form @ [phone]

## 2018-09-19 NOTE — PROGRESS NOTES
"Infectious Disease Progress Note    Author: ALEX Bergman Date & Time of service: 2018  10:17 AM    Chief Complaint:  UTI and history of ESBL    Interval History:  62-year-old WF with metastatic colon cancer including liver and bladder with prior colovesical fistula just completed IV antibiotics for a non-ESBL Escherichia coli urinary tract infection with recurrence of her prior dysuria   AF WBC 4.2 still feels very weak  9/15 AF WBC not done somnolent   AF (99.3) WBC 9 transferred out of ICU-tired. Denies SE abx Depressed  - AF, WBC 13.1, resting comfortably in bed, tolerating abx.   - AF, WBC 10.5, denies any pain, no issue with abx, ok with finishing IV abx inpt.  - Tm 99.5, WBC 13.3, no urinary changes, minimal improvement on pyridium, tolerating abx, agreeable to hospice.   Labs Reviewed, Medications Reviewed and Radiology Reviewed.    Review of Systems:  Review of Systems   Constitutional: Negative for chills and fever.   HENT: Negative for congestion.    Respiratory: Negative for cough and shortness of breath.    Cardiovascular: Positive for leg swelling. Negative for chest pain.   Gastrointestinal: Positive for abdominal pain. Negative for constipation, diarrhea, nausea and vomiting.        LUQ- improved, \"tightness\"   Genitourinary: Positive for dysuria, frequency and hematuria. Negative for urgency.         Chronic dysuria, hematuria and frequency.   Musculoskeletal: Negative for joint pain and myalgias.   Skin: Negative for rash.   Neurological: Positive for sensory change. Negative for weakness and headaches.        BLE tingling       Hemodynamics:  Temp (24hrs), Av.9 °C (98.4 °F), Min:36.2 °C (97.1 °F), Max:37.5 °C (99.5 °F)  Temperature: 36.2 °C (97.1 °F)  Pulse  Av.5  Min: 64  Max: 128   Blood Pressure: (!) 96/51       Physical Exam:  Physical Exam   Constitutional: She is oriented to person, place, and time. She appears well-developed and " well-nourished. No distress.   Morbidly obese   HENT:   Head: Normocephalic and atraumatic.   Eyes: Pupils are equal, round, and reactive to light. Conjunctivae and EOM are normal.   Neck: Normal range of motion. Neck supple. No tracheal deviation present.   Cardiovascular: Normal heart sounds.    No murmur heard.  Tachycardic   Pulmonary/Chest: Effort normal and breath sounds normal. No respiratory distress. She has no wheezes.   Left upper chest port- CDI, no erythema, nontender   Abdominal: Soft. Bowel sounds are normal. She exhibits no distension. There is tenderness. There is no guarding.   LLQ- colostostomy  Slightly tender to LUQ- improving   Musculoskeletal: She exhibits edema. She exhibits no tenderness.   +2-3 BLE edema   Neurological: She is alert and oriented to person, place, and time.   Skin: Skin is warm. No rash noted.   Psychiatric: She has a normal mood and affect. Thought content normal.   Nursing note and vitals reviewed.      Meds:    Current Facility-Administered Medications:   •  phenazopyridine  •  simethicone  •  magnesium citrate  •  ipratropium-albuterol  •  albuterol  •  Respiratory Care per Protocol  •  miconazole 2%-zinc oxide  •  meropenem (MERREM) IV  •  senna-docusate **AND** polyethylene glycol/lytes **AND** magnesium hydroxide **AND** bisacodyl  •  ondansetron  •  ondansetron  •  promethazine  •  promethazine  •  prochlorperazine  •  Notify provider if pain remains uncontrolled **AND** Use the numeric rating scale (NRS-11) on regular floors and Critical-Care Pain Observation Tool (CPOT) on ICUs/Trauma to assess pain **AND** Pulse Ox (Oximetry) **AND** Pharmacy Consult Request **AND** If patient difficult to arouse and/or has respiratory depression, stop any opiates that are currently infusing and call a Rapid Response. **AND** oxyCODONE immediate-release **AND** oxyCODONE immediate-release **AND** HYDROmorphone  •  ALPRAZolam  •  ferrous sulfate  •  dronabinol    Labs:  Recent Labs  "     09/17/18   0317  09/18/18   0001  09/19/18   0010   WBC  13.1*  10.5  13.3*   RBC  2.53*  2.53*  2.54*   HEMOGLOBIN  8.6*  8.6*  8.6*   HEMATOCRIT  26.5*  26.5*  26.6*   MCV  104.7*  104.7*  104.7*   MCH  34.0*  34.0*  33.9*   RDW  114.2*  114.0*  112.9*   PLATELETCT  240  241  258   MPV  10.7  11.1  10.8   NEUTSPOLYS  61.60   --   54.40   LYMPHOCYTES  22.40   --   19.30*   MONOCYTES  11.00   --   19.30*   EOSINOPHILS  0.10   --   0.00   BASOPHILS  0.30   --   0.00   RBCMORPHOLO   --    --   Present     Recent Labs      09/17/18 0317 09/19/18   0010   SODIUM  133*  135   POTASSIUM  4.5  4.5   CHLORIDE  99  100   CO2  24  25   GLUCOSE  92  117*   BUN  19 18     Recent Labs      09/17/18 0317 09/19/18   0010   CREATININE  0.66  0.77       Imaging:  No recent imaging.      Micro:  Results     Procedure Component Value Units Date/Time    BLOOD CULTURE [[id number]] Collected:  09/12/18 1816    Order Status:  Completed Specimen:  Blood from Peripheral Updated:  09/17/18 2300     Significant Indicator NEG     Source BLD     Site PERIPHERAL     Blood Culture No growth after 5 days of incubation.    Narrative:       Protective  Per Hospital Policy: Only change Specimen Src: to \"Line\" if  specified by physician order.    BLOOD CULTURE [[id number]] Collected:  09/12/18 1942    Order Status:  Completed Specimen:  Blood from Peripheral Updated:  09/17/18 2300     Significant Indicator NEG     Source BLD     Site PERIPHERAL     Blood Culture No growth after 5 days of incubation.    Narrative:       Protective  Per Hospital Policy: Only change Specimen Src: to \"Line\" if  specified by physician order.    URINE CULTURE(NEW) [[id number]]  (Abnormal) Collected:  09/12/18 1721    Order Status:  Completed Specimen:  Urine Updated:  09/14/18 1607     Significant Indicator POS (POS)     Source UR     Site --     Urine Culture Mixed skin lucy 10-50,000 cfu/mL (A)      Candida albicans  10-50,000 cfu/mL   (A)    URINALYSIS,CULTURE " IF INDICATED [[id number]]  (Abnormal) Collected:  09/12/18 1721    Order Status:  Completed Specimen:  Urine Updated:  09/12/18 1914     Micro Urine Req Microscopic     Color Brown     Character Turbid (A)     Specific Gravity 1.029     Ph 5.5     Glucose Negative mg/dL      Ketones Trace (A) mg/dL      Protein 300 (A) mg/dL      Bilirubin Small (A)     Urobilinogen, Urine 1.0     Nitrite Positive (A)     Leukocyte Esterase Large (A)     Occult Blood Large (A)          Assessment:  Active Hospital Problems    Diagnosis   • *Sepsis due to urinary tract infection (HCC) [A41.9, N39.0]   • ESBL (extended spectrum beta-lactamase) producing bacteria infection [A49.9, Z16.12]   • Adenocarcinoma of colon metastatic to liver (HCC) [C18.9, C78.7]   • Lactic acidosis [E87.2]   • Neutropenia (HCC) [D70.9]   • Abnormal LFTs [R94.5]   • 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  Continue IV Meropenem for 7 days, end date 9/19  At risk for recurrent UTIs d/t colon mass invading the bladder.  Started on pyridium for dysuria    Leukopenia, resolved  Not neutropenic  Now with leukocytosis  Monitor    Metastatic cancer  CT 9/13 shows worsening metastatic disease-likely contributing to admitting sxs  Chemotherapy being stopped per Dr. Pelaez- will cause more immunosuppression/sepsis which can be life threatening.  Now agreeable to hospice- Palliative Care working with pt.

## 2018-09-19 NOTE — DISCHARGE PLANNING
Anticipated Discharge Disposition: Hospice    Action: Notified by bedside RN ANATOLIY Conley, of pt's desire to have [facility] provide care.  Printed choice form, and will send to [facility] once Hospice order is provided by MD.    Barriers to Discharge: none    Plan: Pending referral to [facility].  Will send choice form to [facility] once Hospice order is available.

## 2018-09-20 NOTE — CARE PLAN
Problem: Nutritional:  Goal: Achieve adequate nutritional intake  Patient will consume 50% of meals   Outcome: MET Date Met: 09/20/18  PO >50% of meals.

## 2018-09-20 NOTE — DISCHARGE PLANNING
Anticipated Discharge Disposition: Home/Hospice    Action: Met with pt at bedside.  Aox4, very pleasant affect.  Discussed discharge planning needs.  Pt reports a meeting today with [facility], with her ,once she is discharged.  Currently has a 4WW, denies further DME needs.  Pt's  will provide transportation home.  No further questions or concerns at this time.     Barriers to Discharge: none    Plan: Home with support of .  [facility] to meet with pt and  at their home.     Care Transition Team Assessment    Information Source  Orientation : Oriented x 4  Information Given By: Patient  Who is responsible for making decisions for patient? : Patient       Elopement Risk  Legal Hold: No  Ambulatory or Self Mobile in Wheelchair: Yes  Disoriented: No  Psychiatric Symptoms: None  History of Wandering: No  Elopement this Admit: No  Vocalizing Wanting to Leave: No  Displays Behaviors, Body Language Wanting to Leave: No-Not at Risk for Elopement  Elopement Risk: Not at Risk for Elopement    Interdisciplinary Discharge Planning  Lives with - Patient's Self Care Capacity: Spouse  Patient or legal guardian wants to designate a caregiver (see row info): No  Housing / Facility: [address] [facility]  Prior Services: None    Discharge Preparedness  What is your plan after discharge?: Home with help (Hospice)  What are your discharge supports?: Spouse  Prior Functional Level: Needs Assist with Activities of Daily Living, Uses Walker  Difficulity with ADLs: Walking  Difficulity with IADLs: Driving    Functional Assesment  Prior Functional Level: Needs Assist with Activities of Daily Living, Uses Walker    Finances  Financial Barriers to Discharge: No  Prescription Coverage: Yes  Prescription Coverage Comments: Preferred Pharmacy: [address]       Advance Directive  Advance Directive?: DPOA for Health Care       Discharge Risks or Barriers  Discharge risks or barriers?: Complex medical  needs  Patient risk factors: Complex medical needs    Anticipated Discharge Information  Anticipated discharge disposition: Home, Hospice  Discharge Address: [address] [place] [address] [address] [place] [place] [address]  Discharge Contact Phone Number: [phone]

## 2018-09-20 NOTE — CARE PLAN
Problem: Safety  Goal: Will remain free from falls  Outcome: PROGRESSING AS EXPECTED  Patient up x1 assist and FWW, she calls appropriately for assistance, safety precautions in place    Problem: Pain Management  Goal: Pain level will decrease to patient's comfort goal  Outcome: PROGRESSING AS EXPECTED    Intervention: Follow pain managment plan developed in collaboration with patient and Interdisciplinary Team  Patient c/o mild abdominal pain; declines intervention at this time. PRN oxy available

## 2018-09-20 NOTE — DISCHARGE SUMMARY
"Discharge Summary    CHIEF COMPLAINT ON ADMISSION  Chief Complaint   Patient presents with   • Sent by MD     Oncology       Reason for Admission  Sent by MD, Abnormal Blood Count     Admission Date  9/12/2018    CODE STATUS  DNAR/DNI    HPI & HOSPITAL COURSE  This is a 62 y.o. female here with \" dysuria.  Patient has history of recurrent urinary tract infections just recently finished 2 week regimen of IV antibiotics today.  She also has a history of colon cancer with metastasis to the bladder.  She follows with  and Dr Abdullahi urology.  She was seen by Dr. Pelaez this morning to come to the emergency department for further evaluation.  He is noted to have positive nitrites and leukocyte Estrace positive with packed WBCs large amount of occult blood as well.  She is also neutropenic she does have 1+ bands.  She is severely tachycardic in the emergency department as well.  There is concern for possible sepsis secondary urinary tract infection as she is symptomatic will be admitted to the ICU for further management.  She has no known alleviating or exacerbating factors to her symptoms.\"      Patient with known metastatic colon cancer to liver, bladder was treated for klebsiella esbl uti here. She had prior history of recurrent uti's, and recently completed chemotherapy. She was initially admitted to ICU and started on meropenem, vancomycin. Aslo seen by palliative due to poor prognosis associated with her cancer. She expressed interest in de-escalation of care. Per ID (), they recommended continuing meropenem, and follow up with cultures. Follow up blood cultures are negative, and urine culture was positive for candida. She received one dose of fluconazole on 9/14. She completed meropenem yesterday. Patient unfortunately is at high risk for future bladder infections due to a  colovesico fistula, resulting in dysuria, hematuria. Given advanced cancer with progression to liver, bladder she is a poor " "candidate for any future treatments. The recommendation is hospice [facility] Currently she is relatively stable, afebrile, improved bowel function and lately tolerating activity better. Has anasarca due to protein loss.       Per heme/onc \" 3/2016-Widely metastatic sigmoid colon cancer with invasion into the bladder resulting in colovesicle fistula and liver mets s/p bladder and sigmoid biopsies consistent with adenocarcinoma of the colon. CEA elevated    - emergent surgery  TUR 3/17/16 removal of large mass showed metastatic adenocarcinoma, IHC could not differentiate bladder versus colon .  s/p Exploratory laparotomy with small bowel resection and sigmoid colectomy, cystectomy on 4/17/16 showed 5.0 cm lowgrade adenocarcinoma, margins uninvolved, lymphovascular invasion present, extensive, 11/13 lymph nodes positive, tumor directly invading the adjacent structures bladder and soft tissues, [id number]   Been through varous chemo regimens per my note from 8/1/18.\"    Therefore, she is discharged in fair and stable condition to hospice.    The patient met 2-midnight criteria for an inpatient stay at the time of discharge.    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    Sepsis due to urinary tract infection (HCC) POA: Unknown    Colostomy in place (HCC) POA: Yes    Morbid obesity with BMI of 40.0-44.9, adult (HCC) POA: Yes    Deep vein thrombosis (DVT) of both lower extremities (HCC) POA: Yes      Overview: Diagnois update 10/1/2016    Lactic acidosis POA: Yes    " Neutropenia (HCC) POA: Yes    Abnormal LFTs POA: Yes    Anemia POA: Yes    Protein-calorie malnutrition, severe (HCC) POA: Yes    DNR (do not resuscitate) POA: Yes    Peristomal hernia POA: Unknown  Resolved Problems:    Hypoalbuminemia POA: Unknown      FOLLOW UP  Future Appointments  Date Time Provider Department Center   9/26/2018 10:00 AM [facility] CT 1 [facility] [place]     [facility]  [address]  [place] [address]  [phone]  Call        MEDICATIONS ON DISCHARGE       Medication List      START taking these medications      Instructions   ALPRAZolam 0.25 MG Tabs  Commonly known as:  XANAX   Take 1 Tab by mouth 3 times a day as needed for Anxiety for up to 10 days.  Dose:  0.25 mg     oxyCODONE immediate-release 5 MG Tabs  Commonly known as:  ROXICODONE   Take 1 Tab by mouth every 6 hours as needed for up to 3 days.  Dose:  5 mg     phenazopyridine 100 MG Tabs  Commonly known as:  PYRIDIUM   Take 1 Tab by mouth 3 times a day, with meals.  Dose:  100 mg        CONTINUE taking these medications      Instructions   ferrous sulfate 325 (65 Fe) MG EC tablet   Take 1 Tab by mouth 2 Times a Day.  Dose:  325 mg     furosemide 20 MG Tabs  Commonly known as:  LASIX   Take 20 mg by mouth 1 time daily as needed.  Dose:  20 mg     vitamin D 1000 UNIT Tabs  Commonly known as:  cholecalciferol   Take 1,000 Units by mouth every day.  Dose:  1000 Units        STOP taking these medications    warfarin 1 MG Tabs  Commonly known as:  COUMADIN              Allergies  No Known Allergies    DIET  Orders Placed This Encounter   Procedures   • Diet Order Regular     Standing Status:   Standing     Number of Occurrences:   1     Order Specific Question:   Diet:     Answer:   Regular [1]       ACTIVITY  As tolerated.  Weight bearing as tolerated    CONSULTATIONS  ID (), Heme/onc ( ), Palliative, Pulmonary    PROCEDURES  none    LABORATORY  Lab Results   Component Value Date    SODIUM 135 09/19/2018     POTASSIUM 4.5 09/19/2018    CHLORIDE 100 09/19/2018    CO2 25 09/19/2018    GLUCOSE 117 (H) 09/19/2018    BUN 18 09/19/2018    CREATININE 0.77 09/19/2018        Lab Results   Component Value Date    WBC 13.8 (H) 09/20/2018    HEMOGLOBIN 8.7 (L) 09/20/2018    HEMATOCRIT 28.4 (L) 09/20/2018    PLATELETCT 286 09/20/2018        Total time of the discharge process exceeds 55 minutes.

## 2018-09-20 NOTE — WOUND TEAM
Asked by RN to see patient for hernia belt.  Unfortunately we do not have any hernia belts, but brought patient edge [employer] ([employer]), which she already uses for her supplies, marked pages with hernia belts and talked about options.  Patient will call and order.  No other needs at this time.

## 2018-09-20 NOTE — PROGRESS NOTES
[facility] Hospitalist Progress Note    Date of Service: 2018    Chief Complaint  62 y.o. female admitted 2018 with hx of advanced colon Ca w mets to bladder, ESBL Ecoli UTI, Hypertension , anemia  admitted 2018 with dysuria .  Dx with Septic shock- resuscitated in ICU with IVFs, antibiotics. Had worsened anemia with hematuria requiring prbc tx.     Interval Problem Update  : patient with known metastatic colon cancer to liver, bladder. onc recommending hospice. Per id merropenem stopping tomorrow, added pyridium. Overall stable, reporting slight improvement in activity tolerance. Lives with her . Bothered by gas pains. Also mild peristomal hernia appreciated, low stool output per rn. Asked to administer mg citrate today. Advised patient to avoid straining. Need to optimize bowel regimen    : patient is relatively stable. Reporting improvement in abdominal pain, bowel movements. Mood is stable. Still generalized anasarca due to protein loss, endorses drinking boost supplement shake. Hospice consult ordered. Completed miguelito today    Consultants/Specialty  ID (), Onc (), pulm ()    Disposition  Anticipate discharge tomorrow with hospice        Review of Systems   Constitutional: Negative for chills and fever.   HENT: Negative for hearing loss.    Eyes: Negative for blurred vision and double vision.   Respiratory: Negative for cough and hemoptysis.    Cardiovascular: Negative for chest pain and palpitations.   Gastrointestinal: Negative for abdominal pain, heartburn, nausea and vomiting.   Genitourinary: Negative for dysuria.   Musculoskeletal: Negative for myalgias and neck pain.   Skin: Negative for rash.   Neurological: Negative for dizziness.   Psychiatric/Behavioral: Negative for depression.      Physical Exam  Laboratory/Imaging   Hemodynamics  Temp (24hrs), Av.8 °C (98.2 °F), Min:36.2 °C (97.1 °F), Max:37.5 °C (99.5 °F)   Temperature: 36.6 °C (97.9  °F)  Pulse  Av.6  Min: 64  Max: 128    Blood Pressure: 121/89      Respiratory      Respiration: 18, Pulse Oximetry: 92 %     Work Of Breathing / Effort: Mild  RUL Breath Sounds: Clear, RML Breath Sounds: Clear, RLL Breath Sounds: Diminished, ROLF Breath Sounds: Clear, LLL Breath Sounds: Diminished    Fluids  No intake or output data in the 24 hours ending 18 1806    Nutrition  Orders Placed This Encounter   Procedures   • Diet Order Regular     Standing Status:   Standing     Number of Occurrences:   1     Order Specific Question:   Diet:     Answer:   Regular [1]     Physical Exam   Constitutional: She is oriented to person, place, and time. No distress.   Eyes: Scleral icterus is present.   Neck: Neck supple.   Cardiovascular: Normal rate, regular rhythm and normal heart sounds.    Pulmonary/Chest: Effort normal and breath sounds normal. No respiratory distress. She has no wheezes.   Abdominal: Soft. Bowel sounds are normal. She exhibits no distension.   (+) colostomy--normal output   Musculoskeletal: She exhibits no edema or tenderness.   Neurological: She is alert and oriented to person, place, and time.   Skin: Skin is warm and dry.       Recent Labs      18   0317  18   0001  18   0010   WBC  13.1*  10.5  13.3*   RBC  2.53*  2.53*  2.54*   HEMOGLOBIN  8.6*  8.6*  8.6*   HEMATOCRIT  26.5*  26.5*  26.6*   MCV  104.7*  104.7*  104.7*   MCH  34.0*  34.0*  33.9*   MCHC  32.5*  32.5*  32.3*   RDW  114.2*  114.0*  112.9*   PLATELETCT  240  241  258   MPV  10.7  11.1  10.8     Recent Labs      18   0317  18   0010   SODIUM  133*  135   POTASSIUM  4.5  4.5   CHLORIDE  99  100   CO2  24  25   GLUCOSE  92  117*   BUN  19  18   CREATININE  0.66  0.77   CALCIUM  7.7*  7.7*                      Assessment/Plan     * ESBL (extended spectrum beta-lactamase) producing bacteria infection- (present on admission)   Assessment & Plan    From Recent cultures. Has had repeat urine + non Esbl  E coli  IV merrem .  Discussed with Dr. Scott - plan thru 9-19.  Added pyridium    9/19: completed miguelito        Sepsis due to urinary tract infection (HCC)   Assessment & Plan    Septic shock due to E coli UTI.    Elevated lactic acid that went up to 7.6 post Fluid resuscitation with  metabolic acidosis resolved.   Immunocompromised patient due to chemotherapy  Recurrent UTIs secondary to colon carcinoma invading the bladder causing infections.  Has hx of ESBL infection and is currently being treated with IV Merrem.  The organ system failure associated with this disease process is the cardiovascular system as is evidenced by the severe lactic acidosis consistent with organ-system ischemia.            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   Hx of chemo per Dr. Christy - Need to contact him regarding future treatment options . She may consider hospice if no additional treatment.           Adenocarcinoma of colon metastatic to liver (HCC)- (present on admission)   Assessment & Plan    Jaundiced  Hospice expected        Colostomy in place (HCC)- (present on admission)   Assessment & Plan    Ostomy care        Peristomal hernia   Assessment & Plan    Strict bowel regimen  Monitor for ostomy leak, may need better fitting appliance        DNR (do not resuscitate)- (present on admission)   Assessment & Plan    Updated in Epic  She would like to further discuss with her oncologist before decision regarding hospice.         Protein-calorie malnutrition, severe (HCC)- (present on admission)   Assessment & Plan    This is a clinical diagnosis in the setting of advanced, metastatic cancer and cancer-induced cachexia.  Albumin is severely low at 1.9        Anemia- (present on admission)   Assessment & Plan    Acute on chronic - due to dilution, hematuria - Hb dropped to 6.5 and required tx.  Continues to  improve.  No AC  Fu CBC        Abnormal LFTs- (present on admission)   Assessment & Plan    Likely due to metastatic disease to liver. CT shows extension of dz.          Neutropenia (HCC)- (present on admission)   Assessment & Plan    WBC 1.9 on admit -- now resolved.         Lactic acidosis- (present on admission)   Assessment & Plan    Improved - AGMA resolved        Deep vein thrombosis (DVT) of both lower extremities (HCC)- (present on admission)   Assessment & Plan    Hold anticoagulation given severe anemia          Morbid obesity with BMI of 40.0-44.9, adult (HCC)- (present on admission)   Assessment & Plan    BMI 40          Quality-Core Measures   Reviewed items::  Labs reviewed, Radiology images reviewed and Medications reviewed

## 2018-09-20 NOTE — HEART FAILURE PROGRAM
[facility] hospice to meet with pt at patients home on 9/20 per patient request.  Patients  to call hospice with appointment time.

## 2018-09-20 NOTE — PALLIATIVE CARE
Palliative Care follow-up  PC RN left another vm message for pt's spouse, Guero re: bringing a copy of AD from home. No return call yet.     Thank you for allowing Palliative Care to participate in this patient's care. Please feel free to call [phone] with any questions or concerns.

## 2018-09-20 NOTE — PROGRESS NOTES
Tamara was eager to go home today. She became nauseated and vomited 50mL of food this am. Zofran given with good relief. She stated she has a prescription of zofran at home if needed. Scripts given to pt for Xanax, Pyridium and Oxycodone. Pt stated she has CBD oil with THC at home that she uses PRN. L port flushed with NS, heparin and deaccessed. D/c instructions reviewed with pt. [facility] hosp[facility] set up for d/c. Pt taken off unit in W/c accompanied by  and CNA at approx 1230.

## 2018-09-24 NOTE — DOCUMENTATION QUERY
DOCUMENTATION QUERY    PROVIDERS: Please select “Cosign w/ note”to reply to query.    To better represent the severity of illness of your patient, please review the following information and exercise your independent professional judgment in responding to this query.     Patient presents with a history of recurrent UTIs secondary to metastatic colon cancer to the bladder. Patient was had recently finished treatment for ESBL/non-ESBL (both documented) E coli UTI prior to admission. Patient is again being treated for a UTI.    It is documented that urine cultures were only growing candida. Sepsis secondary to E coli UTI is documented in the Progress Notes.    Based on clinical findings, risk factors and treatment, can sepsis be further clarified as    1. Secondary to candida UTI  2. Secondary to E coli UTI  3. Other explanation of clinical presentation (please document)  4. Unable to determine    The medical record reflects the following:   Clinical Findings  sepsis   Previous ESBL/non ESBL UTI   Pyelonephritis   Candida   Treatment  Diflucan, meropenem   Risk Factors     Location within medical record  History and Physical, Progress Notes, Discharge Summary and Lab Results      Thank you,   Hanna Cervantes

## 2018-10-04 ENCOUNTER — APPOINTMENT (OUTPATIENT)
Dept: HOSPICE | Facility: HOSPICE | Age: 63
End: 2018-10-04
Payer: COMMERCIAL

## 2018-11-09 NOTE — MR AVS SNAPSHOT
Tamara Hollinsnavin Flynn   2017 11:15 AM   Anticoagulation Visit   MRN: [id number]    Department:  Vascular Medicine   Dept Phone:  [phone]    Description:  Female : 1955   Provider:  [facility] EXAM 5           Allergies as of 2017     No Known Allergies      You were diagnosed with     Deep vein thrombosis (DVT) of both lower extremities, unspecified chronicity, unspecified vein (CMS-HCC)   [[id number]]         Vital Signs     Smoking Status                   Never Smoker            Basic Information     Date Of Birth Sex Race Ethnicity Preferred Language    1955 Female White Non- English      Your appointments     May 22, 2017 11:15 AM   Established Patient with V EXAM 5   [facility]  (--)    [address]  [place] [address]   [phone]            May 24, 2017 10:30 AM   CT BODY WITH with [address] [place] [address] 1   [facility]ING [facility]    [facility] [place]  [address]  [address]  [place] [id number] [phone]           Taking medications as regularly scheduled is strongly encouraged.  *For Abdominal CT-Patient needs to  oral contrast and instruction from the department at least 2 hours prior to exam. Patient may  contrast at any imaging facility.            May 26, 2017 11:40 AM   Established Patient with Maritza Esparza M.D.   [facility]    [address]  [address]  [place] [id number] [phone]           You will be receiving a confirmation call a few days before your appointment from our automated call confirmation system.            May 31, 2017  8:30 AM   Established Patient with [facility] EXAM 4   [facility]  (--)    [address]  [place] [address]   [phone]              Problem List              ICD-10-CM Priority Class Noted - Resolved    Morbid  Native Organ Dx: Diabetes Mellitus - Type II      Not approved for LRD/CAD transplant due to extensive vascular calcification prohibiting kidney transplant, low blood pressures, and decreased functional status.     Spoke to patient, explained committee's decision. He voiced understanding.     Note written by Jaimie Morgan RN    ===============================================    I was present at the meeting and attest to the decision of the committee.   obesity with BMI of 40.0-44.9, adult (CMS-HCC) E66.01, Z68.41   11/12/2015 - Present    Health care maintenance Z00.00   11/12/2015 - Present    Prediabetes R73.03   12/7/2015 - Present    Adenocarcinoma of sigmoid colon (CMS-HCC) C18.7   3/22/2016 - Present    Family history of primary TB Z83.6 Low  4/20/2016 - Present    Adenocarcinoma of colon metastatic to liver (CMS-HCC) C18.9, C78.7 High  4/20/2016 - Present    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 K63.2 High  4/20/2016 - Present    Deep vein thrombosis (DVT) of both lower extremities (CMS-HCC) I82.403   5/23/2016 - Present    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   8/9/2016 - Present    Encounter for screening mammogram for malignant neoplasm of breast Z12.31   9/20/2016 - Present    Colostomy in place (CMS-HCC) Z93.3   2/17/2017 - Present      Health Maintenance        Date Due Completion Dates    PAP SMEAR 10/23/1975 ---    IMM ZOSTER VACCINE 10/23/2014 ---    MAMMOGRAM 11/1/2017 11/1/2016    COLONOSCOPY 3/22/2026 3/22/2016    IMM DTaP/Tdap/Td Vaccine (2 - Td) 2/17/2027 2/17/2017            Results     POCT Protime      Component    INR    1.9                        Current Immunizations     Influenza Vaccine Quad Inj (Pf) 11/12/2015    Influenza Vaccine Quad Inj (Preserved) 10/10/2016    Tdap Vaccine 2/17/2017      Below and/or attached are the medications your provider expects you to take. Review all of your home medications and newly ordered medications with your provider and/or pharmacist. Follow medication instructions as directed by your provider and/or pharmacist. Please keep your medication list with you and share with your provider. Update the information when medications are discontinued, doses are changed, or new medications (including over-the-counter products) are added; and carry medication information at all times in the event of emergency situations     Allergies:  No Known Allergies          Medications   Valid as of: May 22, 2017 - 11:06 AM    Generic Name Brand Name Tablet Size Instructions for use    Acetaminophen (Tab) TYLENOL 500 MG Take 500-1,000 mg by mouth as needed.        Cholecalciferol (Tab) cholecalciferol 1000 UNIT Take 1,000 Units by mouth every day.        Warfarin Sodium (Tab) COUMADIN 6 MG Take one to one and one-half (1-1.5) tablets daily as directed by [facility]        .                 Medicines prescribed today were sent to:     [address] (), [place] - [address]    [address] () [place] [address]    Phone: [phone] Fax: [phone]    Open 24 Hours?: No      Medication refill instructions:       If your prescription bottle indicates you have medication refills left, it is not necessary to call your provider’s office. Please contact your pharmacy and they will refill your medication.    If your prescription bottle indicates you do not have any refills left, you may request refills at any time through one of the following ways: The online [employer] system (except Urgent Care), by calling your provider’s office, or by asking your pharmacy to contact your provider’s office with a refill request. Medication refills are processed only during regular business hours and may not be available until the next business day. Your provider may request additional information or to have a follow-up visit with you prior to refilling your medication.   *Please Note: Medication refills are assigned a new Rx number when refilled electronically. Your pharmacy may indicate that no refills were authorized even though a new prescription for the same medication is available at the pharmacy. Please request the medicine by name with the pharmacy before contacting your provider for a refill.        Warfarin Dosing Calendar   May 2017 Details    Sun Mon Tue Wed Thu Fri Sat      1               2               3               4               5               6                  7               8               9               10               11               12               13                 14               15               16               17               18               19               20                 21               22   1.9   9 mg   See details      23      9 mg         24      6 mg         25      9 mg         26      6 mg         27      6 mg           28      9 mg         29      6 mg         30      9 mg         31      6 mg             Date Details   05/22 This INR check   INR: 1.9       Date of next INR:  5/31/2017         How to take your warfarin dose     To take:  6 mg Take 1 of the 6 mg tablets.    To take:  9 mg Take 1.5 of the 6 mg tablets.              Soft Tissue Regeneration Access Code: Activation code not generated  Current Soft Tissue Regeneration Status: Active

## 2019-12-29 NOTE — PROGRESS NOTES
Problem: Diabetes Self-Management  Goal: *Disease process and treatment process  Description  Define diabetes and identify own type of diabetes; list 3 options for treating diabetes. Outcome: Progressing Towards Goal  Goal: *Incorporating nutritional management into lifestyle  Description  Describe effect of type, amount and timing of food on blood glucose; list 3 methods for planning meals. Outcome: Progressing Towards Goal  Goal: *Incorporating physical activity into lifestyle  Description  State effect of exercise on blood glucose levels. Outcome: Progressing Towards Goal  Goal: *Developing strategies to promote health/change behavior  Description  Define the ABC's of diabetes; identify appropriate screenings, schedule and personal plan for screenings. Outcome: Progressing Towards Goal  Goal: *Using medications safely  Description  State effect of diabetes medications on diabetes; name diabetes medication taking, action and side effects. Outcome: Progressing Towards Goal  Goal: *Monitoring blood glucose, interpreting and using results  Description  Identify recommended blood glucose targets  and personal targets. Outcome: Progressing Towards Goal  Goal: *Prevention, detection, treatment of acute complications  Description  List symptoms of hyper- and hypoglycemia; describe how to treat low blood sugar and actions for lowering  high blood glucose level. Outcome: Progressing Towards Goal  Goal: *Prevention, detection and treatment of chronic complications  Description  Define the natural course of diabetes and describe the relationship of blood glucose levels to long term complications of diabetes.   Outcome: Progressing Towards Goal  Goal: *Developing strategies to address psychosocial issues  Description  Describe feelings about living with diabetes; identify support needed and support network  Outcome: Progressing Towards Goal  Goal: *Insulin pump training  Outcome: Resolved/Met  Note:   N/A  Goal: *Sick [facility]ist Progress Note    Date of Service: 2018    Chief Complaint  62 y.o. Female hx of advanced colon Ca w mets to bladder, ESBL Ecoli UTI, Hypertension , anemia  admitted 2018 with dysuria .  Dx with Septic shock- resuscitated in ICU with IVFs, antibiotics. Had worsened anemia with hematuria requiring prbc tx.     Interval Problem Update  Downgraded to Tele. Urine Cx + E coli. Generalized weakness and debility . Discussed advanced directive .  Reports previously evaluated by hospice and that she is not ready to enroll.     Consultants/Specialty  Dr. Conley, ID    Disposition  Plan PT/OT eval.        Review of Systems   Constitutional: Positive for malaise/fatigue. Negative for chills, diaphoresis and fever.   Respiratory: Negative for cough and shortness of breath.    Cardiovascular: Positive for leg swelling. Negative for chest pain and palpitations.   Gastrointestinal: Negative for abdominal pain, diarrhea and vomiting.   Genitourinary: Positive for dysuria and hematuria (improved. ). Negative for flank pain.   Musculoskeletal: Negative for back pain, joint pain and neck pain.   Neurological: Positive for weakness. Negative for tremors, sensory change, speech change and focal weakness.      Physical Exam  Laboratory/Imaging   Hemodynamics  Temp (24hrs), Av.2 °C (98.9 °F), Min:36.7 °C (98.1 °F), Max:37.4 °C (99.3 °F)   Temperature: 36.7 °C (98.1 °F)  Pulse  Av.2  Min: 88  Max: 128    Blood Pressure: 119/71, NIBP: 111/70      Respiratory      Respiration: 16, Pulse Oximetry: 91 %     Work Of Breathing / Effort: Mild;Shallow  RUL Breath Sounds: Expiratory Wheezes, RML Breath Sounds: Expiratory Wheezes, RLL Breath Sounds: Expiratory Wheezes, ROLF Breath Sounds: Expiratory Wheezes, LLL Breath Sounds: Expiratory Wheezes    Fluids    Intake/Output Summary (Last 24 hours) at 18 1046  Last data filed at 18 0100   Gross per 24 hour   Intake              120 ml   Output               150 ml   Net              -30 ml       Nutrition  Orders Placed This Encounter   Procedures   • Diet Order Regular     Standing Status:   Standing     Number of Occurrences:   1     Order Specific Question:   Diet:     Answer:   Regular [1]     Physical Exam   Constitutional: She is oriented to person, place, and time. No distress.   HENT:   Head: Normocephalic and atraumatic.   Right Ear: External ear normal.   Left Ear: External ear normal.   Nose: Nose normal.   Eyes: EOM are normal. Right eye exhibits no discharge. Left eye exhibits no discharge. No scleral icterus.   Neck: Neck supple. No JVD present.   Cardiovascular: Normal rate and regular rhythm.    No murmur heard.  Pulmonary/Chest: Effort normal. No stridor. She has no wheezes. She has no rales.   Left chest shaun cath   Abdominal: Soft. Bowel sounds are normal. She exhibits no distension. There is no tenderness.   Musculoskeletal: She exhibits edema (2+ lower ext. ). She exhibits no tenderness.   Generalized 4/5 strength.    Neurological: She is alert and oriented to person, place, and time. No cranial nerve deficit.   Skin: Skin is warm and dry. She is not diaphoretic. No pallor.   Psychiatric: She has a normal mood and affect. Her behavior is normal.   Vitals reviewed.      Recent Labs      09/14/18   0406  09/15/18   2240  09/16/18   0344   WBC  4.2*  8.0  9.2   RBC  1.95*  2.47*  2.34*   HEMOGLOBIN  6.5*  8.0*  7.9*   HEMATOCRIT  20.7*  25.4*  23.9*   MCV  106.2*  102.8*  102.1*   MCH  33.3*  32.4  33.8*   MCHC  31.4*  31.5*  33.1*   RDW  118.5*  104.7*  103.8*   PLATELETCT  151*  184  187   MPV  11.6  10.8  10.6     Recent Labs      09/15/18   0456  09/16/18   0344   SODIUM  135  133*   POTASSIUM  3.9  4.3   CHLORIDE  100  97   CO2  27  28   GLUCOSE  108*  104*   BUN  22  22   CREATININE  0.75  0.70   CALCIUM  7.7*  7.8*     Recent Labs      09/14/18   0406  09/15/18   0456   INR  1.43*  1.44*                  Assessment/Plan     * Sepsis due to  day guidelines  Outcome: Progressing Towards Goal  Goal: *Patient Specific Goal (EDIT GOAL, INSERT TEXT)  Outcome: Progressing Towards Goal  Note:   \"I just want to feel better\"     Problem: Patient Education: Go to Patient Education Activity  Goal: Patient/Family Education  Outcome: Progressing Towards Goal     Problem: Falls - Risk of  Goal: *Absence of Falls  Description  Document Ling Miranda Fall Risk and appropriate interventions in the flowsheet. Outcome: Progressing Towards Goal  Note: Fall Risk Interventions:  Mobility Interventions: Assess mobility with egress test, Communicate number of staff needed for ambulation/transfer, Patient to call before getting OOB         Medication Interventions: Evaluate medications/consider consulting pharmacy, Patient to call before getting OOB, Teach patient to arise slowly    Elimination Interventions: Call light in reach, Patient to call for help with toileting needs, Stay With Me (per policy), Toileting schedule/hourly rounds              Problem: Patient Education: Go to Patient Education Activity  Goal: Patient/Family Education  Outcome: Progressing Towards Goal     Problem: Pressure Injury - Risk of  Goal: *Prevention of pressure injury  Description  Document Candelario Scale and appropriate interventions in the flowsheet.   Outcome: Progressing Towards Goal  Note: Pressure Injury Interventions:       Moisture Interventions: Absorbent underpads, Limit adult briefs, Maintain skin hydration (lotion/cream), Offer toileting Q_hr    Activity Interventions: Pressure redistribution bed/mattress(bed type), Increase time out of bed    Mobility Interventions: Pressure redistribution bed/mattress (bed type)    Nutrition Interventions: Document food/fluid/supplement intake, Offer support with meals,snacks and hydration                     Problem: Patient Education: Go to Patient Education Activity  Goal: Patient/Family Education  Outcome: Progressing Towards Goal     Problem: Nausea/Vomiting (Adult)  Goal: *Absence of nausea/vomiting  Outcome: Progressing Towards Goal  Note:   Symptoms well-controlled over course of day; tolerating clear liquids and PO medication well.      Problem: Patient Education: Go to Patient Education Activity  Goal: Patient/Family Education  Outcome: Progressing Towards Goal urinary tract infection (HCC)   Assessment & Plan    Septic shock due to E coli UTI.    Elevated lactic acid that went up to 7.6 post Fluid resuscitation with  metabolic acidosis resolved.   Immunocompromised patient due to chemotherapy  Recurrent UTIs secondary to colon carcinoma invading the bladder causing infections.  Has hx of ESBL infection and is currently being treated with IV Merrem.  The organ system failure associated with this disease process is the cardiovascular system as is evidenced by the severe lactic acidosis consistent with organ-system ischemia.            ESBL (extended spectrum beta-lactamase) producing bacteria infection- (present on admission)   Assessment & Plan    Repeat urine + non Esbl E coli - IV merrem- ID to eval for de escalation.          Colostomy in place (HCC)- (present on admission)   Assessment & Plan    Ostomy care        DNR (do not resuscitate)- (present on admission)   Assessment & Plan    Updated in Epic  Discussed with Palliative care--  Not ready for hospice but plans to fu.         Protein-calorie malnutrition, severe (HCC)- (present on admission)   Assessment & Plan    This is a clinical diagnosis in the setting of advanced, metastatic cancer and cancer-induced cachexia.  Albumin is severely low at 1.9        Anemia- (present on admission)   Assessment & Plan    Acute on chronic - due to dilution, hematuria - Hb dropped to 6.5 -- improved post pRBCs.  No AC  Fu CBC        Abnormal LFTs- (present on admission)   Assessment & Plan    Likely due to metastatic disease to liver. CT shows extension of dz.          Neutropenia (HCC)- (present on admission)   Assessment & Plan    WBC 1.9 on admit -- now resolved.         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   Hx of chemo per  Dr. Latanya Hale to re eval.          Deep vein thrombosis (DVT) of both lower extremities (HCC)- (present on admission)   Assessment & Plan    Hold anticoagulation given severe anemia          Morbid obesity with BMI of 40.0-44.9, adult (HCC)- (present on admission)   Assessment & Plan    BMI 40          Quality-Core Measures   Reviewed items::  Radiology images reviewed, Medications reviewed and Labs reviewed  Traore catheter::  No Traore  DVT prophylaxis - mechanical:  SCDs  Antibiotics:  Treating active infection/contamination beyond 24 hours perioperative coverage      Discussed with multi disciplinary team plan of care.

## 2020-02-07 NOTE — PROGRESS NOTES
Anticoagulation Summary as of 8/7/2017     INR goal 2.0-3.0   Selected INR 2.7 (8/7/2017)   Maintenance plan 6 mg (6 mg x 1) every day   Weekly total 42 mg   Plan last modified DEBBI Shin (7/31/2017)   Next INR check 8/21/2017   Target end date Indefinite    Indications   Deep vein thrombosis (DVT) of both lower extremities (CMS-HCC) [I82.403]         Anticoagulation Episode Summary     INR check location     Preferred lab     Send INR reminders to     Comments [facility]      Anticoagulation Care Providers     Provider Role Specialty Phone number    Yimi Martinez M.D. Referring Surgery [phone]    DIMITRI SchofieldD Responsible      [facility] Responsible  [phone]        Anticoagulation Patient Findings      Current Outpatient Prescriptions on File Prior to Visit   Medication Sig Dispense Refill   • warfarin (COUMADIN) 6 MG Tab Take one to one and one-half (1-1.5) tablets daily as directed by [facility] 135 Tab 1   • cyclobenzaprine (FLEXERIL) 5 MG tablet Take 1-2 Tabs by mouth 2 times a day as needed. 60 Tab 0   • meloxicam (MOBIC) 15 MG tablet Take 1 Tab by mouth every day. 60 Tab 0   • oxycodone immediate-release (ROXICODONE) 5 MG Tab Take 1 Tab by mouth 3 times a day as needed for Severe Pain. 30 Tab 0   • meloxicam (MOBIC) 15 MG tablet Take 1 Tab by mouth every day. 60 Tab 1   • vitamin D (CHOLECALCIFEROL) 1000 UNIT Tab Take 1,000 Units by mouth every day.     • acetaminophen (TYLENOL) 500 MG Tab Take 500-1,000 mg by mouth as needed.       No current facility-administered medications on file prior to visit.       Lab Results   Component Value Date/Time    SODIUM 137 08/08/2016 02:27 PM    POTASSIUM 3.8 08/08/2016 02:27 PM    CHLORIDE 103 08/08/2016 02:27 PM    CO2 25 08/08/2016 02:27 PM    GLUCOSE 123* 08/08/2016 02:27 PM    BUN 25* 08/08/2016 02:27 PM    CREATININE 0.82 08/08/2016 02:27 PM          Tamara Flynn seen in clinic today  INR   is-therapeutic.    Denies signs/symptoms of bleeding and/or thrombosis.    Denies changes to diet or medications.   Follow up appointment in 2 week(s).      Will continue same warfarin dosing.     Beverly Krause, PHARMD              pt will speak with the surgeon and the  anesthesiologist preop

## 2024-12-13 NOTE — PROGRESS NOTES
Anticoagulation Summary as of 5/15/2017     INR goal 2.0-3.0   Selected INR 1.6! (5/15/2017)   Maintenance plan 9 mg (6 mg x 1.5) on Sun, Thu; 6 mg (6 mg x 1) all other days   Weekly total 48 mg   Plan last modified Re Briones [email or web address] (5/15/2017)   Next INR check 5/22/2017   Target end date Indefinite    Indications   Deep vein thrombosis (DVT) of both lower extremities (CMS-HCC) [I82.403]         Anticoagulation Episode Summary     INR check location     Preferred lab     Send INR reminders to     Comments [facility]      Anticoagulation Care Providers     Provider Role Specialty Phone number    Yimi Martinez M.D. Referring Surgery [phone]    DIMITRI SchofieldD Responsible      [facility] Responsible  [phone]        Patient is subtherapeutic today. Denies any medication or diet changes. No current symptoms of bleeding or thrombosis reported. No recent VTEs. Will increase regimen. Follow up in 1 week.    Next Appointment: Monday, May 22, 2017 at 11:1 am.      Re HUITRON           No

## 2025-05-14 NOTE — PROGRESS NOTES
Bedside report received. Pt is resting in bed.  Patient A&O x 4 on 2 L via NC.  Complains of 3/10 abdominal pain, pt has asked to wait for pain meds until she is ready to go to sleep.  POC discussed with patient.  Patient verbalized understanding.  Call light and belongings with in reach.  Bed locked and in lowest position, alarm and fall precautions in place.    Rehabilitation Discharge Instructions    Medications:  Please see medication list for complete list of home medications.  Bring copy to your doctor appointment.  Follow up with your doctor regarding influenza and/or pneumonia vaccine(s).    Activity:  Ambulate with two wheeled walker with supervision    Diabetic Instruction:  Check blood sugar: Two times a day before breakfast and before supper.     Smoking:  Avoid all tobacco products and second hand smoke.  Smoking Cessation Counseling offered:        [place] Toll Free Quit Line: [phone]    Diet / Swallow Guidelines:  Special Diet: Consistent carb moderate with low sodium.     Wound Care Instructions:  Not Applicable    Call Your Doctor If:  You have symptoms that are not \"normal\" for you.  You have new or worse symptoms or pain, not relieved by medicine or rest.   If you have sudden numbness or weakness of the face, arm or leg, or trouble walking or loss of balance; sudden confusion, speaking or seeing, sudden severe headache, chest pain or trouble breathing, dial 911  Other:     Referrals:  Other: Home Exercise Program.

## (undated) DEVICE — ELECTRODE MONOPOLAR ANGLED CUTTING LOOP DIAM 0.35 YELLOW 24FR (6EA/PK)

## (undated) DEVICE — KIT ANESTHESIA W/CIRCUIT & 3/LT BAG W/FILTER (20EA/CA)

## (undated) DEVICE — PACK CYSTOSCOPY - (14/CA)

## (undated) DEVICE — SLEEVE, VASO, THIGH, MED

## (undated) DEVICE — SENSOR SPO2 NEO LNCS ADHESIVE (20/BX) SEE USER NOTES

## (undated) DEVICE — LACTATED RINGERS INJ 1000 ML - (14EA/CA 60CA/PF)

## (undated) DEVICE — SPONGE GAUZESTER 4 X 4 4PLY - (128PK/CA)

## (undated) DEVICE — COVER FOOT UNIVERSAL DISP. - (25EA/CA)

## (undated) DEVICE — WATER IRRIG. STER 3000 ML - (4/CA)

## (undated) DEVICE — CATHETER FOLEY 22 FR 30 CC (12EA/CA)

## (undated) DEVICE — HEAD HOLDER JUNIOR/ADULT

## (undated) DEVICE — BAG DRAINAGE URINARY CLOSED 2000ML (20EA/CA)

## (undated) DEVICE — NEPTUNE 4 PORT MANIFOLD - (20/PK)

## (undated) DEVICE — SUCTION INSTRUMENT YANKAUER BULBOUS TIP W/O VENT (50EA/CA)

## (undated) DEVICE — ELECTRODE DUAL RETURN W/ CORD - (50/PK)

## (undated) DEVICE — SYRINGE 30 ML LL (56/BX)

## (undated) DEVICE — MASK ANESTHESIA ADULT  - (100/CA)

## (undated) DEVICE — WATER IRRIG. STER. 1500 ML - (9/CA)

## (undated) DEVICE — TUBE CONNECT SUCTION CLEAR 120 X 1/4" (50EA/CA)"

## (undated) DEVICE — GOWN SURGEONS X-LARGE - DISP. (30/CA)

## (undated) DEVICE — SET IRRIGATION CYSTOSCOPY Y-TYPE L81 IN (20EA/CA)

## (undated) DEVICE — GLOVE BIOGEL PI ORTHO SZ 6 1/2 SURGICAL PF LF (40PR/BX)

## (undated) DEVICE — GLOVE BIOGEL PI INDICATOR SZ 6.5 SURGICAL PF LF - (50/BX 4BX/CA)

## (undated) DEVICE — GOWN WARMING STANDARD FLEX - (30/CA)

## (undated) DEVICE — GOWN SURGICAL X-LARGE ULTRA - FILM-REINFORCED (20/CA)

## (undated) DEVICE — JELLY, KY 2 0Z STERILE

## (undated) DEVICE — SET LEADWIRE 5 LEAD BEDSIDE DISPOSABLE ECG (1SET OF 5/EA)

## (undated) DEVICE — SET EXTENSION WITH 2 PORTS (48EA/CA) ***PART #2C8610 IS A SUBSTITUTE*****

## (undated) DEVICE — PROTECTOR ULNA NERVE - (36PR/CA)

## (undated) DEVICE — KIT ROOM DECONTAMINATION

## (undated) DEVICE — CONTAINER SPECIMEN BAG OR - STERILE 4 OZ W/LID (100EA/CA)

## (undated) DEVICE — MEDICINE CUP STERILE 2 OZ - (100/CA)

## (undated) DEVICE — TOWELS CLOTH SURGICAL - (4/PK 20PK/CA)

## (undated) DEVICE — GLOVE BIOGEL SZ 7.5 SURGICAL PF LTX - (50PR/BX 4BX/CA)

## (undated) DEVICE — ELECTRODE 850 FOAM ADHESIVE - HYDROGEL RADIOTRNSPRNT (50/PK)

## (undated) DEVICE — TUBE E-T HI-LO CUFF 6.5MM (10EA/BX)

## (undated) DEVICE — PACK SINGLE BASIN - (6/CA)

## (undated) DEVICE — ELECTRODE ROLLERBALL 24FR - (10/BX) OLD # RE-24

## (undated) DEVICE — EVACUATOR BLADDER ELLIK - (10/BX)

## (undated) DEVICE — TUBING CLEARLINK DUO-VENT - C-FLO (48EA/CA)

## (undated) DEVICE — CONNECTOR HOSE NEPTUNE FOR CYSTO ROOM

## (undated) DEVICE — BAG URODRAIN WITH TUBING - (20/CA)